# Patient Record
Sex: MALE | Race: WHITE | NOT HISPANIC OR LATINO | Employment: UNEMPLOYED | URBAN - METROPOLITAN AREA
[De-identification: names, ages, dates, MRNs, and addresses within clinical notes are randomized per-mention and may not be internally consistent; named-entity substitution may affect disease eponyms.]

---

## 2017-01-03 ENCOUNTER — HOSPITAL ENCOUNTER (OUTPATIENT)
Dept: CARDIOLOGY | Facility: HOSPITAL | Age: 57
Discharge: HOME | End: 2017-01-03
Admitting: INTERNAL MEDICINE

## 2017-01-03 PROCEDURE — 85610 PROTHROMBIN TIME: CPT | Performed by: INTERNAL MEDICINE

## 2017-01-03 RX ORDER — RAMIPRIL 1.25 MG/1
CAPSULE ORAL
Qty: 30 CAPSULE | Refills: 0 | Status: SHIPPED | OUTPATIENT
Start: 2017-01-03 | End: 2017-02-16 | Stop reason: SDUPTHER

## 2017-01-03 RX ORDER — WARFARIN SODIUM 2 MG/1
TABLET ORAL
Qty: 45 TABLET | Refills: 0 | Status: SHIPPED | OUTPATIENT
Start: 2017-01-03 | End: 2017-02-16 | Stop reason: SDUPTHER

## 2017-01-03 RX ORDER — AMIODARONE HYDROCHLORIDE 200 MG/1
TABLET ORAL
Qty: 30 TABLET | Refills: 0 | Status: SHIPPED | OUTPATIENT
Start: 2017-01-03 | End: 2017-02-16 | Stop reason: SDUPTHER

## 2017-01-09 DIAGNOSIS — Z79.01 ANTICOAGULATED BY ANTICOAGULATION TREATMENT: ICD-10-CM

## 2017-01-09 DIAGNOSIS — I48.0 PAROXYSMAL ATRIAL FIBRILLATION (HCC): Primary | ICD-10-CM

## 2017-01-09 DIAGNOSIS — Z95.3 S/P MITRAL VALVE REPLACEMENT WITH PORCINE VALVE: ICD-10-CM

## 2017-01-10 ENCOUNTER — HOSPITAL ENCOUNTER (OUTPATIENT)
Dept: CARDIOLOGY | Facility: HOSPITAL | Age: 57
Discharge: HOME | End: 2017-01-10
Admitting: INTERNAL MEDICINE

## 2017-01-10 PROCEDURE — 85610 PROTHROMBIN TIME: CPT | Performed by: INTERNAL MEDICINE

## 2017-01-13 ENCOUNTER — OFFICE VISIT (OUTPATIENT)
Dept: CARDIOLOGY | Facility: CLINIC | Age: 57
End: 2017-01-13

## 2017-01-13 ENCOUNTER — HOSPITAL ENCOUNTER (OUTPATIENT)
Dept: CARDIOLOGY | Facility: HOSPITAL | Age: 57
Discharge: HOME | End: 2017-01-13
Admitting: INTERNAL MEDICINE

## 2017-01-13 VITALS
HEART RATE: 75 BPM | WEIGHT: 225 LBS | DIASTOLIC BLOOD PRESSURE: 85 MMHG | SYSTOLIC BLOOD PRESSURE: 144 MMHG | BODY MASS INDEX: 31.38 KG/M2

## 2017-01-13 DIAGNOSIS — F10.20 ALCOHOLISM (HCC): ICD-10-CM

## 2017-01-13 DIAGNOSIS — Z95.2 S/P MVR (MITRAL VALVE REPLACEMENT): ICD-10-CM

## 2017-01-13 DIAGNOSIS — I10 BENIGN ESSENTIAL HYPERTENSION: Primary | ICD-10-CM

## 2017-01-13 DIAGNOSIS — Z98.890 S/P TVR (TRICUSPID VALVE REPAIR): ICD-10-CM

## 2017-01-13 DIAGNOSIS — J43.8 OTHER EMPHYSEMA (HCC): ICD-10-CM

## 2017-01-13 DIAGNOSIS — I10 ESSENTIAL HYPERTENSION: ICD-10-CM

## 2017-01-13 PROBLEM — R94.31 ABNORMAL ELECTROCARDIOGRAM: Status: ACTIVE | Noted: 2017-01-13

## 2017-01-13 PROCEDURE — 85610 PROTHROMBIN TIME: CPT | Performed by: INTERNAL MEDICINE

## 2017-01-13 PROCEDURE — 93000 ELECTROCARDIOGRAM COMPLETE: CPT | Performed by: INTERNAL MEDICINE

## 2017-01-13 PROCEDURE — 99214 OFFICE O/P EST MOD 30 MIN: CPT | Performed by: INTERNAL MEDICINE

## 2017-01-13 NOTE — MR AVS SNAPSHOT
Joaquín Garcia   1/13/2017 9:30 AM   Office Visit    Dept Phone:  [phone]   Encounter #:  [id number]    Provider:  Steve Ford MD   Department:  [facility]                Your Full Care Plan              Today's Medication Changes          These changes are accurate as of: 1/13/17 10:45 AM.  If you have any questions, ask your nurse or doctor.               Stop taking medication(s)listed here:     cyclobenzaprine 10 MG tablet   Commonly known as:  FLEXERIL   Stopped by:  Steve Ford MD                      Your Updated Medication List          This list is accurate as of: 1/13/17 10:45 AM.  Always use your most recent med list.                acetaminophen 325 MG tablet   Commonly known as:  TYLENOL   Take 2 tablets by mouth Every 6 (Six) Hours As Needed for mild pain (1-3).       amiodarone 200 MG tablet   Commonly known as:  PACERONE   TAKE 1 TABLET BY MOUTH DAILY       aspirin 81 MG EC tablet   Take 1 tablet by mouth Daily.       budesonide-formoterol 160-4.5 MCG/ACT inhaler   Commonly known as:  SYMBICORT   Inhale 2 puffs 2 (Two) Times a Day.       metoprolol tartrate 25 MG tablet   Commonly known as:  LOPRESSOR   TAKE 1 TABLET BY MOUTH EVERY 12 HOURS       ramipril 1.25 MG capsule   Commonly known as:  ALTACE   TAKE 1 CAPSULE BY MOUTH DAILY       SPIRIVA HANDIHALER 18 MCG per inhalation capsule   Generic drug:  tiotropium   INHALE 2 PUFFS BY MOUTH EVERY DAY       warfarin 2 MG tablet   Commonly known as:  COUMADIN   TAKE 1 TABLET BY MOUTH ON MONDAY, WEDNESDAY, FRIDAY. TAKE 1& 1/2 TABLETS ALL OTHER DAYS AS DIRECTED WEEKLY FOR INR 2-3               We Performed the Following     ECG 12 Lead     Protime-INR       You Were Diagnosed With        Codes Comments    Benign essential hypertension    -  Primary ICD-10-CM: I10  ICD-9-CM: 401.1     S/P TVR (tricuspid valve repair)     ICD-10-CM: Z98.890  ICD-9-CM: V45.89        Essential hypertension     ICD-10-CM: I10  ICD-9-CM: 401.9     Other emphysema     ICD-10-CM: J43.8  ICD-9-CM: 492.8     Alcoholism     ICD-10-CM: F10.20  ICD-9-CM: 303.90     S/P MVR (mitral valve replacement)     ICD-10-CM: Z95.2  ICD-9-CM: V43.3       Instructions     None    Patient Instructions History      Upcoming Appointments     Visit Type Date Time Department    OFFICE VISIT 2017  9:30 AM [facility]    LAB 2017  9:45 AM [facility]    OFFICE VISIT 2017  3:00 PM [facility]    OFFICE VISIT 2017  9:00 AM [facility]    OFFICE VISIT 2017 11:00 AM [facility] CARDIAC SURG [facility]      MyChart Signup     [facility] [employer] allows you to send messages to your doctor, view your test results, renew your prescriptions, schedule appointments, and more. To sign up, go to [employer] and click on the Sign Up Now link in the New User? box. Enter your [employer] Activation Code exactly as it appears below along with the last four digits of your Social Security Number and your Date of Birth () to complete the sign-up process. If you do not sign up before the expiration date, you must request a new code.    [employer] Activation Code: [email or web address]  Expires: 2017 10:45 AM    If you have questions, you can email [email or web address] or call [phone] to talk to our [employer] staff. Remember, [employer] is NOT to be used for urgent needs. For medical emergencies, dial 911.               Other Info from Your Visit           Your Appointments     2017  9:45 AM EST   Lab with [facility] LAB/PT INR   [facility] ([place])    [address] [address]   [phone]            2017  3:00 PM EST   Office Visit with James Epley, APRN   [facility] (--)    [address] [facility] [id number] [phone]           Arrive 15 minutes  prior to appointment.            Apr 14, 2017  9:00 AM EDT   Office Visit with Steve Ford MD   [facility] (--)    [address]  [facility] [address] [phone]           Arrive 15 minutes prior to appointment.            Jun 07, 2017 11:00 AM EDT   Office Visit with Robert George MD   [facility] CARDIAC SURGERY (--)    [address]  [facility] [address] [phone]           Arrive 15 minutes prior to appointment.              Allergies     No Known Allergies      Reason for Visit     Atrial Fibrillation           Vital Signs     Blood Pressure Pulse Weight Body Mass Index Smoking Status       144/85 75 225 lb (102 kg) 31.38 kg/m2 Former Smoker       Problems and Diagnoses Noted     Alcoholism    Benign essential hypertension    Chronic airway obstruction    High blood pressure    Status post mitral valve replacement    Status post tricuspid valve repair      Results     ECG 12 Lead

## 2017-01-13 NOTE — PROGRESS NOTES
Procedure   [facility]  Cardiology Progress Note    Patient Identification:  Name:Joaquín Garcia  Age:56 y.o.  Sex: male  :  1960  MRN: [id number]           2017    Subjective:    Chief Complaint   Patient presents with   • Atrial Fibrillation       HPI     He had severe MR with no CAD and subsequently underwent s/p Mitral valve replacement with a 33 mm Medtronic Mosaic ultra porcine valve with preservation of all subvalvar apparatus. Tricuspid valve repair with a 32 mm Medtronic 3-D ring in 2016.  He denies any chest pain. He was once cardioverted but went back into atrial fibrillation.  Now still in atrial fib. No angina. His BP has been stable    His heart rate is stable.  He denies any syncope.  INR is a bit fluctuating    Review of Systems   Constitution: Negative for chills, fever and malaise/fatigue.   HENT: Positive for headaches (occ).    Eyes: Negative for blurred vision and double vision.   Cardiovascular: Positive for chest pain (some) and leg swelling (right foot occ). Negative for irregular heartbeat and palpitations.   Respiratory: Positive for snoring. Negative for shortness of breath.    Skin: Negative for color change.   Musculoskeletal: Positive for arthritis and joint pain.   Gastrointestinal: Negative for abdominal pain, nausea and vomiting.   Neurological: Positive for light-headedness (occ). Negative for dizziness and numbness.   Psychiatric/Behavioral: Negative for depression.       Past Medical History   Diagnosis Date   • Atrial fibrillation    • Atrial fibrillation    • COPD (chronic obstructive pulmonary disease)    • Emphysema with chronic bronchitis    • Hypertension    • Neurofibromatosis        Past Surgical History   Procedure Laterality Date   • Cardiac catheterization  10/20/2016     Procedure: Case Abort;  Surgeon: Zhen Ford MD;  Location: [facility] INVASIVE LOCATION;  Service:    • Cardiac catheterization Left 10/21/2016      Procedure: Cardiac catheterization;  Surgeon: Zhen Ford MD;  Location: [facility] INVASIVE LOCATION;  Service:    • Hernia repair     • Teeth extraction N/A 10/25/2016     Procedure: TEETH EXTRACTION FULL MOUTH;  Surgeon: Gio Ibarra DMD;  Location: [facility] OR;  Service:    • Mitral valve repair/replacement N/A 10/27/2016     Procedure: INTRAOPERATIVE LILIA, MIDLINE STERNOTOMY WITH MITRAL VALVE REPLACEMENT TRICUSPID VALVE REPAIR;  Surgeon: Robert George MD;  Location: [facility] OR;  Service:        Social History     Social History   • Marital status: Single     Spouse name: N/A   • Number of children: N/A   • Years of education: N/A     Occupational History   • Not on file.     Social History Main Topics   • Smoking status: Former Smoker     Packs/day: 0.50   • Smokeless tobacco: Not on file      Comment: stopped 53 days ago   • Alcohol use Yes      Comment: social drinker   • Drug use: No   • Sexual activity: Not on file     Other Topics Concern   • Not on file     Social History Narrative       History reviewed. No pertinent family history.    Scheduled Meds:    Current Outpatient Prescriptions:   •  acetaminophen (TYLENOL) 325 MG tablet, Take 2 tablets by mouth Every 6 (Six) Hours As Needed for mild pain (1-3)., Disp: , Rfl: 0  •  amiodarone (PACERONE) 200 MG tablet, TAKE 1 TABLET BY MOUTH DAILY, Disp: 30 tablet, Rfl: 0  •  aspirin 81 MG EC tablet, Take 1 tablet by mouth Daily., Disp: , Rfl:   •  budesonide-formoterol (SYMBICORT) 160-4.5 MCG/ACT inhaler, Inhale 2 puffs 2 (Two) Times a Day., Disp: 1 inhaler, Rfl: 0  •  metoprolol tartrate (LOPRESSOR) 25 MG tablet, TAKE 1 TABLET BY MOUTH EVERY 12 HOURS, Disp: 60 tablet, Rfl: 0  •  ramipril (ALTACE) 1.25 MG capsule, TAKE 1 CAPSULE BY MOUTH DAILY, Disp: 30 capsule, Rfl: 0  •  SPIRIVA HANDIHALER 18 MCG per inhalation capsule, INHALE 2 PUFFS BY MOUTH EVERY DAY, Disp: 30 capsule, Rfl: 0  •  warfarin (COUMADIN) 2 MG tablet, TAKE 1 TABLET BY  MOUTH ON MONDAY, WEDNESDAY, FRIDAY. TAKE 1& 1/2 TABLETS ALL OTHER DAYS AS DIRECTED WEEKLY FOR INR 2-3, Disp: 45 tablet, Rfl: 0    Objective:  Visit Vitals   • /85   • Pulse 75   • Wt 225 lb (102 kg)   • BMI 31.38 kg/m2        Physical Exam  Physical Exam:    General: No acute distress.    Skin: Warm and dry, no diaphoresis noted   HEENT: No ptosis; external ear and nose normal; oral mucosa moist   Neck: Supple; no carotid bruits; no JVD, Trachea mid line   Heart: S1S2 ir regular rate and rhythm; no murmurs; no gallop or rub appreciated, apex not displaced   Chest: Respirations regular, unlabored at rest, bilateral breath sounds have good air entry; no  wheezes auscultated.     Abdomen: Soft, non-tender, non-distended, positive bowel sounds  No hepatosplenomegaly   Extremities: Bilateral lower extremities have no pre-tibial pitting edema; Radials are palpable   Neurological: Alert and oriented x 3; no new motor deficits,           ECG 12 Lead  Date/Time: 1/13/2017 10:38 AM  Performed by: FARHEEN FORD  Authorized by: FARHEEN FORD   Comparison: compared with previous ECG   Similar to previous ECG  Rhythm: atrial fibrillation  Rate: normal  QRS axis: left               Assessment:  Problem List Items Addressed This Visit        Cardiovascular and Mediastinum    S/P MVR (mitral valve replacement)    Hypertension    S/P TVR (tricuspid valve repair)    Benign essential hypertension - Primary       Respiratory    COPD (chronic obstructive pulmonary disease)       Other    Alcoholism          Plan:    Overall clinically he has been doing OK. INR is a bit fluctuating.  I will check his INR again today. CHF has been stable.  No new complaints.  His BP has been stable and he says he has not given up alcolhol yet.  He is on amiodarone for rate control and QT has been stable.       01/13/2017  Zhen Ford MD, FACC

## 2017-01-17 ENCOUNTER — HOSPITAL ENCOUNTER (OUTPATIENT)
Dept: CARDIOLOGY | Facility: HOSPITAL | Age: 57
Discharge: HOME | End: 2017-01-17
Admitting: INTERNAL MEDICINE

## 2017-01-17 PROCEDURE — 85610 PROTHROMBIN TIME: CPT | Performed by: INTERNAL MEDICINE

## 2017-01-24 ENCOUNTER — HOSPITAL ENCOUNTER (OUTPATIENT)
Dept: CARDIOLOGY | Facility: HOSPITAL | Age: 57
Discharge: HOME | End: 2017-01-24
Admitting: INTERNAL MEDICINE

## 2017-01-24 PROCEDURE — 85610 PROTHROMBIN TIME: CPT | Performed by: INTERNAL MEDICINE

## 2017-01-31 ENCOUNTER — HOSPITAL ENCOUNTER (OUTPATIENT)
Dept: CARDIOLOGY | Facility: HOSPITAL | Age: 57
Discharge: HOME | End: 2017-01-31
Admitting: INTERNAL MEDICINE

## 2017-01-31 PROCEDURE — 85610 PROTHROMBIN TIME: CPT | Performed by: INTERNAL MEDICINE

## 2017-02-07 ENCOUNTER — HOSPITAL ENCOUNTER (OUTPATIENT)
Dept: CARDIOLOGY | Facility: HOSPITAL | Age: 57
Discharge: HOME | End: 2017-02-07
Admitting: INTERNAL MEDICINE

## 2017-02-07 PROCEDURE — 85610 PROTHROMBIN TIME: CPT | Performed by: INTERNAL MEDICINE

## 2017-02-14 ENCOUNTER — APPOINTMENT (OUTPATIENT)
Dept: CARDIOLOGY | Facility: HOSPITAL | Age: 57
End: 2017-02-14

## 2017-02-17 ENCOUNTER — HOSPITAL ENCOUNTER (OUTPATIENT)
Dept: CARDIOLOGY | Facility: HOSPITAL | Age: 57
End: 2017-02-17

## 2017-02-17 RX ORDER — WARFARIN SODIUM 2 MG/1
TABLET ORAL
Qty: 45 TABLET | Refills: 0 | Status: SHIPPED | OUTPATIENT
Start: 2017-02-17 | End: 2017-03-12 | Stop reason: SDUPTHER

## 2017-02-17 RX ORDER — RAMIPRIL 1.25 MG/1
CAPSULE ORAL
Qty: 30 CAPSULE | Refills: 0 | Status: SHIPPED | OUTPATIENT
Start: 2017-02-17 | End: 2017-03-19 | Stop reason: SDUPTHER

## 2017-02-17 RX ORDER — AMIODARONE HYDROCHLORIDE 200 MG/1
TABLET ORAL
Qty: 30 TABLET | Refills: 0 | Status: SHIPPED | OUTPATIENT
Start: 2017-02-17 | End: 2017-03-19 | Stop reason: SDUPTHER

## 2017-02-23 ENCOUNTER — HOSPITAL ENCOUNTER (OUTPATIENT)
Dept: CARDIOLOGY | Facility: HOSPITAL | Age: 57
Discharge: HOME | End: 2017-02-23
Admitting: INTERNAL MEDICINE

## 2017-02-23 ENCOUNTER — OFFICE VISIT (OUTPATIENT)
Dept: FAMILY MEDICINE CLINIC | Facility: CLINIC | Age: 57
End: 2017-02-23

## 2017-02-23 VITALS
WEIGHT: 232 LBS | DIASTOLIC BLOOD PRESSURE: 98 MMHG | HEART RATE: 95 BPM | SYSTOLIC BLOOD PRESSURE: 142 MMHG | OXYGEN SATURATION: 96 % | BODY MASS INDEX: 32.48 KG/M2 | HEIGHT: 71 IN

## 2017-02-23 DIAGNOSIS — Z00.00 HEALTH MAINTENANCE EXAMINATION: ICD-10-CM

## 2017-02-23 DIAGNOSIS — I48.20 ATRIAL FIBRILLATION, CHRONIC (HCC): Primary | ICD-10-CM

## 2017-02-23 DIAGNOSIS — Z95.2 S/P MVR (MITRAL VALVE REPLACEMENT): ICD-10-CM

## 2017-02-23 DIAGNOSIS — R74.8 ALKALINE PHOSPHATASE ELEVATION: ICD-10-CM

## 2017-02-23 DIAGNOSIS — Z13.9 SCREENING: ICD-10-CM

## 2017-02-23 DIAGNOSIS — J43.9 PULMONARY EMPHYSEMA, UNSPECIFIED EMPHYSEMA TYPE (HCC): ICD-10-CM

## 2017-02-23 DIAGNOSIS — Q85.00 NF (NEUROFIBROMATOSIS) (HCC): ICD-10-CM

## 2017-02-23 DIAGNOSIS — Z11.59 NEED FOR HEPATITIS C SCREENING TEST: ICD-10-CM

## 2017-02-23 PROCEDURE — 85610 PROTHROMBIN TIME: CPT | Performed by: INTERNAL MEDICINE

## 2017-02-23 PROCEDURE — 99213 OFFICE O/P EST LOW 20 MIN: CPT | Performed by: NURSE PRACTITIONER

## 2017-02-23 NOTE — PROGRESS NOTES
Subjective   Joaquín Garcia is a 56 y.o. male.     HPI Comments: Follow-up atrial fibrillation plan with medication compliant with warfarin INR within range managed by cardiology    Blood pressures been controlled little higher today  Just had it checked and it was normal    No chest pain no shortness of breath feels good    Recent lab work showed a mildly elevated alkaline phosphatase  Patient without bony pain  We'll repeat this lab today including a GGT    Works, new position at his work more active  Wants to start walking with his mom  Mostly just works a come home watches TV  Doesn't like computers  Used to golf consider trying again  Doesn't have any hobbies    Decreased alcohol 5 days week one half ounce only       The following portions of the patient's history were reviewed and updated as appropriate: allergies, current medications, past medical history, past social history, past surgical history and problem list.    Review of Systems   Constitutional: Negative.  Negative for appetite change, chills, fatigue, fever and unexpected weight change.   HENT: Negative for congestion, ear pain and sore throat.    Eyes: Negative.    Respiratory: Negative for cough, chest tightness, shortness of breath and wheezing.    Cardiovascular: Negative for chest pain, palpitations and leg swelling.   Gastrointestinal: Negative for abdominal pain and constipation.   Genitourinary: Negative for difficulty urinating, dysuria and urgency.   Musculoskeletal: Negative for arthralgias and myalgias.   Skin: Negative for rash and wound.   Neurological: Negative for dizziness, speech difficulty, weakness, numbness and headaches.   Psychiatric/Behavioral: Negative for sleep disturbance. The patient is not nervous/anxious.        Objective   Physical Exam   Constitutional: He appears well-developed.   Appears well very pleasant   Cardiovascular: Normal rate and normal heart sounds.    Irregularly irregular rate controlled    Pulmonary/Chest: Effort normal.   Abdominal: Soft.   Skin:   Moderately severe chronic skin changes  Neurofibromatous throughout  Chronic thickening nasal bridge  Increased on the left with deformity       Psychiatric: He has a normal mood and affect. His behavior is normal. Judgment and thought content normal.   Vitals reviewed.      Assessment/Plan   Joaquín was seen today for follow-up.    Diagnoses and all orders for this visit:    Atrial fibrillation, chronic  -     Comprehensive Metabolic Panel  -     CBC & Differential  -     TSH Rfx On Abnormal To Free T4  -     Lipid Panel With LDL / HDL Ratio  -     TSH  -     Gamma GT  -     PSA  -     Hepatitis C Antibody  -     Vitamin D 25 Hydroxy    S/P MVR (mitral valve replacement)  -     Comprehensive Metabolic Panel  -     CBC & Differential  -     TSH Rfx On Abnormal To Free T4  -     Lipid Panel With LDL / HDL Ratio  -     TSH  -     Gamma GT  -     PSA  -     Hepatitis C Antibody  -     Vitamin D 25 Hydroxy    Pulmonary emphysema, unspecified emphysema type  -     Comprehensive Metabolic Panel  -     CBC & Differential  -     TSH Rfx On Abnormal To Free T4  -     Lipid Panel With LDL / HDL Ratio  -     TSH  -     Gamma GT  -     PSA  -     Hepatitis C Antibody  -     Vitamin D 25 Hydroxy    NF (neurofibromatosis)  -     Comprehensive Metabolic Panel  -     CBC & Differential  -     TSH Rfx On Abnormal To Free T4  -     Lipid Panel With LDL / HDL Ratio  -     TSH  -     Gamma GT  -     PSA  -     Hepatitis C Antibody  -     Vitamin D 25 Hydroxy    Alkaline phosphatase elevation  -     Comprehensive Metabolic Panel  -     CBC & Differential  -     TSH Rfx On Abnormal To Free T4  -     Lipid Panel With LDL / HDL Ratio  -     TSH  -     Gamma GT  -     PSA  -     Hepatitis C Antibody  -     Vitamin D 25 Hydroxy    Screening  -     Comprehensive Metabolic Panel  -     CBC & Differential  -     TSH Rfx On Abnormal To Free T4  -     Lipid Panel With LDL / HDL  Ratio  -     TSH  -     Gamma GT  -     PSA  -     Hepatitis C Antibody  -     Vitamin D 25 Hydroxy    Need for hepatitis C screening test  -     Comprehensive Metabolic Panel  -     CBC & Differential  -     TSH Rfx On Abnormal To Free T4  -     Lipid Panel With LDL / HDL Ratio  -     TSH  -     Gamma GT  -     PSA  -     Hepatitis C Antibody  -     Vitamin D 25 Hydroxy    Health maintenance examination  -     Comprehensive Metabolic Panel  -     CBC & Differential  -     TSH Rfx On Abnormal To Free T4  -     Lipid Panel With LDL / HDL Ratio  -     TSH  -     Gamma GT  -     PSA  -     Hepatitis C Antibody  -     Vitamin D 25 Hydroxy                  Avoid alcohol    Encouraged sometimes social activity or  Hobby    Recheck in 4-6 months  Follow-up cardiology  Recheck blood pressure  Reviewed meds continue same medication

## 2017-03-09 ENCOUNTER — HOSPITAL ENCOUNTER (OUTPATIENT)
Dept: CARDIOLOGY | Facility: HOSPITAL | Age: 57
Discharge: HOME | End: 2017-03-09
Admitting: INTERNAL MEDICINE

## 2017-03-09 PROCEDURE — 85610 PROTHROMBIN TIME: CPT | Performed by: INTERNAL MEDICINE

## 2017-03-13 RX ORDER — WARFARIN SODIUM 2 MG/1
TABLET ORAL
Qty: 45 TABLET | Refills: 0 | Status: SHIPPED | OUTPATIENT
Start: 2017-03-13 | End: 2017-04-14 | Stop reason: SDUPTHER

## 2017-03-20 RX ORDER — RAMIPRIL 1.25 MG/1
CAPSULE ORAL
Qty: 30 CAPSULE | Refills: 0 | Status: SHIPPED | OUTPATIENT
Start: 2017-03-20 | End: 2017-04-27 | Stop reason: SDUPTHER

## 2017-03-20 RX ORDER — AMIODARONE HYDROCHLORIDE 200 MG/1
TABLET ORAL
Qty: 30 TABLET | Refills: 0 | Status: SHIPPED | OUTPATIENT
Start: 2017-03-20 | End: 2017-04-27 | Stop reason: SDUPTHER

## 2017-03-23 ENCOUNTER — HOSPITAL ENCOUNTER (OUTPATIENT)
Dept: CARDIOLOGY | Facility: HOSPITAL | Age: 57
Discharge: HOME | End: 2017-03-23
Admitting: INTERNAL MEDICINE

## 2017-03-23 PROCEDURE — 85610 PROTHROMBIN TIME: CPT | Performed by: INTERNAL MEDICINE

## 2017-04-06 ENCOUNTER — HOSPITAL ENCOUNTER (OUTPATIENT)
Dept: CARDIOLOGY | Facility: HOSPITAL | Age: 57
Discharge: HOME | End: 2017-04-06
Admitting: INTERNAL MEDICINE

## 2017-04-06 PROCEDURE — 85610 PROTHROMBIN TIME: CPT | Performed by: INTERNAL MEDICINE

## 2017-04-15 RX ORDER — WARFARIN SODIUM 2 MG/1
TABLET ORAL
Qty: 45 TABLET | Refills: 0 | Status: SHIPPED | OUTPATIENT
Start: 2017-04-15 | End: 2017-05-18 | Stop reason: SDUPTHER

## 2017-04-20 ENCOUNTER — HOSPITAL ENCOUNTER (OUTPATIENT)
Dept: CARDIOLOGY | Facility: HOSPITAL | Age: 57
Discharge: HOME | End: 2017-04-20
Admitting: INTERNAL MEDICINE

## 2017-04-20 PROCEDURE — 85610 PROTHROMBIN TIME: CPT | Performed by: INTERNAL MEDICINE

## 2017-04-28 RX ORDER — RAMIPRIL 1.25 MG/1
CAPSULE ORAL
Qty: 30 CAPSULE | Refills: 0 | Status: SHIPPED | OUTPATIENT
Start: 2017-04-28 | End: 2017-07-03 | Stop reason: SDUPTHER

## 2017-04-28 RX ORDER — AMIODARONE HYDROCHLORIDE 200 MG/1
TABLET ORAL
Qty: 30 TABLET | Refills: 0 | Status: SHIPPED | OUTPATIENT
Start: 2017-04-28 | End: 2017-06-22 | Stop reason: SDUPTHER

## 2017-05-04 ENCOUNTER — HOSPITAL ENCOUNTER (OUTPATIENT)
Dept: CARDIOLOGY | Facility: HOSPITAL | Age: 57
Discharge: HOME | End: 2017-05-04
Admitting: INTERNAL MEDICINE

## 2017-05-04 PROCEDURE — 85610 PROTHROMBIN TIME: CPT | Performed by: INTERNAL MEDICINE

## 2017-05-18 ENCOUNTER — HOSPITAL ENCOUNTER (OUTPATIENT)
Dept: CARDIOLOGY | Facility: HOSPITAL | Age: 57
Discharge: HOME | End: 2017-05-18
Admitting: INTERNAL MEDICINE

## 2017-05-18 PROCEDURE — 85610 PROTHROMBIN TIME: CPT | Performed by: INTERNAL MEDICINE

## 2017-05-18 RX ORDER — WARFARIN SODIUM 2 MG/1
TABLET ORAL
Qty: 50 TABLET | Refills: 0 | Status: SHIPPED | OUTPATIENT
Start: 2017-05-18 | End: 2017-06-18 | Stop reason: SDUPTHER

## 2017-06-01 ENCOUNTER — HOSPITAL ENCOUNTER (OUTPATIENT)
Dept: CARDIOLOGY | Facility: HOSPITAL | Age: 57
Discharge: HOME | End: 2017-06-01
Admitting: INTERNAL MEDICINE

## 2017-06-01 PROCEDURE — 85610 PROTHROMBIN TIME: CPT | Performed by: INTERNAL MEDICINE

## 2017-06-18 RX ORDER — WARFARIN SODIUM 2 MG/1
TABLET ORAL
Qty: 50 TABLET | Refills: 0 | Status: SHIPPED | OUTPATIENT
Start: 2017-06-18 | End: 2017-08-04 | Stop reason: SDUPTHER

## 2017-06-22 ENCOUNTER — OFFICE VISIT (OUTPATIENT)
Dept: CARDIAC SURGERY | Facility: CLINIC | Age: 57
End: 2017-06-22

## 2017-06-22 VITALS
TEMPERATURE: 98 F | HEART RATE: 72 BPM | BODY MASS INDEX: 30.96 KG/M2 | DIASTOLIC BLOOD PRESSURE: 80 MMHG | SYSTOLIC BLOOD PRESSURE: 138 MMHG | OXYGEN SATURATION: 97 % | WEIGHT: 222 LBS | RESPIRATION RATE: 16 BRPM

## 2017-06-22 DIAGNOSIS — Z95.2 S/P MVR (MITRAL VALVE REPLACEMENT): Primary | ICD-10-CM

## 2017-06-22 DIAGNOSIS — Z98.890 S/P TVR (TRICUSPID VALVE REPAIR): ICD-10-CM

## 2017-06-22 PROCEDURE — 99213 OFFICE O/P EST LOW 20 MIN: CPT | Performed by: THORACIC SURGERY (CARDIOTHORACIC VASCULAR SURGERY)

## 2017-06-22 NOTE — PROGRESS NOTES
CARDIOVASCULAR SURGERY FOLLOW-UP PROGRESS NOTE  Chief Complaint: Here for follow-up 9 months after mitral valve replacement and tricuspid repair.        HPI:   Dear Dr. Lidya Gonzalez MD and colleagues:    It was nice to see Joaquín Garcia in follow up In 9 months  after surgery.  As you know, he is a 56 y.o. male with severe mitral incompetence and mitral stenosis chronic persistent atrial fibrillation and tricuspid incompetence who underwent mitral valve replacement with a porcine valve and tricuspid valve repair on 10/27/16. He did well postoperatively and continues to do well. He comes in today complaining of nothing.  His activity level has been good.       Physical Exam:         /80 (BP Location: Left arm, Patient Position: Sitting, Cuff Size: Adult)  Pulse 72  Temp 98 °F (36.7 °C) (Oral)   Resp 16  Wt 222 lb (101 kg)  SpO2 97%  BMI 30.96 kg/m2  Heart:  irregularly irregular rhythm heart sounds are normal with no murmurs and no incompetence.  Mitral valve sounds are normal.  Lungs:  clear to auscultation bilaterally  Extremities:  no edema  Incision(s):  sternum stable    Assessment/Plan:     S/P MVR and tricuspid valve repair. Overall, he is doing well.    No significant post-op complications    Follow-up as scheduled with cardiology  Follow-up as scheduled with PCP  Return to clinic in 1 year(s) with no new studies    No restrictions of activity.      Thank you for allowing me to participate in the care of your   patient.  Regards,  Robert George MD

## 2017-06-23 RX ORDER — AMIODARONE HYDROCHLORIDE 200 MG/1
TABLET ORAL
Qty: 30 TABLET | Refills: 0 | Status: SHIPPED | OUTPATIENT
Start: 2017-06-23 | End: 2017-07-14 | Stop reason: ALTCHOICE

## 2017-07-03 RX ORDER — RAMIPRIL 1.25 MG/1
1.25 CAPSULE ORAL DAILY
Qty: 30 CAPSULE | Refills: 2 | Status: SHIPPED | OUTPATIENT
Start: 2017-07-03 | End: 2017-09-05 | Stop reason: SDUPTHER

## 2017-07-14 ENCOUNTER — OFFICE VISIT (OUTPATIENT)
Dept: CARDIOLOGY | Facility: CLINIC | Age: 57
End: 2017-07-14

## 2017-07-14 ENCOUNTER — HOSPITAL ENCOUNTER (OUTPATIENT)
Dept: CARDIOLOGY | Facility: HOSPITAL | Age: 57
Discharge: HOME | End: 2017-07-14
Admitting: INTERNAL MEDICINE

## 2017-07-14 VITALS
HEART RATE: 80 BPM | WEIGHT: 232 LBS | SYSTOLIC BLOOD PRESSURE: 138 MMHG | DIASTOLIC BLOOD PRESSURE: 91 MMHG | BODY MASS INDEX: 32.36 KG/M2

## 2017-07-14 DIAGNOSIS — Z98.890 S/P TVR (TRICUSPID VALVE REPAIR): ICD-10-CM

## 2017-07-14 DIAGNOSIS — I10 BENIGN ESSENTIAL HYPERTENSION: ICD-10-CM

## 2017-07-14 DIAGNOSIS — I48.19 PERSISTENT ATRIAL FIBRILLATION (HCC): Primary | ICD-10-CM

## 2017-07-14 DIAGNOSIS — Z95.2 S/P MVR (MITRAL VALVE REPLACEMENT): ICD-10-CM

## 2017-07-14 PROCEDURE — 93000 ELECTROCARDIOGRAM COMPLETE: CPT | Performed by: INTERNAL MEDICINE

## 2017-07-14 PROCEDURE — 99214 OFFICE O/P EST MOD 30 MIN: CPT | Performed by: INTERNAL MEDICINE

## 2017-07-14 PROCEDURE — 85610 PROTHROMBIN TIME: CPT | Performed by: INTERNAL MEDICINE

## 2017-07-14 RX ORDER — METOPROLOL SUCCINATE 100 MG/1
100 TABLET, EXTENDED RELEASE ORAL DAILY
Qty: 30 TABLET | Refills: 3 | Status: SHIPPED | OUTPATIENT
Start: 2017-07-14 | End: 2018-01-08 | Stop reason: SDUPTHER

## 2017-07-14 NOTE — PROGRESS NOTES
Procedure   [facility]  Cardiology Progress Note    Patient Identification:  Name:Joaquín Garcia  Age:56 y.o.  Sex: male  :  1960  MRN: [id number]           2017    Subjective:    Chief Complaint   Patient presents with   • Atrial Fibrillation       HPI    He had severe MR with no CAD and subsequently underwent s/p Mitral valve replacement with a 33 mm Medtronic Mosaic ultra porcine valve with preservation of all subvalvar apparatus. Tricuspid valve repair with a 32 mm Medtronic 3-D ring in 2016.  He denies any chest pain. He was once cardioverted but went back into atrial fibrillation. Now still in atrial fib. No angina. His BP has been stable     His heart rate is stable. He denies any syncope. INR is a bit fluctuating. He is not very compliant patient. No symptoms of CHF.     Review of Systems   Constitution: Positive for malaise/fatigue. Negative for chills and fever.   HENT: Positive for headaches.    Eyes: Negative for blurred vision and double vision.   Cardiovascular: Positive for chest pain (occ) and irregular heartbeat. Negative for leg swelling and palpitations.   Respiratory: Positive for snoring. Negative for shortness of breath.    Skin: Negative for color change.   Musculoskeletal: Positive for arthritis and joint pain.   Neurological: Positive for numbness. Negative for dizziness and light-headedness.       The following portions of the patient's history were reviewed and updated as appropriate: allergies, current medications, past family history, past medical history, past social history,and problem list.    Past Medical History:   Diagnosis Date   • Atrial fibrillation    • Atrial fibrillation    • COPD (chronic obstructive pulmonary disease)    • Emphysema with chronic bronchitis    • Hypertension    • Neurofibromatosis        Past Surgical History:   Procedure Laterality Date   • CARDIAC CATHETERIZATION  10/20/2016    Procedure: Case Abort;  Surgeon:  Zhen Ford MD;  Location: [facility] CATH INVASIVE LOCATION;  Service:    • CARDIAC CATHETERIZATION Left 10/21/2016    Procedure: Cardiac catheterization;  Surgeon: Zhen Ford MD;  Location: [facility] CATH INVASIVE LOCATION;  Service:    • HERNIA REPAIR     • MITRAL VALVE REPAIR/REPLACEMENT N/A 10/27/2016    Procedure: INTRAOPERATIVE LILIA, MIDLINE STERNOTOMY WITH MITRAL VALVE REPLACEMENT TRICUSPID VALVE REPAIR;  Surgeon: Robert George MD;  Location: [facility] OR;  Service:    • TEETH EXTRACTION N/A 10/25/2016    Procedure: TEETH EXTRACTION FULL MOUTH;  Surgeon: Gio Ibarra DMD;  Location: [facility] OR;  Service:        Social History     Social History   • Marital status: Single     Spouse name: N/A   • Number of children: N/A   • Years of education: N/A     Occupational History   • Not on file.     Social History Main Topics   • Smoking status: Former Smoker     Packs/day: 0.50   • Smokeless tobacco: Not on file      Comment: stopped 53 days ago   • Alcohol use Yes      Comment: social drinker   • Drug use: No   • Sexual activity: Not on file     Other Topics Concern   • Not on file     Social History Narrative       History reviewed. No pertinent family history.    Scheduled Meds:    Current Outpatient Prescriptions:   •  acetaminophen (TYLENOL) 325 MG tablet, Take 2 tablets by mouth Every 6 (Six) Hours As Needed for mild pain (1-3)., Disp: , Rfl: 0  •  aspirin 81 MG EC tablet, Take 1 tablet by mouth Daily., Disp: , Rfl:   •  budesonide-formoterol (SYMBICORT) 160-4.5 MCG/ACT inhaler, Inhale 2 puffs 2 (Two) Times a Day., Disp: 1 inhaler, Rfl: 0  •  ramipril (ALTACE) 1.25 MG capsule, Take 1 capsule by mouth Daily., Disp: 30 capsule, Rfl: 2  •  SPIRIVA HANDIHALER 18 MCG per inhalation capsule, INHALE 2 PUFFS BY MOUTH EVERY DAY, Disp: 30 capsule, Rfl: 0  •  warfarin (COUMADIN) 2 MG tablet, TAKE 1 TABLET BY MOUTH TUESDAY, THURSDAY, AND SUNDAY, TAKE 2 TABS ON ALL OTHER DAYS, Disp: 50 tablet,  Rfl: 0  •  metoprolol succinate XL (TOPROL XL) 100 MG 24 hr tablet, Take 1 tablet by mouth Daily., Disp: 30 tablet, Rfl: 3    Objective:  /91  Pulse 80  Wt 232 lb (105 kg)  BMI 32.36 kg/m2     Physical Exam  Physical Exam:    General: No acute distress.    Skin: Warm and dry, no diaphoresis noted   HEENT: No ptosis; external ear and nose normal; oral mucosa moist   Neck: Supple; no carotid bruits; no JVD, Trachea mid line   Heart: S1S2 irregular rate and rhythm; soft systolic murmurs; no gallop or rub appreciated, apex not displaced   Chest: Respirations regular, unlabored at rest, bilateral breath sounds have good air entry; no  wheezes auscultated.     Abdomen: Soft, non-tender, non-distended, positive bowel sounds  No hepatosplenomegaly   Extremities: Bilateral lower extremities have no pre-tibial pitting edema; Radials are palpable   Neurological: Alert and oriented x 3; no new motor deficits,           ECG 12 Lead  Date/Time: 7/14/2017 4:21 PM  Performed by: FARHEEN DAVIES  Authorized by: FARHEEN DAVIES   Rhythm: atrial fibrillation  QRS axis: right  Clinical impression: abnormal ECG           Comparison to previous ECG:  Similar to previous ecg     Assessment:  Problem List Items Addressed This Visit        Cardiovascular and Mediastinum    S/P MVR (mitral valve replacement)    S/P TVR (tricuspid valve repair)    Benign essential hypertension    Relevant Medications    metoprolol succinate XL (TOPROL XL) 100 MG 24 hr tablet      Other Visit Diagnoses     Persistent atrial fibrillation    -  Primary    Relevant Medications    metoprolol succinate XL (TOPROL XL) 100 MG 24 hr tablet          Plan:    Overall clinically stable with no angina. His ECG shows atrial fibrillation persistat. I am going to stop his amiodarone and go up on the dose of the coumadin. Importance regular INR checks to prevent stroke is explained.  Overall clinically he has been stable.      I not only counseled the  patient today on the risk factor modification of significant factors noted in the assessment and plan, and I also recommended that the patient reduce salt and saturated animal fat intake in diet, about the advantages of plant based diet, as well as to perform scheduled exercise on a regular basis.    07/14/2017  Zhen Ford MD, FACC

## 2017-07-27 ENCOUNTER — HOSPITAL ENCOUNTER (OUTPATIENT)
Dept: CARDIOLOGY | Facility: HOSPITAL | Age: 57
Discharge: HOME | End: 2017-07-27
Admitting: INTERNAL MEDICINE

## 2017-07-27 PROCEDURE — 85610 PROTHROMBIN TIME: CPT | Performed by: INTERNAL MEDICINE

## 2017-07-28 ENCOUNTER — APPOINTMENT (OUTPATIENT)
Dept: CARDIOLOGY | Facility: HOSPITAL | Age: 57
End: 2017-07-28

## 2017-08-04 ENCOUNTER — APPOINTMENT (OUTPATIENT)
Dept: CARDIOLOGY | Facility: HOSPITAL | Age: 57
End: 2017-08-04

## 2017-08-04 RX ORDER — WARFARIN SODIUM 2 MG/1
TABLET ORAL
Qty: 50 TABLET | Refills: 0 | Status: SHIPPED | OUTPATIENT
Start: 2017-08-04 | End: 2017-09-05 | Stop reason: SDUPTHER

## 2017-08-14 ENCOUNTER — TELEPHONE (OUTPATIENT)
Dept: FAMILY MEDICINE CLINIC | Facility: CLINIC | Age: 57
End: 2017-08-14

## 2017-08-14 DIAGNOSIS — N39.0 URINARY TRACT INFECTION, SITE UNSPECIFIED: Primary | ICD-10-CM

## 2017-08-14 RX ORDER — SULFAMETHOXAZOLE AND TRIMETHOPRIM 400; 80 MG/1; MG/1
1 TABLET ORAL 2 TIMES DAILY
Qty: 30 TABLET | Refills: 0 | Status: SHIPPED | OUTPATIENT
Start: 2017-08-14 | End: 2017-09-05

## 2017-09-01 RX ORDER — WARFARIN SODIUM 2 MG/1
TABLET ORAL
Qty: 50 TABLET | Refills: 0 | OUTPATIENT
Start: 2017-09-01

## 2017-09-05 ENCOUNTER — OFFICE VISIT (OUTPATIENT)
Dept: FAMILY MEDICINE CLINIC | Facility: CLINIC | Age: 57
End: 2017-09-05

## 2017-09-05 VITALS
TEMPERATURE: 98 F | BODY MASS INDEX: 32.76 KG/M2 | DIASTOLIC BLOOD PRESSURE: 90 MMHG | HEART RATE: 89 BPM | SYSTOLIC BLOOD PRESSURE: 190 MMHG | OXYGEN SATURATION: 99 % | WEIGHT: 234 LBS | HEIGHT: 71 IN

## 2017-09-05 DIAGNOSIS — I10 ESSENTIAL HYPERTENSION: ICD-10-CM

## 2017-09-05 DIAGNOSIS — Z95.2 S/P MVR (MITRAL VALVE REPLACEMENT): ICD-10-CM

## 2017-09-05 DIAGNOSIS — I48.20 ATRIAL FIBRILLATION, CHRONIC (HCC): Primary | ICD-10-CM

## 2017-09-05 DIAGNOSIS — F41.9 ANXIETY: ICD-10-CM

## 2017-09-05 PROCEDURE — 99214 OFFICE O/P EST MOD 30 MIN: CPT | Performed by: NURSE PRACTITIONER

## 2017-09-05 RX ORDER — WARFARIN SODIUM 2 MG/1
TABLET ORAL
Qty: 50 TABLET | Refills: 0 | OUTPATIENT
Start: 2017-09-05

## 2017-09-05 RX ORDER — WARFARIN SODIUM 2 MG/1
TABLET ORAL
Qty: 60 TABLET | Refills: 5 | Status: SHIPPED | OUTPATIENT
Start: 2017-09-05 | End: 2020-06-30 | Stop reason: SDUPTHER

## 2017-09-05 RX ORDER — ESCITALOPRAM OXALATE 5 MG/1
5 TABLET ORAL DAILY
Qty: 30 TABLET | Refills: 1 | Status: SHIPPED | OUTPATIENT
Start: 2017-09-05 | End: 2017-10-31

## 2017-09-05 RX ORDER — RAMIPRIL 10 MG/1
10 CAPSULE ORAL DAILY
Qty: 30 CAPSULE | Refills: 5 | Status: SHIPPED | OUTPATIENT
Start: 2017-09-05 | End: 2020-06-30 | Stop reason: SDUPTHER

## 2017-09-05 NOTE — PATIENT INSTRUCTIONS
Discharge instructions      Lexapro generic 5 mg which is considered a low dose    One half tablet the first week, then 1 tablet thereafter    Blood pressure increase ramipril 10 mg daily for blood pressure    Check blood pressure call me 2 days    Recheck in the office 2 weeks every 2 weeks INR    Follow-up office visit 6 weeks for anxiety and blood pressure    Thank You,      James Epley,  NP

## 2017-09-05 NOTE — PROGRESS NOTES
Subjective   Joaquín Garcia is a 56 y.o. male.     HPI Comments:   Patient is here to discuss warfarin treatment INR's anticoagulation. Also some issues with anxiety job-related.      Warfarin 2 mg Tuesdays Thursdays Sundays  4 mg all other days  INR today 2.8  Atrial fibrillation  2.0-3.0    Recently his cardiologist left the practice. He will see another cardiologist soon, needs to make an appointment. Until then he needs me to write him a prescription for his blood thinner Coumadin.  Presently compliant. History of noncompliance. The patient states he's learned his lesson.  Artificial Mitral valve replacement, nonmechanical  Atrial fibrillation.  Present without chest pain shortness of breath or dizziness.    He checks his blood pressure occasionally home recently 140/90\it was rechecked today per myself 190/90  Compliant with his blood pressure medications.      Last several months increased anxiety and stress at work.  New position with more responsibilities and work  At his job. Feels frustrated.  Sometimes has anger. Presently managed. But his anxiety is chronic and affects his personal and professional relationships  No history of severe mental illness no contraindications to SSRIs.  Patient reports he decreased his alcohol use presently one of the half up to 2 ounces per day except weekends 4 ounces Friday and Saturday                   The following portions of the patient's history were reviewed and updated as appropriate: current medications, past family history, past medical history, past social history, past surgical history and problem list.    Review of Systems   Constitutional: Negative.  Negative for appetite change, chills, fatigue, fever and unexpected weight change.   HENT: Negative for congestion, ear pain and sore throat.    Eyes: Negative.    Respiratory: Negative for cough, chest tightness, shortness of breath and wheezing.    Cardiovascular: Negative for chest pain, palpitations and leg  swelling.   Gastrointestinal: Negative for abdominal pain and constipation.   Genitourinary: Negative for difficulty urinating, dysuria and urgency.   Musculoskeletal: Negative for arthralgias and myalgias.   Skin: Negative for rash and wound.   Neurological: Negative for dizziness, speech difficulty, weakness, numbness and headaches.   Psychiatric/Behavioral: Negative for sleep disturbance. The patient is nervous/anxious.        Objective   Physical Exam   Constitutional: He is oriented to person, place, and time. He appears well-developed and well-nourished.   HENT:   Head: Normocephalic.   Tm clear rush no mass canal patent without d/c   Eyes: Conjunctivae are normal. Pupils are equal, round, and reactive to light. No scleral icterus.   Neck: Neck supple. No JVD present. No thyromegaly present.   Cardiovascular: Normal rate.  Exam reveals no gallop and no friction rub.    No murmur heard.  Irregular   Pulmonary/Chest: Effort normal and breath sounds normal. No stridor. No respiratory distress. He has no wheezes. He has no rales.   Abdominal: Soft. Bowel sounds are normal. He exhibits no distension. There is no tenderness.   No hepatosplenomegaly, no ascites,   Musculoskeletal: He exhibits no edema or tenderness.   Lymphadenopathy:     He has no cervical adenopathy.   Neurological: He is alert and oriented to person, place, and time. He has normal reflexes.   Skin: Skin is warm and dry. No rash noted. No erythema.   Psychiatric: He has a normal mood and affect. His behavior is normal. Judgment and thought content normal.   Vitals reviewed.      Assessment/Plan   Joaquín was seen today for follow-up and med refill.    Diagnoses and all orders for this visit:    Atrial fibrillation, chronic    Essential hypertension    Anxiety    Other orders  -     escitalopram (LEXAPRO) 5 MG tablet; Take 1 tablet by mouth Daily.  -     ramipril (ALTACE) 10 MG capsule; Take 1 capsule by mouth Daily. For hypertension  -     warfarin  (COUMADIN) 2 MG tablet; 2 tablets daily or as directed, per  INR                  Discussed appropriate use antianxiety medication such as Lexapro  Alt.ernatives, risk-benefit discussed including potential increased risk of bleeding when combined with Coumadin    Discharge instructions      Lexapro generic 5 mg which is considered a low dose    One half tablet the first week, then 1 tablet thereafter    Blood pressure increase ramipril 10 mg daily for blood pressure    Check blood pressure call me 2 days    Recheck in the office 2 weeks every 2 weeks INR    Follow-up office visit 6 weeks for anxiety and blood pressure    Thank You,      James Epley,  CELIA

## 2017-09-19 ENCOUNTER — CLINICAL SUPPORT (OUTPATIENT)
Dept: FAMILY MEDICINE CLINIC | Facility: CLINIC | Age: 57
End: 2017-09-19

## 2017-09-19 DIAGNOSIS — I48.20 ATRIAL FIBRILLATION, CHRONIC (HCC): ICD-10-CM

## 2017-09-19 LAB — INR PPP: 1.5 (ref 0.9–1.1)

## 2017-09-19 PROCEDURE — 36416 COLLJ CAPILLARY BLOOD SPEC: CPT | Performed by: NURSE PRACTITIONER

## 2017-09-19 PROCEDURE — 85610 PROTHROMBIN TIME: CPT | Performed by: NURSE PRACTITIONER

## 2017-09-19 PROCEDURE — 99211 OFF/OP EST MAY X REQ PHY/QHP: CPT | Performed by: NURSE PRACTITIONER

## 2017-09-26 ENCOUNTER — CLINICAL SUPPORT (OUTPATIENT)
Dept: FAMILY MEDICINE CLINIC | Facility: CLINIC | Age: 57
End: 2017-09-26

## 2017-09-26 DIAGNOSIS — I48.20 ATRIAL FIBRILLATION, CHRONIC (HCC): ICD-10-CM

## 2017-09-26 LAB — INR PPP: 2.6 (ref 0.9–1.1)

## 2017-09-26 PROCEDURE — 36416 COLLJ CAPILLARY BLOOD SPEC: CPT | Performed by: NURSE PRACTITIONER

## 2017-09-26 PROCEDURE — 85610 PROTHROMBIN TIME: CPT | Performed by: NURSE PRACTITIONER

## 2017-10-03 ENCOUNTER — TELEPHONE (OUTPATIENT)
Dept: FAMILY MEDICINE CLINIC | Facility: CLINIC | Age: 57
End: 2017-10-03

## 2017-10-03 ENCOUNTER — CLINICAL SUPPORT (OUTPATIENT)
Dept: FAMILY MEDICINE CLINIC | Facility: CLINIC | Age: 57
End: 2017-10-03

## 2017-10-03 DIAGNOSIS — I48.20 ATRIAL FIBRILLATION, CHRONIC (HCC): ICD-10-CM

## 2017-10-03 LAB — INR PPP: 1.4 (ref 0.9–1.1)

## 2017-10-03 PROCEDURE — 85610 PROTHROMBIN TIME: CPT | Performed by: NURSE PRACTITIONER

## 2017-10-03 PROCEDURE — 36416 COLLJ CAPILLARY BLOOD SPEC: CPT | Performed by: NURSE PRACTITIONER

## 2017-10-03 PROCEDURE — 99211 OFF/OP EST MAY X REQ PHY/QHP: CPT | Performed by: NURSE PRACTITIONER

## 2017-10-03 NOTE — TELEPHONE ENCOUNTER
Patient received extra 4 mg 2 weeks ago, he thought he missed a dose while out of town  Last week 2.6 mg  Continue same repeat today 1.4  Taking Sunday Tuesday Thursday 2 mg all other days 4 mg    Lovenox until INR therapeutic 2.0 3.0  Risk-benefit  Proper administration    Extra 4 mg today total 6mg    Then 2 mg Tuesdays, 4 mg remaining days  Recheck INR  Thursday

## 2017-10-05 ENCOUNTER — CLINICAL SUPPORT (OUTPATIENT)
Dept: FAMILY MEDICINE CLINIC | Facility: CLINIC | Age: 57
End: 2017-10-05

## 2017-10-05 DIAGNOSIS — I48.20 ATRIAL FIBRILLATION, CHRONIC (HCC): ICD-10-CM

## 2017-10-05 LAB — INR PPP: 3.1 (ref 0.9–1.1)

## 2017-10-05 PROCEDURE — 36416 COLLJ CAPILLARY BLOOD SPEC: CPT | Performed by: NURSE PRACTITIONER

## 2017-10-05 PROCEDURE — 85610 PROTHROMBIN TIME: CPT | Performed by: NURSE PRACTITIONER

## 2017-10-09 ENCOUNTER — CLINICAL SUPPORT (OUTPATIENT)
Dept: FAMILY MEDICINE CLINIC | Facility: CLINIC | Age: 57
End: 2017-10-09

## 2017-10-09 DIAGNOSIS — I48.20 ATRIAL FIBRILLATION, CHRONIC (HCC): ICD-10-CM

## 2017-10-09 LAB — INR PPP: 2.7 (ref 0.9–1.1)

## 2017-10-09 PROCEDURE — 36416 COLLJ CAPILLARY BLOOD SPEC: CPT | Performed by: NURSE PRACTITIONER

## 2017-10-09 PROCEDURE — 85610 PROTHROMBIN TIME: CPT | Performed by: NURSE PRACTITIONER

## 2017-10-25 ENCOUNTER — CLINICAL SUPPORT (OUTPATIENT)
Dept: FAMILY MEDICINE CLINIC | Facility: CLINIC | Age: 57
End: 2017-10-25

## 2017-10-25 DIAGNOSIS — I48.20 ATRIAL FIBRILLATION, CHRONIC (HCC): ICD-10-CM

## 2017-10-25 LAB — INR PPP: 2.5 (ref 0.9–1.1)

## 2017-10-25 PROCEDURE — 36416 COLLJ CAPILLARY BLOOD SPEC: CPT | Performed by: NURSE PRACTITIONER

## 2017-10-25 PROCEDURE — 85610 PROTHROMBIN TIME: CPT | Performed by: NURSE PRACTITIONER

## 2017-10-31 ENCOUNTER — OFFICE VISIT (OUTPATIENT)
Dept: FAMILY MEDICINE CLINIC | Facility: CLINIC | Age: 57
End: 2017-10-31

## 2017-10-31 VITALS
HEIGHT: 71 IN | DIASTOLIC BLOOD PRESSURE: 95 MMHG | TEMPERATURE: 98.4 F | SYSTOLIC BLOOD PRESSURE: 162 MMHG | WEIGHT: 232 LBS | HEART RATE: 86 BPM | BODY MASS INDEX: 32.48 KG/M2 | OXYGEN SATURATION: 98 %

## 2017-10-31 DIAGNOSIS — Z12.5 PROSTATE CANCER SCREENING: ICD-10-CM

## 2017-10-31 DIAGNOSIS — Z12.11 COLON CANCER SCREENING: ICD-10-CM

## 2017-10-31 DIAGNOSIS — I48.20 ATRIAL FIBRILLATION, CHRONIC (HCC): ICD-10-CM

## 2017-10-31 DIAGNOSIS — Z95.2 S/P MVR (MITRAL VALVE REPLACEMENT): ICD-10-CM

## 2017-10-31 DIAGNOSIS — Z11.59 NEED FOR HEPATITIS C SCREENING TEST: ICD-10-CM

## 2017-10-31 DIAGNOSIS — I10 BENIGN ESSENTIAL HYPERTENSION: Primary | ICD-10-CM

## 2017-10-31 LAB — INR PPP: 3.1 (ref 0.9–1.1)

## 2017-10-31 PROCEDURE — 36416 COLLJ CAPILLARY BLOOD SPEC: CPT | Performed by: NURSE PRACTITIONER

## 2017-10-31 PROCEDURE — 85610 PROTHROMBIN TIME: CPT | Performed by: NURSE PRACTITIONER

## 2017-10-31 PROCEDURE — 99213 OFFICE O/P EST LOW 20 MIN: CPT | Performed by: NURSE PRACTITIONER

## 2017-10-31 RX ORDER — AMLODIPINE BESYLATE 5 MG/1
5 TABLET ORAL DAILY
Qty: 30 TABLET | Refills: 5 | Status: SHIPPED | OUTPATIENT
Start: 2017-10-31 | End: 2017-11-15 | Stop reason: SDUPTHER

## 2017-11-01 NOTE — PROGRESS NOTES
Subjective   Joaquín Garcia is a 57 y.o. male.     HPI Comments: Follow-up blood pressure hypertension essential Long-standing  Rechecked 162/92  Recently increased his medications  It is better from previous was still elevated  No chest pain shortness of breath  No rapid heart feels great        INR 3.1 today  Presently compliant with his medication Coumadin,  He said long history previously of noncompliance with this is changed since last hospital visit  History of atrial fibrillation                       The following portions of the patient's history were reviewed and updated as appropriate: allergies, past family history, past medical history, past social history, past surgical history and problem list.    Review of Systems   Constitutional: Negative.  Negative for appetite change, chills, fatigue, fever and unexpected weight change.   HENT: Negative for congestion, ear pain and sore throat.    Eyes: Negative.    Respiratory: Negative for cough, chest tightness, shortness of breath and wheezing.    Cardiovascular: Negative for chest pain, palpitations and leg swelling.   Gastrointestinal: Negative for abdominal pain and constipation.   Genitourinary: Negative for difficulty urinating, dysuria and urgency.   Musculoskeletal: Negative for arthralgias and myalgias.   Skin: Negative for rash and wound.   Neurological: Negative for dizziness, speech difficulty, weakness, numbness and headaches.   Psychiatric/Behavioral: Negative for sleep disturbance. The patient is not nervous/anxious.        Objective   Physical Exam   Constitutional: He is oriented to person, place, and time. He appears well-developed and well-nourished.   HENT:   Head: Normocephalic.   Tm clear rush no mass canal patent without d/c   Eyes: Conjunctivae are normal. Pupils are equal, round, and reactive to light. No scleral icterus.   Neck: Neck supple. No JVD present. No thyromegaly present.   Cardiovascular: Normal rate, regular rhythm and  normal heart sounds.  Exam reveals no gallop and no friction rub.    No murmur heard.  Pulmonary/Chest: Effort normal and breath sounds normal. No stridor. No respiratory distress. He has no wheezes. He has no rales.   Abdominal: Soft. Bowel sounds are normal. He exhibits no distension. There is no tenderness.   No hepatosplenomegaly, no ascites,   Musculoskeletal: He exhibits no edema or tenderness.   Lymphadenopathy:     He has no cervical adenopathy.   Neurological: He is alert and oriented to person, place, and time. He has normal reflexes.   Skin: Skin is warm and dry. No rash noted. No erythema.   Psychiatric: He has a normal mood and affect. His behavior is normal. Judgment and thought content normal.   Vitals reviewed.      Assessment/Plan   Diagnoses and all orders for this visit:    Benign essential hypertension  -     TSH Rfx On Abnormal To Free T4  -     Comprehensive Metabolic Panel  -     CBC & Differential  -     Lipid Panel With LDL / HDL Ratio  -     PSA  -     Occult Blood, Fecal By Immunoassay - Stool, Per Rectum  -     Urinalysis With / Microscopic If Indicated - Urine, Clean Catch  -     Hepatitis C Antibody  -     POC INR    Atrial fibrillation, chronic  -     TSH Rfx On Abnormal To Free T4  -     Comprehensive Metabolic Panel  -     CBC & Differential  -     Lipid Panel With LDL / HDL Ratio  -     PSA  -     Occult Blood, Fecal By Immunoassay - Stool, Per Rectum  -     Urinalysis With / Microscopic If Indicated - Urine, Clean Catch  -     Hepatitis C Antibody  -     POC INR    S/P MVR (mitral valve replacement)  -     TSH Rfx On Abnormal To Free T4  -     Comprehensive Metabolic Panel  -     CBC & Differential  -     Lipid Panel With LDL / HDL Ratio  -     PSA  -     Occult Blood, Fecal By Immunoassay - Stool, Per Rectum  -     Urinalysis With / Microscopic If Indicated - Urine, Clean Catch  -     Hepatitis C Antibody  -     POC INR    Prostate cancer screening  -     TSH Rfx On Abnormal To  Free T4  -     Comprehensive Metabolic Panel  -     CBC & Differential  -     Lipid Panel With LDL / HDL Ratio  -     PSA  -     Occult Blood, Fecal By Immunoassay - Stool, Per Rectum  -     Urinalysis With / Microscopic If Indicated - Urine, Clean Catch  -     Hepatitis C Antibody  -     POC INR    Colon cancer screening  -     TSH Rfx On Abnormal To Free T4  -     Comprehensive Metabolic Panel  -     CBC & Differential  -     Lipid Panel With LDL / HDL Ratio  -     PSA  -     Occult Blood, Fecal By Immunoassay - Stool, Per Rectum  -     Urinalysis With / Microscopic If Indicated - Urine, Clean Catch  -     Ambulatory Referral For Screening Colonoscopy  -     Hepatitis C Antibody  -     POC INR    Need for hepatitis C screening test  -     Hepatitis C Antibody  -     POC INR    Other orders  -     amLODIPine (NORVASC) 5 MG tablet; Take 1 tablet by mouth Daily. For hypertention                    Add amlodipine  Recheck one week repeat INR  Treatment go 2.0 to 3.0    Continue same [place] dosing, recheck one week see INR lab report for details  Complemented patient for compliance and effort for his health

## 2017-11-05 PROBLEM — Z11.59 NEED FOR HEPATITIS C SCREENING TEST: Status: ACTIVE | Noted: 2017-11-05

## 2017-11-05 PROBLEM — Z12.5 PROSTATE CANCER SCREENING: Status: ACTIVE | Noted: 2017-11-05

## 2017-11-05 PROBLEM — Z12.11 COLON CANCER SCREENING: Status: ACTIVE | Noted: 2017-11-05

## 2017-11-08 ENCOUNTER — CLINICAL SUPPORT (OUTPATIENT)
Dept: FAMILY MEDICINE CLINIC | Facility: CLINIC | Age: 57
End: 2017-11-08

## 2017-11-08 LAB — INR PPP: 3.4 (ref 0.9–1.1)

## 2017-11-08 PROCEDURE — 36416 COLLJ CAPILLARY BLOOD SPEC: CPT | Performed by: NURSE PRACTITIONER

## 2017-11-08 PROCEDURE — 85610 PROTHROMBIN TIME: CPT | Performed by: NURSE PRACTITIONER

## 2017-11-15 ENCOUNTER — OFFICE VISIT (OUTPATIENT)
Dept: FAMILY MEDICINE CLINIC | Facility: CLINIC | Age: 57
End: 2017-11-15

## 2017-11-15 VITALS
BODY MASS INDEX: 32.48 KG/M2 | HEART RATE: 87 BPM | SYSTOLIC BLOOD PRESSURE: 160 MMHG | HEIGHT: 71 IN | TEMPERATURE: 98 F | DIASTOLIC BLOOD PRESSURE: 90 MMHG | WEIGHT: 232 LBS | OXYGEN SATURATION: 96 %

## 2017-11-15 DIAGNOSIS — S86.912A KNEE STRAIN, LEFT, INITIAL ENCOUNTER: Primary | ICD-10-CM

## 2017-11-15 DIAGNOSIS — S89.92XA INJURY OF LEFT KNEE, INITIAL ENCOUNTER: ICD-10-CM

## 2017-11-15 PROCEDURE — 99214 OFFICE O/P EST MOD 30 MIN: CPT | Performed by: NURSE PRACTITIONER

## 2017-11-15 RX ORDER — AMLODIPINE BESYLATE 10 MG/1
10 TABLET ORAL DAILY
Qty: 30 TABLET | Refills: 5 | Status: SHIPPED | OUTPATIENT
Start: 2017-11-15 | End: 2020-07-09 | Stop reason: SDUPTHER

## 2017-11-15 NOTE — PROGRESS NOTES
Patient's here today complains of left knee pain injured his left knee yesterday at work.  Apparently he is not able to file any Workmen's Comp. Injury according to patient.    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    After walking way he continued to have moderately severe left knee pain  He bumped his foot onto some plastic that normally would not cause injury, and noticed his left knee was much more injured that he had thought as this exacerbated his pain.    He actually had one injury which was the initial stepping over the palate and twisting his knee causing moderately severe pain.               Physical exam  Full range of motion knee  Mild to moderate subpatellar crepitus  Chronic  No effusion apparent  Stable negative drawer sign negative Hoang's  No laxity  Able to bear weight with slight lamp  Unable to squat more due to decreased chronic flexibility than knee pain but was having quite a bit of knee pain as well  Flexion decreased    During exam distal knee tenderness anterior  Small lateral fat pad but no edema only physical exam    No bruising  No evidence of hematoma      Plan    X-rays  Call tomorrow for results  Left knee immobilizer  Okay to take off her shower and sleeping  Recheck in one week  If not improving will need to see orthopedic and/or MRI

## 2017-11-15 NOTE — PATIENT INSTRUCTIONS
Nonweightbearing  ×48 hours  Limit weightbearing as tolerated      You can weigh your  knee brace, if helps  Walk lately and gradually increase weight  He can return to work full duty Monday if better otherwise follow-up here  If he continued to have knee pain you need a follow-up next week or see orthopedic    If severe pain severe swelling worsening symptoms  Emergency room  Recheck next week for your INR

## 2017-11-16 NOTE — PROGRESS NOTES
Subjective   Joaquín Garcia is a 57 y.o. male.     HPI Comments: Patient's here today complains of left knee pain  Moderate pain presently able to bear weight without giving awayinjured his left knee yesterday at work.  Apparently he is not able to file any Workmen's Comp. 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    After walking way he continued to have moderately severe left knee pain  He bumped his foot onto some plastic that normally would not cause injury, and noticed his left knee was much more injured that he had thought as this exacerbated his pain.    He actually had one injury which was the initial stepping over the palate and twisting his knee causing moderately severe pain.                        The following portions of the patient's history were reviewed and updated as appropriate: allergies, current medications, past family history, past medical history, past surgical history and problem list.    Review of Systems    Objective   Physical Exam   Constitutional: He is oriented to person, place, and time. He appears well-developed and well-nourished.   Physical exam  Full range of motion knee  Mild to moderate subpatellar crepitus  Chronic  No effusion apparent  Stable negative drawer sign negative Hoang's  No laxity  Able to bear weight with slight lamp  Unable to squat more due to decreased chronic flexibility than knee pain but was having quite a bit of knee pain as well  Flexion decreased    During exam distal knee tenderness anterior  Small lateral fat pad but no edema only  physical exam    No bruising  No evidence of hematoma       HENT:   Head: Normocephalic.   Eyes: Conjunctivae are normal. Pupils are equal, round, and reactive to light.   Cardiovascular: Normal rate.    Irregular rhythm rate controlled   Pulmonary/Chest: Effort normal and breath sounds normal.   Musculoskeletal:   Physical exam  Full range of motion knee  Mild to moderate subpatellar crepitus  Chronic  No effusion apparent  Stable negative drawer sign negative Hoang's  No laxity  Able to bear weight with slight lamp  Unable to squat more due to decreased chronic flexibility than knee pain but was having quite a bit of knee pain as well  Flexion decreased    During exam distal knee tenderness anterior  Small lateral fat pad but no edema only physical exam    No bruising  No evidence of hematoma       Neurological: He is alert and oriented to person, place, and time.   Skin: Skin is warm and dry.   Psychiatric: He has a normal mood and affect. His behavior is normal. Judgment and thought content normal.   Vitals reviewed.      Assessment/Plan   Joaquín was seen today for knee injury.    Diagnoses and all orders for this visit:    Knee strain, left, initial encounter    Injury of left knee, initial encounter  -     XR Knee 3 View Left    Other orders  -     amLODIPine (NORVASC) 10 MG tablet; Take 1 tablet by mouth Daily. For hypertention                    Plan    Patient declines knee immobilizer  Would rather wear splint of his own  They was to return to work on Monday  I suggested he take a few more days off  No work the next two days  Like to see how we can make is INRs easier  I believe he's getting trouble at work  Hopefully he can be stable enough to back off a little bit  But his medication is changing as he's taken Tylenol so  Will repeat this next week  Hopefully we can watch him a good enough stability to be monthly    X-rays  Call tomorrow for results  Left knee immobilizer  Okay to take off her shower  and sleeping  Recheck in one week  If not improving will need to see orthopedic and/or MRI

## 2018-01-08 RX ORDER — METOPROLOL SUCCINATE 100 MG/1
100 TABLET, EXTENDED RELEASE ORAL DAILY
Qty: 30 TABLET | Refills: 3 | Status: SHIPPED | OUTPATIENT
Start: 2018-01-08 | End: 2020-06-30 | Stop reason: SDUPTHER

## 2018-07-25 ENCOUNTER — ANTICOAGULATION VISIT (OUTPATIENT)
Dept: FAMILY MEDICINE CLINIC | Facility: CLINIC | Age: 58
End: 2018-07-25

## 2020-06-30 ENCOUNTER — OFFICE VISIT (OUTPATIENT)
Dept: FAMILY MEDICINE CLINIC | Facility: CLINIC | Age: 60
End: 2020-06-30

## 2020-06-30 VITALS
TEMPERATURE: 98 F | SYSTOLIC BLOOD PRESSURE: 192 MMHG | HEIGHT: 71 IN | BODY MASS INDEX: 31.36 KG/M2 | HEART RATE: 86 BPM | DIASTOLIC BLOOD PRESSURE: 114 MMHG | OXYGEN SATURATION: 94 % | WEIGHT: 224 LBS

## 2020-06-30 DIAGNOSIS — J44.9 CHRONIC OBSTRUCTIVE PULMONARY DISEASE, UNSPECIFIED COPD TYPE (HCC): ICD-10-CM

## 2020-06-30 DIAGNOSIS — Z98.890 S/P TVR (TRICUSPID VALVE REPAIR): ICD-10-CM

## 2020-06-30 DIAGNOSIS — F10.20 ALCOHOLISM (HCC): ICD-10-CM

## 2020-06-30 DIAGNOSIS — Z91.199 NONCOMPLIANCE: ICD-10-CM

## 2020-06-30 DIAGNOSIS — Z95.2 S/P MVR (MITRAL VALVE REPLACEMENT): ICD-10-CM

## 2020-06-30 DIAGNOSIS — M54.42 MIDLINE LOW BACK PAIN WITH BILATERAL SCIATICA, UNSPECIFIED CHRONICITY: ICD-10-CM

## 2020-06-30 DIAGNOSIS — I48.20 ATRIAL FIBRILLATION, CHRONIC (HCC): Primary | ICD-10-CM

## 2020-06-30 DIAGNOSIS — M54.41 MIDLINE LOW BACK PAIN WITH BILATERAL SCIATICA, UNSPECIFIED CHRONICITY: ICD-10-CM

## 2020-06-30 DIAGNOSIS — Z79.01 ANTICOAGULATION MANAGEMENT ENCOUNTER: ICD-10-CM

## 2020-06-30 DIAGNOSIS — I10 ESSENTIAL HYPERTENSION: ICD-10-CM

## 2020-06-30 DIAGNOSIS — Z12.5 PROSTATE CANCER SCREENING: ICD-10-CM

## 2020-06-30 DIAGNOSIS — Z51.81 ANTICOAGULATION MANAGEMENT ENCOUNTER: ICD-10-CM

## 2020-06-30 LAB — INR PPP: 1 (ref 0.9–1.1)

## 2020-06-30 PROCEDURE — 85610 PROTHROMBIN TIME: CPT | Performed by: NURSE PRACTITIONER

## 2020-06-30 PROCEDURE — 99214 OFFICE O/P EST MOD 30 MIN: CPT | Performed by: NURSE PRACTITIONER

## 2020-06-30 PROCEDURE — 93000 ELECTROCARDIOGRAM COMPLETE: CPT | Performed by: NURSE PRACTITIONER

## 2020-06-30 PROCEDURE — 36416 COLLJ CAPILLARY BLOOD SPEC: CPT | Performed by: NURSE PRACTITIONER

## 2020-06-30 RX ORDER — WARFARIN SODIUM 2 MG/1
TABLET ORAL
Qty: 60 TABLET | Refills: 5 | Status: SHIPPED | OUTPATIENT
Start: 2020-06-30 | End: 2021-06-25 | Stop reason: SDUPTHER

## 2020-06-30 RX ORDER — RAMIPRIL 10 MG/1
10 CAPSULE ORAL DAILY
Qty: 90 CAPSULE | Refills: 1 | Status: SHIPPED | OUTPATIENT
Start: 2020-06-30 | End: 2020-12-17

## 2020-06-30 RX ORDER — METOPROLOL SUCCINATE 100 MG/1
100 TABLET, EXTENDED RELEASE ORAL DAILY
Qty: 90 TABLET | Refills: 1 | Status: SHIPPED | OUTPATIENT
Start: 2020-06-30 | End: 2020-12-22

## 2020-07-01 LAB
ALBUMIN SERPL-MCNC: 4.6 G/DL (ref 3.8–4.8)
ALBUMIN/GLOB SERPL: 1.6 {RATIO} (ref 1.2–2.2)
ALP SERPL-CCNC: 105 IU/L (ref 39–117)
ALT SERPL-CCNC: 27 IU/L (ref 0–44)
APPEARANCE UR: CLEAR
AST SERPL-CCNC: 16 IU/L (ref 0–40)
BACTERIA #/AREA URNS HPF: NORMAL /[HPF]
BASOPHILS # BLD AUTO: 0.1 X10E3/UL (ref 0–0.2)
BASOPHILS NFR BLD AUTO: 1 %
BILIRUB SERPL-MCNC: 0.7 MG/DL (ref 0–1.2)
BILIRUB UR QL STRIP: NEGATIVE
BUN SERPL-MCNC: 10 MG/DL (ref 8–27)
BUN/CREAT SERPL: 12 (ref 10–24)
CALCIUM SERPL-MCNC: 9.3 MG/DL (ref 8.6–10.2)
CHLORIDE SERPL-SCNC: 99 MMOL/L (ref 96–106)
CHOLEST SERPL-MCNC: 217 MG/DL (ref 100–199)
CO2 SERPL-SCNC: 23 MMOL/L (ref 20–29)
COLOR UR: YELLOW
CREAT SERPL-MCNC: 0.85 MG/DL (ref 0.76–1.27)
EOSINOPHIL # BLD AUTO: 0.2 X10E3/UL (ref 0–0.4)
EOSINOPHIL NFR BLD AUTO: 2 %
EPI CELLS #/AREA URNS HPF: NORMAL /HPF (ref 0–10)
ERYTHROCYTE [DISTWIDTH] IN BLOOD BY AUTOMATED COUNT: 13.7 % (ref 11.6–15.4)
GLOBULIN SER CALC-MCNC: 2.9 G/DL (ref 1.5–4.5)
GLUCOSE SERPL-MCNC: 91 MG/DL (ref 65–99)
GLUCOSE UR QL: NEGATIVE
HCT VFR BLD AUTO: 51.8 % (ref 37.5–51)
HDLC SERPL-MCNC: 49 MG/DL
HGB BLD-MCNC: 17.7 G/DL (ref 13–17.7)
HGB UR QL STRIP: NEGATIVE
IMM GRANULOCYTES # BLD AUTO: 0 X10E3/UL (ref 0–0.1)
IMM GRANULOCYTES NFR BLD AUTO: 0 %
KETONES UR QL STRIP: NEGATIVE
LDLC SERPL CALC-MCNC: 142 MG/DL (ref 0–99)
LDLC/HDLC SERPL: 2.9 RATIO (ref 0–3.6)
LEUKOCYTE ESTERASE UR QL STRIP: NEGATIVE
LYMPHOCYTES # BLD AUTO: 1.4 X10E3/UL (ref 0.7–3.1)
LYMPHOCYTES NFR BLD AUTO: 14 %
MCH RBC QN AUTO: 32.3 PG (ref 26.6–33)
MCHC RBC AUTO-ENTMCNC: 34.2 G/DL (ref 31.5–35.7)
MCV RBC AUTO: 95 FL (ref 79–97)
MICRO URNS: ABNORMAL
MONOCYTES # BLD AUTO: 0.7 X10E3/UL (ref 0.1–0.9)
MONOCYTES NFR BLD AUTO: 8 %
MUCOUS THREADS URNS QL MICRO: PRESENT
NEUTROPHILS # BLD AUTO: 7.1 X10E3/UL (ref 1.4–7)
NEUTROPHILS NFR BLD AUTO: 75 %
NITRITE UR QL STRIP: NEGATIVE
PH UR STRIP: 5 [PH] (ref 5–7.5)
PLATELET # BLD AUTO: 188 X10E3/UL (ref 150–450)
POTASSIUM SERPL-SCNC: 5.1 MMOL/L (ref 3.5–5.2)
PROT SERPL-MCNC: 7.5 G/DL (ref 6–8.5)
PROT UR QL STRIP: ABNORMAL
PSA SERPL-MCNC: 1.2 NG/ML (ref 0–4)
RBC # BLD AUTO: 5.48 X10E6/UL (ref 4.14–5.8)
RBC #/AREA URNS HPF: NORMAL /HPF (ref 0–2)
SODIUM SERPL-SCNC: 139 MMOL/L (ref 134–144)
SP GR UR: 1.02 (ref 1–1.03)
TRIGL SERPL-MCNC: 129 MG/DL (ref 0–149)
TSH SERPL DL<=0.005 MIU/L-ACNC: 2.35 UIU/ML (ref 0.45–4.5)
UROBILINOGEN UR STRIP-MCNC: 1 MG/DL (ref 0.2–1)
VLDLC SERPL CALC-MCNC: 26 MG/DL (ref 5–40)
WBC # BLD AUTO: 9.4 X10E3/UL (ref 3.4–10.8)
WBC #/AREA URNS HPF: NORMAL /HPF (ref 0–5)

## 2020-07-02 ENCOUNTER — OFFICE VISIT (OUTPATIENT)
Dept: FAMILY MEDICINE CLINIC | Facility: CLINIC | Age: 60
End: 2020-07-02

## 2020-07-02 VITALS
OXYGEN SATURATION: 96 % | BODY MASS INDEX: 31.36 KG/M2 | TEMPERATURE: 97.3 F | HEART RATE: 84 BPM | HEIGHT: 71 IN | SYSTOLIC BLOOD PRESSURE: 156 MMHG | WEIGHT: 224 LBS | DIASTOLIC BLOOD PRESSURE: 91 MMHG

## 2020-07-02 DIAGNOSIS — I10 ESSENTIAL HYPERTENSION: ICD-10-CM

## 2020-07-02 DIAGNOSIS — M54.42 ACUTE MIDLINE LOW BACK PAIN WITH BILATERAL SCIATICA: ICD-10-CM

## 2020-07-02 DIAGNOSIS — M54.41 ACUTE MIDLINE LOW BACK PAIN WITH BILATERAL SCIATICA: ICD-10-CM

## 2020-07-02 DIAGNOSIS — I48.20 ATRIAL FIBRILLATION, CHRONIC (HCC): Primary | ICD-10-CM

## 2020-07-02 PROCEDURE — 99213 OFFICE O/P EST LOW 20 MIN: CPT | Performed by: NURSE PRACTITIONER

## 2020-07-02 RX ORDER — METHYLPREDNISOLONE 4 MG/1
TABLET ORAL
Qty: 21 TABLET | Refills: 0 | Status: SHIPPED | OUTPATIENT
Start: 2020-07-02 | End: 2020-10-28

## 2020-07-02 RX ORDER — AMLODIPINE BESYLATE 5 MG/1
5 TABLET ORAL DAILY
Qty: 30 TABLET | Refills: 3 | Status: SHIPPED | OUTPATIENT
Start: 2020-07-02 | End: 2020-07-09

## 2020-07-02 NOTE — PATIENT INSTRUCTIONS
Discharge instructions    Continue  Metoprolol 100 mg daily  Continue ramipril 10 mg daily  Start amlodipine 5 mg daily  Continue warfarin 4 mg daily  Medrol Dosepak as directed  Follow-up next Tuesday for INR recheck blood pressure and to recheck your back    Would advise you to fill out an FMLA form human resources  Bring this with you to your next appointment so we can complete  Short-term disability forms as well  Need a 30-minute appointment at your next follow-up    Continue off work  2 more weeks reevaluate at that time    Emergency room if chest pain increased shortness of breath weakness severe headache confusion slurred speech

## 2020-07-02 NOTE — PROGRESS NOTES
"Subjective   Joaquín Garcia is a 59 y.o. male.     Pleasant gentleman here follow-up atrial fibrillation uncontrolled blood pressure  Low back pain sciatica  Little bit better with his back still has to use walker still has moderate to severe pain  Blood pressure improvement 156/91 today  No chest pain or shortness of breath no headaches or dizziness  He is taking warfarin 4 mg daily for atrial fibrillation  He is resumed meta propanol and ramipril  I have not resume amlodipine which previously he was taking 10 mg daily we will do this today    Hypertension   Pertinent negatives include no chest pain, palpitations or shortness of breath.        /91   Pulse 84   Temp 97.3 °F (36.3 °C) (Temporal)   Ht 180.3 cm (71\")   Wt 102 kg (224 lb)   SpO2 96%   BMI 31.24 kg/m²       The following portions of the patient's history were reviewed and updated as appropriate: allergies, current medications, past family history, past medical history, past social history, past surgical history and problem list.    Review of Systems   Constitutional: Negative for fatigue and fever.   HENT: Negative.  Negative for trouble swallowing.    Eyes: Negative.    Respiratory: Negative.  Negative for cough and shortness of breath.    Cardiovascular: Negative for chest pain, palpitations and leg swelling.   Gastrointestinal: Negative.  Negative for abdominal pain.   Genitourinary: Negative.    Musculoskeletal: Positive for back pain.   Skin: Negative.    Neurological: Negative.  Negative for dizziness and confusion.   Psychiatric/Behavioral: Negative.        Objective   Physical Exam   Constitutional: He appears well-developed and well-nourished.   HENT:   Head: Normocephalic and atraumatic.   Eyes: Pupils are equal, round, and reactive to light. Conjunctivae are normal.   Neck: Neck supple.   Cardiovascular: Normal rate and normal heart sounds.   Irregular rhythm without murmur  Much better rate approximately 80   Pulmonary/Chest: " Effort normal.   Skin: Skin is warm and dry. No rash noted. No erythema.   Psychiatric: He has a normal mood and affect. His behavior is normal. Judgment and thought content normal.   Vitals reviewed.        Assessment/Plan   Joaquín was seen today for hypertension.    Diagnoses and all orders for this visit:    Atrial fibrillation, chronic (CMS/HCC)    Essential hypertension    Acute midline low back pain with bilateral sciatica    Other orders  -     methylPREDNISolone (MEDROL, GRACE,) 4 MG tablet; Take as directed on package instructions.  -     amLODIPine (NORVASC) 5 MG tablet; Take 1 tablet by mouth Daily. For blood pressure and healthy heart      Patient is doing much better  Blood pressures coming down  Medrol Dosepak was held off until his blood pressure is controlled better we will add amlodipine today  Close follow-up he resumed his warfarin will need repeat INR next week            There are no Patient Instructions on file for this visit.

## 2020-07-06 ENCOUNTER — HOSPITAL ENCOUNTER (OUTPATIENT)
Dept: GENERAL RADIOLOGY | Facility: HOSPITAL | Age: 60
Discharge: HOME | End: 2020-07-06
Admitting: NURSE PRACTITIONER

## 2020-07-06 PROCEDURE — 72100 X-RAY EXAM L-S SPINE 2/3 VWS: CPT

## 2020-07-09 ENCOUNTER — OFFICE VISIT (OUTPATIENT)
Dept: FAMILY MEDICINE CLINIC | Facility: CLINIC | Age: 60
End: 2020-07-09

## 2020-07-09 VITALS
BODY MASS INDEX: 31.5 KG/M2 | TEMPERATURE: 98.2 F | OXYGEN SATURATION: 94 % | DIASTOLIC BLOOD PRESSURE: 96 MMHG | WEIGHT: 225 LBS | HEIGHT: 71 IN | HEART RATE: 71 BPM | SYSTOLIC BLOOD PRESSURE: 179 MMHG

## 2020-07-09 DIAGNOSIS — M54.42 MIDLINE LOW BACK PAIN WITH BILATERAL SCIATICA, UNSPECIFIED CHRONICITY: Primary | ICD-10-CM

## 2020-07-09 DIAGNOSIS — I10 BENIGN ESSENTIAL HYPERTENSION: ICD-10-CM

## 2020-07-09 DIAGNOSIS — Z79.01 CHRONIC ANTICOAGULATION: ICD-10-CM

## 2020-07-09 DIAGNOSIS — I48.20 ATRIAL FIBRILLATION, CHRONIC (HCC): ICD-10-CM

## 2020-07-09 DIAGNOSIS — M54.41 MIDLINE LOW BACK PAIN WITH BILATERAL SCIATICA, UNSPECIFIED CHRONICITY: Primary | ICD-10-CM

## 2020-07-09 LAB — INR PPP: 4.1 (ref 0.9–1.1)

## 2020-07-09 PROCEDURE — 99213 OFFICE O/P EST LOW 20 MIN: CPT | Performed by: NURSE PRACTITIONER

## 2020-07-09 PROCEDURE — 36416 COLLJ CAPILLARY BLOOD SPEC: CPT | Performed by: NURSE PRACTITIONER

## 2020-07-09 PROCEDURE — 85610 PROTHROMBIN TIME: CPT | Performed by: NURSE PRACTITIONER

## 2020-07-09 RX ORDER — METHYLPREDNISOLONE 4 MG/1
TABLET ORAL
Qty: 21 TABLET | Refills: 0 | Status: SHIPPED | OUTPATIENT
Start: 2020-07-09 | End: 2020-08-21

## 2020-07-09 RX ORDER — AMLODIPINE BESYLATE 10 MG/1
10 TABLET ORAL DAILY
Qty: 90 TABLET | Refills: 1 | Status: SHIPPED | OUTPATIENT
Start: 2020-07-09 | End: 2021-01-05

## 2020-07-09 NOTE — PATIENT INSTRUCTIONS
Discharge instructions    For blood pressure increase amlodipine to 10 mg daily    INR anticoagulation  4.0 today  Your goal is between 2.0 and 3.0    Hold warfarin 1 day  Then take warfarin 4 mg daily except  Mondays and Thursdays 2 mg    Recheck in 1 week INR in 1 week    Schedule physical therapy as soon as possible  Okay to go ahead and start Medrol Dosepak  As we discussed  Chest pain shortness of breath weakness  Severe headache emergency room

## 2020-07-09 NOTE — PROGRESS NOTES
"Subjective   Joaquín Garcia is a 59 y.o. male.     Pleasant gentleman here follow-up acute midline back pain radiates down his legs bilateral  As well as essential hypertension quite uncontrolled with noncompliance with his blood pressure medications atrial fibrillation he is status post 2 surgeries heart valves mitral when tricuspid, no artificial valve previously  INR is been off the warfarin 2 years approximately for atrial fibrillation  He is resumed everything that he needs to do he is getting back on track he has good support from his family his mother is here with him today  Need INR today       /96   Pulse 71   Temp 98.2 °F (36.8 °C) (Temporal)   Ht 180.3 cm (71\")   Wt 102 kg (225 lb)   SpO2 94%   BMI 31.38 kg/m²       The following portions of the patient's history were reviewed and updated as appropriate: allergies, current medications, past family history, past medical history, past social history, past surgical history and problem list.    Review of Systems   Constitutional: Negative for fatigue and fever.   HENT: Negative.  Negative for trouble swallowing.    Eyes: Negative.    Respiratory: Negative.  Negative for cough and shortness of breath.    Cardiovascular: Negative for chest pain, palpitations and leg swelling.   Gastrointestinal: Negative.  Negative for abdominal pain.   Genitourinary: Negative.    Musculoskeletal: Positive for back pain.   Skin: Negative.    Neurological: Negative.  Negative for dizziness and confusion.   Psychiatric/Behavioral: Negative.        Objective   Physical Exam   Constitutional: He is oriented to person, place, and time. He appears well-developed and well-nourished. No distress.   Pleasant nontoxic   HENT:   Head: Normocephalic and atraumatic.   Nose: Nose normal.   Mouth/Throat: Oropharynx is clear and moist.   Eyes: Pupils are equal, round, and reactive to light. Conjunctivae are normal.   Neck: Neck supple. No JVD present.   Cardiovascular: Normal " rate, regular rhythm and normal heart sounds.   No murmur heard.  Normal rate irregular with murmur no radiation   Pulmonary/Chest: Effort normal and breath sounds normal. No respiratory distress. He has no wheezes.   Abdominal: Soft. Bowel sounds are normal. He exhibits no distension and no mass. There is no tenderness. There is no guarding. No hernia.   Musculoskeletal: He exhibits no edema or tenderness.   Lymphadenopathy:     He has no cervical adenopathy.   Neurological: He is alert and oriented to person, place, and time.   Skin: Skin is warm and dry. He is not diaphoretic.   Psychiatric: He has a normal mood and affect. His behavior is normal. Judgment and thought content normal.   Vitals reviewed.        Assessment/Plan   Joaquín was seen today for hypertension and atrial fibrillation.    Diagnoses and all orders for this visit:    Midline low back pain with bilateral sciatica, unspecified chronicity    Atrial fibrillation, chronic (CMS/HCC)  -     POCT INR    Benign essential hypertension    Chronic anticoagulation    Other orders  -     methylPREDNISolone (MEDROL, GRACE,) 4 MG tablet; Take as directed on package instructions.  -     amLODIPine (NORVASC) 10 MG tablet; Take 1 tablet by mouth Daily. For hypertention                  Patient Instructions   Discharge instructions    For blood pressure increase amlodipine to 10 mg daily    INR anticoagulation  4.0 today  Your goal is between 2.0 and 3.0    Hold warfarin 1 day  Then take warfarin 4 mg daily except  Mondays and Thursdays 2 mg    Recheck in 1 week INR in 1 week    Schedule physical therapy as soon as possible  Okay to go ahead and start Medrol Dosepak  As we discussed  Chest pain shortness of breath weakness  Severe headache emergency room

## 2020-07-20 ENCOUNTER — CLINICAL SUPPORT (OUTPATIENT)
Dept: FAMILY MEDICINE CLINIC | Facility: CLINIC | Age: 60
End: 2020-07-20

## 2020-07-20 DIAGNOSIS — Z79.01 CHRONIC ANTICOAGULATION: Primary | ICD-10-CM

## 2020-07-20 LAB — INR PPP: 2.2 (ref 0.9–1.1)

## 2020-07-20 PROCEDURE — 85610 PROTHROMBIN TIME: CPT | Performed by: NURSE PRACTITIONER

## 2020-07-20 PROCEDURE — 36416 COLLJ CAPILLARY BLOOD SPEC: CPT | Performed by: NURSE PRACTITIONER

## 2020-07-22 ENCOUNTER — TELEPHONE (OUTPATIENT)
Dept: FAMILY MEDICINE CLINIC | Facility: CLINIC | Age: 60
End: 2020-07-22

## 2020-07-22 NOTE — TELEPHONE ENCOUNTER
PATIENT STATES THAT HE NEEDS FORM FILLED OUT BY DR EPLEY. IT WILL BE FAXED TO OFFICE. PLEASE FILL OUT AND CALL HIM WITH ANY QUESTIONS. HE WANTED TO MAKE SURE DR EPLEY GOT THE FORM.   [phone]

## 2020-07-22 NOTE — TELEPHONE ENCOUNTER
Attempted to call pt to see what kind of forms he needed to be filled out to make sure we could fill them out.

## 2020-07-31 NOTE — TELEPHONE ENCOUNTER
PT CALLED TO FOLLOW UP ON SHORT-TERM DISABILITY PAPERWORK.   HE WANTS TO SPEAK WITH JAMES EPLEY REGARDING WHAT HE NEEDS AND WHERE IT NEEDS TO GO.    PLEASE ADVISE.    CALLBACK NUMBER: [phone]

## 2020-08-03 ENCOUNTER — TELEPHONE (OUTPATIENT)
Dept: FAMILY MEDICINE CLINIC | Facility: CLINIC | Age: 60
End: 2020-08-03

## 2020-08-03 NOTE — TELEPHONE ENCOUNTER
PT'S MOTHER YU CALLED TO FOLLOW UP ON DISABILITY PAPERWORK MR. EPLEY IS SUPPOSED TO BE WORKING ON FOR PT. HE HAS BEEN TRYING TO GET THIS FOR SOME TIME, AND YU SAYS THE PT NEEDS THE MONEY. SHE WANTS THIS TAKEN CARE OF ASAP.    PLEASE CALL WITH AN UPDATE ON THE PAPERWORK. [phone]

## 2020-08-04 ENCOUNTER — OFFICE VISIT (OUTPATIENT)
Dept: FAMILY MEDICINE CLINIC | Facility: CLINIC | Age: 60
End: 2020-08-04

## 2020-08-04 VITALS
OXYGEN SATURATION: 95 % | BODY MASS INDEX: 31.78 KG/M2 | SYSTOLIC BLOOD PRESSURE: 159 MMHG | TEMPERATURE: 97.7 F | WEIGHT: 227 LBS | DIASTOLIC BLOOD PRESSURE: 90 MMHG | HEART RATE: 73 BPM | HEIGHT: 71 IN

## 2020-08-04 DIAGNOSIS — M54.16 LUMBAR RADICULOPATHY, ACUTE: Primary | ICD-10-CM

## 2020-08-04 DIAGNOSIS — M54.41 ACUTE MIDLINE LOW BACK PAIN WITH BILATERAL SCIATICA: ICD-10-CM

## 2020-08-04 DIAGNOSIS — R60.9 DEPENDENT EDEMA: ICD-10-CM

## 2020-08-04 DIAGNOSIS — M54.42 ACUTE MIDLINE LOW BACK PAIN WITH BILATERAL SCIATICA: ICD-10-CM

## 2020-08-04 DIAGNOSIS — I10 BENIGN ESSENTIAL HYPERTENSION: ICD-10-CM

## 2020-08-04 PROCEDURE — 99213 OFFICE O/P EST LOW 20 MIN: CPT | Performed by: NURSE PRACTITIONER

## 2020-08-04 NOTE — PROGRESS NOTES
Pleasant gentleman here follow-up acute low back pain with bilateral radiculopathy symptoms.  Patient continues to have increased low back pain  Radiates down his legs bilateral to his feet  Worse when laying down  As well as when he stands better when he sits  He has had steroid  As well as a home exercise program and referred in physical therapy but he was concerned regarding the pandemic and wanted to do these at home and has been exercising most days of the week for several weeks  Despite his exercises and 2 Medrol Dosepak's  Although he had some improvement after the first the back pain is returned and continues to have back pain despite above interventions    No fever chills no increased abdominal pain no weakness no saddlebag paresthesias

## 2020-08-04 NOTE — PATIENT INSTRUCTIONS
Discharge instructions outpatient physical therapy outpatient MRI schedule appointment here to discuss her MRI results and to plan the next strategy which will be evaluate for injections or to see a neurosurgeon  But continue to participate in physical therapy    Decrease breads and pastas  Compression socks 15-19 knee-high  Or 20-30 such as mojo  He has some dependent edema as well as likely from your amlodipine so just elevate feet when sitting decrease his sodium which is in the breath in the past rash    Avoid alcohol stop smoking      Weakness bowel or bladder incontinence or retention groin paresthesias fever and back pain emergency room

## 2020-08-11 ENCOUNTER — OFFICE VISIT (OUTPATIENT)
Dept: FAMILY MEDICINE CLINIC | Facility: CLINIC | Age: 60
End: 2020-08-11

## 2020-08-11 VITALS
OXYGEN SATURATION: 94 % | SYSTOLIC BLOOD PRESSURE: 158 MMHG | DIASTOLIC BLOOD PRESSURE: 94 MMHG | HEART RATE: 88 BPM | WEIGHT: 230 LBS | BODY MASS INDEX: 32.2 KG/M2 | TEMPERATURE: 96.9 F | HEIGHT: 71 IN

## 2020-08-11 DIAGNOSIS — Z79.01 CHRONIC ANTICOAGULATION: ICD-10-CM

## 2020-08-11 DIAGNOSIS — Z51.81 ANTICOAGULATION MANAGEMENT ENCOUNTER: ICD-10-CM

## 2020-08-11 DIAGNOSIS — M54.42 ACUTE MIDLINE LOW BACK PAIN WITH BILATERAL SCIATICA: ICD-10-CM

## 2020-08-11 DIAGNOSIS — Z79.01 ANTICOAGULATION MANAGEMENT ENCOUNTER: ICD-10-CM

## 2020-08-11 DIAGNOSIS — I10 BENIGN ESSENTIAL HYPERTENSION: Primary | ICD-10-CM

## 2020-08-11 DIAGNOSIS — M54.41 ACUTE MIDLINE LOW BACK PAIN WITH BILATERAL SCIATICA: ICD-10-CM

## 2020-08-11 LAB — INR PPP: 2.1 (ref 0.9–1.1)

## 2020-08-11 PROCEDURE — 99213 OFFICE O/P EST LOW 20 MIN: CPT | Performed by: NURSE PRACTITIONER

## 2020-08-11 PROCEDURE — 85610 PROTHROMBIN TIME: CPT | Performed by: NURSE PRACTITIONER

## 2020-08-11 PROCEDURE — 36416 COLLJ CAPILLARY BLOOD SPEC: CPT | Performed by: NURSE PRACTITIONER

## 2020-08-11 NOTE — PROGRESS NOTES
"Subjective   Joaquín Garcia is a 59 y.o. male.     Pleasant gentleman here follow-up severe back pain radiates down his legs down to his feet  Increased by laying down especially as well as standing better when sitting  Pain is been moderate severe to severe he has an MRI scheduled next week  No bowel or bladder change or weakness  Presently on short-term disability is having such severe pain unable to work at this time no night sweats unexplained weight loss  It is back to take his warfarin for his atrial fibrillation he is putting interest back into his health  Checking blood pressure at home  Daily has been average around 132/84  Elevated today 158/94 but he had to walk an extensive weight to give here    INR 2.1 today    Atrial Fibrillation   Symptoms are negative for chest pain, dizziness, palpitations and shortness of breath. Past medical history includes atrial fibrillation.        /94   Pulse 88   Temp 96.9 °F (36.1 °C) (Tympanic)   Ht 180.3 cm (70.98\")   Wt 104 kg (230 lb)   SpO2 94%   BMI 32.09 kg/m²       The following portions of the patient's history were reviewed and updated as appropriate: allergies, current medications, past family history, past medical history, past social history, past surgical history and problem list.    Review of Systems   Constitutional: Negative for fatigue and fever.   HENT: Negative.  Negative for trouble swallowing.    Eyes: Negative.    Respiratory: Negative.  Negative for cough and shortness of breath.    Cardiovascular: Negative for chest pain, palpitations and leg swelling.   Gastrointestinal: Negative.  Negative for abdominal pain.   Genitourinary: Negative.    Musculoskeletal: Positive for back pain.   Skin: Negative.    Neurological: Negative.  Negative for dizziness and confusion.   Psychiatric/Behavioral: Negative.        Objective   Physical Exam   Constitutional: He is oriented to person, place, and time. He appears well-developed and " well-nourished. No distress.   Pleasant no distress   HENT:   Head: Normocephalic and atraumatic.   Nose: Nose normal.   Mouth/Throat: Oropharynx is clear and moist.   Eyes: Pupils are equal, round, and reactive to light. Conjunctivae are normal.   Neck: Neck supple. No JVD present.   Cardiovascular: Normal rate and normal heart sounds.   No murmur heard.  Irregular irregular rate controlled 70   Pulmonary/Chest: Effort normal and breath sounds normal. No respiratory distress. He has no wheezes.   Abdominal: Soft. Bowel sounds are normal. He exhibits no distension and no mass. There is no tenderness. There is no guarding. No hernia.   Musculoskeletal: He exhibits no edema or tenderness.   Walking, slowly with a walker no focal weakness   Lymphadenopathy:     He has no cervical adenopathy.   Neurological: He is alert and oriented to person, place, and time.   Skin: Skin is warm and dry. He is not diaphoretic.   Psychiatric: He has a normal mood and affect. His behavior is normal. Judgment and thought content normal.   Vitals reviewed.        Assessment/Plan   Joaquín was seen today for atrial fibrillation.    Diagnoses and all orders for this visit:    Benign essential hypertension    Chronic anticoagulation  -     POC INR    Anticoagulation management encounter  -     POC INR    Acute midline low back pain with bilateral sciatica      Patient will continue present plan continue antihypertensive medications continue exercise as tolerated and follow-up next week  To discuss his MRI results he will come back next Friday  Likely repeat his INR at that time as well            There are no Patient Instructions on file for this visit.

## 2020-08-11 NOTE — PATIENT INSTRUCTIONS
Discharge instructions continue present care follow-up after MRI  Next Friday   INR and in the next 1 to 2 weeks

## 2020-08-11 NOTE — PROGRESS NOTES
"Subjective   Joaquín Garcia is a 59 y.o. male.     Pleasant gentleman here follow-up severe acute low back pain with bilateral radiculopathy symptoms.  Going on several weeks  Patient continues to have increased low back pain  Radiates down his legs bilateral to his feet  Worse when laying down  As well as when he stands better when he sits  He has had steroid pack x2 little relief  As well as a home exercise program and referred in physical therapy but he was concerned regarding the pandemic and wanted to do these at home and has been exercising most days of the week for several weeks  Despite his exercises and 2 Medrol Dosepak's  Although he had some improvement after the first the back pain is returned and continues to have back pain despite above interventions    No fever chills no increased abdominal pain no weakness no saddlebag paresthesias    Essential hypertension atrial fibrillation need for chronic anticoagulation and noncompliance he is back on his medications to control his blood pressure heart rate is doing better his INR was within range with warfarin  Blood pressures been improving  130s low 140s he is having some edema in his lower extremities without increased shortness of breath or evidence of heart failure likely from amlodipine      Back Pain   Pertinent negatives include no abdominal pain, chest pain or fever.        /90   Pulse 73   Temp 97.7 °F (36.5 °C) (Tympanic)   Ht 180.3 cm (71\")   Wt 103 kg (227 lb)   SpO2 95%   BMI 31.66 kg/m²       The following portions of the patient's history were reviewed and updated as appropriate: allergies, current medications, past family history, past medical history, past social history, past surgical history and problem list.    Review of Systems   Constitutional: Negative for fatigue and fever.   HENT: Negative.  Negative for trouble swallowing.    Eyes: Negative.    Respiratory: Negative.  Negative for cough and shortness of breath.  "   Cardiovascular: Negative for chest pain, palpitations and leg swelling.   Gastrointestinal: Negative.  Negative for abdominal pain.   Genitourinary: Negative.    Musculoskeletal: Positive for back pain.   Skin: Negative.    Neurological: Negative.  Negative for dizziness and confusion.   Psychiatric/Behavioral: Negative.        Objective   Physical Exam   Constitutional: He is oriented to person, place, and time. He appears well-developed and well-nourished. No distress.   Nontoxic pleasant   HENT:   Head: Normocephalic and atraumatic.   Nose: Nose normal.   Mouth/Throat: Oropharynx is clear and moist.   Eyes: Pupils are equal, round, and reactive to light. Conjunctivae are normal.   Neck: Neck supple. No JVD present.   Cardiovascular: Normal rate and normal heart sounds.   No murmur heard.  Rate controlled irregular rhythm   Pulmonary/Chest: Effort normal and breath sounds normal. No respiratory distress. He has no wheezes.   Abdominal: Soft. Bowel sounds are normal. He exhibits no distension and no mass. There is no tenderness. There is no guarding. No hernia.   Musculoskeletal: He exhibits edema. He exhibits no tenderness.   Trace to 1+ pitting edema tibia  Bilateral no calf tenderness no redness   Lymphadenopathy:     He has no cervical adenopathy.   Neurological: He is alert and oriented to person, place, and time.   Skin: Skin is warm and dry. He is not diaphoretic.   Psychiatric: He has a normal mood and affect. His behavior is normal. Judgment and thought content normal.   Vitals reviewed.        Assessment/Plan   Joaquín was seen today for complete short term disablity forms and back pain.    Diagnoses and all orders for this visit:    Lumbar radiculopathy, acute  -     MRI Lumbar Spine Without Contrast    Acute midline low back pain with bilateral sciatica  -     MRI Lumbar Spine Without Contrast    Benign essential hypertension    Dependent edema        Patient has no improvement of his back despite 2  anti-inflammatory packs and continued exercises  Pain is uncontrolled  Schedule outpatient MRI  Dependent edema he will follow instructions below on dietary changes decreasing sodium  Chest pain shortness of breath severe pain fever edema increased emergency room          Patient Instructions   Discharge instructions outpatient physical therapy outpatient MRI schedule appointment here to discuss her MRI results and to plan the next strategy which will be evaluate for injections or to see a neurosurgeon  But continue to participate in physical therapy    Decrease breads and pastas  Compression socks 15-19 knee-high  Or 20-30 such as mojo  He has some dependent edema as well as likely from your amlodipine so just elevate feet when sitting decrease his sodium which is in the breath in the past rash    Avoid alcohol stop smoking      Weakness bowel or bladder incontinence or retention groin paresthesias fever and back pain emergency room

## 2020-08-18 ENCOUNTER — HOSPITAL ENCOUNTER (OUTPATIENT)
Dept: MRI IMAGING | Facility: HOSPITAL | Age: 60
Discharge: HOME | End: 2020-08-18
Admitting: NURSE PRACTITIONER

## 2020-08-18 PROCEDURE — 72148 MRI LUMBAR SPINE W/O DYE: CPT

## 2020-08-19 DIAGNOSIS — G96.89 SPINAL CORD CYSTS: ICD-10-CM

## 2020-08-19 DIAGNOSIS — M54.16 LUMBAR RADICULOPATHY, ACUTE: ICD-10-CM

## 2020-08-19 DIAGNOSIS — M51.36 BULGING LUMBAR DISC: Primary | ICD-10-CM

## 2020-08-21 ENCOUNTER — OFFICE VISIT (OUTPATIENT)
Dept: FAMILY MEDICINE CLINIC | Facility: CLINIC | Age: 60
End: 2020-08-21

## 2020-08-21 VITALS
BODY MASS INDEX: 32.53 KG/M2 | HEART RATE: 89 BPM | OXYGEN SATURATION: 99 % | SYSTOLIC BLOOD PRESSURE: 144 MMHG | DIASTOLIC BLOOD PRESSURE: 88 MMHG | HEIGHT: 71 IN | WEIGHT: 232.4 LBS

## 2020-08-21 DIAGNOSIS — M54.41 MIDLINE LOW BACK PAIN WITH BILATERAL SCIATICA, UNSPECIFIED CHRONICITY: Primary | ICD-10-CM

## 2020-08-21 DIAGNOSIS — L03.114 CELLULITIS OF LEFT ARM: ICD-10-CM

## 2020-08-21 DIAGNOSIS — M51.36 BULGING LUMBAR DISC: ICD-10-CM

## 2020-08-21 DIAGNOSIS — M54.42 MIDLINE LOW BACK PAIN WITH BILATERAL SCIATICA, UNSPECIFIED CHRONICITY: Primary | ICD-10-CM

## 2020-08-21 PROCEDURE — 99213 OFFICE O/P EST LOW 20 MIN: CPT | Performed by: NURSE PRACTITIONER

## 2020-08-21 RX ORDER — AMOXICILLIN AND CLAVULANATE POTASSIUM 875; 125 MG/1; MG/1
1 TABLET, FILM COATED ORAL 2 TIMES DAILY
Qty: 14 TABLET | Refills: 0 | Status: SHIPPED | OUTPATIENT
Start: 2020-08-21 | End: 2020-10-28

## 2020-08-21 NOTE — PATIENT INSTRUCTIONS
Discharge instructions  Appointment Dr. Cullen Musa neurosurgery to discuss MRI  If uncontrolled pain weakness bowel or bladder retention or incontinence or groin paresthesias  Augmentin twice daily 7 days for left arm dog bite if increased pain redness swelling emergency room    INR outpatient 5 days

## 2020-08-25 NOTE — PROGRESS NOTES
"Subjective   Joaquín Garcia is a 59 y.o. male.     Follow-up low back pain radiates down his legs complains of severe pain difficult time sleeping increased pain at night and when he stands.  MRI shows osteophyte and disc bulging compressing the equadal  possibly cerebral flow disruption  Patient has no groin paresthesias no groin pain no weakness bowel or bladder change incontinence  No fever chills  No worsening pain  Patient history of alcoholism  Tylenol is not helping much he takes warfarin for atrial fibrillation cannot take NSAIDs  Type II steroid packs not much help  Essential hypertension much better controlled  Noncompliance much more noncompliance mother is here with the patient today for support  Tobacco abuse not ready to stop  Alcohol abuse says he is cut down not ready to stop    During exam it incidentally noted he had some redness and scabs left upper extremity  Patient says not bothering him too much his mom is worried about his dog  Had scratched and nibbled on his arm  No fever no chills some redness and scabs        Back Pain   Pertinent negatives include no abdominal pain, chest pain or fever.   Hypertension   Pertinent negatives include no chest pain, palpitations or shortness of breath.        /88   Pulse 89   Ht 180.3 cm (71\")   Wt 105 kg (232 lb 6.4 oz)   SpO2 99%   BMI 32.41 kg/m²       The following portions of the patient's history were reviewed and updated as appropriate: allergies, current medications, past family history, past medical history, past social history, past surgical history and problem list.    Review of Systems   Constitutional: Negative for fatigue and fever.   HENT: Negative.  Negative for trouble swallowing.    Eyes: Negative.    Respiratory: Negative.  Negative for cough and shortness of breath.    Cardiovascular: Negative for chest pain, palpitations and leg swelling.   Gastrointestinal: Negative.  Negative for abdominal pain.   Genitourinary: Negative. "    Musculoskeletal: Positive for back pain.   Skin: Negative.    Neurological: Negative.  Negative for dizziness and confusion.   Psychiatric/Behavioral: Negative.        Objective   Physical Exam   Constitutional: He is oriented to person, place, and time. He appears well-developed and well-nourished. No distress.   HENT:   Head: Normocephalic and atraumatic.   Nose: Nose normal.   Mouth/Throat: Oropharynx is clear and moist.   Eyes: Pupils are equal, round, and reactive to light. Conjunctivae are normal.   Neck: Neck supple. No JVD present.   Cardiovascular: Normal rate, regular rhythm and normal heart sounds.   No murmur heard.  Pulmonary/Chest: Effort normal and breath sounds normal. No respiratory distress. He has no wheezes.   Abdominal: Soft. Bowel sounds are normal. He exhibits no distension and no mass. There is no tenderness. There is no guarding. No hernia.   Musculoskeletal: He exhibits no edema or tenderness.   Negative straight leg raise plantar flexion dorsiflexion normal  No lumbar tenderness patient is walking guarded without foot drag or any evidence of weakness  He is using a walker as he describes severe pain but he has no focal weakness noted without walker    Left upper extremity 5 or 6 less than 1 cm scabs with couple millimeter surrounding redness  Appears to be an early infection possibly there is no deep-seated evidence of cellulitis or edema order the nontender to touch   Lymphadenopathy:     He has no cervical adenopathy.   Neurological: He is alert and oriented to person, place, and time.   Skin: Skin is warm and dry. He is not diaphoretic.   Psychiatric: He has a normal mood and affect. His behavior is normal. Judgment and thought content normal.   Vitals reviewed.        Assessment/Plan   Joaquín was seen today for back pain and hypertension.    Diagnoses and all orders for this visit:    Midline low back pain with bilateral sciatica, unspecified chronicity    Bulging lumbar  disc    Other orders  -     amoxicillin-clavulanate (Augmentin) 875-125 MG per tablet; Take 1 tablet by mouth 2 (Two) Times a Day. With food and water      May have early cellulitis of his dog scratch/bite nipple  Elevate arm heart level start antibiotic now watch closely especially next 48 hours  If increased pain redness swelling fever chills emergency room  Follow instructions below any increase neurological weakness symptoms saddlebag paresthesias fevers  Gait difficulties uncontrolled pain emergency room  He has a history of alcoholism at this time he is not a candidate for opiate management should his pain become unbearable or uncontrolled emergency room            Patient Instructions   Discharge instructions  Appointment Dr. Cullen Musa neurosurgery to discuss MRI  If uncontrolled pain weakness bowel or bladder retention or incontinence or groin paresthesias  Augmentin twice daily 7 days for left arm dog bite if increased pain redness swelling emergency room    INR outpatient 5 days

## 2020-08-31 ENCOUNTER — TELEPHONE (OUTPATIENT)
Dept: FAMILY MEDICINE CLINIC | Facility: CLINIC | Age: 60
End: 2020-08-31

## 2020-08-31 NOTE — TELEPHONE ENCOUNTER
MESSAGE FOR [facility]:    PT IS NEEDING AN EXTENSION ON HIS DISABILITY AND NEEDS FOR [facility] TO SEND AN UPDATED REQUEST TO HIS DISABILITY CLAIM THAT INCLUDES HIS MOST RECENT MRI.

## 2020-09-03 ENCOUNTER — TELEPHONE (OUTPATIENT)
Dept: FAMILY MEDICINE CLINIC | Facility: CLINIC | Age: 60
End: 2020-09-03

## 2020-09-03 NOTE — TELEPHONE ENCOUNTER
PATIENT IS WANTING  COPY OF HIS MRI SENT TO HIS , [facility], FAX NUMBER [phone]    PLEASE FOLLOW UP WITH PATIENT AFTER COMPLETED.    PATIENT CALL BACK     [phone]

## 2020-09-10 ENCOUNTER — OFFICE VISIT (OUTPATIENT)
Dept: FAMILY MEDICINE CLINIC | Facility: CLINIC | Age: 60
End: 2020-09-10

## 2020-09-10 VITALS
HEIGHT: 71 IN | TEMPERATURE: 97.7 F | WEIGHT: 236 LBS | DIASTOLIC BLOOD PRESSURE: 90 MMHG | HEART RATE: 105 BPM | OXYGEN SATURATION: 96 % | SYSTOLIC BLOOD PRESSURE: 160 MMHG | BODY MASS INDEX: 33.04 KG/M2

## 2020-09-10 DIAGNOSIS — I10 ESSENTIAL HYPERTENSION: ICD-10-CM

## 2020-09-10 DIAGNOSIS — M54.16 LUMBAR RADICULOPATHY, ACUTE: Primary | ICD-10-CM

## 2020-09-10 DIAGNOSIS — M51.36 BULGING OF LUMBAR INTERVERTEBRAL DISC: ICD-10-CM

## 2020-09-10 DIAGNOSIS — I48.20 ATRIAL FIBRILLATION, CHRONIC (HCC): ICD-10-CM

## 2020-09-10 LAB — INR PPP: 2.3 (ref 0.9–1.1)

## 2020-09-10 PROCEDURE — 99213 OFFICE O/P EST LOW 20 MIN: CPT | Performed by: NURSE PRACTITIONER

## 2020-09-10 PROCEDURE — 85610 PROTHROMBIN TIME: CPT | Performed by: NURSE PRACTITIONER

## 2020-09-10 RX ORDER — HYDROCHLOROTHIAZIDE 12.5 MG/1
12.5 TABLET ORAL DAILY
Qty: 30 TABLET | Refills: 5 | Status: SHIPPED | OUTPATIENT
Start: 2020-09-10 | End: 2021-01-05

## 2020-09-10 NOTE — PROGRESS NOTES
"Subjective   Joaquín Garcia is a 59 y.o. male.     Follow-up severe low back pain radiates down his legs without weakness groin paresthesias bowel bladder changes fevers chills or abdominal pain.  Symptoms over 1 month pain has been severe  Increased with walking cannot go up and down stairs or steps more pain the longer he walks.  Previously he could not go anywhere without his walker now he has had some mild improvement but still has substantial pain moderate severe to severe  Patient has substantial pain as well as substantial abnormality of his lumbar MRI obtained on 8/4/2020, revealing multilevel degenerative changes and more importantly and specifically at L4-L5 large cysts 9 x 6 x 12 mm as well as a large bulging disc apparently narrowing cauda equina and diminishing spinal fluid  Patient has severe pain severely restricted and going up and down stairs walking standing sitting.  Restricted due to severe pain.  Presently taking Tylenol  And using body position for relief  History of alcoholism and tobacco abuse he has been decreased decreasing his alcohol use  He is participating in physical therapy  Blood pressure averages systolic 138 over upper 80s  For for his blood pressure 130/80 has no contraindications to diuretics no renal insufficiency no kidney stones    2 rounds of steroids ineffective and  He cannot take anti-inflammatories he has been referred to neurosurgery but we cannot get him into see Dr. Cullen Musa until November             /90   Pulse 105   Temp 97.7 °F (36.5 °C) (Temporal)   Ht 180.3 cm (71\")   Wt 107 kg (236 lb)   SpO2 96%   BMI 32.92 kg/m²       The following portions of the patient's history were reviewed and updated as appropriate: allergies, current medications, past family history, past medical history, past social history, past surgical history and problem list.    Review of Systems   Constitutional: Negative for fatigue and fever.   HENT: Negative.  Negative for " trouble swallowing.    Eyes: Negative.    Respiratory: Negative.  Negative for cough and shortness of breath.    Cardiovascular: Negative for chest pain, palpitations and leg swelling.   Gastrointestinal: Negative.  Negative for abdominal pain.   Genitourinary: Negative.    Musculoskeletal: Positive for back pain and gait problem.   Skin: Negative.    Neurological: Negative for dizziness and confusion.   Psychiatric/Behavioral: Negative.        Objective   Physical Exam   Constitutional: He appears well-developed and well-nourished.   Pleasant nontoxic   HENT:   Head: Normocephalic and atraumatic.   Eyes: Pupils are equal, round, and reactive to light. Conjunctivae are normal.   Neck: Neck supple.   Pulmonary/Chest: Effort normal.   Musculoskeletal:   Gait is guarded but no foot dragging no evidence of plantar dorsiflexion weakness with gait but unable to stand on his toes however when each individual foot checked patient equal strength bilateral 5+ plantar flexion and dorsiflexion he is able to move each individual toe and neurovascular intact sensation intact entire extremity negative straight leg raise  No lumbar tenderness   Skin: Skin is warm and dry. No rash noted. No erythema.   Psychiatric: He has a normal mood and affect. His behavior is normal. Judgment and thought content normal.   Vitals reviewed.        Assessment/Plan   Joaquín was seen today for follow-up on back pain.    Diagnoses and all orders for this visit:    Atrial fibrillation, chronic (CMS/HCC)  -     POCT INR    Bulging of lumbar intervertebral disc  -     Ambulatory Referral to Orthopedic Surgery    Lumbar radiculopathy, acute  -     Ambulatory Referral to Orthopedic Surgery    Other orders  -     hydroCHLOROthiazide (HYDRODIURIL) 12.5 MG tablet; Take 1 tablet by mouth Daily. For blood pressure control                  There are no Patient Instructions on file for this visit.              Patient has substantial pain as well as substantial  abnormality of his lumbar MRI obtained on 8/4/2020, revealing multilevel degenerative changes and more importantly and specifically at L4-L5 large cysts 9 x 6 x 12 mm as well as a large bulging disc apparently narrowing cauda equina and diminishing spinal fluid  Patient has severe pain severely restricted and going up and down stairs walking standing sitting.  Restricted due to severe pain.    Presently his restrictions include no prolonged standing sitting squatting reaching pushing pulling lifting.    Current treatment plan  Continue home exercises  Neurosurgery referral [facility]  As he cannot get into  Dr. Cullen Musa until November I will refer him to Dr. Christine  He will continue physical therapy  He cannot take anti-inflammatories due to warfarin  He has had at least 2 rounds of steroids which were not effective    Refer to Dr. Christine  No work presently  Reevaluate in 2 weeks  Estimated return to work 4-8 weeks      If patient has bowel or bladder retention or incontinence groin paresthesias weakness fever abdominal pain worsening symptoms or uncontrolled pain emergency room

## 2020-09-10 NOTE — PATIENT INSTRUCTIONS
Patient has substantial pain as well as substantial abnormality of his lumbar MRI obtained on 8/4/2020, revealing multilevel degenerative changes and more importantly and specifically at L4-L5 large cysts 9 x 6 x 12 mm as well as a large bulging disc apparently narrowing cauda equina and diminishing spinal fluid  Patient has severe pain severely restricted and going up and down stairs walking standing sitting.  Restricted due to severe pain.    Presently his restrictions include no prolonged standing sitting squatting reaching pushing pulling lifting.    Current treatment plan  Continue home exercises  Neurosurgery referral ASAP  As he cannot get into  Dr. Cullen Musa until November I will refer him to Dr. Christine  He will continue physical therapy  He cannot take anti-inflammatories due to warfarin  He has had at least 2 rounds of steroids which were not effective    Refer to Dr. Christine  No work presently  Reevaluate in 2 weeks  Estimated return to work 4-8 weeks      If patient has bowel or bladder retention or incontinence groin paresthesias weakness fever abdominal pain worsening symptoms or uncontrolled pain emergency room    Check blood pressure weekly should average less than 130/80 and greater than or equal to 110/70  Start hydrochlorothiazide 12.5 mg daily  Recheck in 2 weeks sooner for problems

## 2020-10-13 ENCOUNTER — OFFICE VISIT (OUTPATIENT)
Dept: ORTHOPEDIC SURGERY | Facility: CLINIC | Age: 60
End: 2020-10-13

## 2020-10-13 VITALS — WEIGHT: 234 LBS | HEIGHT: 71 IN | BODY MASS INDEX: 32.76 KG/M2 | TEMPERATURE: 97.3 F

## 2020-10-13 DIAGNOSIS — M48.062 SPINAL STENOSIS OF LUMBAR REGION WITH NEUROGENIC CLAUDICATION: Primary | ICD-10-CM

## 2020-10-13 PROCEDURE — 99204 OFFICE O/P NEW MOD 45 MIN: CPT | Performed by: ORTHOPAEDIC SURGERY

## 2020-10-13 RX ORDER — CEFAZOLIN SODIUM 2 G/100ML
2 INJECTION, SOLUTION INTRAVENOUS ONCE
Status: CANCELLED | OUTPATIENT
Start: 2020-11-05 | End: 2020-10-13

## 2020-10-13 NOTE — PROGRESS NOTES
New patient or new problem visit    Chief Complaint   Patient presents with   • Lumbar Spine - Establish Care       HPI: He is complaining of history since July of worsening back pain radiating into the right lower extremity.  Severe constant with stabbing and aching worse with activity.  He has been unable to work and it is failing to improve.  There is no history of trauma.  He sometimes uses a walker to help him with the pain.    PFSH: See chart- reviewed.  He is a smoker with COPD heart disease and history of neurofibromatosis    Review of Systems   Constitutional: Negative for chills, fever and unexpected weight change.   HENT: Positive for dental problem, sinus pressure, sinus pain and sneezing. Negative for trouble swallowing and voice change.    Eyes: Positive for pain. Negative for visual disturbance.   Respiratory: Negative for cough and shortness of breath.    Cardiovascular: Negative for chest pain and leg swelling.   Gastrointestinal: Negative for abdominal pain, nausea and vomiting.   Endocrine: Negative for cold intolerance and heat intolerance.   Genitourinary: Negative for difficulty urinating, frequency and urgency.   Musculoskeletal: Positive for arthralgias, back pain, neck pain and neck stiffness.        Muscle aches   Skin: Negative for rash and wound.   Allergic/Immunologic: Negative for immunocompromised state.   Neurological: Positive for dizziness, light-headedness, numbness and headaches. Negative for weakness.   Hematological: Does not bruise/bleed easily.   Psychiatric/Behavioral: Negative for dysphoric mood. The patient is not nervous/anxious.        PE: Constitutional: Vital signs above-noted.  Awake, alert and oriented    Psychiatric: Affect and insight do not appear grossly disturbed.    Pulmonary: Breathing is unlabored, color is good.    Skin: Warm, dry and normal turgor.  Stigmata of multiple neurofibromata    Cardiac: Pedal pulses intact.  No edema.    Eyesight and hearing appear  adequate for examination purposes      Musculoskeletal:  There is minimal tenderness to percussion and palpation of the spine. Motion appears undisturbed.  Posture is unremarkable to coronal and sagittal inspection.    The skin about the area is intact.  There is no palpable or visible deformity.  There is no local spasm.       Neurologic:   Reflexes are absent in the patellae and achilles.   Motor function is undisturbed in quadriceps, EHL, and gastrocnemius   sensation appears symmetrically intact to light touch.  In the bilateral lower extremities there is no evidence of atrophy.   Clonus is absent..  Gait appears undisturbed.  SLR test negative      MEDICAL DECISION MAKING    XRAY: Plain film x-rays of the lumbar spine demonstrate multilevel degenerative changes loss of lordosis and a sacralized transitional lumbosacral segment.  I reviewed the radiologist report with which I agree.  MRI scan of the lumbar spine shows severe stenosis of what we will call L4-5 are the second last formed and mobile disc which is due in part to a large synovial cyst but there is also facet hypertrophy causing severe canal narrowing.  Neither plain films nor MRI show any spondylolisthesis    Other: n/a    Impression: Multilevel lumbar disc degeneration and severe spinal stenosis L4-5.  Attention to transitional lumbosacral junction.  I think he is a good candidate for L4-5 laminectomy which will relieve his leg pain perhaps a bit of the back pain but not all of it.  It severe enough stenosis that I think he needs the surgery which should not be delayed for conservative treatment if he is a medical candidate.    Plan: L4-5 laminectomy.  I discussed the risks and benefits of laminectomy.  Risks include adverse anesthetic events including death, stroke and myocardial infarction.  More specific surgical complications include infection, nerve root injury and/or paralysis, persistent pain, recurrent pain, spinal fluid leakage, painful  scarring, and increased back pain and/or instability, among others. There is a 70 to 90 percent chance of success.   Alternatives were discussed.  After careful consideration the patient wishes to proceed with surgery.

## 2020-10-19 ENCOUNTER — TELEPHONE (OUTPATIENT)
Dept: FAMILY MEDICINE CLINIC | Facility: CLINIC | Age: 60
End: 2020-10-19

## 2020-10-20 ENCOUNTER — PREP FOR SURGERY (OUTPATIENT)
Dept: OTHER | Facility: HOSPITAL | Age: 60
End: 2020-10-20

## 2020-10-21 ENCOUNTER — OFFICE VISIT (OUTPATIENT)
Dept: FAMILY MEDICINE CLINIC | Facility: CLINIC | Age: 60
End: 2020-10-21

## 2020-10-21 VITALS
HEART RATE: 67 BPM | SYSTOLIC BLOOD PRESSURE: 130 MMHG | DIASTOLIC BLOOD PRESSURE: 70 MMHG | BODY MASS INDEX: 31.92 KG/M2 | WEIGHT: 228 LBS | OXYGEN SATURATION: 98 % | TEMPERATURE: 97.1 F | HEIGHT: 71 IN

## 2020-10-21 DIAGNOSIS — Z23 NEED FOR IMMUNIZATION AGAINST INFLUENZA: ICD-10-CM

## 2020-10-21 DIAGNOSIS — Z01.818 PREOPERATIVE CLEARANCE: ICD-10-CM

## 2020-10-21 DIAGNOSIS — M48.062 SPINAL STENOSIS OF LUMBAR REGION WITH NEUROGENIC CLAUDICATION: Primary | ICD-10-CM

## 2020-10-21 DIAGNOSIS — I48.20 ATRIAL FIBRILLATION, CHRONIC (HCC): ICD-10-CM

## 2020-10-21 DIAGNOSIS — I10 ESSENTIAL HYPERTENSION: ICD-10-CM

## 2020-10-21 LAB — INR PPP: 3.1 (ref 0.9–1.1)

## 2020-10-21 PROCEDURE — 36416 COLLJ CAPILLARY BLOOD SPEC: CPT | Performed by: NURSE PRACTITIONER

## 2020-10-21 PROCEDURE — 90686 IIV4 VACC NO PRSV 0.5 ML IM: CPT | Performed by: NURSE PRACTITIONER

## 2020-10-21 PROCEDURE — 99213 OFFICE O/P EST LOW 20 MIN: CPT | Performed by: NURSE PRACTITIONER

## 2020-10-21 PROCEDURE — 90471 IMMUNIZATION ADMIN: CPT | Performed by: NURSE PRACTITIONER

## 2020-10-21 PROCEDURE — 85610 PROTHROMBIN TIME: CPT | Performed by: NURSE PRACTITIONER

## 2020-10-21 NOTE — PROGRESS NOTES
"Subjective   Joaquín Garcia is a 60 y.o. male.     Very pleasant gentleman here today complains of severe mid low back pain  Several months with an abnormal MRI showing significantly decreased disc space diminished spinal fluid cauda equina at L4-L5 causing displacement of the L5 nerve root  Patient reports he continues to have severe pain he has had approximately 10% improvement he has felt least 3 times no new weakness no new bowel or bladder incontinence or retention no saddlebag paresthesias  He has seen orthopedic surgeon Dr. Christine and he is scheduled to have surgery laminectomy November 5    He has atrial fibrillation has been noncompliant previously but since the summer he has been doing very well taking his medications  And taken warfarin for A. fib to decrease risk of stroke  He will need to be off this for at least 5 days prior to his surgery and he will need cardiac clearance    Essential hypertension presently controlled  He is taking all his medication  Cutting down his tobacco use  History of alcoholism he is decrease his alcohol intake down to 3 ounces per day    Patient reports he is only had a 10% decrease in pain he has difficulty sleeping walking is affecting every aspect of his life he does not feel like he can work          No diabetes  No history of anesthesia difficulties  He is without acute respiratory symptoms without acute urinary symptoms no abdominal pain or other complaints he feels like he is ready for surgery unfortunately he is out on disability and is unable to work due to his severe pain and new disability                   /70   Pulse 67   Temp 97.1 °F (36.2 °C) (Temporal)   Ht 180.3 cm (71\")   Wt 103 kg (228 lb)   SpO2 98%   BMI 31.80 kg/m²       The following portions of the patient's history were reviewed and updated as appropriate: allergies, current medications, past family history, past medical history, past social history, past surgical history and problem " list.    Review of Systems   Constitutional: Negative for fatigue and fever.   HENT: Negative.  Negative for trouble swallowing.    Eyes: Negative.    Respiratory: Negative.  Negative for cough and shortness of breath.    Cardiovascular: Negative for chest pain, palpitations and leg swelling.   Gastrointestinal: Negative.  Negative for abdominal pain.   Genitourinary: Negative.         No new bowel or bladder changes   Musculoskeletal: Positive for back pain.        No weakness foot drop no groin paresthesias   Skin: Negative.    Neurological: Negative.  Negative for dizziness and confusion.   Psychiatric/Behavioral: Negative.        Objective   Physical Exam  Vitals signs reviewed.   Constitutional:       General: He is not in acute distress.     Appearance: He is well-developed. He is not diaphoretic.      Comments: Pleasant no distress   HENT:      Head: Normocephalic and atraumatic.      Nose: Nose normal.   Eyes:      Conjunctiva/sclera: Conjunctivae normal.      Pupils: Pupils are equal, round, and reactive to light.   Neck:      Musculoskeletal: Neck supple.      Vascular: No JVD.   Cardiovascular:      Rate and Rhythm: Normal rate. Rhythm irregular.      Heart sounds: Normal heart sounds. No murmur.   Pulmonary:      Effort: Pulmonary effort is normal. No respiratory distress.      Breath sounds: Normal breath sounds. No wheezing.   Abdominal:      General: Bowel sounds are normal. There is no distension.      Palpations: Abdomen is soft. There is no mass.      Tenderness: There is no abdominal tenderness. There is no guarding.      Hernia: No hernia is present.   Musculoskeletal:         General: No tenderness.      Comments: Patient was able to get up in the table fairly well he had some grimacing when he was leaning back but much better range of motion much better maneuverability than his initial baseline he came in without a walker his gait is still guarded but much more brisk than his shuffling when he  was in more severe pain   Lymphadenopathy:      Cervical: No cervical adenopathy.   Skin:     General: Skin is warm and dry.   Neurological:      Mental Status: He is alert and oriented to person, place, and time.   Psychiatric:         Behavior: Behavior normal.         Thought Content: Thought content normal.         Judgment: Judgment normal.           Assessment/Plan   Diagnoses and all orders for this visit:    1. Atrial fibrillation, chronic (CMS/HCC) (Primary)  -     POCT INR  -     Ambulatory Referral to Cardiology    2. Spinal stenosis of lumbar region with neurogenic claudication    3. Essential hypertension    4. Preoperative clearance  -     Ambulatory Referral to Cardiology    5. Need for immunization against influenza  -     Fluarix/Fluzone/Afluria Quad>6 Months    Patient will follow up with Dr. Christine for surgery  His blood pressures controlled he is without chest pain or shortness of breath he has no chronic cough no shortness of breath  No urinary difficulties he has no fevers no chills no night sweats  No signs or symptoms of infection  He has had no prior problems with anesthesia  He does have known atrial fibrillation and previous mitral valve pig valve replacement no mechanical valve  He has not followed up with cardiology  Needs a new referral  I refer him to cardiology for an evaluation as well as he needs cardiology clearance and  Advice and instructions regarding warfarin cessation prior to surgery    If weakness bowel bladder incontinence retention fever groin paresthesias    Emergency room                There are no Patient Instructions on file for this visit.

## 2020-10-21 NOTE — PROGRESS NOTES
Very pleasant gentleman here today complains of severe mid low back pain  Several months with an abnormal MRI showing significantly decreased disc space diminished spinal fluid cauda equina at L4-L5 causing displacement of the L5 nerve root  Patient reports he continues to have severe pain he has had approximately 10% improvement he has felt least 3 times no new weakness no new bowel or bladder incontinence or retention no saddlebag paresthesias  He has seen orthopedic surgeon Dr. Christine and he is scheduled to have surgery laminectomy November 5    He has atrial fibrillation has been noncompliant previously but since the summer he has been doing very well taking his medications  And taken warfarin for A. fib to decrease risk of stroke  He will need to be off this for at least 5 days prior to his surgery and he will need cardiac clearance    Essential hypertension presently controlled  He is taking all his medication  Cutting down his tobacco use  History of alcoholism he is decrease his alcohol intake down to 3 ounces per day        No diabetes  No history of anesthesia difficulties  He is without acute respiratory symptoms without acute urinary symptoms no abdominal pain or other complaints he feels like he is ready for surgery unfortunately he is out on disability and is unable to work due to his severe pain and new disability

## 2020-10-26 ENCOUNTER — TRANSCRIBE ORDERS (OUTPATIENT)
Dept: PREADMISSION TESTING | Facility: HOSPITAL | Age: 60
End: 2020-10-26

## 2020-10-26 DIAGNOSIS — Z01.818 OTHER SPECIFIED PRE-OPERATIVE EXAMINATION: Primary | ICD-10-CM

## 2020-10-28 ENCOUNTER — APPOINTMENT (OUTPATIENT)
Dept: PREADMISSION TESTING | Facility: HOSPITAL | Age: 60
End: 2020-10-28

## 2020-10-28 VITALS
TEMPERATURE: 97.6 F | OXYGEN SATURATION: 98 % | DIASTOLIC BLOOD PRESSURE: 85 MMHG | HEART RATE: 107 BPM | HEIGHT: 71 IN | WEIGHT: 230.5 LBS | SYSTOLIC BLOOD PRESSURE: 146 MMHG | RESPIRATION RATE: 18 BRPM | BODY MASS INDEX: 32.27 KG/M2

## 2020-10-28 LAB
ANION GAP SERPL CALCULATED.3IONS-SCNC: 9.8 MMOL/L (ref 5–15)
BUN SERPL-MCNC: 27 MG/DL (ref 8–23)
BUN/CREAT SERPL: 27.3 (ref 7–25)
CALCIUM SPEC-SCNC: 9.1 MG/DL (ref 8.6–10.5)
CHLORIDE SERPL-SCNC: 101 MMOL/L (ref 98–107)
CO2 SERPL-SCNC: 25.2 MMOL/L (ref 22–29)
CREAT SERPL-MCNC: 0.99 MG/DL (ref 0.76–1.27)
DEPRECATED RDW RBC AUTO: 44.3 FL (ref 37–54)
ERYTHROCYTE [DISTWIDTH] IN BLOOD BY AUTOMATED COUNT: 12.7 % (ref 12.3–15.4)
GFR SERPL CREATININE-BSD FRML MDRD: 77 ML/MIN/1.73
GLUCOSE SERPL-MCNC: 166 MG/DL (ref 65–99)
HCT VFR BLD AUTO: 48.2 % (ref 37.5–51)
HGB BLD-MCNC: 17.1 G/DL (ref 13–17.7)
INR PPP: 2.36 (ref 0.9–1.1)
MCH RBC QN AUTO: 33.7 PG (ref 26.6–33)
MCHC RBC AUTO-ENTMCNC: 35.5 G/DL (ref 31.5–35.7)
MCV RBC AUTO: 94.9 FL (ref 79–97)
PLATELET # BLD AUTO: 228 10*3/MM3 (ref 140–450)
PMV BLD AUTO: 8.3 FL (ref 6–12)
POTASSIUM SERPL-SCNC: 4.2 MMOL/L (ref 3.5–5.2)
PROTHROMBIN TIME: 25.1 SECONDS (ref 11.7–14.2)
RBC # BLD AUTO: 5.08 10*6/MM3 (ref 4.14–5.8)
SODIUM SERPL-SCNC: 136 MMOL/L (ref 136–145)
WBC # BLD AUTO: 7.26 10*3/MM3 (ref 3.4–10.8)

## 2020-10-28 PROCEDURE — 80048 BASIC METABOLIC PNL TOTAL CA: CPT | Performed by: ORTHOPAEDIC SURGERY

## 2020-10-28 PROCEDURE — 36415 COLL VENOUS BLD VENIPUNCTURE: CPT

## 2020-10-28 PROCEDURE — 85610 PROTHROMBIN TIME: CPT | Performed by: ORTHOPAEDIC SURGERY

## 2020-10-28 PROCEDURE — 85027 COMPLETE CBC AUTOMATED: CPT | Performed by: ORTHOPAEDIC SURGERY

## 2020-10-28 RX ORDER — WARFARIN SODIUM 2 MG/1
4 TABLET ORAL TAKE AS DIRECTED
COMMUNITY
End: 2022-08-26 | Stop reason: ALTCHOICE

## 2020-10-29 ENCOUNTER — OFFICE VISIT (OUTPATIENT)
Dept: CARDIOLOGY | Facility: CLINIC | Age: 60
End: 2020-10-29

## 2020-10-29 ENCOUNTER — PREP FOR SURGERY (OUTPATIENT)
Dept: OTHER | Facility: HOSPITAL | Age: 60
End: 2020-10-29

## 2020-10-29 ENCOUNTER — TELEPHONE (OUTPATIENT)
Dept: ORTHOPEDIC SURGERY | Facility: CLINIC | Age: 60
End: 2020-10-29

## 2020-10-29 VITALS
DIASTOLIC BLOOD PRESSURE: 82 MMHG | BODY MASS INDEX: 31.84 KG/M2 | WEIGHT: 227.4 LBS | OXYGEN SATURATION: 98 % | SYSTOLIC BLOOD PRESSURE: 120 MMHG | HEART RATE: 87 BPM | HEIGHT: 71 IN

## 2020-10-29 DIAGNOSIS — I48.20 ATRIAL FIBRILLATION, CHRONIC (HCC): ICD-10-CM

## 2020-10-29 DIAGNOSIS — I34.0 NONRHEUMATIC MITRAL VALVE REGURGITATION: Primary | ICD-10-CM

## 2020-10-29 DIAGNOSIS — I10 ESSENTIAL HYPERTENSION: ICD-10-CM

## 2020-10-29 DIAGNOSIS — Z72.0 TOBACCO ABUSE: ICD-10-CM

## 2020-10-29 PROCEDURE — 99204 OFFICE O/P NEW MOD 45 MIN: CPT | Performed by: INTERNAL MEDICINE

## 2020-10-29 PROCEDURE — 93000 ELECTROCARDIOGRAM COMPLETE: CPT | Performed by: INTERNAL MEDICINE

## 2020-10-29 NOTE — TELEPHONE ENCOUNTER
----- Message from Marcos Christine MD sent at 10/29/2020  2:59 PM EDT -----    ----- Message -----  From: Maikol Remy MD  Sent: 10/29/2020   2:08 PM EDT  To: MD Dr. Emmett Murillo: I saw Mr. Garcia in the office today for a consultation and preoperative evaluation for his upcoming back surgery.  He is chronically anticoagulated with warfarin secondary to chronic atrial fibrillation history of mitral valve replacement.  I recommend that he come off his warfarin 4 days prior to her surgery.  His INR yesterday was only 2.3.  He will need to go back on anticoagulation as soon as feasible postop.  Unfortunately the patient had a major complication on Lovenox after his heart surgery.  My recommendation if he needs any bridge postop it would be intravenous heparin and not Lovenox.  We will be happy to see him in the perioperative period to help manage his anticoagulation if needed.  Otherwise from a cardiac standpoint I do not anticipate any issues.  He does not have coronary disease based on his catheterization in 2016.  If you have any question please give me a call.  Thank you.  Morgan Remy  Cell [phone]

## 2020-10-29 NOTE — PROGRESS NOTES
Date of Consultation: 10/29/2020    Patient Name: Joaquín Garcia  :1960    Encounter Provider:Maikol Remy MD  Primary Care Provider: Epley, James, APRN    Place of Service: [facility] CARDIOLOGY    Chief Complaint   Patient presents with   • Atrial Fibrillation       History of Present Illness    The patient is a 60-year-old white male with a history of atrial fibrillation and mitral regurgitation.  He underwent a mitral valve replacement with a tissue prosthesis in 2016.  He also had a tricuspid valve repair.  He has remained anticoagulated with warfarin since that time.    The patient used to be followed by another cardiologist who is no longer here in town.  Its been several years since he has had any evaluation.  He is now here for preoperative evaluation with upcoming back surgery.    From a symptom standpoint the patient denies any chest pain or shortness of breath.  He denies any lightheadedness nor dizziness.  Only rarely will he have some lower extremity edema.  He states he does drink 2 alcoholic beverages every day.  He also drinks a fair amount of coffee and smokes half a pack of cigarettes per day.    On his catheterization in  there was no known coronary disease.  His left ventricular function was normal.    Past Medical History:   Diagnosis Date   • Arthritis    • Atrial fibrillation (CMS/HCC)    • COPD (chronic obstructive pulmonary disease) (CMS/HCC)     PT STATES NO LONGER ON INHALERS BUT IS BREATHING WELL   • Degenerative lumbar disc    • Emphysema with chronic bronchitis (CMS/HCC)    • H/O tricuspid valve repair 2016    MEDTRONIC ANNULOPLASTY RING   • History of heart valve replacement with porcine valve 2016    MITRAL VALVE, DR COBURN   • Hypertension    • Low back pain     RADIATES DOWN BLE, WORSE ON RT, SOME NUMBNESS/TINGLING   • Neurofibromatosis (CMS/HCC)    • On anticoagulant therapy    • Sleep apnea     DOES NOT USE CPAP   • Spinal  stenosis          Past Surgical History:   Procedure Laterality Date   • CARDIAC CATHETERIZATION  10/20/2016    Procedure: Case Abort;  Surgeon: Zhen Ford MD;  Location: [facility] CATH INVASIVE LOCATION;  Service:    • CARDIAC CATHETERIZATION Left 10/21/2016    Procedure: Cardiac catheterization;  Surgeon: Zhen Ford MD;  Location: [facility] CATH INVASIVE LOCATION;  Service:    • HERNIA REPAIR     • MITRAL VALVE REPAIR/REPLACEMENT N/A 10/27/2016    Procedure: INTRAOPERATIVE LILIA, MIDLINE STERNOTOMY WITH MITRAL VALVE REPLACEMENT TRICUSPID VALVE REPAIR;  Surgeon: Robert George MD;  Location: [facility] MAIN OR;  Service:    • TEETH EXTRACTION N/A 10/25/2016    Procedure: TEETH EXTRACTION FULL MOUTH;  Surgeon: Gio Ibarra DMD;  Location: [facility] OR;  Service:            Current Outpatient Medications:   •  acetaminophen (TYLENOL) 325 MG tablet, Take 2 tablets by mouth Every 6 (Six) Hours As Needed for mild pain (1-3)., Disp: , Rfl: 0  •  amLODIPine (NORVASC) 10 MG tablet, Take 1 tablet by mouth Daily. For hypertention, Disp: 90 tablet, Rfl: 1  •  hydroCHLOROthiazide (HYDRODIURIL) 12.5 MG tablet, Take 1 tablet by mouth Daily. For blood pressure control, Disp: 30 tablet, Rfl: 5  •  metoprolol succinate XL (Toprol XL) 100 MG 24 hr tablet, Take 1 tablet by mouth Daily., Disp: 90 tablet, Rfl: 1  •  ramipril (ALTACE) 10 MG capsule, Take 1 capsule by mouth Daily. For hypertension, Disp: 90 capsule, Rfl: 1  •  warfarin (COUMADIN) 2 MG tablet, 2 tablets daily or as directed, per  INR (Patient taking differently: Take 2 mg by mouth Take As Directed. 2 MG DAILY ON TUESDAYS, THURSDAYS AND SATURDAYS PT TO HOLD 5 DAYS PRIOR TO SURGERY), Disp: 60 tablet, Rfl: 5  •  warfarin (COUMADIN) 2 MG tablet, Take 4 mg by mouth Take As Directed. 4 MG ON MONDAYS, WEDNESDAYS AND FRIDAYS PT TO HOLD 5 DAYS PRIOR TO SURGERY, Disp: , Rfl:       Social History     Socioeconomic History   • Marital status: Single     Spouse  "name: Not on file   • Number of children: Not on file   • Years of education: Not on file   • Highest education level: Not on file   Tobacco Use   • Smoking status: Current Every Day Smoker     Packs/day: 0.00     Years: 35.00     Pack years: 0.00     Types: Cigarettes   • Smokeless tobacco: Never Used   • Tobacco comment: PT STATES DOWN TO 1/2 TO 1/3 PPD FROM 1PPD   Substance and Sexual Activity   • Alcohol use: Yes     Comment: Social   • Drug use: No   • Sexual activity: Defer         Review of Systems   Constitution: Negative.   HENT: Negative.    Eyes: Negative.    Cardiovascular: Negative.    Respiratory: Negative.    Endocrine: Negative.    Skin: Negative.    Musculoskeletal: Negative.    Gastrointestinal: Negative.    Neurological: Negative.    Psychiatric/Behavioral: Negative.        Procedures      ECG 12 Lead    Date/Time: 10/29/2020 2:04 PM  Performed by: Maikol Remy MD  Authorized by: Maikol Remy MD   Comparison: compared with previous ECG from 6/3/2020  Rhythm: atrial fibrillation  Rate: normal  Conduction: conduction normal  QRS axis: normal  Other findings: non-specific ST-T wave changes                Objective:    /82 (BP Location: Left arm, Patient Position: Sitting, Cuff Size: Adult)   Pulse 87   Ht 180.3 cm (71\")   Wt 103 kg (227 lb 6.4 oz)   SpO2 98%   BMI 31.72 kg/m²         Vitals signs reviewed.   Constitutional:       Appearance: Well-developed.   Eyes:      Pupils: Pupils are equal, round, and reactive to light.   HENT:      Head: Normocephalic.   Neck:      Musculoskeletal: Normal range of motion.      Thyroid: No thyromegaly.      Vascular: No carotid bruit or JVD.   Pulmonary:      Effort: Pulmonary effort is normal.      Breath sounds: Normal breath sounds.   Cardiovascular:      Normal rate. Regular rhythm.      No gallop.   Pulses:     Intact distal pulses.   Edema:     Peripheral edema absent.   Abdominal:      General: Bowel sounds are normal.      " Palpations: Abdomen is soft.   Skin:     General: Skin is warm and dry.      Findings: No erythema.   Neurological:      Mental Status: Alert and oriented to person, place, and time.             Assessment:       Diagnosis Plan   1. Nonrheumatic mitral valve regurgitation  Adult Transthoracic Echo Complete W/ Cont if Necessary Per Protocol   2. Atrial fibrillation, chronic (CMS/HCC)     3. Essential hypertension     4. Tobacco abuse         1.  Nonrheumatic mitral regurgitation: Status post mitral valve replacement with tissue prosthesis  2.  Tricuspid regurgitation: Status post repair  3.  Hypertension: Controlled  4.  Tobacco abuse: Advised to discontinue all tobacco products  5.  Atrial fibrillation: Chronic.  Anticoagulated with warfarin.  The patient can stop the warfarin 4 days prior to his surgery.  He will need to be on anticoagulation postop until his warfarin levels are therapeutic.  Unfortunately the patient had a severe reaction to Lovenox after his valve surgery.  If he does need any form of bridge postop he would need to be on heparin.  Hopefully he can resume warfarin after his upcoming surgery.  6.  Dyslipidemia: Dietary measures discussed.    From a cardiac standpoint he appears to be well controlled from a rhythm standpoint.  He has no evidence of heart failure.  He is cleared for his back surgery.     Plan:       1.  Discussed with Dr. Gil anticoagulation recommendations  2.  Echocardiogram: Patient has never had a post op echo.  3.  We will anticipate follow-up in 6 months    I appreciate the opportunity to see this patient in consultation.

## 2020-10-30 ENCOUNTER — PREP FOR SURGERY (OUTPATIENT)
Dept: OTHER | Facility: HOSPITAL | Age: 60
End: 2020-10-30

## 2020-11-03 ENCOUNTER — LAB (OUTPATIENT)
Dept: LAB | Facility: HOSPITAL | Age: 60
End: 2020-11-03

## 2020-11-03 DIAGNOSIS — Z01.818 OTHER SPECIFIED PRE-OPERATIVE EXAMINATION: ICD-10-CM

## 2020-11-03 PROCEDURE — C9803 HOPD COVID-19 SPEC COLLECT: HCPCS

## 2020-11-03 PROCEDURE — U0004 COV-19 TEST NON-CDC HGH THRU: HCPCS | Performed by: INTERNAL MEDICINE

## 2020-11-04 LAB — SARS-COV-2 RNA RESP QL NAA+PROBE: NOT DETECTED

## 2020-11-05 ENCOUNTER — ANESTHESIA EVENT (OUTPATIENT)
Dept: PERIOP | Facility: HOSPITAL | Age: 60
End: 2020-11-05

## 2020-11-05 ENCOUNTER — ANESTHESIA (OUTPATIENT)
Dept: PERIOP | Facility: HOSPITAL | Age: 60
End: 2020-11-05

## 2020-11-05 ENCOUNTER — HOSPITAL ENCOUNTER (OUTPATIENT)
Facility: HOSPITAL | Age: 60
Setting detail: OBSERVATION
Discharge: HOME | End: 2020-11-06
Attending: ORTHOPAEDIC SURGERY | Admitting: ORTHOPAEDIC SURGERY

## 2020-11-05 ENCOUNTER — APPOINTMENT (OUTPATIENT)
Dept: GENERAL RADIOLOGY | Facility: HOSPITAL | Age: 60
End: 2020-11-05

## 2020-11-05 DIAGNOSIS — M48.062 SPINAL STENOSIS OF LUMBAR REGION WITH NEUROGENIC CLAUDICATION: ICD-10-CM

## 2020-11-05 LAB
INR PPP: 1.2 (ref 0.9–1.1)
PROTHROMBIN TIME: 15.1 SECONDS (ref 11.7–14.2)

## 2020-11-05 PROCEDURE — 63047 LAM FACETEC & FORAMOT LUMBAR: CPT | Performed by: ORTHOPAEDIC SURGERY

## 2020-11-05 PROCEDURE — 25010000002 NEOSTIGMINE PER 0.5 MG: Performed by: NURSE ANESTHETIST, CERTIFIED REGISTERED

## 2020-11-05 PROCEDURE — 25010000003 CEFAZOLIN IN DEXTROSE 2-4 GM/100ML-% SOLUTION: Performed by: ORTHOPAEDIC SURGERY

## 2020-11-05 PROCEDURE — 25010000002 DEXAMETHASONE PER 1 MG: Performed by: NURSE ANESTHETIST, CERTIFIED REGISTERED

## 2020-11-05 PROCEDURE — G0378 HOSPITAL OBSERVATION PER HR: HCPCS

## 2020-11-05 PROCEDURE — 25010000002 ONDANSETRON PER 1 MG: Performed by: ORTHOPAEDIC SURGERY

## 2020-11-05 PROCEDURE — 25010000003 HYDROMORPHONE HCL PF 50 MG/5ML SOLUTION: Performed by: ORTHOPAEDIC SURGERY

## 2020-11-05 PROCEDURE — 76000 FLUOROSCOPY <1 HR PHYS/QHP: CPT

## 2020-11-05 PROCEDURE — 25010000002 ONDANSETRON PER 1 MG: Performed by: NURSE ANESTHETIST, CERTIFIED REGISTERED

## 2020-11-05 PROCEDURE — 25010000002 MIDAZOLAM PER 1 MG: Performed by: ANESTHESIOLOGY

## 2020-11-05 PROCEDURE — 85610 PROTHROMBIN TIME: CPT | Performed by: ORTHOPAEDIC SURGERY

## 2020-11-05 PROCEDURE — 25010000002 HYDROMORPHONE PER 4 MG: Performed by: NURSE ANESTHETIST, CERTIFIED REGISTERED

## 2020-11-05 PROCEDURE — 25010000003 CEFAZOLIN PER 500 MG: Performed by: ORTHOPAEDIC SURGERY

## 2020-11-05 PROCEDURE — 72100 X-RAY EXAM L-S SPINE 2/3 VWS: CPT

## 2020-11-05 PROCEDURE — 25010000002 FENTANYL CITRATE (PF) 100 MCG/2ML SOLUTION: Performed by: NURSE ANESTHETIST, CERTIFIED REGISTERED

## 2020-11-05 PROCEDURE — 25010000002 PHENYLEPHRINE PER 1 ML: Performed by: NURSE ANESTHETIST, CERTIFIED REGISTERED

## 2020-11-05 PROCEDURE — 25010000002 FENTANYL CITRATE (PF) 100 MCG/2ML SOLUTION: Performed by: ANESTHESIOLOGY

## 2020-11-05 PROCEDURE — 25010000002 PROPOFOL 10 MG/ML EMULSION: Performed by: NURSE ANESTHETIST, CERTIFIED REGISTERED

## 2020-11-05 DEVICE — FLOSEAL HEMOSTATIC MATRIX, 5ML
Type: IMPLANTABLE DEVICE | Site: SPINE LUMBAR | Status: FUNCTIONAL
Brand: FLOSEAL HEMOSTATIC MATRIX

## 2020-11-05 RX ORDER — HYDROMORPHONE HCL 110MG/55ML
PATIENT CONTROLLED ANALGESIA SYRINGE INTRAVENOUS AS NEEDED
Status: DISCONTINUED | OUTPATIENT
Start: 2020-11-05 | End: 2020-11-05 | Stop reason: SURG

## 2020-11-05 RX ORDER — METOPROLOL SUCCINATE 100 MG/1
100 TABLET, EXTENDED RELEASE ORAL DAILY
Status: DISCONTINUED | OUTPATIENT
Start: 2020-11-06 | End: 2020-11-06 | Stop reason: HOSPADM

## 2020-11-05 RX ORDER — FENTANYL CITRATE 50 UG/ML
INJECTION, SOLUTION INTRAMUSCULAR; INTRAVENOUS AS NEEDED
Status: DISCONTINUED | OUTPATIENT
Start: 2020-11-05 | End: 2020-11-05 | Stop reason: SURG

## 2020-11-05 RX ORDER — NALOXONE HCL 0.4 MG/ML
0.1 VIAL (ML) INJECTION
Status: DISCONTINUED | OUTPATIENT
Start: 2020-11-05 | End: 2020-11-06 | Stop reason: HOSPADM

## 2020-11-05 RX ORDER — CEFAZOLIN SODIUM 2 G/100ML
2 INJECTION, SOLUTION INTRAVENOUS ONCE
Status: COMPLETED | OUTPATIENT
Start: 2020-11-05 | End: 2020-11-05

## 2020-11-05 RX ORDER — LABETALOL HYDROCHLORIDE 5 MG/ML
5 INJECTION, SOLUTION INTRAVENOUS
Status: DISCONTINUED | OUTPATIENT
Start: 2020-11-05 | End: 2020-11-05 | Stop reason: HOSPADM

## 2020-11-05 RX ORDER — RAMIPRIL 10 MG/1
10 CAPSULE ORAL DAILY
Status: DISCONTINUED | OUTPATIENT
Start: 2020-11-05 | End: 2020-11-06 | Stop reason: HOSPADM

## 2020-11-05 RX ORDER — AMLODIPINE BESYLATE 10 MG/1
10 TABLET ORAL DAILY
Status: DISCONTINUED | OUTPATIENT
Start: 2020-11-05 | End: 2020-11-06 | Stop reason: HOSPADM

## 2020-11-05 RX ORDER — CYCLOBENZAPRINE HCL 10 MG
10 TABLET ORAL 3 TIMES DAILY PRN
Status: DISCONTINUED | OUTPATIENT
Start: 2020-11-05 | End: 2020-11-06 | Stop reason: HOSPADM

## 2020-11-05 RX ORDER — HYDROCHLOROTHIAZIDE 12.5 MG/1
12.5 TABLET ORAL DAILY
Status: DISCONTINUED | OUTPATIENT
Start: 2020-11-05 | End: 2020-11-06 | Stop reason: HOSPADM

## 2020-11-05 RX ORDER — POLYETHYLENE GLYCOL 3350 17 G/17G
17 POWDER, FOR SOLUTION ORAL DAILY
Status: DISCONTINUED | OUTPATIENT
Start: 2020-11-05 | End: 2020-11-06 | Stop reason: HOSPADM

## 2020-11-05 RX ORDER — ONDANSETRON 2 MG/ML
INJECTION INTRAMUSCULAR; INTRAVENOUS AS NEEDED
Status: DISCONTINUED | OUTPATIENT
Start: 2020-11-05 | End: 2020-11-05 | Stop reason: SURG

## 2020-11-05 RX ORDER — HYDROMORPHONE HCL IN 0.9% NACL 10 MG/50ML
PATIENT CONTROLLED ANALGESIA SYRINGE INTRAVENOUS CONTINUOUS
Status: DISCONTINUED | OUTPATIENT
Start: 2020-11-05 | End: 2020-11-06 | Stop reason: HOSPADM

## 2020-11-05 RX ORDER — SODIUM CHLORIDE 0.9 % (FLUSH) 0.9 %
3-10 SYRINGE (ML) INJECTION AS NEEDED
Status: DISCONTINUED | OUTPATIENT
Start: 2020-11-05 | End: 2020-11-05 | Stop reason: HOSPADM

## 2020-11-05 RX ORDER — ONDANSETRON 2 MG/ML
4 INJECTION INTRAMUSCULAR; INTRAVENOUS ONCE AS NEEDED
Status: DISCONTINUED | OUTPATIENT
Start: 2020-11-05 | End: 2020-11-05 | Stop reason: HOSPADM

## 2020-11-05 RX ORDER — FENTANYL CITRATE 50 UG/ML
50 INJECTION, SOLUTION INTRAMUSCULAR; INTRAVENOUS
Status: DISCONTINUED | OUTPATIENT
Start: 2020-11-05 | End: 2020-11-05 | Stop reason: HOSPADM

## 2020-11-05 RX ORDER — LIDOCAINE HYDROCHLORIDE 20 MG/ML
INJECTION, SOLUTION INFILTRATION; PERINEURAL AS NEEDED
Status: DISCONTINUED | OUTPATIENT
Start: 2020-11-05 | End: 2020-11-05 | Stop reason: SURG

## 2020-11-05 RX ORDER — HYDROMORPHONE HYDROCHLORIDE 1 MG/ML
0.5 INJECTION, SOLUTION INTRAMUSCULAR; INTRAVENOUS; SUBCUTANEOUS
Status: DISCONTINUED | OUTPATIENT
Start: 2020-11-05 | End: 2020-11-05 | Stop reason: HOSPADM

## 2020-11-05 RX ORDER — DIPHENHYDRAMINE HYDROCHLORIDE 50 MG/ML
12.5 INJECTION INTRAMUSCULAR; INTRAVENOUS
Status: DISCONTINUED | OUTPATIENT
Start: 2020-11-05 | End: 2020-11-05 | Stop reason: HOSPADM

## 2020-11-05 RX ORDER — ONDANSETRON 4 MG/1
4 TABLET, FILM COATED ORAL EVERY 6 HOURS PRN
Status: DISCONTINUED | OUTPATIENT
Start: 2020-11-05 | End: 2020-11-06 | Stop reason: HOSPADM

## 2020-11-05 RX ORDER — DIPHENHYDRAMINE HCL 25 MG
25 CAPSULE ORAL
Status: DISCONTINUED | OUTPATIENT
Start: 2020-11-05 | End: 2020-11-05 | Stop reason: HOSPADM

## 2020-11-05 RX ORDER — OXYCODONE AND ACETAMINOPHEN 7.5; 325 MG/1; MG/1
1 TABLET ORAL ONCE AS NEEDED
Status: DISCONTINUED | OUTPATIENT
Start: 2020-11-05 | End: 2020-11-05 | Stop reason: HOSPADM

## 2020-11-05 RX ORDER — SODIUM CHLORIDE, SODIUM LACTATE, POTASSIUM CHLORIDE, CALCIUM CHLORIDE 600; 310; 30; 20 MG/100ML; MG/100ML; MG/100ML; MG/100ML
9 INJECTION, SOLUTION INTRAVENOUS CONTINUOUS
Status: DISCONTINUED | OUTPATIENT
Start: 2020-11-05 | End: 2020-11-06 | Stop reason: HOSPADM

## 2020-11-05 RX ORDER — SODIUM CHLORIDE 9 MG/ML
100 INJECTION, SOLUTION INTRAVENOUS CONTINUOUS
Status: DISCONTINUED | OUTPATIENT
Start: 2020-11-05 | End: 2020-11-06 | Stop reason: HOSPADM

## 2020-11-05 RX ORDER — FAMOTIDINE 10 MG/ML
20 INJECTION, SOLUTION INTRAVENOUS ONCE
Status: COMPLETED | OUTPATIENT
Start: 2020-11-05 | End: 2020-11-05

## 2020-11-05 RX ORDER — MIDAZOLAM HYDROCHLORIDE 1 MG/ML
1 INJECTION INTRAMUSCULAR; INTRAVENOUS
Status: COMPLETED | OUTPATIENT
Start: 2020-11-05 | End: 2020-11-05

## 2020-11-05 RX ORDER — EPHEDRINE SULFATE 50 MG/ML
INJECTION, SOLUTION INTRAVENOUS AS NEEDED
Status: DISCONTINUED | OUTPATIENT
Start: 2020-11-05 | End: 2020-11-05 | Stop reason: SURG

## 2020-11-05 RX ORDER — FLUMAZENIL 0.1 MG/ML
0.2 INJECTION INTRAVENOUS AS NEEDED
Status: DISCONTINUED | OUTPATIENT
Start: 2020-11-05 | End: 2020-11-05 | Stop reason: HOSPADM

## 2020-11-05 RX ORDER — ESMOLOL HYDROCHLORIDE 10 MG/ML
INJECTION INTRAVENOUS AS NEEDED
Status: DISCONTINUED | OUTPATIENT
Start: 2020-11-05 | End: 2020-11-05 | Stop reason: SURG

## 2020-11-05 RX ORDER — SODIUM CHLORIDE 0.9 % (FLUSH) 0.9 %
3 SYRINGE (ML) INJECTION EVERY 12 HOURS SCHEDULED
Status: DISCONTINUED | OUTPATIENT
Start: 2020-11-05 | End: 2020-11-06 | Stop reason: HOSPADM

## 2020-11-05 RX ORDER — ROCURONIUM BROMIDE 10 MG/ML
INJECTION, SOLUTION INTRAVENOUS AS NEEDED
Status: DISCONTINUED | OUTPATIENT
Start: 2020-11-05 | End: 2020-11-05 | Stop reason: SURG

## 2020-11-05 RX ORDER — ONDANSETRON 2 MG/ML
4 INJECTION INTRAMUSCULAR; INTRAVENOUS EVERY 6 HOURS PRN
Status: DISCONTINUED | OUTPATIENT
Start: 2020-11-05 | End: 2020-11-06 | Stop reason: HOSPADM

## 2020-11-05 RX ORDER — PROMETHAZINE HYDROCHLORIDE 25 MG/1
25 SUPPOSITORY RECTAL ONCE AS NEEDED
Status: DISCONTINUED | OUTPATIENT
Start: 2020-11-05 | End: 2020-11-05 | Stop reason: HOSPADM

## 2020-11-05 RX ORDER — SODIUM CHLORIDE 0.9 % (FLUSH) 0.9 %
10 SYRINGE (ML) INJECTION AS NEEDED
Status: DISCONTINUED | OUTPATIENT
Start: 2020-11-05 | End: 2020-11-06 | Stop reason: HOSPADM

## 2020-11-05 RX ORDER — PROPOFOL 10 MG/ML
VIAL (ML) INTRAVENOUS AS NEEDED
Status: DISCONTINUED | OUTPATIENT
Start: 2020-11-05 | End: 2020-11-05 | Stop reason: SURG

## 2020-11-05 RX ORDER — DEXAMETHASONE SODIUM PHOSPHATE 10 MG/ML
INJECTION INTRAMUSCULAR; INTRAVENOUS AS NEEDED
Status: DISCONTINUED | OUTPATIENT
Start: 2020-11-05 | End: 2020-11-05 | Stop reason: SURG

## 2020-11-05 RX ORDER — EPHEDRINE SULFATE 50 MG/ML
5 INJECTION, SOLUTION INTRAVENOUS ONCE AS NEEDED
Status: DISCONTINUED | OUTPATIENT
Start: 2020-11-05 | End: 2020-11-05 | Stop reason: HOSPADM

## 2020-11-05 RX ORDER — BISACODYL 10 MG
10 SUPPOSITORY, RECTAL RECTAL DAILY PRN
Status: DISCONTINUED | OUTPATIENT
Start: 2020-11-05 | End: 2020-11-06 | Stop reason: HOSPADM

## 2020-11-05 RX ORDER — ACETAMINOPHEN 325 MG/1
650 TABLET ORAL EVERY 4 HOURS PRN
Status: DISCONTINUED | OUTPATIENT
Start: 2020-11-05 | End: 2020-11-06 | Stop reason: HOSPADM

## 2020-11-05 RX ORDER — NALOXONE HCL 0.4 MG/ML
0.2 VIAL (ML) INJECTION AS NEEDED
Status: DISCONTINUED | OUTPATIENT
Start: 2020-11-05 | End: 2020-11-05 | Stop reason: HOSPADM

## 2020-11-05 RX ORDER — CEFAZOLIN SODIUM 2 G/100ML
2 INJECTION, SOLUTION INTRAVENOUS EVERY 8 HOURS
Status: COMPLETED | OUTPATIENT
Start: 2020-11-05 | End: 2020-11-06

## 2020-11-05 RX ORDER — SODIUM CHLORIDE 0.9 % (FLUSH) 0.9 %
3 SYRINGE (ML) INJECTION EVERY 12 HOURS SCHEDULED
Status: DISCONTINUED | OUTPATIENT
Start: 2020-11-05 | End: 2020-11-05 | Stop reason: HOSPADM

## 2020-11-05 RX ORDER — OXYCODONE HYDROCHLORIDE AND ACETAMINOPHEN 5; 325 MG/1; MG/1
2 TABLET ORAL EVERY 4 HOURS PRN
Status: DISCONTINUED | OUTPATIENT
Start: 2020-11-05 | End: 2020-11-06 | Stop reason: HOSPADM

## 2020-11-05 RX ORDER — PROMETHAZINE HYDROCHLORIDE 25 MG/1
25 TABLET ORAL ONCE AS NEEDED
Status: DISCONTINUED | OUTPATIENT
Start: 2020-11-05 | End: 2020-11-05 | Stop reason: HOSPADM

## 2020-11-05 RX ORDER — DOCUSATE SODIUM 100 MG/1
100 CAPSULE, LIQUID FILLED ORAL 2 TIMES DAILY
Status: DISCONTINUED | OUTPATIENT
Start: 2020-11-05 | End: 2020-11-06 | Stop reason: HOSPADM

## 2020-11-05 RX ORDER — GLYCOPYRROLATE 0.2 MG/ML
INJECTION INTRAMUSCULAR; INTRAVENOUS AS NEEDED
Status: DISCONTINUED | OUTPATIENT
Start: 2020-11-05 | End: 2020-11-05 | Stop reason: SURG

## 2020-11-05 RX ORDER — LIDOCAINE HYDROCHLORIDE 10 MG/ML
0.5 INJECTION, SOLUTION EPIDURAL; INFILTRATION; INTRACAUDAL; PERINEURAL ONCE AS NEEDED
Status: DISCONTINUED | OUTPATIENT
Start: 2020-11-05 | End: 2020-11-05 | Stop reason: HOSPADM

## 2020-11-05 RX ORDER — HYDROCODONE BITARTRATE AND ACETAMINOPHEN 7.5; 325 MG/1; MG/1
1 TABLET ORAL ONCE AS NEEDED
Status: DISCONTINUED | OUTPATIENT
Start: 2020-11-05 | End: 2020-11-05 | Stop reason: HOSPADM

## 2020-11-05 RX ADMIN — HYDROMORPHONE HYDROCHLORIDE 0.5 MG: 1 INJECTION, SOLUTION INTRAMUSCULAR; INTRAVENOUS; SUBCUTANEOUS at 16:13

## 2020-11-05 RX ADMIN — MIDAZOLAM 1 MG: 1 INJECTION INTRAMUSCULAR; INTRAVENOUS at 13:30

## 2020-11-05 RX ADMIN — FAMOTIDINE 20 MG: 10 INJECTION INTRAVENOUS at 10:39

## 2020-11-05 RX ADMIN — FENTANYL CITRATE 50 MCG: 50 INJECTION INTRAMUSCULAR; INTRAVENOUS at 14:15

## 2020-11-05 RX ADMIN — FENTANYL CITRATE 50 MCG: 50 INJECTION, SOLUTION INTRAMUSCULAR; INTRAVENOUS at 10:40

## 2020-11-05 RX ADMIN — PHENYLEPHRINE HYDROCHLORIDE 100 MCG: 10 INJECTION INTRAVENOUS at 15:11

## 2020-11-05 RX ADMIN — CEFAZOLIN SODIUM 2 G: 2 INJECTION, SOLUTION INTRAVENOUS at 21:17

## 2020-11-05 RX ADMIN — LIDOCAINE HYDROCHLORIDE 100 MG: 20 INJECTION, SOLUTION INFILTRATION; PERINEURAL at 14:08

## 2020-11-05 RX ADMIN — FENTANYL CITRATE 50 MCG: 50 INJECTION, SOLUTION INTRAMUSCULAR; INTRAVENOUS at 15:45

## 2020-11-05 RX ADMIN — DEXAMETHASONE SODIUM PHOSPHATE 8 MG: 10 INJECTION INTRAMUSCULAR; INTRAVENOUS at 14:16

## 2020-11-05 RX ADMIN — PHENYLEPHRINE HYDROCHLORIDE 150 MCG: 10 INJECTION INTRAVENOUS at 14:41

## 2020-11-05 RX ADMIN — RAMIPRIL 10 MG: 10 CAPSULE ORAL at 20:14

## 2020-11-05 RX ADMIN — ESMOLOL HYDROCHLORIDE 10 MG: 10 INJECTION, SOLUTION INTRAVENOUS at 14:29

## 2020-11-05 RX ADMIN — ONDANSETRON 4 MG: 2 INJECTION INTRAMUSCULAR; INTRAVENOUS at 18:02

## 2020-11-05 RX ADMIN — FENTANYL CITRATE 50 MCG: 50 INJECTION, SOLUTION INTRAMUSCULAR; INTRAVENOUS at 16:05

## 2020-11-05 RX ADMIN — NEOSTIGMINE METHYLSULFATE 2 MG: 1 INJECTION INTRAMUSCULAR; INTRAVENOUS; SUBCUTANEOUS at 15:17

## 2020-11-05 RX ADMIN — ESMOLOL HYDROCHLORIDE 10 MG: 10 INJECTION, SOLUTION INTRAVENOUS at 14:15

## 2020-11-05 RX ADMIN — FENTANYL CITRATE 50 MCG: 50 INJECTION, SOLUTION INTRAMUSCULAR; INTRAVENOUS at 13:30

## 2020-11-05 RX ADMIN — HYDROMORPHONE HYDROCHLORIDE 0.5 MG: 1 INJECTION, SOLUTION INTRAMUSCULAR; INTRAVENOUS; SUBCUTANEOUS at 15:50

## 2020-11-05 RX ADMIN — PROPOFOL 200 MG: 10 INJECTION, EMULSION INTRAVENOUS at 14:08

## 2020-11-05 RX ADMIN — MIDAZOLAM 1 MG: 1 INJECTION INTRAMUSCULAR; INTRAVENOUS at 10:39

## 2020-11-05 RX ADMIN — ROCURONIUM BROMIDE 20 MG: 10 INJECTION INTRAVENOUS at 14:44

## 2020-11-05 RX ADMIN — PROPOFOL 50 MG: 10 INJECTION, EMULSION INTRAVENOUS at 14:10

## 2020-11-05 RX ADMIN — SODIUM CHLORIDE, POTASSIUM CHLORIDE, SODIUM LACTATE AND CALCIUM CHLORIDE 9 ML/HR: 600; 310; 30; 20 INJECTION, SOLUTION INTRAVENOUS at 08:51

## 2020-11-05 RX ADMIN — PHENYLEPHRINE HYDROCHLORIDE 200 MCG: 10 INJECTION INTRAVENOUS at 15:06

## 2020-11-05 RX ADMIN — HYDROMORPHONE HYDROCHLORIDE 0.5 MG: 2 INJECTION, SOLUTION INTRAMUSCULAR; INTRAVENOUS; SUBCUTANEOUS at 15:24

## 2020-11-05 RX ADMIN — FENTANYL CITRATE 50 MCG: 50 INJECTION INTRAMUSCULAR; INTRAVENOUS at 14:08

## 2020-11-05 RX ADMIN — ONDANSETRON HYDROCHLORIDE 4 MG: 2 SOLUTION INTRAMUSCULAR; INTRAVENOUS at 15:12

## 2020-11-05 RX ADMIN — CEFAZOLIN SODIUM 2 G: 2 INJECTION, SOLUTION INTRAVENOUS at 14:18

## 2020-11-05 RX ADMIN — HYDROMORPHONE HYDROCHLORIDE: 10 INJECTION, SOLUTION INTRAMUSCULAR; INTRAVENOUS; SUBCUTANEOUS at 16:22

## 2020-11-05 RX ADMIN — ROCURONIUM BROMIDE 40 MG: 10 INJECTION INTRAVENOUS at 14:08

## 2020-11-05 RX ADMIN — GLYCOPYRROLATE 0.4 MG: 0.2 INJECTION INTRAMUSCULAR; INTRAVENOUS at 15:17

## 2020-11-05 RX ADMIN — PHENYLEPHRINE HYDROCHLORIDE 100 MCG: 10 INJECTION INTRAVENOUS at 14:38

## 2020-11-05 RX ADMIN — ESMOLOL HYDROCHLORIDE 10 MG: 10 INJECTION, SOLUTION INTRAVENOUS at 14:17

## 2020-11-05 RX ADMIN — SODIUM CHLORIDE, POTASSIUM CHLORIDE, SODIUM LACTATE AND CALCIUM CHLORIDE: 600; 310; 30; 20 INJECTION, SOLUTION INTRAVENOUS at 14:43

## 2020-11-05 RX ADMIN — EPHEDRINE SULFATE 10 MG: 50 INJECTION INTRAVENOUS at 14:23

## 2020-11-05 RX ADMIN — DOCUSATE SODIUM 100 MG: 100 CAPSULE, LIQUID FILLED ORAL at 20:14

## 2020-11-05 RX ADMIN — SODIUM CHLORIDE 100 ML/HR: 9 INJECTION, SOLUTION INTRAVENOUS at 18:02

## 2020-11-05 RX ADMIN — PHENYLEPHRINE HYDROCHLORIDE 150 MCG: 10 INJECTION INTRAVENOUS at 14:58

## 2020-11-05 RX ADMIN — HYDROCHLOROTHIAZIDE 12.5 MG: 12.5 CAPSULE ORAL at 20:13

## 2020-11-05 NOTE — SIGNIFICANT NOTE
S/w Yolie, pt's mother. Advised that pt has left for OR, and she can expect a call from the surgeon post-op with an update. Verbalized understanding.

## 2020-11-05 NOTE — PLAN OF CARE
Problem: Adult Inpatient Plan of Care  Goal: Plan of Care Review  Outcome: Ongoing, Progressing  Flowsheets  Taken 11/5/2020 1734 by Stacy Ibarra, RN  Progress: no change  Outcome Summary: pt to unit from PACU at at 1700. Ambulated from stretcher to bed with Assist x2 VSS. NVI. Dressing CDI. Dilauded PCA in place. pain controlled at this time. Pt plans to D/C home with HH when able. Educated on the importance of glucose monitoring with HO DM. Verbalized understanding. will cont to monitor.  Taken 11/5/2020 1636 by Yomaira Parson, RN  Plan of Care Reviewed With: patient   Goal Outcome Evaluation:  Plan of Care Reviewed With: patient  Progress: no change  Outcome Summary: pt to unit from PACU at at 1700. Ambulated from stretcher to bed with Assist x2 VSS. NVI. Dressing CDI. Dilauded PCA in place. pain controlled at this time. Pt plans to D/C home with HH when able. Educated on the importance of glucose monitoring with HO DM. Verbalized understanding. will cont to monitor.

## 2020-11-05 NOTE — ANESTHESIA PROCEDURE NOTES
Airway  Urgency: elective    Date/Time: 11/5/2020 2:13 PM  Airway not difficult    General Information and Staff    Patient location during procedure: OR  Anesthesiologist: Adán Acosta MD  CRNA: Carolina Patricia CRNA    Indications and Patient Condition  Indications for airway management: airway protection    Preoxygenated: yes  MILS maintained throughout  Mask difficulty assessment: 2 - vent by mask + OA or adjuvant +/- NMBA    Final Airway Details  Final airway type: endotracheal airway      Successful airway: ETT  Cuffed: yes   Successful intubation technique: direct laryngoscopy  Facilitating devices/methods: intubating stylet  Blade: Craft  Blade size: 2  ETT size (mm): 7.5  Cormack-Lehane Classification: grade I - full view of glottis  Placement verified by: chest auscultation and capnometry   Cuff volume (mL): 8  Measured from: teeth  ETT/EBT  to teeth (cm): 22  Number of attempts at approach: 2  Assessment: lips, teeth, and gum same as pre-op and atraumatic intubation    Additional Comments  DL X 1 with Mac 3. Grade 3 view. DL X1 with Craft 2. Grade 1 view. BBS and +Etc02.

## 2020-11-05 NOTE — ANESTHESIA PREPROCEDURE EVALUATION
Anesthesia Evaluation     Patient summary reviewed and Nursing notes reviewed                Airway   Mallampati: III  Dental      Pulmonary    (+) a smoker Current, COPD, sleep apnea,   Cardiovascular     ECG reviewed  PT is on anticoagulation therapy  Patient on routine beta blocker  Rhythm: irregular  Rate: normal    (+) hypertension, valvular problems/murmurs, dysrhythmias Atrial Fib,       Neuro/Psych  (+) psychiatric history,     GI/Hepatic/Renal/Endo    (+) morbid obesity,      Musculoskeletal     Abdominal    Substance History   (+) alcohol use,      OB/GYN negative ob/gyn ROS         Other   arthritis,                      Anesthesia Plan    ASA 3     general   (Afib    S/p cardiac valve replacements)  intravenous induction     Anesthetic plan, all risks, benefits, and alternatives have been provided, discussed and informed consent has been obtained with: patient.

## 2020-11-05 NOTE — ANESTHESIA POSTPROCEDURE EVALUATION
"Patient: Joaquín Garcia Jr.    Procedure Summary     Date: 11/05/20 Room / Location: [facility] OR [address] [place] [address] MAIN OR    Anesthesia Start: 1403 Anesthesia Stop: 1540    Procedure: Lumbar 4 - Lumbar 5 Laminectomy (Bilateral Spine Lumbar) Diagnosis:       Spinal stenosis of lumbar region with neurogenic claudication      (Spinal stenosis of lumbar region with neurogenic claudication [M48.062])    Surgeon: Marcos Christine MD Provider: Adán Acosta MD    Anesthesia Type: general ASA Status: 3          Anesthesia Type: general    Vitals  Vitals Value Taken Time   /82 11/05/20 1630   Temp 36.3 °C (97.4 °F) 11/05/20 1536   Pulse 79 11/05/20 1638   Resp 14 11/05/20 1630   SpO2 90 % 11/05/20 1638   Vitals shown include unvalidated device data.        Post Anesthesia Care and Evaluation    Patient location during evaluation: bedside  Patient participation: complete - patient participated  Level of consciousness: sleepy but conscious  Pain score: 0  Pain management: adequate  Airway patency: patent  Anesthetic complications: No anesthetic complications    Cardiovascular status: acceptable  Respiratory status: acceptable  Hydration status: acceptable    Comments: /82   Pulse 72   Temp 36.3 °C (97.4 °F) (Oral)   Resp 14   Ht 180.3 cm (71\")   Wt 103 kg (227 lb 3.2 oz)   SpO2 92%   BMI 31.69 kg/m²         "

## 2020-11-05 NOTE — OP NOTE
Operative note    Preoperative diagnosis: L4-5 spinal stenosis    Postoperative diagnosis:  same    Procedure: L4-5 laminectomy    Surgeon:  Marcos Christine Jr. MD    Asst.:  Nicolle Washington    Anesthetic: Gen.    EBL: Minimal    Condition: Satisfactory    Description of procedure: Patient was anesthetized and positioned prone back was prepped and draped in sterile fashion incision was made down to lumbodorsal fascia subperiosteal elevation.  At the medial aspect of the facet joints.  A marker was placed confirming the anatomic level.  I performed a laminectomy for decompression at L4-5.   The interspinous ligament was excised.  The upper lamina was removed out to within 8-10 mm of the pars intra-articularis.  A small portion of the cephalad aspect of the caudal lamina was excised with a Kerrison rongeur.  Medial facetectomies were performed bilaterally using Kerrison rongeur and osteotomes.  A small synovial cyst was noted and removed.  A high-speed toby was used as well.  Foraminotomies were accomplished with a foraminotomy rongeur.   The disc was determined to be not impinging and stable and was not excised.  Upon completion of the decompression I could pass a ball probe through the affected foramina, and easily retract  the nerve roots  toward the midline.  Bleeding was controlled with bipolar cautery,and Gelfoam with thrombin and/ or FloSeal hemostatic matrix.  No dural trauma was sustained, and no CSF leakage was noted.

## 2020-11-06 ENCOUNTER — READMISSION MANAGEMENT (OUTPATIENT)
Dept: CALL CENTER | Facility: HOSPITAL | Age: 60
End: 2020-11-06

## 2020-11-06 VITALS
DIASTOLIC BLOOD PRESSURE: 75 MMHG | HEART RATE: 74 BPM | HEIGHT: 71 IN | BODY MASS INDEX: 31.79 KG/M2 | TEMPERATURE: 97.7 F | SYSTOLIC BLOOD PRESSURE: 122 MMHG | OXYGEN SATURATION: 95 % | RESPIRATION RATE: 16 BRPM | WEIGHT: 227.07 LBS

## 2020-11-06 PROCEDURE — 25010000003 CEFAZOLIN IN DEXTROSE 2-4 GM/100ML-% SOLUTION: Performed by: ORTHOPAEDIC SURGERY

## 2020-11-06 PROCEDURE — 99024 POSTOP FOLLOW-UP VISIT: CPT | Performed by: ORTHOPAEDIC SURGERY

## 2020-11-06 PROCEDURE — G0378 HOSPITAL OBSERVATION PER HR: HCPCS

## 2020-11-06 RX ORDER — OXYCODONE HYDROCHLORIDE AND ACETAMINOPHEN 5; 325 MG/1; MG/1
TABLET ORAL
Qty: 40 TABLET | Refills: 0 | Status: SHIPPED | OUTPATIENT
Start: 2020-11-06 | End: 2022-08-26 | Stop reason: ALTCHOICE

## 2020-11-06 RX ORDER — PSEUDOEPHEDRINE HCL 30 MG
100 TABLET ORAL 2 TIMES DAILY
Qty: 30 CAPSULE | Refills: 0 | Status: SHIPPED | OUTPATIENT
Start: 2020-11-06 | End: 2022-08-26 | Stop reason: ALTCHOICE

## 2020-11-06 RX ORDER — CYCLOBENZAPRINE HCL 10 MG
10 TABLET ORAL 3 TIMES DAILY PRN
Qty: 35 TABLET | Refills: 0 | Status: SHIPPED | OUTPATIENT
Start: 2020-11-06 | End: 2022-08-26 | Stop reason: ALTCHOICE

## 2020-11-06 RX ADMIN — HYDROCHLOROTHIAZIDE 12.5 MG: 12.5 CAPSULE ORAL at 08:12

## 2020-11-06 RX ADMIN — DOCUSATE SODIUM 100 MG: 100 CAPSULE, LIQUID FILLED ORAL at 08:12

## 2020-11-06 RX ADMIN — METOPROLOL SUCCINATE 100 MG: 100 TABLET, EXTENDED RELEASE ORAL at 08:12

## 2020-11-06 RX ADMIN — OXYCODONE HYDROCHLORIDE AND ACETAMINOPHEN 2 TABLET: 5; 325 TABLET ORAL at 08:15

## 2020-11-06 RX ADMIN — SODIUM CHLORIDE 100 ML/HR: 9 INJECTION, SOLUTION INTRAVENOUS at 06:22

## 2020-11-06 RX ADMIN — RAMIPRIL 10 MG: 10 CAPSULE ORAL at 08:12

## 2020-11-06 RX ADMIN — POLYETHYLENE GLYCOL 3350 17 G: 17 POWDER, FOR SOLUTION ORAL at 08:12

## 2020-11-06 RX ADMIN — AMLODIPINE BESYLATE 10 MG: 10 TABLET ORAL at 08:12

## 2020-11-06 RX ADMIN — CEFAZOLIN SODIUM 2 G: 2 INJECTION, SOLUTION INTRAVENOUS at 06:22

## 2020-11-06 NOTE — PROGRESS NOTES
Postop day 1: AVSS awake alert oriented.  No new problems.  No leg pain and moves the legs well.  Plan DC home follow-up 2 weeks

## 2020-11-06 NOTE — PLAN OF CARE
Goal Outcome Evaluation:  Plan of Care Reviewed With: patient  Progress: improving  Outcome Summary: patient ambulating with assistance around nursing unit, pain controlled at this time, unable to void, I&O cath x1 this shift, educated on b/p monitoring

## 2020-11-06 NOTE — OUTREACH NOTE
Prep Survey      Responses   [facility] facility patient discharged from?  [facility]   Is LACE score < 7 ?  Yes   Eligibility  [facility]   Date of Admission  11/05/20   Date of Discharge  11/06/20   Discharge Disposition  Home or Self Care   Discharge diagnosis  [place] LAMINEC/FACETECT/FORAMIN,LUMBAR 1 SEG    Does the patient have one of the following disease processes/diagnoses(primary or secondary)?  General Surgery   Does the patient have Home health ordered?  No   Is there a DME ordered?  No   Prep survey completed?  Yes          Daniela Burnham RN

## 2020-11-06 NOTE — PROGRESS NOTES
Discharge Planning Assessment  [facility]     Patient Name: Joaquín Garcia Jr.  MRN: [id number]  Today's Date: 11/6/2020    Admit Date: 11/5/2020    Discharge Needs Assessment     Row Name 11/06/20 0940       Living Environment    Current Living Arrangements  home/apartment/condo    Primary Care Provided by  self    Provides Primary Care For  no one    Family Caregiver if Needed  parent(s)    Able to Return to Prior Arrangements  yes       Resource/Environmental Concerns    Transportation Concerns  car, none       Transition Planning    Patient/Family Anticipates Transition to  home with family;home    Patient/Family Anticipated Services at Transition      Transportation Anticipated  family or friend will provide       Discharge Needs Assessment    Readmission Within the Last 30 Days  no previous admission in last 30 days    Equipment Currently Used at Home  none        Discharge Plan     Row Name 11/06/20 0940       Plan    Plan  Home with family support    Patient/Family in Agreement with Plan  yes    Plan Comments  Spoke with the patient, verified current information, and CCP role explained. Soumya states he has family support at home. He's IADL, and has no history with DME/RH/HH. Patient plans to d/c home with family support, and states family will transport him home by car at d/c. Pt denies needs for DME/RH/HH. Discussed case with MILA Spencer/Ortho. No other needs identified. CCP following.    Final Discharge Disposition Code  01 - home or self-care    Final Note  Pt to d/c home with family support. Family to transport.        Continued Care and Services - Admitted Since 11/5/2020    Coordination has not been started for this encounter.       Expected Discharge Date and Time     Expected Discharge Date Expected Discharge Time    Nov 6, 2020         Demographic Summary     Row Name 11/06/20 0939       General Information    Admission Type  observation    Reason for Consult  discharge planning     Preferred Language  English     Used During This Interaction  no       Contact Information    Permission Granted to Share Info With  ;family/designee        Functional Status     Row Name 11/06/20 0940       Functional Status    Usual Activity Tolerance  good    Current Activity Tolerance  good       Functional Status, IADL    Medications  independent    Meal Preparation  independent    Housekeeping  independent    Laundry  independent    Shopping  independent       Mental Status Summary    Recent Changes in Mental Status/Cognitive Functioning  no changes        Psychosocial     Row Name 11/06/20 0940       Intellectual Performance WDL    Level of Consciousness  Alert       Coping/Stress    Patient Personal Strengths  able to adapt    Sources of Support  parent(s)    Reaction to Health Status  accepting    Understanding of Condition and Treatment  adequate understanding of medical condition       Developmental Stage (Eriksson's)    Developmental Stage  Stage 7 (35-65 years/Middle Adulthood) Generativity vs. Stagnation        Abuse/Neglect    No documentation.       Legal    No documentation.       Substance Abuse    No documentation.       Patient Forms    No documentation.           Thuy Vargas RN

## 2020-11-06 NOTE — DISCHARGE SUMMARY
Orthopedic Discharge Summary      Patient: Joaquín Garcia Jr.  YOB: 1960  Medical Record Number: [id number]    Attending Physician: Marcos Christine MD  Consulting Physician(s):   Consults     No orders found for last 30 day(s).          Date of Admission: 11/5/2020  8:32 AM  Date of Discharge:11/6/2020    Discharge Diagnosis: NE LAMINEC/FACETECT/FORAMIN,LUMBAR 1 SEG [37930] (Lumbar 4-5 laminectomy),       Presenting Problem/History of Present Illness: Spinal stenosis of lumbar region with neurogenic claudication [M48.062]  Spinal stenosis of lumbar region with neurogenic claudication [M48.062]      Allergies: No Known Allergies    Discharge Medications     Discharge Medications      New Medications      Instructions Start Date   cyclobenzaprine 10 MG tablet  Commonly known as: FLEXERIL   10 mg, Oral, 3 Times Daily PRN      docusate sodium 100 MG capsule   100 mg, Oral, 2 Times Daily      oxyCODONE-acetaminophen 5-325 MG per tablet  Commonly known as: PERCOCET   1 to 2 tablets p.o. every 4 to 6 hours as needed pain         Changes to Medications      Instructions Start Date   warfarin 2 MG tablet  Commonly known as: COUMADIN  What changed: Another medication with the same name was changed. Make sure you understand how and when to take each.   4 mg, Oral, Take As Directed, 4 MG ON MONDAYS, WEDNESDAYS AND FRIDAYS PT TO HOLD 5 DAYS PRIOR TO SURGERY       warfarin 2 MG tablet  Commonly known as: COUMADIN  What changed:   · how much to take  · how to take this  · when to take this  · additional instructions   2 tablets daily or as directed, per  INR         Continue These Medications      Instructions Start Date   acetaminophen 325 MG tablet  Commonly known as: TYLENOL   650 mg, Oral, Every 6 Hours PRN      amLODIPine 10 MG tablet  Commonly known as: NORVASC   10 mg, Oral, Daily, For hypertention      hydroCHLOROthiazide 12.5 MG tablet  Commonly known as: HYDRODIURIL   12.5 mg, Oral, Daily, For  blood pressure control      metoprolol succinate  MG 24 hr tablet  Commonly known as: Toprol XL   100 mg, Oral, Daily      ramipril 10 MG capsule  Commonly known as: ALTACE   10 mg, Oral, Daily, For hypertension               Past Medical History:   Diagnosis Date   • Arthritis    • Atrial fibrillation (CMS/HCC)    • COPD (chronic obstructive pulmonary disease) (CMS/HCC)     PT STATES NO LONGER ON INHALERS BUT IS BREATHING WELL   • Degenerative lumbar disc    • Emphysema with chronic bronchitis (CMS/HCC)    • H/O tricuspid valve repair 2016    MEDTRONIC ANNULOPLASTY RING   • History of heart valve replacement with porcine valve 2016    MITRAL VALVE, DR COBURN   • Hypertension    • Low back pain     RADIATES DOWN BLE, WORSE ON RT, SOME NUMBNESS/TINGLING   • Neurofibromatosis (CMS/HCC)    • On anticoagulant therapy    • Sleep apnea     DOES NOT USE CPAP   • Spinal stenosis         Past Surgical History:   Procedure Laterality Date   • CARDIAC CATHETERIZATION  10/20/2016    Procedure: Case Abort;  Surgeon: Zhen Ford MD;  Location: [facility] INVASIVE LOCATION;  Service:    • CARDIAC CATHETERIZATION Left 10/21/2016    Procedure: Cardiac catheterization;  Surgeon: Zhen Ford MD;  Location: [facility] INVASIVE LOCATION;  Service:    • HERNIA REPAIR     • MITRAL VALVE REPAIR/REPLACEMENT N/A 10/27/2016    Procedure: INTRAOPERATIVE LILIA, MIDLINE STERNOTOMY WITH MITRAL VALVE REPLACEMENT TRICUSPID VALVE REPAIR;  Surgeon: Robert Coburn MD;  Location: [facility] OR;  Service:    • TEETH EXTRACTION N/A 10/25/2016    Procedure: TEETH EXTRACTION FULL MOUTH;  Surgeon: Gio Ibarra DMD;  Location: [facility] OR;  Service:         Social History     Occupational History   • Not on file   Tobacco Use   • Smoking status: Current Every Day Smoker     Packs/day: 0.50     Years: 35.00     Pack years: 17.50     Types: Cigarettes   • Smokeless tobacco: Never Used   • Tobacco comment: PT STATES DOWN TO  1/2 TO 1/3 PPD FROM 1PPD   Substance and Sexual Activity   • Alcohol use: Yes     Alcohol/week: 2.0 standard drinks     Types: 2 Shots of liquor per week   • Drug use: No   • Sexual activity: Defer      Social History     Social History Narrative   • Not on file        Family History   Problem Relation Age of Onset   • Heart disease Father    • Hypertension Father    • Malig Hyperthermia Neg Hx          Physical Exam: 60 y.o. male  General Appearance:    Alert, cooperative, in no acute distress                      Vitals:    11/06/20 0200 11/06/20 0300 11/06/20 0400 11/06/20 0600   BP:  109/65     BP Location:  Right arm     Patient Position:  Lying     Pulse:  84     Resp: 16 16 16 16   Temp:  97.3 °F (36.3 °C)     TempSrc:  Skin     SpO2:  91%     Weight:       Height:            DIAGNOSTIC TESTS:   Admission on 11/05/2020   Component Date Value Ref Range Status   • Protime 11/05/2020 15.1* 11.7 - 14.2 Seconds Final   • INR 11/05/2020 1.20* 0.90 - 1.10 Final       No results found.    Hospital Course:  60 y.o. male admitted to [facility] to services of Marcos Christine MD with Spinal stenosis of lumbar region with neurogenic claudication [M48.062]  Spinal stenosis of lumbar region with neurogenic claudication [M48.062] on 11/5/2020 and underwent [place] LAMINEC/FACETECT/FORAMIN,LUMBAR 1 SEG [78399] (Lumbar 4-5 laminectomy)  Per Marcos Christine MD. Antibiotic and VTE prophylaxis were per SCIP protocols.  The patient was admitted to the floor where IV and/or oral pain medications were administered for postoperative pain.  At discharge the incisional pain was tolerable and preop neurologic function was intact.  The dressing was dry and the wound was clean.    Condition on Discharge:  Stable    Discharge Instructions:   1.  Patient may weight bear as tolerated unless otherwise specified.   2.  May use ice to back incision as needed.   3.  Patient also instructed on incentive spirometer during hospitalization and  encouraged to continue to use at home regularly.   4.  Dressing is waterproof and should remain on and intact until seen in the office at 1st PO visit.  Patient has been instructed to contact the office if dressing becomes loose or saturated.   5.  Patient may shower on POD #3 if and when all wound drainage has stopped.    6.  Patient is to avoid forward bending and twisting at the waist.  No lifting greater than 5 pounds.     A detailed list of instructions specific to the operation was given to the patient at the time of discharge.    Follow up Instructions:  Follow up in the office with Dr. Marcos Christine Jr. in 2-3 weeks - patient to call the office at [phone] to schedule. Prescriptions were given for pain medication.    Follow-up Appointments  Future Appointments   Date Time Provider Department Center   11/20/2020 10:40 AM Marcos Christine MD [facility]   11/25/2020 10:00 AM Simon Pelaez MD [facility]   12/3/2020  7:00 AM [facility]   1/22/2021  8:00 AM Epley, James, APRN [facility]     Additional Instructions for the Follow-ups that You Need to Schedule     Discharge Follow-up with Specialty: Orthopedics; 2 Weeks   As directed      Specialty: Orthopedics    Follow Up: 2 Weeks    Follow Up Details: Return to the office to see Dr. Marcos Christine               Discharge Disposition Plan:today to home    Date: 11/6/2020    Marcos Christine MD  11/06/20  07:53 EST

## 2020-11-09 ENCOUNTER — TRANSITIONAL CARE MANAGEMENT TELEPHONE ENCOUNTER (OUTPATIENT)
Dept: CALL CENTER | Facility: HOSPITAL | Age: 60
End: 2020-11-09

## 2020-11-09 NOTE — OUTREACH NOTE
Call Center TCM Note      Responses   [facility] patient discharged from?  [place]   Does the patient have one of the following disease processes/diagnoses(primary or secondary)?  General Surgery   TCM attempt successful?  Yes   Discharge diagnosis  [place] LAMINEC/FACETECT/FORAMIN,LUMBAR 1 SEG    Meds reviewed with patient/caregiver?  Yes   Is the patient having any side effects they believe may be caused by any medication additions or changes?  No   Does the patient have all medications related to this admission filled (includes all antibiotics, pain medications, etc.)  Yes   Is the patient taking all medications as directed (includes completed medication regime)?  Yes   Does the patient have a follow up appointment scheduled with their surgeon?  Yes   Has the patient kept scheduled appointments due by today?  Yes   Comments  Post op with ortho sgn 11/20, and neuro sgn 11/25/2020. Pt will keep 01/2021 sched fwp with PCP   Has home health visited the patient within 72 hours of discharge?  N/A   Psychosocial issues?  No   Did the patient receive a copy of their discharge instructions?  Yes   Nursing interventions  Reviewed instructions with patient   What is the patient's perception of their health status since discharge?  Improving   Is the patient /caregiver able to teach back basic post-op care?  Continue use of incentive spirometry at least 1 week post discharge, Drive as instructed by MD in discharge instructions, No tub bath, swimming, or hot tub until instructed by MD, Do not remove steri-strips, Lifting as instructed by MD in discharge instructions, Keep incision areas clean,dry and protected, Take showers only when approved by MD-sponge bathe until then, Practice 'cough and deep breath'   Is the patient/caregiver able to teach back signs and symptoms of incisional infection?  Increased redness, swelling or pain at the incisonal site, Pus or odor from incision, Fever, Increased drainage or bleeding,  Incisional warmth   Is the patient/caregiver able to teach back steps to recovery at home?  Set small, achievable goals for return to baseline health, Practice good oral hygiene, Eat a well-balance diet, Rest and rebuild strength, gradually increase activity, Weigh daily, Make a list of questions for surgeon's appointment   If the patient is a current smoker, are they able to teach back resources for cessation?  [id number]   Is the patient/caregiver able to teach back the hierarchy of who to call/visit for symptoms/problems? PCP, Specialist, Home health nurse, Urgent Care, ED, 911  Yes   TCM call completed?  Yes   Wrap up additional comments  Pt states he is doing great s/p lumbar laminectomy. Feels better than he expected. Moving about home well, walking. No questions or concerns. Post op with ortho sgn 11/20, and neuro sgn 11/25/2020. Pt will keep 01/2021 sched fwp with PCP          Daniela Leyva MA    11/9/2020, 12:52 EST

## 2020-11-20 ENCOUNTER — OFFICE VISIT (OUTPATIENT)
Dept: ORTHOPEDIC SURGERY | Facility: CLINIC | Age: 60
End: 2020-11-20

## 2020-11-20 ENCOUNTER — TELEPHONE (OUTPATIENT)
Dept: NEUROSURGERY | Facility: CLINIC | Age: 60
End: 2020-11-20

## 2020-11-20 VITALS — WEIGHT: 227 LBS | BODY MASS INDEX: 31.78 KG/M2 | TEMPERATURE: 97.5 F | HEIGHT: 71 IN

## 2020-11-20 DIAGNOSIS — M48.062 SPINAL STENOSIS OF LUMBAR REGION WITH NEUROGENIC CLAUDICATION: Primary | ICD-10-CM

## 2020-11-20 PROCEDURE — 99024 POSTOP FOLLOW-UP VISIT: CPT | Performed by: ORTHOPAEDIC SURGERY

## 2020-11-20 NOTE — PROGRESS NOTES
He is 2 weeks out from lumbar laminectomy and has no leg pain.  Minimal back pain wound is healing nicely is doing very well.  This point he can drive and slowly resume normal activity.  No heavy lifting for now.  Follow-up in a month.  No x-rays needed.

## 2020-11-20 NOTE — TELEPHONE ENCOUNTER
Pt had an new patient appointment with Dr. Pelaez scheduled 11/25/20 appointment was made in August. Pt had recent surgery with Dr. Christine. This appointment with Dr. Pelaez has been cancelled.

## 2020-12-03 ENCOUNTER — HOSPITAL ENCOUNTER (OUTPATIENT)
Dept: CARDIOLOGY | Facility: HOSPITAL | Age: 60
Discharge: HOME | End: 2020-12-03
Admitting: INTERNAL MEDICINE

## 2020-12-03 VITALS
HEART RATE: 93 BPM | BODY MASS INDEX: 32.48 KG/M2 | DIASTOLIC BLOOD PRESSURE: 70 MMHG | WEIGHT: 232 LBS | HEIGHT: 71 IN | SYSTOLIC BLOOD PRESSURE: 138 MMHG

## 2020-12-03 DIAGNOSIS — I34.0 NONRHEUMATIC MITRAL VALVE REGURGITATION: ICD-10-CM

## 2020-12-03 LAB
AORTIC ARCH: 2.7 CM
ASCENDING AORTA: 3 CM
BH CV ECHO MEAS - ACS: 2.5 CM
BH CV ECHO MEAS - AO ARCH DIAM (PROXIMAL TRANS.): 2.7 CM
BH CV ECHO MEAS - AO MAX PG (FULL): 3.7 MMHG
BH CV ECHO MEAS - AO MAX PG: 6.9 MMHG
BH CV ECHO MEAS - AO MEAN PG (FULL): 1 MMHG
BH CV ECHO MEAS - AO MEAN PG: 3.1 MMHG
BH CV ECHO MEAS - AO ROOT AREA (BSA CORRECTED): 1.7
BH CV ECHO MEAS - AO ROOT AREA: 10.9 CM^2
BH CV ECHO MEAS - AO ROOT DIAM: 3.7 CM
BH CV ECHO MEAS - AO V2 MAX: 131.7 CM/SEC
BH CV ECHO MEAS - AO V2 MEAN: 78.6 CM/SEC
BH CV ECHO MEAS - AO V2 VTI: 23.5 CM
BH CV ECHO MEAS - ASC AORTA: 3 CM
BH CV ECHO MEAS - AVA(I,A): 3.2 CM^2
BH CV ECHO MEAS - AVA(I,D): 3.2 CM^2
BH CV ECHO MEAS - AVA(V,A): 3.2 CM^2
BH CV ECHO MEAS - AVA(V,D): 3.2 CM^2
BH CV ECHO MEAS - BSA(HAYCOCK): 2.3 M^2
BH CV ECHO MEAS - BSA: 2.2 M^2
BH CV ECHO MEAS - BZI_BMI: 32.4 KILOGRAMS/M^2
BH CV ECHO MEAS - BZI_METRIC_HEIGHT: 180.3 CM
BH CV ECHO MEAS - BZI_METRIC_WEIGHT: 105.2 KG
BH CV ECHO MEAS - EDV(MOD-SP2): 80 ML
BH CV ECHO MEAS - EDV(MOD-SP4): 76 ML
BH CV ECHO MEAS - EDV(TEICH): 110.6 ML
BH CV ECHO MEAS - EF(CUBED): 71.3 %
BH CV ECHO MEAS - EF(MOD-BP): 55.1 %
BH CV ECHO MEAS - EF(MOD-SP2): 55 %
BH CV ECHO MEAS - EF(MOD-SP4): 53.9 %
BH CV ECHO MEAS - EF(TEICH): 62.8 %
BH CV ECHO MEAS - ESV(MOD-SP2): 36 ML
BH CV ECHO MEAS - ESV(MOD-SP4): 35 ML
BH CV ECHO MEAS - ESV(TEICH): 41.2 ML
BH CV ECHO MEAS - FS: 34 %
BH CV ECHO MEAS - IVS/LVPW: 0.96
BH CV ECHO MEAS - IVSD: 1.3 CM
BH CV ECHO MEAS - LV DIASTOLIC VOL/BSA (35-75): 33.8 ML/M^2
BH CV ECHO MEAS - LV MASS(C)D: 260.4 GRAMS
BH CV ECHO MEAS - LV MASS(C)DI: 115.9 GRAMS/M^2
BH CV ECHO MEAS - LV MAX PG: 3.3 MMHG
BH CV ECHO MEAS - LV MEAN PG: 2 MMHG
BH CV ECHO MEAS - LV SYSTOLIC VOL/BSA (12-30): 15.6 ML/M^2
BH CV ECHO MEAS - LV V1 MAX: 90.4 CM/SEC
BH CV ECHO MEAS - LV V1 MEAN: 67.1 CM/SEC
BH CV ECHO MEAS - LV V1 VTI: 16.3 CM
BH CV ECHO MEAS - LVIDD: 4.9 CM
BH CV ECHO MEAS - LVIDS: 3.2 CM
BH CV ECHO MEAS - LVLD AP2: 6.9 CM
BH CV ECHO MEAS - LVLD AP4: 6.6 CM
BH CV ECHO MEAS - LVLS AP2: 5.6 CM
BH CV ECHO MEAS - LVLS AP4: 5.5 CM
BH CV ECHO MEAS - LVOT AREA (M): 4.5 CM^2
BH CV ECHO MEAS - LVOT AREA: 4.6 CM^2
BH CV ECHO MEAS - LVOT DIAM: 2.4 CM
BH CV ECHO MEAS - LVPWD: 1.4 CM
BH CV ECHO MEAS - MV DEC SLOPE: 459.5 CM/SEC^2
BH CV ECHO MEAS - MV DEC TIME: 0.27 SEC
BH CV ECHO MEAS - MV E MAX VEL: 158 CM/SEC
BH CV ECHO MEAS - MV MAX PG: 12 MMHG
BH CV ECHO MEAS - MV MEAN PG: 7.1 MMHG
BH CV ECHO MEAS - MV P1/2T MAX VEL: 160.2 CM/SEC
BH CV ECHO MEAS - MV P1/2T: 102.1 MSEC
BH CV ECHO MEAS - MV V2 MAX: 172.8 CM/SEC
BH CV ECHO MEAS - MV V2 MEAN: 128.8 CM/SEC
BH CV ECHO MEAS - MV V2 VTI: 39.6 CM
BH CV ECHO MEAS - MVA P1/2T LCG: 1.4 CM^2
BH CV ECHO MEAS - MVA(P1/2T): 2.2 CM^2
BH CV ECHO MEAS - MVA(VTI): 1.9 CM^2
BH CV ECHO MEAS - PA MAX PG (FULL): 5.4 MMHG
BH CV ECHO MEAS - PA MAX PG: 7.2 MMHG
BH CV ECHO MEAS - PA V2 MAX: 134.4 CM/SEC
BH CV ECHO MEAS - PVA(V,A): 1.5 CM^2
BH CV ECHO MEAS - PVA(V,D): 1.5 CM^2
BH CV ECHO MEAS - QP/QS: 0.41
BH CV ECHO MEAS - RV MAX PG: 1.9 MMHG
BH CV ECHO MEAS - RV MEAN PG: 0.93 MMHG
BH CV ECHO MEAS - RV V1 MAX: 68.1 CM/SEC
BH CV ECHO MEAS - RV V1 MEAN: 43.8 CM/SEC
BH CV ECHO MEAS - RV V1 VTI: 10.1 CM
BH CV ECHO MEAS - RVOT AREA: 3 CM^2
BH CV ECHO MEAS - RVOT DIAM: 2 CM
BH CV ECHO MEAS - SI(AO): 114.3 ML/M^2
BH CV ECHO MEAS - SI(CUBED): 36.4 ML/M^2
BH CV ECHO MEAS - SI(LVOT): 33.6 ML/M^2
BH CV ECHO MEAS - SI(MOD-SP2): 19.6 ML/M^2
BH CV ECHO MEAS - SI(MOD-SP4): 18.3 ML/M^2
BH CV ECHO MEAS - SI(TEICH): 30.9 ML/M^2
BH CV ECHO MEAS - SV(AO): 256.8 ML
BH CV ECHO MEAS - SV(CUBED): 81.7 ML
BH CV ECHO MEAS - SV(LVOT): 75.5 ML
BH CV ECHO MEAS - SV(MOD-SP2): 44 ML
BH CV ECHO MEAS - SV(MOD-SP4): 41 ML
BH CV ECHO MEAS - SV(RVOT): 30.6 ML
BH CV ECHO MEAS - SV(TEICH): 69.4 ML
BH CV XLRA - RV BASE: 4.6 CM
BH CV XLRA - RV LENGTH: 6.7 CM
BH CV XLRA - RV MID: 3.5 CM
LEFT ATRIUM VOLUME INDEX: 76 ML/M2
LEFT ATRIUM VOLUME: 163 CM3
LV EF 2D ECHO EST: 55 %
MAXIMAL PREDICTED HEART RATE: 158 BPM
SINUS: 3.3 CM
STJ: 3.3 CM
STRESS TARGET HR: 134 BPM

## 2020-12-03 PROCEDURE — 93306 TTE W/DOPPLER COMPLETE: CPT | Performed by: INTERNAL MEDICINE

## 2020-12-03 PROCEDURE — 93306 TTE W/DOPPLER COMPLETE: CPT

## 2020-12-04 ENCOUNTER — TELEPHONE (OUTPATIENT)
Dept: CARDIOLOGY | Facility: CLINIC | Age: 60
End: 2020-12-04

## 2020-12-16 RX ORDER — WARFARIN SODIUM 2 MG/1
TABLET ORAL
Qty: 180 TABLET | Refills: 1 | Status: SHIPPED | OUTPATIENT
Start: 2020-12-16 | End: 2021-06-11

## 2020-12-17 RX ORDER — RAMIPRIL 10 MG/1
CAPSULE ORAL
Qty: 90 CAPSULE | Refills: 1 | Status: SHIPPED | OUTPATIENT
Start: 2020-12-17 | End: 2021-06-11

## 2020-12-22 RX ORDER — METOPROLOL SUCCINATE 100 MG/1
TABLET, EXTENDED RELEASE ORAL
Qty: 90 TABLET | Refills: 1 | Status: SHIPPED | OUTPATIENT
Start: 2020-12-22 | End: 2021-06-21

## 2021-01-05 RX ORDER — AMLODIPINE BESYLATE 10 MG/1
10 TABLET ORAL DAILY
Qty: 90 TABLET | Refills: 1 | Status: SHIPPED | OUTPATIENT
Start: 2021-01-05 | End: 2021-06-28

## 2021-01-05 RX ORDER — HYDROCHLOROTHIAZIDE 12.5 MG/1
12.5 TABLET ORAL DAILY
Qty: 90 TABLET | Refills: 1 | Status: SHIPPED | OUTPATIENT
Start: 2021-01-05 | End: 2021-06-28

## 2021-01-25 NOTE — PLAN OF CARE
Doing well overall  tdap is unsure if it's UTD  Flu shot has not done- discussed    Eczema- triamcinolone ordered today  Peanut allergy- stable, has epipen    RTC for CPE   Goal Outcome Evaluation:  Plan of Care Reviewed With: patient  Progress: improving

## 2021-06-11 DIAGNOSIS — Z51.81 ANTICOAGULATION MANAGEMENT ENCOUNTER: ICD-10-CM

## 2021-06-11 DIAGNOSIS — I48.20 ATRIAL FIBRILLATION, CHRONIC (HCC): Primary | ICD-10-CM

## 2021-06-11 DIAGNOSIS — Z79.01 ANTICOAGULATION MANAGEMENT ENCOUNTER: ICD-10-CM

## 2021-06-11 RX ORDER — WARFARIN SODIUM 2 MG/1
TABLET ORAL
Qty: 60 TABLET | Refills: 0 | Status: SHIPPED | OUTPATIENT
Start: 2021-06-11 | End: 2022-08-26 | Stop reason: ALTCHOICE

## 2021-06-11 RX ORDER — RAMIPRIL 10 MG/1
CAPSULE ORAL
Qty: 90 CAPSULE | Refills: 0 | Status: SHIPPED | OUTPATIENT
Start: 2021-06-11 | End: 2021-09-23

## 2021-06-11 NOTE — TELEPHONE ENCOUNTER
Please call patient, have him schedule appointment with me soon for follow-up, as well as I referred him to cardiology warfarin management, I will no longer be able to manage his warfarin I gave him 1 month but he needs to see cardiology this has to be followed up by to prevent a bleed in his brain or stroke.  Hope he is doing well likely home soon thank you

## 2021-06-21 RX ORDER — METOPROLOL SUCCINATE 100 MG/1
TABLET, EXTENDED RELEASE ORAL
Qty: 90 TABLET | Refills: 0 | Status: SHIPPED | OUTPATIENT
Start: 2021-06-21 | End: 2021-09-23

## 2021-06-25 RX ORDER — WARFARIN SODIUM 2 MG/1
TABLET ORAL
Qty: 60 TABLET | Refills: 0 | Status: SHIPPED | OUTPATIENT
Start: 2021-06-25 | End: 2022-08-26 | Stop reason: ALTCHOICE

## 2021-06-25 NOTE — TELEPHONE ENCOUNTER
Please schedule patient an appointment will need to be monitored monthly orders and I will prescribe that for him and he will have to go to cardiology thank you

## 2021-06-28 RX ORDER — HYDROCHLOROTHIAZIDE 12.5 MG/1
12.5 TABLET ORAL DAILY
Qty: 90 TABLET | Refills: 0 | Status: SHIPPED | OUTPATIENT
Start: 2021-06-28 | End: 2021-09-23

## 2021-06-28 RX ORDER — AMLODIPINE BESYLATE 10 MG/1
10 TABLET ORAL DAILY
Qty: 90 TABLET | Refills: 0 | Status: SHIPPED | OUTPATIENT
Start: 2021-06-28 | End: 2021-09-23

## 2021-09-23 RX ORDER — HYDROCHLOROTHIAZIDE 12.5 MG/1
12.5 TABLET ORAL DAILY
Qty: 30 TABLET | Refills: 0 | Status: SHIPPED | OUTPATIENT
Start: 2021-09-23 | End: 2021-11-15

## 2021-09-23 RX ORDER — AMLODIPINE BESYLATE 10 MG/1
10 TABLET ORAL DAILY
Qty: 30 TABLET | Refills: 0 | Status: SHIPPED | OUTPATIENT
Start: 2021-09-23 | End: 2021-10-18

## 2021-09-23 RX ORDER — RAMIPRIL 10 MG/1
CAPSULE ORAL
Qty: 30 CAPSULE | Refills: 0 | Status: SHIPPED | OUTPATIENT
Start: 2021-09-23 | End: 2021-11-04

## 2021-09-23 RX ORDER — METOPROLOL SUCCINATE 100 MG/1
TABLET, EXTENDED RELEASE ORAL
Qty: 30 TABLET | Refills: 0 | Status: SHIPPED | OUTPATIENT
Start: 2021-09-23 | End: 2021-10-18

## 2021-09-23 NOTE — TELEPHONE ENCOUNTER
Please schedule him fasting lab before next office appointment and schedule his office appointment within the next month thank you

## 2021-09-23 NOTE — TELEPHONE ENCOUNTER
Rx Refill Note  Requested Prescriptions     Pending Prescriptions Disp Refills   • metoprolol succinate XL (TOPROL-XL) 100 MG 24 hr tablet [Pharmacy Med Name: METOPROLOL SUCC  MG TAB] 90 tablet 0     Sig: TAKE 1 TABLET BY MOUTH EVERY DAY   • ramipril (ALTACE) 10 MG capsule [Pharmacy Med Name: RAMIPRIL 10 MG CAPSULE] 90 capsule 0     Sig: TAKE 1 CAPSULE BY MOUTH EVERY DAY      Last office visit with prescribing clinician: 10/21/2020      Next office visit with prescribing clinician: Visit date not found            Gilda Allen LPN  09/23/21, 10:22 EDT

## 2021-10-12 ENCOUNTER — OFFICE VISIT (OUTPATIENT)
Dept: FAMILY MEDICINE CLINIC | Facility: CLINIC | Age: 61
End: 2021-10-12

## 2021-10-12 VITALS
SYSTOLIC BLOOD PRESSURE: 124 MMHG | HEART RATE: 102 BPM | RESPIRATION RATE: 14 BRPM | DIASTOLIC BLOOD PRESSURE: 80 MMHG | WEIGHT: 208.7 LBS | OXYGEN SATURATION: 97 % | HEIGHT: 71 IN | TEMPERATURE: 97.7 F | BODY MASS INDEX: 29.22 KG/M2

## 2021-10-12 DIAGNOSIS — Z95.2 S/P MVR (MITRAL VALVE REPLACEMENT): ICD-10-CM

## 2021-10-12 DIAGNOSIS — M25.522 LEFT ELBOW PAIN: ICD-10-CM

## 2021-10-12 DIAGNOSIS — Z98.890 S/P TVR (TRICUSPID VALVE REPAIR): ICD-10-CM

## 2021-10-12 DIAGNOSIS — I10 BENIGN ESSENTIAL HYPERTENSION: Primary | ICD-10-CM

## 2021-10-12 DIAGNOSIS — I48.20 ATRIAL FIBRILLATION, CHRONIC (HCC): ICD-10-CM

## 2021-10-12 DIAGNOSIS — Z72.0 TOBACCO ABUSE: ICD-10-CM

## 2021-10-12 DIAGNOSIS — Z12.11 SCREEN FOR COLON CANCER: ICD-10-CM

## 2021-10-12 PROCEDURE — 99214 OFFICE O/P EST MOD 30 MIN: CPT | Performed by: NURSE PRACTITIONER

## 2021-10-12 RX ORDER — AMOXICILLIN AND CLAVULANATE POTASSIUM 875; 125 MG/1; MG/1
1 TABLET, FILM COATED ORAL 2 TIMES DAILY
Qty: 14 TABLET | Refills: 0 | Status: SHIPPED | OUTPATIENT
Start: 2021-10-12 | End: 2021-10-21 | Stop reason: SDUPTHER

## 2021-10-12 NOTE — PATIENT INSTRUCTIONS
Discharge instructions    You need your INR checked monthly  After you show stability we can do this likely every 2 months    If you are unable for whatever reason to come in more consistently unfortunately I will need to refer you on to cardiology for your safety      Strongly encourage you to quit smoking continue healthy diet regular exercise with more focus on decreasing processed foods breads and pasta continue portion control and as we discussed challenge you  To continue therapeutic lifestyle changes and good decisions to feel better breathing better overall health improvement my challenges 195 pounds  Total weight over the next 6 to 12 months  That would be down 13 pounds  If this happens we may need to decrease your blood pressure medicines with caution    You have some mild redness and tenderness on your left elbow without fever    As a precaution given you Augmentin twice daily x7 days but should you have increased pain increased redness swelling edema fever chills unable to move your elbow worsening  Immediately to the emergency room to rule out septic arthritis which is extremely severe    Go to the emergency room for further evaluation should you not improve  After 3 days of antibiotics    Recheck INR  Monday while taking the antibiotic  Send me a message  Friday morning on your arm condition

## 2021-10-18 ENCOUNTER — CLINICAL SUPPORT (OUTPATIENT)
Dept: FAMILY MEDICINE CLINIC | Facility: CLINIC | Age: 61
End: 2021-10-18

## 2021-10-18 DIAGNOSIS — I48.20 ATRIAL FIBRILLATION, CHRONIC (HCC): Primary | ICD-10-CM

## 2021-10-18 LAB — INR PPP: 2.9 (ref 1.9–2.9)

## 2021-10-18 PROCEDURE — 93793 ANTICOAG MGMT PT WARFARIN: CPT | Performed by: NURSE PRACTITIONER

## 2021-10-18 PROCEDURE — 85610 PROTHROMBIN TIME: CPT | Performed by: NURSE PRACTITIONER

## 2021-10-18 PROCEDURE — 36416 COLLJ CAPILLARY BLOOD SPEC: CPT | Performed by: NURSE PRACTITIONER

## 2021-10-18 RX ORDER — METOPROLOL SUCCINATE 100 MG/1
TABLET, EXTENDED RELEASE ORAL
Qty: 90 TABLET | Refills: 1 | Status: SHIPPED | OUTPATIENT
Start: 2021-10-18 | End: 2022-05-25 | Stop reason: SDUPTHER

## 2021-10-18 RX ORDER — AMLODIPINE BESYLATE 10 MG/1
10 TABLET ORAL DAILY
Qty: 90 TABLET | Refills: 1 | Status: SHIPPED | OUTPATIENT
Start: 2021-10-18 | End: 2022-05-25 | Stop reason: SDUPTHER

## 2021-10-18 NOTE — TELEPHONE ENCOUNTER
Rx Refill Note  Requested Prescriptions     Pending Prescriptions Disp Refills   • amLODIPine (NORVASC) 10 MG tablet [Pharmacy Med Name: AMLODIPINE BESYLATE 10 MG TAB] 30 tablet 0     Sig: TAKE 1 TABLET BY MOUTH DAILY. FOR HYPERTENTION   • metoprolol succinate XL (TOPROL-XL) 100 MG 24 hr tablet [Pharmacy Med Name: METOPROLOL SUCC  MG TAB] 30 tablet 0     Sig: TAKE 1 TABLET BY MOUTH EVERY DAY      Last office visit with prescribing clinician: 10/12/2021      Next office visit with prescribing clinician: Visit date not found            Jose Cueva Rep  10/18/21, 12:24 EDT

## 2021-10-21 ENCOUNTER — OFFICE VISIT (OUTPATIENT)
Dept: FAMILY MEDICINE CLINIC | Facility: CLINIC | Age: 61
End: 2021-10-21

## 2021-10-21 VITALS
HEART RATE: 91 BPM | TEMPERATURE: 96.8 F | OXYGEN SATURATION: 99 % | BODY MASS INDEX: 29.18 KG/M2 | DIASTOLIC BLOOD PRESSURE: 84 MMHG | WEIGHT: 208.4 LBS | SYSTOLIC BLOOD PRESSURE: 142 MMHG | HEIGHT: 71 IN | RESPIRATION RATE: 12 BRPM

## 2021-10-21 DIAGNOSIS — M70.22 OLECRANON BURSITIS OF LEFT ELBOW: ICD-10-CM

## 2021-10-21 DIAGNOSIS — M25.522 LEFT ELBOW PAIN: Primary | ICD-10-CM

## 2021-10-21 PROCEDURE — 99214 OFFICE O/P EST MOD 30 MIN: CPT | Performed by: NURSE PRACTITIONER

## 2021-10-21 RX ORDER — AMOXICILLIN AND CLAVULANATE POTASSIUM 875; 125 MG/1; MG/1
1 TABLET, FILM COATED ORAL 2 TIMES DAILY
Qty: 14 TABLET | Refills: 0 | Status: SHIPPED | OUTPATIENT
Start: 2021-10-21 | End: 2022-05-25

## 2021-10-28 ENCOUNTER — CLINICAL SUPPORT (OUTPATIENT)
Dept: FAMILY MEDICINE CLINIC | Facility: CLINIC | Age: 61
End: 2021-10-28

## 2021-10-28 DIAGNOSIS — I48.20 ATRIAL FIBRILLATION, CHRONIC: ICD-10-CM

## 2021-10-28 DIAGNOSIS — Z79.01 ANTICOAGULATION MANAGEMENT ENCOUNTER: Primary | ICD-10-CM

## 2021-10-28 DIAGNOSIS — Z51.81 ANTICOAGULATION MANAGEMENT ENCOUNTER: Primary | ICD-10-CM

## 2021-10-28 LAB — INR PPP: 2.2 (ref 0.9–1.1)

## 2021-10-28 PROCEDURE — 85610 PROTHROMBIN TIME: CPT | Performed by: NURSE PRACTITIONER

## 2021-10-28 PROCEDURE — 36416 COLLJ CAPILLARY BLOOD SPEC: CPT | Performed by: NURSE PRACTITIONER

## 2021-10-28 NOTE — PROGRESS NOTES
"Chief Complaint  Med Management (follow up/inr) and Elbow Injury (pt states was sleeping and must have hit elbow on something. elbow is swollen.)    Subjective          Joaquín BILL Radha Eduardo presents to [facility]  Pleasant patient here today follow-up essential hypertension, controlled chronic A. Fib,  Takes warfarin chronically and has not been compliant with his INRs and we have counseled him  That I can only manage this turns office he is status post mitral valve replacement and tricuspid valve replacement  History of alcoholism, continues to drink at this time but says he is cut down not ready to stop  COPD stable continues to smoke not ready to stop at this time  Has some elbow pain a couple days of mild erythema without fever chills or severe complaints is tender to touch no known injury no history of osteomyelitis no arthralgias body aches or other fever equivalents    Elbow Injury        Objective   Vital Signs:   /80   Pulse 102   Temp 97.7 °F (36.5 °C) (Infrared)   Resp 14   Ht 180.3 cm (71\")   Wt 94.7 kg (208 lb 11.2 oz)   SpO2 97%   BMI 29.11 kg/m²     Physical Exam  Vitals reviewed.   Constitutional:       Appearance: He is well-developed.   HENT:      Head: Normocephalic.      Nose: Nose normal.   Eyes:      General: No scleral icterus.     Conjunctiva/sclera: Conjunctivae normal.      Pupils: Pupils are equal, round, and reactive to light.   Neck:      Thyroid: No thyromegaly.      Vascular: No JVD.   Cardiovascular:      Rate and Rhythm: Normal rate and regular rhythm.      Heart sounds: Normal heart sounds. No murmur heard.  No friction rub. No gallop.    Pulmonary:      Effort: Pulmonary effort is normal. No respiratory distress.      Breath sounds: Normal breath sounds. No stridor. No wheezing or rales.   Abdominal:      General: Bowel sounds are normal. There is no distension.      Palpations: Abdomen is soft.      Tenderness: There is no abdominal " tenderness.      Comments: No hepatosplenomegaly, no ascites,   Musculoskeletal:         General: No tenderness.      Cervical back: Neck supple.      Comments: Tenderness elbow some edema no abscess noted mild erythema full range of motion moving freely   Lymphadenopathy:      Cervical: No cervical adenopathy.   Skin:     General: Skin is warm and dry.      Findings: No erythema or rash.   Neurological:      Mental Status: He is alert and oriented to person, place, and time.      Deep Tendon Reflexes: Reflexes are normal and symmetric.   Psychiatric:         Behavior: Behavior normal.         Thought Content: Thought content normal.         Judgment: Judgment normal.        Result Review :                 Assessment and Plan    Diagnoses and all orders for this visit:    1. Benign essential hypertension (Primary)    2. Screen for colon cancer  -     Ambulatory Referral For Screening Colonoscopy    3. Left elbow pain    4. S/P TVR (tricuspid valve repair)    5. S/P MVR (mitral valve replacement)    6. Tobacco abuse    7. Atrial fibrillation, chronic (HCC)    Other orders  -     Discontinue: amoxicillin-clavulanate (Augmentin) 875-125 MG per tablet; Take 1 tablet by mouth 2 (Two) Times a Day.  Dispense: 14 tablet; Refill: 0        Follow Up   Return in about 6 months (around 4/12/2022), or Fasting labs prior to next appointment, follow-up in 6 months.     Start antibiotic now, he may have some localized inflammation but could have a localized infection as well  Normal range of motion and no fever or severe pain or evidence of a septic joint  Any increased pain redness swelling he needs to go to the emergency room  Close follow-up as well would need to make sure this resolves promptly  Describe signs and symptoms of septic joint he knows to watch for these were emergency room  Change people places things encourage him to quit smoking  Check blood pressure weekly stop alcohol as well    Continue Covid vaccines and  booster when available should he get Covid reach out to me immediately for antibody therapy has high risk  Patient was given instructions and counseling regarding his condition or for health maintenance advice. Please see specific information pulled into the AVS if appropriate.

## 2021-11-04 RX ORDER — RAMIPRIL 10 MG/1
CAPSULE ORAL
Qty: 30 CAPSULE | Refills: 0 | Status: SHIPPED | OUTPATIENT
Start: 2021-11-04 | End: 2022-01-18

## 2021-11-07 NOTE — PROGRESS NOTES
"Chief Complaint  Follow-up (follow up visit for left elbow)    Subjective          Joaquín aGrcia Jr. presents to [facility]  Pleasant gentleman here today with left elbow pain, some mild redness no fever no chills x1 day no known injury said no history of septic joint no night sweats or other complaints.  Takes warfarin for atrial fibrillation blood pressure has been controlled at home.        Objective   Vital Signs:   /84   Pulse 91   Temp 96.8 °F (36 °C) (Infrared)   Resp 12   Ht 180.3 cm (71\")   Wt 94.5 kg (208 lb 6.4 oz)   SpO2 99%   BMI 29.07 kg/m²     Physical Exam  Vitals reviewed.   Constitutional:       Appearance: He is well-developed.   HENT:      Head: Normocephalic.      Nose: Nose normal.   Eyes:      General: No scleral icterus.     Conjunctiva/sclera: Conjunctivae normal.      Pupils: Pupils are equal, round, and reactive to light.   Neck:      Thyroid: No thyromegaly.      Vascular: No JVD.   Cardiovascular:      Rate and Rhythm: Normal rate and regular rhythm.      Heart sounds: Normal heart sounds. No murmur heard.  No friction rub. No gallop.    Pulmonary:      Effort: Pulmonary effort is normal. No respiratory distress.      Breath sounds: Normal breath sounds. No stridor. No wheezing or rales.   Abdominal:      General: Bowel sounds are normal. There is no distension.      Palpations: Abdomen is soft.      Tenderness: There is no abdominal tenderness.      Comments: No hepatosplenomegaly, no ascites,   Musculoskeletal:         General: Swelling and tenderness present. Normal range of motion.      Cervical back: Neck supple.      Comments: Some pinkness mild erythema without tenderness left elbow normal range of motion is open versus edematous no fluctuance or abscess   Lymphadenopathy:      Cervical: No cervical adenopathy.   Skin:     General: Skin is warm and dry.      Findings: No erythema or rash.   Neurological:      Mental Status: He is " alert and oriented to person, place, and time.      Deep Tendon Reflexes: Reflexes are normal and symmetric.   Psychiatric:         Behavior: Behavior normal.         Thought Content: Thought content normal.         Judgment: Judgment normal.        Result Review :                 Assessment and Plan    Diagnoses and all orders for this visit:    1. Left elbow pain (Primary)  -     Ambulatory Referral to Hand Surgery    2. Olecranon bursitis of left elbow  -     Ambulatory Referral to Hand Surgery    Other orders  -     amoxicillin-clavulanate (Augmentin) 875-125 MG per tablet; Take 1 tablet by mouth 2 (Two) Times a Day.  Dispense: 14 tablet; Refill: 0        Follow Up   Return INR outpatient 1 week, urgent referral hand, recheck 6 months fasting labs prior.  Patient was given instructions and counseling regarding his condition or for health maintenance advice. Please see specific information pulled into the AVS if appropriate.     Increased pain redness swelling fever chills emergency room immediately low threshold for any further evaluation and will begin dealing with a septic joint has mild erythema but we should take no chances here make sure to improvement promptly  He declines emergency room for I&D presently

## 2021-11-15 ENCOUNTER — CLINICAL SUPPORT (OUTPATIENT)
Dept: FAMILY MEDICINE CLINIC | Facility: CLINIC | Age: 61
End: 2021-11-15

## 2021-11-15 DIAGNOSIS — Z79.01 ANTICOAGULATION MANAGEMENT ENCOUNTER: Primary | ICD-10-CM

## 2021-11-15 DIAGNOSIS — Z51.81 ANTICOAGULATION MANAGEMENT ENCOUNTER: Primary | ICD-10-CM

## 2021-11-15 LAB — INR PPP: 1.7 (ref 0.9–1.1)

## 2021-11-15 PROCEDURE — 36416 COLLJ CAPILLARY BLOOD SPEC: CPT | Performed by: NURSE PRACTITIONER

## 2021-11-15 PROCEDURE — 85610 PROTHROMBIN TIME: CPT | Performed by: NURSE PRACTITIONER

## 2021-11-15 RX ORDER — HYDROCHLOROTHIAZIDE 12.5 MG/1
12.5 TABLET ORAL DAILY
Qty: 30 TABLET | Refills: 0 | Status: SHIPPED | OUTPATIENT
Start: 2021-11-15 | End: 2022-01-18

## 2022-01-18 RX ORDER — HYDROCHLOROTHIAZIDE 12.5 MG/1
12.5 TABLET ORAL DAILY
Qty: 90 TABLET | Refills: 0 | Status: SHIPPED | OUTPATIENT
Start: 2022-01-18 | End: 2022-05-25 | Stop reason: SDUPTHER

## 2022-01-18 RX ORDER — RAMIPRIL 10 MG/1
CAPSULE ORAL
Qty: 90 CAPSULE | Refills: 0 | Status: SHIPPED | OUTPATIENT
Start: 2022-01-18 | End: 2022-05-25 | Stop reason: SDUPTHER

## 2022-05-25 ENCOUNTER — OFFICE VISIT (OUTPATIENT)
Dept: FAMILY MEDICINE CLINIC | Facility: CLINIC | Age: 62
End: 2022-05-25

## 2022-05-25 VITALS
TEMPERATURE: 97.5 F | BODY MASS INDEX: 28.27 KG/M2 | RESPIRATION RATE: 18 BRPM | HEART RATE: 114 BPM | DIASTOLIC BLOOD PRESSURE: 90 MMHG | SYSTOLIC BLOOD PRESSURE: 148 MMHG | OXYGEN SATURATION: 99 % | HEIGHT: 71 IN | WEIGHT: 201.9 LBS

## 2022-05-25 DIAGNOSIS — Z79.899 MEDICATION MANAGEMENT: Primary | ICD-10-CM

## 2022-05-25 DIAGNOSIS — Z98.890 S/P TVR (TRICUSPID VALVE REPAIR): ICD-10-CM

## 2022-05-25 DIAGNOSIS — Z95.2 S/P MVR (MITRAL VALVE REPLACEMENT): ICD-10-CM

## 2022-05-25 DIAGNOSIS — Z13.220 SCREENING FOR LIPOID DISORDERS: ICD-10-CM

## 2022-05-25 DIAGNOSIS — I10 BENIGN ESSENTIAL HYPERTENSION: ICD-10-CM

## 2022-05-25 DIAGNOSIS — Z51.81 ANTICOAGULATION MANAGEMENT ENCOUNTER: ICD-10-CM

## 2022-05-25 DIAGNOSIS — Z79.01 ANTICOAGULATION MANAGEMENT ENCOUNTER: ICD-10-CM

## 2022-05-25 DIAGNOSIS — I48.20 ATRIAL FIBRILLATION, CHRONIC: ICD-10-CM

## 2022-05-25 PROCEDURE — 99214 OFFICE O/P EST MOD 30 MIN: CPT | Performed by: NURSE PRACTITIONER

## 2022-05-25 PROCEDURE — 90677 PCV20 VACCINE IM: CPT | Performed by: NURSE PRACTITIONER

## 2022-05-25 PROCEDURE — 90471 IMMUNIZATION ADMIN: CPT | Performed by: NURSE PRACTITIONER

## 2022-05-25 RX ORDER — HYDROCHLOROTHIAZIDE 12.5 MG/1
12.5 TABLET ORAL DAILY
Qty: 90 TABLET | Refills: 0 | Status: SHIPPED | OUTPATIENT
Start: 2022-05-25 | End: 2022-08-22

## 2022-05-25 RX ORDER — METOPROLOL SUCCINATE 100 MG/1
100 TABLET, EXTENDED RELEASE ORAL DAILY
Qty: 90 TABLET | Refills: 1 | Status: SHIPPED | OUTPATIENT
Start: 2022-05-25 | End: 2023-01-26

## 2022-05-25 RX ORDER — RAMIPRIL 10 MG/1
10 CAPSULE ORAL DAILY
Qty: 90 CAPSULE | Refills: 1 | Status: SHIPPED | OUTPATIENT
Start: 2022-05-25 | End: 2022-11-23

## 2022-05-25 RX ORDER — WARFARIN SODIUM 5 MG/1
5 TABLET ORAL NIGHTLY
Qty: 30 TABLET | Refills: 0 | Status: SHIPPED | OUTPATIENT
Start: 2022-05-25 | End: 2022-06-17

## 2022-05-25 RX ORDER — AMLODIPINE BESYLATE 10 MG/1
10 TABLET ORAL DAILY
Qty: 90 TABLET | Refills: 1 | Status: SHIPPED | OUTPATIENT
Start: 2022-05-25 | End: 2022-11-23

## 2022-05-25 NOTE — PROGRESS NOTES
"Chief Complaint  Hypertension (AA new refill on meds, been without for 2m)    Subjective          Joaquín Garcia  presents to [facility]  Joaquín presents for medication refills. He is currently off all medications as they have . He has not taken coumadin in the past few months. He has a history of atrial fibrillation as well as valve replacement. His last dose of coumadin was 4 mg, he reports his INR goal is 2-3.     He has a history of hypertension. He is currently prescribed amlodipine, hctz, metoprolol and altace. He has not taken these medications recently as he has been out. He is interested in refills today.    He does smoke and drink alcohol. He is aware to monitor for bleeding with the coumadin.     Hypertension  This is a chronic problem. The current episode started more than 1 year ago. The problem has been gradually worsening since onset. The problem is uncontrolled. Pertinent negatives include no anxiety, blurred vision, chest pain, headaches, malaise/fatigue, neck pain, orthopnea, palpitations, peripheral edema, PND, shortness of breath or sweats. There are no associated agents to hypertension. Risk factors for coronary artery disease include male gender. Past treatments include ACE inhibitors, beta blockers, diuretics and calcium channel blockers. Current antihypertension treatment includes nothing. Compliance problems include psychosocial issues.        Objective   Vital Signs:  /90 (BP Location: Left arm, Patient Position: Sitting, Cuff Size: Adult)   Pulse 114   Temp 97.5 °F (36.4 °C) (Temporal)   Resp 18   Ht 180.3 cm (70.98\")   Wt 91.6 kg (201 lb 14.4 oz)   SpO2 99%   BMI 28.17 kg/m²         Physical Exam  Vitals reviewed.   Constitutional:       Appearance: Normal appearance.   HENT:      Right Ear: Tympanic membrane and ear canal normal.      Left Ear: Tympanic membrane and ear canal normal.      Mouth/Throat:      Mouth: Mucous " membranes are moist.      Pharynx: Oropharynx is clear.   Eyes:      Conjunctiva/sclera: Conjunctivae normal.      Pupils: Pupils are equal, round, and reactive to light.   Cardiovascular:      Rate and Rhythm: Regular rhythm. Tachycardia present.      Heart sounds: No murmur heard.    No friction rub. No gallop.   Pulmonary:      Effort: Pulmonary effort is normal. No respiratory distress.      Breath sounds: Normal breath sounds. No wheezing, rhonchi or rales.   Abdominal:      General: Bowel sounds are normal. There is no distension.      Palpations: Abdomen is soft. There is no mass.      Tenderness: There is no abdominal tenderness.      Hernia: No hernia is present.   Skin:     General: Skin is warm and dry.   Neurological:      Mental Status: He is alert and oriented to person, place, and time.   Psychiatric:         Mood and Affect: Mood normal.        Result Review :                 Assessment and Plan    Diagnoses and all orders for this visit:    1. Medication management (Primary)  -     CBC & Differential  -     Comprehensive metabolic panel  -     Lipid Panel With LDL / HDL Ratio  -     Protime-INR    2. Anticoagulation management encounter  -     Protime-INR  -     Protime-INR; Future    3. Atrial fibrillation, chronic (HCC)    4. Benign essential hypertension  -     CBC & Differential  -     Comprehensive metabolic panel    5. S/P TVR (tricuspid valve repair)    6. S/P MVR (mitral valve replacement)    7. Screening for lipoid disorders  -     Lipid Panel With LDL / HDL Ratio    Other orders  -     Pneumococcal Conjugate Vaccine 20-Valent (PCV20)  -     ramipril (ALTACE) 10 MG capsule; Take 1 capsule by mouth Daily.  Dispense: 90 capsule; Refill: 1  -     metoprolol succinate XL (TOPROL-XL) 100 MG 24 hr tablet; Take 1 tablet by mouth Daily.  Dispense: 90 tablet; Refill: 1  -     hydroCHLOROthiazide (HYDRODIURIL) 12.5 MG tablet; Take 1 tablet by mouth Daily.  Dispense: 90 tablet; Refill: 0  -      amLODIPine (NORVASC) 10 MG tablet; Take 1 tablet by mouth Daily. For hypertention  Dispense: 90 tablet; Refill: 1  -     warfarin (Coumadin) 5 MG tablet; Take 1 tablet by mouth Every Night.  Dispense: 30 tablet; Refill: 0             Follow Up   No follow-ups on file.  Patient was given instructions and counseling regarding his condition or for health maintenance advice. Please see specific information pulled into the AVS if appropriate.     Joaquín presents for medication refills, states INR goal is 2-3, no recent INR, stopped coumadin end of 2021  Will resume coumadin 5 mg,repeat INR in two weeks, last dose 4 mg, recommend at home testing, order faxed for [facility]el[facility] home INR to be monitored twice monthly minimal, he will see cost and move forward    HTN/tachycardia: previously treated with metoprolol, amlodipine, ramipril, and hctz. These medications were stopped. BP is increased at 148/90, heart rate is elevated at 114  Resume home medications refills sent    Update prevnar 20  Follow up in 3 months  Refills given

## 2022-05-26 LAB
ALBUMIN SERPL-MCNC: 4.5 G/DL (ref 3.8–4.8)
ALBUMIN/GLOB SERPL: 1.6 {RATIO} (ref 1.2–2.2)
ALP SERPL-CCNC: 110 IU/L (ref 44–121)
ALT SERPL-CCNC: 12 IU/L (ref 0–44)
AST SERPL-CCNC: 13 IU/L (ref 0–40)
BASOPHILS # BLD AUTO: 0.1 X10E3/UL (ref 0–0.2)
BASOPHILS NFR BLD AUTO: 1 %
BILIRUB SERPL-MCNC: 0.5 MG/DL (ref 0–1.2)
BUN SERPL-MCNC: 13 MG/DL (ref 8–27)
BUN/CREAT SERPL: 14 (ref 10–24)
CALCIUM SERPL-MCNC: 9.3 MG/DL (ref 8.6–10.2)
CHLORIDE SERPL-SCNC: 103 MMOL/L (ref 96–106)
CHOLEST SERPL-MCNC: 197 MG/DL (ref 100–199)
CO2 SERPL-SCNC: 20 MMOL/L (ref 20–29)
CREAT SERPL-MCNC: 0.9 MG/DL (ref 0.76–1.27)
EGFRCR SERPLBLD CKD-EPI 2021: 96 ML/MIN/1.73
EOSINOPHIL # BLD AUTO: 0.1 X10E3/UL (ref 0–0.4)
EOSINOPHIL NFR BLD AUTO: 1 %
ERYTHROCYTE [DISTWIDTH] IN BLOOD BY AUTOMATED COUNT: 13.7 % (ref 11.6–15.4)
GLOBULIN SER CALC-MCNC: 2.8 G/DL (ref 1.5–4.5)
GLUCOSE SERPL-MCNC: 91 MG/DL (ref 65–99)
HCT VFR BLD AUTO: 46.4 % (ref 37.5–51)
HDLC SERPL-MCNC: 53 MG/DL
HGB BLD-MCNC: 15.9 G/DL (ref 13–17.7)
IMM GRANULOCYTES # BLD AUTO: 0 X10E3/UL (ref 0–0.1)
IMM GRANULOCYTES NFR BLD AUTO: 0 %
INR PPP: 1 (ref 0.9–1.2)
LDLC SERPL CALC-MCNC: 123 MG/DL (ref 0–99)
LDLC/HDLC SERPL: 2.3 RATIO (ref 0–3.6)
LYMPHOCYTES # BLD AUTO: 1.4 X10E3/UL (ref 0.7–3.1)
LYMPHOCYTES NFR BLD AUTO: 13 %
MCH RBC QN AUTO: 33.5 PG (ref 26.6–33)
MCHC RBC AUTO-ENTMCNC: 34.3 G/DL (ref 31.5–35.7)
MCV RBC AUTO: 98 FL (ref 79–97)
MONOCYTES # BLD AUTO: 0.8 X10E3/UL (ref 0.1–0.9)
MONOCYTES NFR BLD AUTO: 8 %
NEUTROPHILS # BLD AUTO: 8.2 X10E3/UL (ref 1.4–7)
NEUTROPHILS NFR BLD AUTO: 77 %
PLATELET # BLD AUTO: 206 X10E3/UL (ref 150–450)
POTASSIUM SERPL-SCNC: 4.3 MMOL/L (ref 3.5–5.2)
PROT SERPL-MCNC: 7.3 G/DL (ref 6–8.5)
PROTHROMBIN TIME: 10.9 SEC (ref 9.1–12)
RBC # BLD AUTO: 4.75 X10E6/UL (ref 4.14–5.8)
SODIUM SERPL-SCNC: 141 MMOL/L (ref 134–144)
TRIGL SERPL-MCNC: 119 MG/DL (ref 0–149)
VLDLC SERPL CALC-MCNC: 21 MG/DL (ref 5–40)
WBC # BLD AUTO: 10.5 X10E3/UL (ref 3.4–10.8)

## 2022-06-16 NOTE — TELEPHONE ENCOUNTER
Rx Refill Note  Requested Prescriptions     Pending Prescriptions Disp Refills   • warfarin (COUMADIN) 5 MG tablet [Pharmacy Med Name: WARFARIN SODIUM 5 MG TABLET] 30 tablet 0     Sig: TAKE 1 TABLET BY MOUTH AT NIGHT      Last office visit with prescribing clinician: 5/25/2022      Next office visit with prescribing clinician: Visit date not found       {TIP  Please add Last Relevant Lab Date if appropriate: 05/25/22    Diamond Terry MA  06/16/22, 11:06 EDT

## 2022-06-17 RX ORDER — WARFARIN SODIUM 5 MG/1
TABLET ORAL
Qty: 30 TABLET | Refills: 1 | Status: SHIPPED | OUTPATIENT
Start: 2022-06-17 | End: 2022-07-25

## 2022-06-17 NOTE — TELEPHONE ENCOUNTER
Please refer patient to to cardiology for warfarin management  Notify patient please I would like him to manage his warfarin.  In the future    But for now schedule him an INR next week outpatient please  Until he sees cardiology  Thank you

## 2022-07-22 NOTE — TELEPHONE ENCOUNTER
Rx Refill Note  Requested Prescriptions     Pending Prescriptions Disp Refills   • warfarin (COUMADIN) 5 MG tablet [Pharmacy Med Name: WARFARIN SODIUM 5 MG TABLET] 30 tablet 1     Sig: TAKE 1 TABLET BY MOUTH EVERY DAY AT NIGHT      Last office visit with prescribing clinician: 10/21/2021      Next office visit with prescribing clinician: Visit date not found            Jose Cueva Rep  07/22/22, 12:20 EDT

## 2022-07-25 RX ORDER — WARFARIN SODIUM 5 MG/1
TABLET ORAL
Qty: 30 TABLET | Refills: 1 | Status: SHIPPED | OUTPATIENT
Start: 2022-07-25 | End: 2022-08-24

## 2022-07-25 NOTE — TELEPHONE ENCOUNTER
Please call patient  Schedule him a follow-up appointment  Please gently but firmly remind him must get his INR is monthly otherwise I can no longer fill this and he will have to go to a separate clinic at his cardiologist office    Thank you get him on schedule for INR please

## 2022-08-02 ENCOUNTER — OFFICE VISIT (OUTPATIENT)
Dept: FAMILY MEDICINE CLINIC | Facility: CLINIC | Age: 62
End: 2022-08-02

## 2022-08-02 VITALS
DIASTOLIC BLOOD PRESSURE: 62 MMHG | SYSTOLIC BLOOD PRESSURE: 118 MMHG | BODY MASS INDEX: 27.9 KG/M2 | WEIGHT: 199.3 LBS | HEIGHT: 71 IN | HEART RATE: 69 BPM | OXYGEN SATURATION: 97 % | RESPIRATION RATE: 12 BRPM | TEMPERATURE: 97 F

## 2022-08-02 DIAGNOSIS — I10 HYPERTENSION, UNSPECIFIED TYPE: Primary | ICD-10-CM

## 2022-08-02 DIAGNOSIS — I48.20 ATRIAL FIBRILLATION, CHRONIC: ICD-10-CM

## 2022-08-02 DIAGNOSIS — Z72.0 TOBACCO ABUSE: ICD-10-CM

## 2022-08-02 DIAGNOSIS — Z12.2 SCREENING FOR LUNG CANCER: ICD-10-CM

## 2022-08-02 DIAGNOSIS — Z12.11 SCREEN FOR COLON CANCER: ICD-10-CM

## 2022-08-02 PROCEDURE — 99213 OFFICE O/P EST LOW 20 MIN: CPT | Performed by: NURSE PRACTITIONER

## 2022-08-02 NOTE — PATIENT INSTRUCTIONS
Discharge instructions INR today  Then monthly  If consistent for at least 6 months can go every 2 months if not checking monthly  We will have to refer you to cardiology, for management,  Annual visit with cardiology  Lung cancer screening healthy diet regular exercise,  Functioning well follow-up in 4 months labs prior to next appointment please this is almost 100  If additional weight loss will likely need to decrease your blood pressure medicine so stay in tune to this check blood pressure weekly should be less than 130/80 greater than 110/70 on average

## 2022-08-02 NOTE — PROGRESS NOTES
"Chief Complaint  Med Refill    Subjective        Joaquín Garcia Jr. presents to [facility]  Very pleasant gentleman here today follow-up essential hypertension nicely controlled  History of atrial fibrillation takes warfarin, here for his INR he has been delayed on his INR checking, but he is up-to-date with his follow-up he was here in May and saw a colleague of mine.  No new complaints.  He has not had a colonoscopy does not want to have a colonoscopy  He did agree to consider Cologuard he has no family history of colon cancer no personal history of polyps          Objective   Vital Signs:  /62   Pulse 69   Temp 97 °F (36.1 °C)   Resp 12   Ht 180.3 cm (70.98\")   Wt 90.4 kg (199 lb 4.8 oz)   SpO2 97%   BMI 27.81 kg/m²   Estimated body mass index is 27.81 kg/m² as calculated from the following:    Height as of this encounter: 180.3 cm (70.98\").    Weight as of this encounter: 90.4 kg (199 lb 4.8 oz).    BMI is >= 25 and <30. (Overweight) The following options were offered after discussion;: exercise counseling/recommendations and nutrition counseling/recommendations      Physical Exam  Vitals reviewed.   Constitutional:       Appearance: He is well-developed.   HENT:      Head: Normocephalic.      Nose: Nose normal.   Eyes:      General: No scleral icterus.     Conjunctiva/sclera: Conjunctivae normal.      Pupils: Pupils are equal, round, and reactive to light.   Neck:      Thyroid: No thyromegaly.      Vascular: No JVD.   Cardiovascular:      Rate and Rhythm: Normal rate and regular rhythm.      Heart sounds: Normal heart sounds. No murmur heard.    No friction rub. No gallop.   Pulmonary:      Effort: Pulmonary effort is normal. No respiratory distress.      Breath sounds: Normal breath sounds. No stridor. No wheezing or rales.   Abdominal:      General: Bowel sounds are normal. There is no distension.      Palpations: Abdomen is soft.      Tenderness: There is no " abdominal tenderness.      Comments: No hepatosplenomegaly, no ascites,   Musculoskeletal:         General: No tenderness.      Cervical back: Neck supple.   Lymphadenopathy:      Cervical: No cervical adenopathy.   Skin:     General: Skin is warm and dry.      Findings: No erythema or rash.   Neurological:      Mental Status: He is alert and oriented to person, place, and time.      Deep Tendon Reflexes: Reflexes are normal and symmetric.   Psychiatric:         Behavior: Behavior normal.         Thought Content: Thought content normal.         Judgment: Judgment normal.        Result Review :                Assessment and Plan   Diagnoses and all orders for this visit:    1. Hypertension, unspecified type (Primary)    2. Screen for colon cancer  -     Cologuard - Stool, Per Rectum; Future    3. Atrial fibrillation, chronic (HCC)  -     Ambulatory Referral to Cardiology    4. Screening for lung cancer  -     CT Chest Low Dose Wo    5. Tobacco abuse  -     CT Chest Low Dose Wo           I spent 20  minutes caring for Joaquín on this date of service. This time includes time spent by me in the following activities:preparing for the visit, reviewing tests, obtaining and/or reviewing a separately obtained history, performing a medically appropriate examination and/or evaluation , counseling and educating the patient/family/caregiver, ordering medications, tests, or procedures, documenting information in the medical record, independently interpreting results and communicating that information with the patient/family/caregiver and care coordination  Follow Up   Return in about 4 months (around 12/2/2022), or labs before next apt.  Patient was given instructions and counseling regarding his condition or for health maintenance advice. Please see specific information pulled into the AVS if appropriate.     Discharge instructions INR today  Then monthly  If consistent for at least 6 months can go every 2 months if not checking  monthly  We will have to refer you to cardiology, for management,  Annual visit with cardiology  Lung cancer screening healthy diet regular exercise,  Functioning well follow-up in 4 months labs prior to next appointment please this is almost 100  If additional weight loss will likely need to decrease your blood pressure medicine so stay in tune to this check blood pressure weekly should be less than 130/80 greater than 110/70 on average

## 2022-08-09 DIAGNOSIS — I10 BENIGN ESSENTIAL HYPERTENSION: Primary | ICD-10-CM

## 2022-08-09 DIAGNOSIS — I48.20 ATRIAL FIBRILLATION, CHRONIC: Primary | ICD-10-CM

## 2022-08-09 DIAGNOSIS — I48.20 ATRIAL FIBRILLATION, CHRONIC: ICD-10-CM

## 2022-08-09 DIAGNOSIS — I10 BENIGN ESSENTIAL HYPERTENSION: ICD-10-CM

## 2022-08-10 LAB
ALBUMIN SERPL-MCNC: 4.7 G/DL (ref 3.8–4.8)
ALBUMIN/GLOB SERPL: 1.5 {RATIO} (ref 1.2–2.2)
ALP SERPL-CCNC: 103 IU/L (ref 44–121)
ALT SERPL-CCNC: 21 IU/L (ref 0–44)
AST SERPL-CCNC: 14 IU/L (ref 0–40)
BASOPHILS # BLD AUTO: 0.1 X10E3/UL (ref 0–0.2)
BASOPHILS NFR BLD AUTO: 1 %
BILIRUB SERPL-MCNC: 0.7 MG/DL (ref 0–1.2)
BUN SERPL-MCNC: 19 MG/DL (ref 8–27)
BUN/CREAT SERPL: 21 (ref 10–24)
CALCIUM SERPL-MCNC: 9.8 MG/DL (ref 8.6–10.2)
CHLORIDE SERPL-SCNC: 101 MMOL/L (ref 96–106)
CHOLEST SERPL-MCNC: 214 MG/DL (ref 100–199)
CO2 SERPL-SCNC: 22 MMOL/L (ref 20–29)
CREAT SERPL-MCNC: 0.89 MG/DL (ref 0.76–1.27)
EGFRCR SERPLBLD CKD-EPI 2021: 96 ML/MIN/1.73
EOSINOPHIL # BLD AUTO: 0.1 X10E3/UL (ref 0–0.4)
EOSINOPHIL NFR BLD AUTO: 1 %
ERYTHROCYTE [DISTWIDTH] IN BLOOD BY AUTOMATED COUNT: 14.1 % (ref 11.6–15.4)
GLOBULIN SER CALC-MCNC: 3.2 G/DL (ref 1.5–4.5)
GLUCOSE SERPL-MCNC: 108 MG/DL (ref 65–99)
HCT VFR BLD AUTO: 50.3 % (ref 37.5–51)
HDLC SERPL-MCNC: 50 MG/DL
HGB BLD-MCNC: 17.3 G/DL (ref 13–17.7)
IMM GRANULOCYTES # BLD AUTO: 0 X10E3/UL (ref 0–0.1)
IMM GRANULOCYTES NFR BLD AUTO: 0 %
INR PPP: 2 (ref 0.9–1.2)
LDLC SERPL CALC-MCNC: 143 MG/DL (ref 0–99)
LDLC/HDLC SERPL: 2.9 RATIO (ref 0–3.6)
LYMPHOCYTES # BLD AUTO: 1.4 X10E3/UL (ref 0.7–3.1)
LYMPHOCYTES NFR BLD AUTO: 10 %
MCH RBC QN AUTO: 33.3 PG (ref 26.6–33)
MCHC RBC AUTO-ENTMCNC: 34.4 G/DL (ref 31.5–35.7)
MCV RBC AUTO: 97 FL (ref 79–97)
MONOCYTES # BLD AUTO: 0.9 X10E3/UL (ref 0.1–0.9)
MONOCYTES NFR BLD AUTO: 7 %
NEUTROPHILS # BLD AUTO: 11.3 X10E3/UL (ref 1.4–7)
NEUTROPHILS NFR BLD AUTO: 81 %
PLATELET # BLD AUTO: 216 X10E3/UL (ref 150–450)
POTASSIUM SERPL-SCNC: 4.6 MMOL/L (ref 3.5–5.2)
PROT SERPL-MCNC: 7.9 G/DL (ref 6–8.5)
PROTHROMBIN TIME: 21.2 SEC (ref 9.1–12)
RBC # BLD AUTO: 5.2 X10E6/UL (ref 4.14–5.8)
SODIUM SERPL-SCNC: 139 MMOL/L (ref 134–144)
TRIGL SERPL-MCNC: 118 MG/DL (ref 0–149)
VLDLC SERPL CALC-MCNC: 21 MG/DL (ref 5–40)
WBC # BLD AUTO: 13.9 X10E3/UL (ref 3.4–10.8)

## 2022-08-18 ENCOUNTER — TELEPHONE (OUTPATIENT)
Dept: FAMILY MEDICINE CLINIC | Facility: CLINIC | Age: 62
End: 2022-08-18

## 2022-08-18 DIAGNOSIS — E78.2 MIXED HYPERLIPIDEMIA: Primary | ICD-10-CM

## 2022-08-18 NOTE — TELEPHONE ENCOUNTER
Please give him atorvastatin 20 mg dispense 90  1 p.o. daily to decrease risk of stroke and heart attack  No refill    1 week after starting atorvastatin give him on the schedule for INR  Tell him very important to make sure  He does not have unexpected bleeding for potential drug interaction with the medicine we may need to adjust his warfarin based on his INR    Thank you!!

## 2022-08-18 NOTE — TELEPHONE ENCOUNTER
PATIENT IS RETURNING WILBERTO'S PHONE CALL.     PATIENT VERBALIZED HIS UNDERSTANDING AND PATIENT STATES HE IS OK WITH STARTING THE NEW MEDICATION.     PREFERRED PHARMACY: [facility]/pharmacy #[id number] - [place], [place] - [address] AT [facility] [phone] [facility] [phone] FX    CALL BACK: [phone]

## 2022-08-18 NOTE — TELEPHONE ENCOUNTER
LVM for pt to return call    Hub please inform patient if call back:    Rose!    Mono has received and reveiwed your labs. He says the following:    Please call patient his INR is 2.0 therapeutic range   His cholesterol is too high   He needs to take a cholesterol medicine to decrease risk of stroke and heart attack.  After starting this very important in 1 week after starting we need to repeat his INR   Then will do monthly thereafter   We may need to decrease his warfarin after starting atorvastatin   If we do not it may increase risk of bleeding   If he is willing to do this send the prescription in for him and set him up 1 week   Out for INR after starting atorvastatin 40 mg daily         If you have questions, feel free to reach out to us!  ARNULFO Valerio

## 2022-08-19 RX ORDER — ATORVASTATIN CALCIUM 20 MG/1
20 TABLET, FILM COATED ORAL DAILY
Qty: 90 TABLET | Refills: 0 | Status: SHIPPED | OUTPATIENT
Start: 2022-08-19 | End: 2022-11-28

## 2022-08-22 NOTE — TELEPHONE ENCOUNTER
Rx Refill Note  Requested Prescriptions     Pending Prescriptions Disp Refills   • hydroCHLOROthiazide (HYDRODIURIL) 12.5 MG tablet [Pharmacy Med Name: HYDROCHLOROTHIAZIDE 12.5 MG TB] 90 tablet 0     Sig: TAKE 1 TABLET BY MOUTH EVERY DAY      Last office visit with prescribing clinician: 5/25/2022      Next office visit with prescribing clinician: Visit date not found            Dana Valdivia MA  08/22/22, 11:46 EDT

## 2022-08-24 RX ORDER — WARFARIN SODIUM 5 MG/1
TABLET ORAL
Qty: 90 TABLET | Refills: 0 | Status: SHIPPED | OUTPATIENT
Start: 2022-08-24

## 2022-08-24 RX ORDER — HYDROCHLOROTHIAZIDE 12.5 MG/1
TABLET ORAL
Qty: 90 TABLET | Refills: 1 | Status: SHIPPED | OUTPATIENT
Start: 2022-08-24 | End: 2023-01-26

## 2022-08-25 NOTE — PROGRESS NOTES
Date of Office Visit: 2022  Encounter Provider: TIFFANIE Ramirez  Place of Service: [facility]  Patient Name: Joaquín Garcia Jr.  :1960    Chief Complaint   Patient presents with   • Nonrheumatic mitral valve regurgitation   • Persistent atrial fibrillation    • Follow-up     LOV  10/29/2020   :     HPI: Joaquín Garcia Jr. is a 61 y.o. male who is a patient of Dr. Remy.  He is new to me today and presents for an office follow-up.  Patient was last seen in the office on 10/29/2020 by Dr. Remy for preoperative evaluation for upcoming back surgery.  He has a history of atrial fibrillation and mitral regurgitation.  He underwent mitral valve replacement with tissue prosthesis in 2016.  At that time, he also had a tricuspid valve repair.  Remained anticoagulated with warfarin since that time.  Of note, patient had severe reaction to Lovenox after his valve surgery.    Patient used to be followed by another cardiologist who is no longer here in town.  It had been several years since he had an evaluation prior to his  office visit.  At that office visit he had no complaints except some lower extremity edema at times.  He admitted to smoking half pack cigarettes a day, drink a fair amount of coffee and 2 alcoholic beverages every day.    A cardiac cath in  showed no coronary disease.  His left ventricular function was normal.      Patient presents today with no complaints of chest pain or pressure.  He has palpitations intermittently, not often.  He reports losing approximately 30 pounds since January by watching what he eats.  He is also alot more active, doing a lot of yard that he was not doing in previous years.  He has decreased the amount of smoking from a pack and 1/2 to 10 cigarettes a day.  He has also decreased his alcohol intake, as well.  Patient has no lower extremity edema.  He has some shortness of breath on  exertion that has not changed over the years.  Patient notes that he does have sleep apnea however he has never been officially tested.  I offered referral to sleep medicine however patient states that he would likely not wear the CPAP device and would like to forego assessment.        Previous testing and notes have been reviewed by me.   Past Medical History:   Diagnosis Date   • Arthritis    • Atrial fibrillation (HCC)    • COPD (chronic obstructive pulmonary disease) ([facility])     PT STATES NO LONGER ON INHALERS BUT IS BREATHING WELL   • Degenerative lumbar disc    • Emphysema with chronic bronchitis ([facility])    • H/O tricuspid valve repair 2016    MEDTRONIC ANNULOPLASTY RING   • History of heart valve replacement with porcine valve 2016    MITRAL VALVE, DR GEORGE   • Hypertension    • Low back pain     RADIATES DOWN BLE, WORSE ON RT, SOME NUMBNESS/TINGLING   • Neurofibromatosis (HCC)    • On anticoagulant therapy    • Sleep apnea     DOES NOT USE CPAP   • Spinal stenosis        Past Surgical History:   Procedure Laterality Date   • CARDIAC CATHETERIZATION  10/20/2016    Procedure: Case Abort;  Surgeon: Zhen Ford MD;  Location: [facility] CATH INVASIVE LOCATION;  Service:    • CARDIAC CATHETERIZATION Left 10/21/2016    Procedure: Cardiac catheterization;  Surgeon: Zhen Ford MD;  Location: [facility] CATH INVASIVE LOCATION;  Service:    • HERNIA REPAIR     • LUMBAR LAMINECTOMY DISCECTOMY DECOMPRESSION Bilateral 11/5/2020    Procedure: Lumbar 4 - Lumbar 5 Laminectomy;  Surgeon: Marcos Christine MD;  Location: [facility] OR;  Service: Orthopedic Spine;  Laterality: Bilateral;   • MITRAL VALVE REPAIR/REPLACEMENT N/A 10/27/2016    Procedure: INTRAOPERATIVE LILIA, MIDLINE STERNOTOMY WITH MITRAL VALVE REPLACEMENT TRICUSPID VALVE REPAIR;  Surgeon: Robert George MD;  Location: [facility] OR;  Service:    • TEETH EXTRACTION N/A 10/25/2016    Procedure: TEETH EXTRACTION FULL MOUTH;  Surgeon: Gio Ibarra,  DMD;  Location: [facility] OR;  Service:        Social History     Socioeconomic History   • Marital status: Single   Tobacco Use   • Smoking status: Current Every Day Smoker     Packs/day: 0.50     Years: 35.00     Pack years: 17.50     Types: Cigarettes   • Smokeless tobacco: Never Used   • Tobacco comment: PT STATES DOWN TO 1/2 TO 1/3 PPD FROM 1PPD   Vaping Use   • Vaping Use: Never used   Substance and Sexual Activity   • Alcohol use: Yes     Alcohol/week: 2.0 standard drinks     Types: 2 Shots of liquor per week     Comment: Caffeine - Soft Drinks daily   • Drug use: No   • Sexual activity: Defer       Family History   Problem Relation Age of Onset   • Heart disease Father    • Hypertension Father    • Malig Hyperthermia Neg Hx        Review of Systems   Constitutional: Negative.   HENT: Negative.    Eyes: Negative.    Cardiovascular: Positive for irregular heartbeat.   Respiratory: Negative.    Endocrine: Negative.    Hematologic/Lymphatic:        Warfarin   Skin: Negative.    Musculoskeletal: Negative.    Gastrointestinal: Negative.    Genitourinary: Negative.    Neurological: Negative.    Psychiatric/Behavioral: Negative.    Allergic/Immunologic: Negative.        No Known Allergies      Current Outpatient Medications:   •  acetaminophen (TYLENOL) 325 MG tablet, Take 2 tablets by mouth Every 6 (Six) Hours As Needed for mild pain (1-3)., Disp: , Rfl: 0  •  amLODIPine (NORVASC) 10 MG tablet, Take 1 tablet by mouth Daily. For hypertention, Disp: 90 tablet, Rfl: 1  •  atorvastatin (LIPITOR) 20 MG tablet, Take 1 tablet by mouth Daily., Disp: 90 tablet, Rfl: 0  •  hydroCHLOROthiazide (HYDRODIURIL) 12.5 MG tablet, TAKE 1 TABLET BY MOUTH EVERY DAY, Disp: 90 tablet, Rfl: 1  •  metoprolol succinate XL (TOPROL-XL) 100 MG 24 hr tablet, Take 1 tablet by mouth Daily., Disp: 90 tablet, Rfl: 1  •  ramipril (ALTACE) 10 MG capsule, Take 1 capsule by mouth Daily., Disp: 90 capsule, Rfl: 1  •  warfarin (COUMADIN) 5 MG tablet,  "TAKE 1 TABLET BY MOUTH EVERY DAY AT NIGHT, Disp: 90 tablet, Rfl: 0      Objective:     Vitals:    08/26/22 0851   BP: 100/72   BP Location: Left arm   Patient Position: Sitting   Pulse: 65   SpO2: 98%   Weight: 89.6 kg (197 lb 9.6 oz)   Height: 180.3 cm (71\")     Body mass index is 27.56 kg/m².     2D Echocardiogram 12/3/2020:  LVEF 55%, normal LV size, LV wall thickness consistent with mild LVH  Normal RV size and systolic function  Severely increased LA, severely dilated RA  Mild thickening of AV, no AI, no AS  Mitral valve peak and mean gradients within defined limits, no significant prosthetic stenosis or regurgitation  No significant TR or TS    Right/ Left heart cath 10/21/2016:  No significant coronary disease  Normal LVEF with severe mitral regurgitation with markedly dilated left atrium    PHYSICAL EXAM:    Constitutional:       Appearance: Healthy appearance. Not in distress.   Neck:      Vascular: No JVR. JVD normal.   Pulmonary:      Effort: Pulmonary effort is normal.      Breath sounds: Normal breath sounds. No wheezing. No rhonchi. No rales.   Chest:      Chest wall: Not tender to palpatation.   Cardiovascular:      PMI at left midclavicular line. Normal rate. Irregularly irregular rhythm. Normal S1. Normal S2.      Murmurs: There is no murmur.      No gallop. No click. No rub.   Pulses:     Intact distal pulses.   Edema:     Peripheral edema absent.   Abdominal:      General: Bowel sounds are normal.      Palpations: Abdomen is soft.      Tenderness: There is no abdominal tenderness.   Musculoskeletal: Normal range of motion.         General: No tenderness. Skin:     General: Skin is warm and dry.   Neurological:      General: No focal deficit present.      Mental Status: Alert and oriented to person, place and time.           ECG 12 Lead    Date/Time: 8/26/2022 9:26 AM  Performed by: Noa Kirk APRN  Authorized by: Noa Kirk APRN   Comparison: compared with previous ECG from " 10/29/2020  Similar to previous ECG  Rhythm: atrial fibrillation  BPM: 65  QRS axis: right                Assessment:       Diagnosis Plan   1. Atrial fibrillation, chronic (HCC)     2. S/P MVR (mitral valve replacement)     3. Hypertension, unspecified type     4. S/P TVR (tricuspid valve repair)     5. Benign essential hypertension     6. On anticoagulant therapy       No orders of the defined types were placed in this encounter.         Plan:       1.  Atrial fibrillation: Chronic anticoagulated with warfarin.  INR followed by PCP office.  Controlled on beta-blocker  2.  Hypertension: Well-controlled  3.  Nonrheumatic mitral regurgitation: Status post mitral valve replacement with tissue prosthesis.  Euvolemic  4.  Tricuspid regurgitation: Status post repair  5.  Dyslipidemia: Lipid panel shows total cholesterol 214, triglycerides 118, HDL 50 and .  On statin therapy.  Recently started on atorvastatin 20 mg daily by PCP.  Liver enzymes in normal range.  6.  Tobacco abuse: Recommend smoking cessation.  Patient has decreased his amount of smoking  7.  Possible sleep apnea: Recommend referral to sleep medicine.  Patient wanted to hold on that at this time.  He will call if/when he is ready.    Mr. Garcia will follow up with Dr. Moses in 1 year.  He will call sooner for any questions or concerns.  At this time PCP is ordering antihypertensives.  If that changes, he will let us know.           Your medication list          Accurate as of August 26, 2022  9:20 AM. If you have any questions, ask your nurse or doctor.            CONTINUE taking these medications      Instructions Last Dose Given Next Dose Due   acetaminophen 325 MG tablet  Commonly known as: TYLENOL      Take 2 tablets by mouth Every 6 (Six) Hours As Needed for mild pain (1-3).       amLODIPine 10 MG tablet  Commonly known as: NORVASC      Take 1 tablet by mouth Daily. For hypertention       atorvastatin 20 MG tablet  Commonly known as:  LIPITOR      Take 1 tablet by mouth Daily.       hydroCHLOROthiazide 12.5 MG tablet  Commonly known as: HYDRODIURIL      TAKE 1 TABLET BY MOUTH EVERY DAY       metoprolol succinate  MG 24 hr tablet  Commonly known as: TOPROL-XL      Take 1 tablet by mouth Daily.       ramipril 10 MG capsule  Commonly known as: ALTACE      Take 1 capsule by mouth Daily.       warfarin 5 MG tablet  Commonly known as: COUMADIN      TAKE 1 TABLET BY MOUTH EVERY DAY AT NIGHT                As always, it has been a pleasure to participate in your patient's care.      Sincerely,       TIFFANIE Hoang

## 2022-08-26 ENCOUNTER — OFFICE VISIT (OUTPATIENT)
Dept: CARDIOLOGY | Facility: CLINIC | Age: 62
End: 2022-08-26

## 2022-08-26 VITALS
BODY MASS INDEX: 27.66 KG/M2 | DIASTOLIC BLOOD PRESSURE: 72 MMHG | HEIGHT: 71 IN | SYSTOLIC BLOOD PRESSURE: 100 MMHG | WEIGHT: 197.6 LBS | HEART RATE: 65 BPM | OXYGEN SATURATION: 98 %

## 2022-08-26 DIAGNOSIS — Z98.890 S/P TVR (TRICUSPID VALVE REPAIR): ICD-10-CM

## 2022-08-26 DIAGNOSIS — Z95.2 S/P MVR (MITRAL VALVE REPLACEMENT): ICD-10-CM

## 2022-08-26 DIAGNOSIS — I10 HYPERTENSION, UNSPECIFIED TYPE: ICD-10-CM

## 2022-08-26 DIAGNOSIS — Z79.01 ON ANTICOAGULANT THERAPY: ICD-10-CM

## 2022-08-26 DIAGNOSIS — I10 BENIGN ESSENTIAL HYPERTENSION: ICD-10-CM

## 2022-08-26 DIAGNOSIS — I48.20 ATRIAL FIBRILLATION, CHRONIC: Primary | ICD-10-CM

## 2022-08-26 PROCEDURE — 99214 OFFICE O/P EST MOD 30 MIN: CPT | Performed by: NURSE PRACTITIONER

## 2022-08-26 PROCEDURE — 93000 ELECTROCARDIOGRAM COMPLETE: CPT | Performed by: NURSE PRACTITIONER

## 2022-09-09 ENCOUNTER — APPOINTMENT (OUTPATIENT)
Dept: CT IMAGING | Facility: HOSPITAL | Age: 62
End: 2022-09-09

## 2022-09-13 ENCOUNTER — HOSPITAL ENCOUNTER (OUTPATIENT)
Dept: CT IMAGING | Facility: HOSPITAL | Age: 62
Discharge: HOME | End: 2022-09-13
Admitting: NURSE PRACTITIONER

## 2022-09-13 PROCEDURE — 71271 CT THORAX LUNG CANCER SCR C-: CPT

## 2022-11-23 RX ORDER — AMLODIPINE BESYLATE 10 MG/1
TABLET ORAL
Qty: 90 TABLET | Refills: 1 | Status: SHIPPED | OUTPATIENT
Start: 2022-11-23

## 2022-11-23 RX ORDER — RAMIPRIL 10 MG/1
CAPSULE ORAL
Qty: 90 CAPSULE | Refills: 1 | Status: SHIPPED | OUTPATIENT
Start: 2022-11-23

## 2022-11-24 DIAGNOSIS — E78.2 MIXED HYPERLIPIDEMIA: ICD-10-CM

## 2022-11-28 RX ORDER — ATORVASTATIN CALCIUM 20 MG/1
TABLET, FILM COATED ORAL
Qty: 90 TABLET | Refills: 0 | Status: SHIPPED | OUTPATIENT
Start: 2022-11-28

## 2023-01-26 RX ORDER — HYDROCHLOROTHIAZIDE 12.5 MG/1
TABLET ORAL
Qty: 90 TABLET | Refills: 0 | Status: SHIPPED | OUTPATIENT
Start: 2023-01-26

## 2023-01-26 RX ORDER — METOPROLOL SUCCINATE 100 MG/1
TABLET, EXTENDED RELEASE ORAL
Qty: 90 TABLET | Refills: 1 | Status: SHIPPED | OUTPATIENT
Start: 2023-01-26

## 2023-01-26 NOTE — TELEPHONE ENCOUNTER
Rx Refill Note  Requested Prescriptions     Pending Prescriptions Disp Refills   • hydroCHLOROthiazide (HYDRODIURIL) 12.5 MG tablet [Pharmacy Med Name: HYDROCHLOROTHIAZIDE 12.5 MG TB] 90 tablet 1     Sig: TAKE 1 TABLET BY MOUTH EVERY DAY      Last office visit with prescribing clinician: 8/2/2022   Last telemedicine visit with prescribing clinician: Visit date not found   Next office visit with prescribing clinician: Visit date not found                         Would you like a call back once the refill request has been completed: [] Yes [] No    If the office needs to give you a call back, can they leave a voicemail: [] Yes [] No    Jose Cueva Rep  01/26/23, 09:25 EST

## 2023-01-26 NOTE — TELEPHONE ENCOUNTER
Please call patient and schedule fasting lab  Remind him he can go off of business hours as well  Is a history of compliance problems and depression    I have not seen him for his INR I do not think he is seen in the cardiology clinic for this  However we need to refer him  To cardiology to monitor his INR as he is noncompliant here chronically  Not sure how to do this if you could assist please or let me know    Make sure he speak directly to patient  He is a pleasant gentleman thank you

## 2023-01-30 ENCOUNTER — TELEPHONE (OUTPATIENT)
Dept: FAMILY MEDICINE CLINIC | Facility: CLINIC | Age: 63
End: 2023-01-30
Payer: COMMERCIAL

## 2023-01-30 DIAGNOSIS — Z00.00 HEALTH CARE MAINTENANCE: ICD-10-CM

## 2023-01-30 DIAGNOSIS — I10 BENIGN ESSENTIAL HYPERTENSION: ICD-10-CM

## 2023-01-30 DIAGNOSIS — E78.2 MIXED HYPERLIPIDEMIA: Primary | ICD-10-CM

## 2023-01-30 DIAGNOSIS — Z12.5 PROSTATE CANCER SCREENING: ICD-10-CM

## 2023-05-05 DIAGNOSIS — Z79.01 ANTICOAGULATED ON WARFARIN: ICD-10-CM

## 2023-05-05 DIAGNOSIS — Z95.2 S/P MVR (MITRAL VALVE REPLACEMENT): ICD-10-CM

## 2023-05-05 DIAGNOSIS — I48.20 ATRIAL FIBRILLATION, CHRONIC: Primary | ICD-10-CM

## 2023-05-05 RX ORDER — AMLODIPINE BESYLATE 10 MG/1
TABLET ORAL
Qty: 90 TABLET | Refills: 0 | Status: SHIPPED | OUTPATIENT
Start: 2023-05-05

## 2023-05-05 RX ORDER — RAMIPRIL 10 MG/1
CAPSULE ORAL
Qty: 90 CAPSULE | Refills: 0 | Status: SHIPPED | OUTPATIENT
Start: 2023-05-05

## 2023-05-05 NOTE — TELEPHONE ENCOUNTER
Rx Refill Note  Requested Prescriptions     Pending Prescriptions Disp Refills   • amLODIPine (NORVASC) 10 MG tablet [Pharmacy Med Name: AMLODIPINE BESYLATE 10 MG TAB] 90 tablet 1     Sig: TAKE 1 TABLET BY MOUTH EVERY DAY   • ramipril (ALTACE) 10 MG capsule [Pharmacy Med Name: RAMIPRIL 10 MG CAPSULE] 90 capsule 1     Sig: TAKE 1 CAPSULE BY MOUTH EVERY DAY      Last office visit with prescribing clinician: 5/25/2022   Last telemedicine visit with prescribing clinician: Visit date not found   Next office visit with prescribing clinician: Visit date not found                         Would you like a call back once the refill request has been completed: [] Yes [] No    If the office needs to give you a call back, can they leave a voicemail: [] Yes [] No    Clemencia Montalvo LPN  05/05/23, 10:44 EDT

## 2023-09-05 ENCOUNTER — TELEPHONE (OUTPATIENT)
Dept: FAMILY MEDICINE CLINIC | Facility: CLINIC | Age: 63
End: 2023-09-05
Payer: MEDICARE

## 2023-09-05 DIAGNOSIS — Z79.01 CHRONIC ANTICOAGULATION: Primary | ICD-10-CM

## 2023-09-05 DIAGNOSIS — Z91.199 NONCOMPLIANCE: ICD-10-CM

## 2023-09-05 DIAGNOSIS — I48.11 LONGSTANDING PERSISTENT ATRIAL FIBRILLATION: ICD-10-CM

## 2023-09-05 RX ORDER — METOPROLOL SUCCINATE 100 MG/1
TABLET, EXTENDED RELEASE ORAL
Qty: 90 TABLET | Refills: 0 | Status: SHIPPED | OUTPATIENT
Start: 2023-09-05

## 2023-09-05 NOTE — TELEPHONE ENCOUNTER
Rx Refill Note  Requested Prescriptions     Pending Prescriptions Disp Refills    metoprolol succinate XL (TOPROL-XL) 100 MG 24 hr tablet [Pharmacy Med Name: METOPROLOL SUCC  MG TAB] 90 tablet 1     Sig: TAKE 1 TABLET BY MOUTH EVERY DAY      Last office visit with prescribing clinician: 5/25/2022   Last telemedicine visit with prescribing clinician: Visit date not found   Next office visit with prescribing clinician: Visit date not found       {TIP  Please add Last Relevant Lab Date if appropriate: 08/09/22                 Would you like a call back once the refill request has been completed: [] Yes [] No    If the office needs to give you a call back, can they leave a voicemail: [] Yes [] No    Diamond Terry MA  09/05/23, 11:15 EDT

## 2023-09-05 NOTE — TELEPHONE ENCOUNTER
Please call patient  Speak directly to patient no messages please  Looks like he is not taking warfarin or any anticoagulate    If this is the case I gave him a new prescription for Eliquis 5 mg twice daily to decrease risk of strokes related to A-fib  He is not to take warfarin with this medication, very important    Clarify that he is not taking warfarin?    Make sure he is taking his blood pressure medications I referred him to cardiology  We need to schedule him fasting lab and appointment with myself    He can start Eliquis twice a day hopefully his insurance will cover  We will see what his cardiologist wants to do regarding antidiarrhea coagulation to decrease risk of stroke regarding A-fib and his valvular replacements      Clarify if he is in fact taking warfarin?    Thank you

## 2023-09-07 NOTE — TELEPHONE ENCOUNTER
Attempted to call pt. No answer LM     PLEASE TRANSFER CALL IF PT CALLS BACK .    HUB OKAY TO READ AND TRANSFER

## 2023-09-08 NOTE — TELEPHONE ENCOUNTER
I have left a voice mail on patients phone and with the wife. Unable to reach the patient having to leave a voice mails.

## 2023-09-08 NOTE — TELEPHONE ENCOUNTER
Patient stopped taking his warfarin. Advised the patient its dangers of stop taking this medication. It can cause a stroke. Patient was advised he needs to go pickup the new medication and start today. Patient has an appointment with cardio next week.

## 2023-09-11 ENCOUNTER — TELEPHONE (OUTPATIENT)
Dept: FAMILY MEDICINE CLINIC | Facility: CLINIC | Age: 63
End: 2023-09-11
Payer: MEDICARE

## 2023-09-11 NOTE — TELEPHONE ENCOUNTER
Received letter today from [facility] letting us know that it is time for pt lung cancer screening.     Please advise, okay to place orders?

## 2023-09-12 ENCOUNTER — TELEPHONE (OUTPATIENT)
Dept: FAMILY MEDICINE CLINIC | Facility: CLINIC | Age: 63
End: 2023-09-12
Payer: MEDICARE

## 2023-09-12 DIAGNOSIS — Z72.0 TOBACCO ABUSE: Primary | ICD-10-CM

## 2023-09-12 NOTE — TELEPHONE ENCOUNTER
ORDERS PENDING FOR LUNG CANCER SCREENING SENT AS TELEPHONE ENCOUNTER. I ALSO PLACED NAMES OF OFFICE PT IS WANTING CT DONE

## 2023-09-19 ENCOUNTER — OFFICE VISIT (OUTPATIENT)
Dept: CARDIOLOGY | Facility: CLINIC | Age: 63
End: 2023-09-19
Payer: MEDICARE

## 2023-09-19 VITALS
HEART RATE: 109 BPM | DIASTOLIC BLOOD PRESSURE: 82 MMHG | HEIGHT: 71 IN | SYSTOLIC BLOOD PRESSURE: 120 MMHG | WEIGHT: 205.8 LBS | BODY MASS INDEX: 28.81 KG/M2

## 2023-09-19 DIAGNOSIS — Z98.890 S/P TVR (TRICUSPID VALVE REPAIR): ICD-10-CM

## 2023-09-19 DIAGNOSIS — Z95.2 S/P MVR (MITRAL VALVE REPLACEMENT): ICD-10-CM

## 2023-09-19 DIAGNOSIS — J44.9 CHRONIC OBSTRUCTIVE PULMONARY DISEASE, UNSPECIFIED COPD TYPE: ICD-10-CM

## 2023-09-19 DIAGNOSIS — I48.20 ATRIAL FIBRILLATION, CHRONIC: Primary | ICD-10-CM

## 2023-09-19 DIAGNOSIS — Z72.0 TOBACCO ABUSE: ICD-10-CM

## 2023-09-19 DIAGNOSIS — E78.2 MIXED HYPERLIPIDEMIA: ICD-10-CM

## 2023-09-19 PROCEDURE — 93000 ELECTROCARDIOGRAM COMPLETE: CPT | Performed by: NURSE PRACTITIONER

## 2023-09-19 PROCEDURE — 3079F DIAST BP 80-89 MM HG: CPT | Performed by: NURSE PRACTITIONER

## 2023-09-19 PROCEDURE — 3074F SYST BP LT 130 MM HG: CPT | Performed by: NURSE PRACTITIONER

## 2023-09-19 PROCEDURE — 99214 OFFICE O/P EST MOD 30 MIN: CPT | Performed by: NURSE PRACTITIONER

## 2023-09-19 RX ORDER — ATORVASTATIN CALCIUM 20 MG/1
20 TABLET, FILM COATED ORAL DAILY
Qty: 30 TABLET | Refills: 11 | Status: SHIPPED | OUTPATIENT
Start: 2023-09-19

## 2023-09-19 RX ORDER — RAMIPRIL 10 MG/1
10 CAPSULE ORAL DAILY
Qty: 30 CAPSULE | Refills: 11 | Status: SHIPPED | OUTPATIENT
Start: 2023-09-19

## 2023-09-19 RX ORDER — HYDROCHLOROTHIAZIDE 12.5 MG/1
12.5 TABLET ORAL DAILY
Qty: 30 TABLET | Refills: 11 | Status: SHIPPED | OUTPATIENT
Start: 2023-09-19

## 2023-09-19 RX ORDER — AMLODIPINE BESYLATE 10 MG/1
10 TABLET ORAL DAILY
Qty: 30 TABLET | Refills: 11 | Status: SHIPPED | OUTPATIENT
Start: 2023-09-19

## 2023-09-19 RX ORDER — METOPROLOL SUCCINATE 100 MG/1
100 TABLET, EXTENDED RELEASE ORAL DAILY
Qty: 30 TABLET | Refills: 11 | Status: SHIPPED | OUTPATIENT
Start: 2023-09-19

## 2023-09-19 NOTE — PROGRESS NOTES
Please call patient  Due to the burden of the INR's  for him, I switched him to Eliquis 5 mg twice daily  He should not take warfarin any longer  He should continue Eliquis 5 mg twice a day without disruption  For protection against stroke  There is probably a coupon for his co-pay on  [employer] etc.    Just want to confirm that he received this is taking and doing well and knows not to take with warfarin thank you

## 2023-09-19 NOTE — PROGRESS NOTES
Date of Office Visit: 2023  Encounter Provider: TIFFANIE Ramirez  Place of Service: [facility] CARDIOLOGY  Patient Name: Joaquín Garcia Jr.  :1960    No chief complaint on file.  : follow up    HPI: Joaquín Garcia Jr. is a 62 y.o. male who is a patient who previously followed with Dr. Remy.  He presents for 1 year office follow-up.  He has a history of atrial fibrillation, hypertension, hyperlipidemia and mitral regurgitation.  He underwent mitral valve replacement with tissue prosthesis in 2016.  At that time, he also had tricuspid valve repair. Of note, patient had severe reaction to Lovenox after his valve surgery.  Left heart cath prior to cardiac surgery showed no coronary disease and normal left ventricular function.    Today, patient has no new complaints.  Blood pressure well controlled on today;s visit.  He has been out of his ramipril and amlodipine for approximately 1 week and metoprolol since last .  EKG shows atrial fibrillation with a rate of 109.  This is a higher rate than previous EKG showing 62.  Not taking his metoprolol for the last couple of days, could have contributed to this.  Patient states that he drinks plenty of water.  He has no chest pain, lightheadedness, edema or shortness of air of the ordinary.  Patient states that he only sleeps well about one night a week.  I, again, offered to put a sleep referral in and he declined.    Previous testing and notes have been reviewed by me.   Past Medical History:   Diagnosis Date    Arthritis     Atrial fibrillation     COPD (chronic obstructive pulmonary disease)     PT STATES NO LONGER ON INHALERS BUT IS BREATHING WELL    Degenerative lumbar disc     Emphysema with chronic bronchitis     H/O tricuspid valve repair 2016    MEDTRONIC ANNULOPLASTY RING    History of heart valve replacement with porcine valve 2016    MITRAL VALVE, DR COBURN    Hypertension     Low back  pain     RADIATES DOWN BLE, WORSE ON RT, SOME NUMBNESS/TINGLING    Neurofibromatosis     On anticoagulant therapy     Sleep apnea     DOES NOT USE CPAP    Spinal stenosis        Past Surgical History:   Procedure Laterality Date    CARDIAC CATHETERIZATION  10/20/2016    Procedure: Case Abort;  Surgeon: Zhen Ford MD;  [facility];  Service:     CARDIAC CATHETERIZATION Left 10/21/2016    Procedure: Cardiac catheterization;  Surgeon: Zhen Ford MD;  [facility];  Service:     HERNIA REPAIR      LUMBAR LAMINECTOMY DISCECTOMY DECOMPRESSION Bilateral 11/5/2020    Procedure: Lumbar 4 - Lumbar 5 Laminectomy;  Surgeon: Marcos Christine MD;  Location: [facility] OR;  Service: Orthopedic Spine;  Laterality: Bilateral;    MITRAL VALVE REPAIR/REPLACEMENT N/A 10/27/2016    Procedure: INTRAOPERATIVE LILIA, MIDLINE STERNOTOMY WITH MITRAL VALVE REPLACEMENT TRICUSPID VALVE REPAIR;  Surgeon: Robert George MD;  Location: [facility] OR;  Service:     TEETH EXTRACTION N/A 10/25/2016    Procedure: TEETH EXTRACTION FULL MOUTH;  Surgeon: Gio Ibarra DMD;  Location: [facility] OR;  Service:        Social History     Socioeconomic History    Marital status: Single   Tobacco Use    Smoking status: Every Day     Packs/day: 0.50     Years: 35.00     Pack years: 17.50     Types: Cigarettes    Smokeless tobacco: Never    Tobacco comments:     PT STATES DOWN TO 1/2 TO 1/3 PPD FROM 1PPD   Vaping Use    Vaping Use: Never used   Substance and Sexual Activity    Alcohol use: Yes     Alcohol/week: 2.0 standard drinks     Types: 2 Shots of liquor per week     Comment: Caffeine - Soft Drinks daily    Drug use: No    Sexual activity: Defer       Family History   Problem Relation Age of Onset    Heart disease Father     Hypertension Father     Malig Hyperthermia Neg Hx        Review of Systems   Constitutional: Negative.   HENT: Negative.     Eyes: Negative.   "  Cardiovascular:  Positive for irregular heartbeat.   Respiratory: Negative.     Endocrine: Negative.    Hematologic/Lymphatic:        Eliquis     Skin: Negative.    Musculoskeletal: Negative.    Gastrointestinal: Negative.    Genitourinary: Negative.    Neurological: Negative.    Psychiatric/Behavioral: Negative.     Allergic/Immunologic: Negative.      No Known Allergies      Current Outpatient Medications:     acetaminophen (TYLENOL) 325 MG tablet, Take 2 tablets by mouth Every 6 (Six) Hours As Needed for mild pain (1-3)., Disp: , Rfl: 0    amLODIPine (NORVASC) 10 MG tablet, Take 1 tablet by mouth Daily., Disp: 30 tablet, Rfl: 11    apixaban (ELIQUIS) 5 MG tablet tablet, Take 1 tablet by mouth 2 (Two) Times a Day. To decrease risk of stroke(do not take with warfarin), Disp: 180 tablet, Rfl: 0    atorvastatin (LIPITOR) 20 MG tablet, Take 1 tablet by mouth Daily., Disp: 30 tablet, Rfl: 11    hydroCHLOROthiazide (HYDRODIURIL) 12.5 MG tablet, Take 1 tablet by mouth Daily., Disp: 30 tablet, Rfl: 11    metoprolol succinate XL (TOPROL-XL) 100 MG 24 hr tablet, Take 1 tablet by mouth Daily., Disp: 30 tablet, Rfl: 11    ramipril (ALTACE) 10 MG capsule, Take 1 capsule by mouth Daily., Disp: 30 capsule, Rfl: 11      Objective:     Vitals:    09/19/23 1151   BP: 120/82   Pulse: 109   Weight: 93.4 kg (205 lb 12.8 oz)   Height: 180.3 cm (71\")     Body mass index is 28.7 kg/m².    2D Echocardiogram 12/3/2020:  Calculated left ventricular EF = 55.1% Estimated left ventricular EF = 55% Estimated left ventricular EF was in agreement with the calculated left ventricular EF. Left ventricular systolic function is normal. Normal left ventricular cavity size noted. Left ventricular wall thickness is consistent with mild concentric hypertrophy. All left ventricular wall segments contract normally. Left ventricular diastolic function was indeterminate.  Left atrial volume is severely increased.The right atrial cavity is severely " dilated.  There is mild thickening of the aortic valve.  There is a 33 mm, porcine, Medtronic bioprosthetic mitral valve present. The mitral valve peak and mean gradients are within defined limits. There is no significant prosthetic valve stenosis or regurgitation.  No significant tricuspid valve regurgitation or significant stenosis present. There is evidence of previous tricuspid valve repair.     Left Heart Cath 10/21/2016:  A. Hemodynamics:  Ao: 90/60 mmHg;  LV:  94/16 mmHg       LM: Normal       LAD: Proximal LAD has mild plaque of 20% and mid to distal LAD is normal      Circumflex:  Large caliber vessel, non dominant and normal      RCA:  Large dominant vessel and mid RCA has 20% plaque      Left Ventriculogram:   Normal LV  EF= 55%     G.  Right Heart Cath:    RA mean:  mmHg,                                                                        RV: 57/12,mmHg                                          PA:  mmHg; mean PA:  54/24 mmHg                                          PCWP mean:  28 mm with prominent V waves                                                                                   CI:   2.03  L/min       Pa saturation: 45%, RA saturation 51% and FA saturation 92% on 4 ltrs             Final Impressions:     1. No significant Coronary disease,  2. Normal LVEF with severe Mitral regurgitation with markedly dilated left atrium    LILIA 10/20/2016:  All left ventricular wall segments contract normally.  Left ventricular function is normal.  Left atrial cavity size is severely dilated.  Severe mitral valve regurgitation is present  Moderate tricuspid valve regurgitation is present.        PHYSICAL EXAM:    Constitutional:       Appearance: Healthy appearance. Not in distress.   Neck:      Vascular: No JVR. JVD normal.   Pulmonary:      Effort: Pulmonary effort is normal.      Breath sounds: Normal breath sounds. No wheezing. No rhonchi. No rales.   Chest:      Chest wall: Not tender to palpatation.    Cardiovascular:      PMI at left midclavicular line. Normal rate. Irregularly irregular rhythm. Normal S1. Normal S2.       Murmurs: There is no murmur.      No gallop.  No click. No rub.   Pulses:     Intact distal pulses.   Edema:     Peripheral edema absent.   Abdominal:      General: Bowel sounds are normal.      Palpations: Abdomen is soft.      Tenderness: There is no abdominal tenderness.   Musculoskeletal: Normal range of motion.         General: No tenderness. Skin:     General: Skin is warm and dry.   Neurological:      General: No focal deficit present.      Mental Status: Alert and oriented to person, place and time.         ECG 12 Lead    Date/Time: 9/19/2023 1:01 PM  Performed by: Noa Kirk APRN  Authorized by: Noa Kirk APRN   Comparison: compared with previous ECG from 8/26/2022  Similar to previous ECG  Rhythm: atrial fibrillation  BPM: 109  QRS axis: right          Assessment:       Diagnosis Plan   1. Atrial fibrillation, chronic        2. Mixed hyperlipidemia  atorvastatin (LIPITOR) 20 MG tablet      3. S/P MVR (mitral valve replacement)        4. S/P TVR (tricuspid valve repair)        5. Chronic obstructive pulmonary disease, unspecified COPD type        6. Tobacco abuse          No orders of the defined types were placed in this encounter.         Plan:       1.  Persistent atrial fibrillation: Rate controlled with beta-blocker.  On anticoagulation with Eliquis.  No issues of bleeding.   2.  Status post mitral valve replacement with prosthetic valve in 2016: euvolemic.  NO murmur  3.  Status post tricuspid valve repair in 2016: as above  4.  Tobacco abuse: smokes approx 10 cigarettes a day.  Smoking cessation recommended.    5.  Hypertension: well controlled    Mr. Garcia will follow-up with Dr. Walsh in 1 year.  He will call sooner for any questions or concerns.           Your medication list            Accurate as of September 19, 2023 12:58 PM. If you have any  questions, ask your nurse or doctor.                CONTINUE taking these medications        Instructions Last Dose Given Next Dose Due   acetaminophen 325 MG tablet  Commonly known as: TYLENOL      Take 2 tablets by mouth Every 6 (Six) Hours As Needed for mild pain (1-3).       amLODIPine 10 MG tablet  Commonly known as: NORVASC      Take 1 tablet by mouth Daily.       apixaban 5 MG tablet tablet  Commonly known as: ELIQUIS      Take 1 tablet by mouth 2 (Two) Times a Day. To decrease risk of stroke(do not take with warfarin)       atorvastatin 20 MG tablet  Commonly known as: LIPITOR      Take 1 tablet by mouth Daily.       hydroCHLOROthiazide 12.5 MG tablet  Commonly known as: HYDRODIURIL      Take 1 tablet by mouth Daily.       metoprolol succinate  MG 24 hr tablet  Commonly known as: TOPROL-XL      Take 1 tablet by mouth Daily.       ramipril 10 MG capsule  Commonly known as: ALTACE      Take 1 capsule by mouth Daily.                 Where to Get Your Medications        These medications were sent to [facility]/pharmacy #[id number] - [facility], [place] - [address] AT [facility] [phone] [facility] [phone]   [address], [facility] [address]      Phone: [phone]   amLODIPine 10 MG tablet  atorvastatin 20 MG tablet  hydroCHLOROthiazide 12.5 MG tablet  metoprolol succinate  MG 24 hr tablet  ramipril 10 MG capsule           As always, it has been a pleasure to participate in your patient's care.      Sincerely,       TIFFANIE Hoang

## 2023-09-26 ENCOUNTER — OFFICE VISIT (OUTPATIENT)
Dept: FAMILY MEDICINE CLINIC | Facility: CLINIC | Age: 63
End: 2023-09-26
Payer: MEDICARE

## 2023-09-26 VITALS
WEIGHT: 201.4 LBS | OXYGEN SATURATION: 97 % | TEMPERATURE: 97.7 F | HEART RATE: 88 BPM | HEIGHT: 71 IN | SYSTOLIC BLOOD PRESSURE: 128 MMHG | RESPIRATION RATE: 14 BRPM | BODY MASS INDEX: 28.19 KG/M2 | DIASTOLIC BLOOD PRESSURE: 60 MMHG

## 2023-09-26 DIAGNOSIS — M79.674 TOE PAIN, RIGHT: ICD-10-CM

## 2023-09-26 DIAGNOSIS — Z23 FLU VACCINE NEED: ICD-10-CM

## 2023-09-26 DIAGNOSIS — I48.91 ATRIAL FIBRILLATION, UNSPECIFIED TYPE: ICD-10-CM

## 2023-09-26 DIAGNOSIS — M10.9 ACUTE GOUT INVOLVING TOE OF RIGHT FOOT, UNSPECIFIED CAUSE: Primary | ICD-10-CM

## 2023-09-26 DIAGNOSIS — Z12.5 PROSTATE CANCER SCREENING: ICD-10-CM

## 2023-09-26 DIAGNOSIS — I10 HYPERTENSION, UNSPECIFIED TYPE: ICD-10-CM

## 2023-09-26 DIAGNOSIS — M48.062 SPINAL STENOSIS OF LUMBAR REGION WITH NEUROGENIC CLAUDICATION: ICD-10-CM

## 2023-09-26 PROCEDURE — 99214 OFFICE O/P EST MOD 30 MIN: CPT | Performed by: NURSE PRACTITIONER

## 2023-09-26 PROCEDURE — 3078F DIAST BP <80 MM HG: CPT | Performed by: NURSE PRACTITIONER

## 2023-09-26 PROCEDURE — 3074F SYST BP LT 130 MM HG: CPT | Performed by: NURSE PRACTITIONER

## 2023-09-26 PROCEDURE — G0008 ADMIN INFLUENZA VIRUS VAC: HCPCS | Performed by: NURSE PRACTITIONER

## 2023-09-26 PROCEDURE — 90662 IIV NO PRSV INCREASED AG IM: CPT | Performed by: NURSE PRACTITIONER

## 2023-09-26 RX ORDER — FLUTICASONE PROPIONATE 50 MCG
2 SPRAY, SUSPENSION (ML) NASAL DAILY
Qty: 15.8 ML | Refills: 11 | Status: SHIPPED | OUTPATIENT
Start: 2023-09-26

## 2023-09-26 RX ORDER — METHYLPREDNISOLONE 4 MG/1
TABLET ORAL
Qty: 21 TABLET | Refills: 0 | Status: SHIPPED | OUTPATIENT
Start: 2023-09-26

## 2023-09-26 NOTE — PROGRESS NOTES
"Chief Complaint  Gout (Pt states gout in right foot )    Subjective        Joaquín Garcia Jr. presents to [facility] PRIMARY CARE  History of Present Illness  Pleasant patient here today complains of right first metatarsal joint tenderness and redness and swelling over the last week has sudden onset consistent with gout has had this in the past,  Signs and symptoms of gout, does not take allopurinol I do not know if he has had official diagnosis  And will need uric acid checked, he has no fever no chills no chest pain shortness of breath he had difficulty with compliance with warfarin, for this reason he was switched to Eliquis and this is much better for him, as he had some difficulty checking the frequent INRs, he saw cardiology everything checked out well\    Ear pressure bilateral no fever  Essential hypertension controlled 128/60 he is doing more thorough visit  Gout      Objective   Vital Signs:  /60   Pulse 88   Temp 97.7 °F (36.5 °C) (Temporal)   Resp 14   Ht 180.3 cm (71\")   Wt 91.4 kg (201 lb 6.4 oz)   SpO2 97%   BMI 28.09 kg/m²   Estimated body mass index is 28.09 kg/m² as calculated from the following:    Height as of this encounter: 180.3 cm (71\").    Weight as of this encounter: 91.4 kg (201 lb 6.4 oz).          Physical Exam  Vitals reviewed.   Constitutional:       General: He is not in acute distress.     Appearance: Normal appearance. He is well-developed. He is not ill-appearing, toxic-appearing or diaphoretic.   HENT:      Head: Normocephalic.      Ears:      Comments: Wax impaction right left TM retracted otherwise normal clear.     Nose: Nose normal.   Eyes:      General: No scleral icterus.     Conjunctiva/sclera: Conjunctivae normal.      Pupils: Pupils are equal, round, and reactive to light.   Neck:      Thyroid: No thyromegaly.      Vascular: No JVD.   Cardiovascular:      Rate and Rhythm: Normal rate and regular rhythm.      Heart sounds: Normal heart " sounds. No murmur heard.    No friction rub. No gallop.   Pulmonary:      Effort: Pulmonary effort is normal. No respiratory distress.      Breath sounds: Normal breath sounds. No stridor. No wheezing or rales.   Abdominal:      General: Bowel sounds are normal. There is no distension.      Palpations: Abdomen is soft.      Tenderness: There is no abdominal tenderness.      Comments: No hepatosplenomegaly, no ascites,   Musculoskeletal:         General: No tenderness.      Cervical back: Neck supple.      Comments: Redness and tenderness over his right first metatarsal joint, no significant metatarsal joint tenderness otherwise, walking with a limp.     Lymphadenopathy:      Cervical: No cervical adenopathy.   Skin:     General: Skin is warm and dry.      Findings: No erythema or rash.   Neurological:      General: No focal deficit present.      Mental Status: He is alert and oriented to person, place, and time. Mental status is at baseline.      Deep Tendon Reflexes: Reflexes are normal and symmetric.   Psychiatric:         Behavior: Behavior normal.         Thought Content: Thought content normal.         Judgment: Judgment normal.      Result Review :                Assessment and Plan   Diagnoses and all orders for this visit:    1. Acute gout involving toe of right foot, unspecified cause (Primary)  -     CBC & Differential  -     Comprehensive Metabolic Panel  -     Uric Acid  -     TSH Rfx On Abnormal To Free T4  -     Lipid Panel With LDL / HDL Ratio  -     PSA Screen    2. Flu vaccine need  -     Fluzone High-Dose 65+yrs (9031-9230)  -     CBC & Differential  -     Comprehensive Metabolic Panel  -     Uric Acid  -     TSH Rfx On Abnormal To Free T4  -     Lipid Panel With LDL / HDL Ratio  -     PSA Screen    3. Hypertension, unspecified type  -     CBC & Differential  -     Comprehensive Metabolic Panel  -     Uric Acid  -     TSH Rfx On Abnormal To Free T4  -     Lipid Panel With LDL / HDL Ratio  -     PSA  Screen    4. Toe pain, right  -     CBC & Differential  -     Comprehensive Metabolic Panel  -     Uric Acid  -     TSH Rfx On Abnormal To Free T4  -     Lipid Panel With LDL / HDL Ratio  -     PSA Screen    5. Spinal stenosis of lumbar region with neurogenic claudication  -     CBC & Differential  -     Comprehensive Metabolic Panel  -     Uric Acid  -     TSH Rfx On Abnormal To Free T4  -     Lipid Panel With LDL / HDL Ratio  -     PSA Screen    6. Atrial fibrillation, unspecified type  -     CBC & Differential  -     Comprehensive Metabolic Panel  -     Uric Acid  -     TSH Rfx On Abnormal To Free T4  -     Lipid Panel With LDL / HDL Ratio  -     PSA Screen    7. Prostate cancer screening  -     CBC & Differential  -     Comprehensive Metabolic Panel  -     Uric Acid  -     TSH Rfx On Abnormal To Free T4  -     Lipid Panel With LDL / HDL Ratio  -     PSA Screen    Other orders  -     methylPREDNISolone (MEDROL) 4 MG dose pack; Take as directed on package instructions.  Dispense: 21 tablet; Refill: 0             Follow Up   Return in about 2 weeks (around 10/10/2023).  Patient was given instructions and counseling regarding his condition or for health maintenance advice. Please see specific information pulled into the AVS if appropriate.     Patient Instructions   Discharge instructions Medrol Dosepak for right foot inflammation gout checking labs today specifically uric acid will need it less than 6    If severe pain fever chills worsening symptoms emergency room    Flonase 2 sprays each nostril chronically for eustachian tube dysfunction ear discomfort right ear has wax impaction 5 drops  Over-the-counter Debrox ear softening drops daily  Recheck in the office 1 or 2 weeks for irrigation as well as Medicare welcome to Medicare physical    Check blood pressure weekly stay on top of your health    Hydrate well

## 2023-09-26 NOTE — PATIENT INSTRUCTIONS
Discharge instructions Medrol Dosepak for right foot inflammation gout checking labs today specifically uric acid will need it less than 6    If severe pain fever chills worsening symptoms emergency room    Flonase 2 sprays each nostril chronically for eustachian tube dysfunction ear discomfort right ear has wax impaction 5 drops  Over-the-counter Debrox ear softening drops daily  Recheck in the office 1 or 2 weeks for irrigation as well as Medicare welcome to Medicare physical    Check blood pressure weekly stay on top of your health    Hydrate well

## 2023-09-27 LAB
ALBUMIN SERPL-MCNC: 4.6 G/DL (ref 3.5–5.2)
ALBUMIN/GLOB SERPL: 1.6 G/DL
ALP SERPL-CCNC: 105 U/L (ref 39–117)
ALT SERPL-CCNC: 18 U/L (ref 1–41)
AST SERPL-CCNC: 23 U/L (ref 1–40)
BASOPHILS # BLD AUTO: 0.09 10*3/MM3 (ref 0–0.2)
BASOPHILS NFR BLD AUTO: 1 % (ref 0–1.5)
BILIRUB SERPL-MCNC: 0.5 MG/DL (ref 0–1.2)
BUN SERPL-MCNC: 18 MG/DL (ref 8–23)
BUN/CREAT SERPL: 16.8 (ref 7–25)
CALCIUM SERPL-MCNC: 9.7 MG/DL (ref 8.6–10.5)
CHLORIDE SERPL-SCNC: 100 MMOL/L (ref 98–107)
CHOLEST SERPL-MCNC: 181 MG/DL (ref 0–200)
CO2 SERPL-SCNC: 26 MMOL/L (ref 22–29)
CREAT SERPL-MCNC: 1.07 MG/DL (ref 0.76–1.27)
EGFRCR SERPLBLD CKD-EPI 2021: 77 ML/MIN/1.73
EOSINOPHIL # BLD AUTO: 0.09 10*3/MM3 (ref 0–0.4)
EOSINOPHIL NFR BLD AUTO: 1 % (ref 0.3–6.2)
ERYTHROCYTE [DISTWIDTH] IN BLOOD BY AUTOMATED COUNT: 13.7 % (ref 12.3–15.4)
GLOBULIN SER CALC-MCNC: 2.8 GM/DL
GLUCOSE SERPL-MCNC: 95 MG/DL (ref 65–99)
HCT VFR BLD AUTO: 53 % (ref 37.5–51)
HDLC SERPL-MCNC: 51 MG/DL (ref 40–60)
HGB BLD-MCNC: 18.5 G/DL (ref 13–17.7)
IMM GRANULOCYTES # BLD AUTO: 0.06 10*3/MM3 (ref 0–0.05)
IMM GRANULOCYTES NFR BLD AUTO: 0.6 % (ref 0–0.5)
LDLC SERPL CALC-MCNC: 114 MG/DL (ref 0–100)
LDLC/HDLC SERPL: 2.2 {RATIO}
LYMPHOCYTES # BLD AUTO: 1.44 10*3/MM3 (ref 0.7–3.1)
LYMPHOCYTES NFR BLD AUTO: 15.3 % (ref 19.6–45.3)
MCH RBC QN AUTO: 35.3 PG (ref 26.6–33)
MCHC RBC AUTO-ENTMCNC: 34.9 G/DL (ref 31.5–35.7)
MCV RBC AUTO: 101.1 FL (ref 79–97)
MONOCYTES # BLD AUTO: 0.81 10*3/MM3 (ref 0.1–0.9)
MONOCYTES NFR BLD AUTO: 8.6 % (ref 5–12)
NEUTROPHILS # BLD AUTO: 6.9 10*3/MM3 (ref 1.7–7)
NEUTROPHILS NFR BLD AUTO: 73.5 % (ref 42.7–76)
NRBC BLD AUTO-RTO: 0 /100 WBC (ref 0–0.2)
PLATELET # BLD AUTO: 170 10*3/MM3 (ref 140–450)
POTASSIUM SERPL-SCNC: 5.3 MMOL/L (ref 3.5–5.2)
PROT SERPL-MCNC: 7.4 G/DL (ref 6–8.5)
PSA SERPL-MCNC: 1.03 NG/ML (ref 0–4)
RBC # BLD AUTO: 5.24 10*6/MM3 (ref 4.14–5.8)
SODIUM SERPL-SCNC: 139 MMOL/L (ref 136–145)
TRIGL SERPL-MCNC: 88 MG/DL (ref 0–150)
TSH SERPL DL<=0.005 MIU/L-ACNC: 1.2 UIU/ML (ref 0.27–4.2)
URATE SERPL-MCNC: 9.8 MG/DL (ref 3.4–7)
VLDLC SERPL CALC-MCNC: 16 MG/DL (ref 5–40)
WBC # BLD AUTO: 9.39 10*3/MM3 (ref 3.4–10.8)

## 2023-09-29 ENCOUNTER — HOSPITAL ENCOUNTER (OUTPATIENT)
Dept: PET IMAGING | Facility: HOSPITAL | Age: 63
Discharge: HOME | End: 2023-09-29
Admitting: NURSE PRACTITIONER
Payer: MEDICARE

## 2023-09-29 DIAGNOSIS — Z72.0 TOBACCO ABUSE: ICD-10-CM

## 2023-09-29 PROCEDURE — 71271 CT THORAX LUNG CANCER SCR C-: CPT

## 2023-10-02 ENCOUNTER — TELEPHONE (OUTPATIENT)
Dept: FAMILY MEDICINE CLINIC | Facility: CLINIC | Age: 63
End: 2023-10-02

## 2023-10-02 ENCOUNTER — TELEPHONE (OUTPATIENT)
Dept: FAMILY MEDICINE CLINIC | Facility: CLINIC | Age: 63
End: 2023-10-02
Payer: MEDICARE

## 2023-10-02 DIAGNOSIS — I51.89 ATRIAL MASS: Primary | ICD-10-CM

## 2023-10-02 NOTE — TELEPHONE ENCOUNTER
CALLED PT TO SCHED A TELEPHONE VISIT THIS AFTERNOON WITH  TO DISCUSS CT RESULT. PLEASE TRANSFER TO OFFICE. OK FOR HUB TO READ

## 2023-10-11 ENCOUNTER — OFFICE VISIT (OUTPATIENT)
Dept: FAMILY MEDICINE CLINIC | Facility: CLINIC | Age: 63
End: 2023-10-11
Payer: MEDICARE

## 2023-10-11 VITALS
HEART RATE: 75 BPM | WEIGHT: 203.1 LBS | SYSTOLIC BLOOD PRESSURE: 120 MMHG | HEIGHT: 71 IN | DIASTOLIC BLOOD PRESSURE: 60 MMHG | TEMPERATURE: 97.3 F | OXYGEN SATURATION: 97 % | BODY MASS INDEX: 28.43 KG/M2 | RESPIRATION RATE: 14 BRPM

## 2023-10-11 DIAGNOSIS — M10.9 GOUT, UNSPECIFIED CAUSE, UNSPECIFIED CHRONICITY, UNSPECIFIED SITE: ICD-10-CM

## 2023-10-11 DIAGNOSIS — R79.89 ABNORMAL CBC: ICD-10-CM

## 2023-10-11 DIAGNOSIS — I51.89 LEFT ATRIAL MASS: Primary | ICD-10-CM

## 2023-10-11 DIAGNOSIS — R40.0 DAYTIME SOMNOLENCE: ICD-10-CM

## 2023-10-11 DIAGNOSIS — Z12.11 SCREEN FOR COLON CANCER: ICD-10-CM

## 2023-10-11 RX ORDER — ATORVASTATIN CALCIUM 40 MG/1
40 TABLET, FILM COATED ORAL DAILY
Qty: 30 TABLET | Refills: 11 | Status: SHIPPED | OUTPATIENT
Start: 2023-10-11

## 2023-10-11 RX ORDER — ALLOPURINOL 300 MG/1
300 TABLET ORAL DAILY
Qty: 30 TABLET | Refills: 11 | Status: SHIPPED | OUTPATIENT
Start: 2023-10-11

## 2023-10-11 NOTE — PROGRESS NOTES
"Chief Complaint  Gout (2 week follow up)    Subjective        Joaquín Garcia Jr. presents to [facility] PRIMARY [facility]ARE  History of Present Illness  Pleasant patient here today follow-up recent gout episode is doing much better no pain  Recent follow-up pulmonary nodule no change here but possible 5 cm rounded mass left atrium  Recently saw Dr. Rodriguez in my absence,  CT angiogram pending  Cardiology follow-up next year history of atrial fibrillation not mechanical heart valve tricuspid bicuspid  No acute chest pain shortness of breath doing well.  History of neurofibromatosis      Gout        Objective   Vital Signs:  /60   Pulse 75   Temp 97.3 øF (36.3 øC) (Infrared)   Resp 14   Ht 180.3 cm (71\")   Wt 92.1 kg (203 lb 1.6 oz)   SpO2 97%   BMI 28.33 kg/mý   Estimated body mass index is 28.33 kg/mý as calculated from the following:    Height as of this encounter: 180.3 cm (71\").    Weight as of this encounter: 92.1 kg (203 lb 1.6 oz).          Physical Exam  Vitals reviewed.   Constitutional:       General: He is not in acute distress.     Appearance: Normal appearance. He is well-developed. He is not ill-appearing, toxic-appearing or diaphoretic.      Comments:   Plsnt okay appears well   HENT:      Head: Normocephalic.      Nose: Nose normal.   Eyes:      General: No scleral icterus.     Conjunctiva/sclera: Conjunctivae normal.      Pupils: Pupils are equal, round, and reactive to light.   Neck:      Thyroid: No thyromegaly.      Vascular: No JVD.   Cardiovascular:      Rate and Rhythm: Normal rate and regular rhythm.      Heart sounds: Normal heart sounds. No murmur heard.     No friction rub. No gallop.   Pulmonary:      Effort: Pulmonary effort is normal. No respiratory distress.      Breath sounds: Normal breath sounds. No stridor. No wheezing or rales.   Abdominal:      General: Bowel sounds are normal. There is no distension.      Palpations: Abdomen is soft.      Tenderness: " There is no abdominal tenderness.      Comments: No hepatosplenomegaly, no ascites,   Musculoskeletal:         General: No tenderness.      Cervical back: Neck supple.   Lymphadenopathy:      Cervical: No cervical adenopathy.   Skin:     General: Skin is warm and dry.      Findings: No erythema or rash.   Neurological:      General: No focal deficit present.      Mental Status: He is alert and oriented to person, place, and time.      Deep Tendon Reflexes: Reflexes are normal and symmetric.   Psychiatric:         Mood and Affect: Mood normal.         Behavior: Behavior normal.         Thought Content: Thought content normal.         Judgment: Judgment normal.        Result Review :                Assessment and Plan   Diagnoses and all orders for this visit:    1. Left atrial mass (Primary)  -     Ambulatory Referral to Cardiology             Follow Up   No follow-ups on file.  Patient was given instructions and counseling regarding his condition or for health maintenance advice. Please see specific information pulled into the AVS if appropriate.     There are no Patient Instructions on file for this visit.

## 2023-10-11 NOTE — PATIENT INSTRUCTIONS
Discharge instructions  Continue healthy diet exercise  Avoid alcohol  Cologuard outpatient  Follow-up cardiology regarding a possible left atrial mass as well as get your  CT angiogram likely will need echocardiogram and cardiology will order this if needed  Chest pain shortness of breath weakness emergency room  Always hydrate well check blood pressure weekly should be less than 130/80      Stop smoking  Increase atorvastatin to 40 mg daily and resume cholesterol medication  You likely has sleep apnea sleep study important  Regarding your heart and mental wellbeing    Reactive changes in your CBC I would like hematology to take a look at this but I suspect it may be a combination of  Enlarged red blood cells from alcohol or microcytosis and increased hemoglobin hematocrit from possible sleep apnea but she need further evaluation here.    As long as you are doing well follow-up here in 6 weeks.    Once we got    COVID-vaccine recommended strongly after 6 weeks Shingrix x2 shingles vaccine      After 6 weeks Prevnar 20  Somewhere along the line Tdap      Walking daily break a sweat something fun daily

## 2023-10-17 ENCOUNTER — HOSPITAL ENCOUNTER (OUTPATIENT)
Facility: HOSPITAL | Age: 63
Discharge: HOME | End: 2023-10-17
Payer: MEDICARE

## 2023-10-17 ENCOUNTER — HOSPITAL ENCOUNTER (INPATIENT)
Facility: HOSPITAL | Age: 63
LOS: 5 days | Discharge: HOME HEALTH | End: 2023-10-23
Attending: EMERGENCY MEDICINE | Admitting: STUDENT IN AN ORGANIZED HEALTH CARE EDUCATION/TRAINING PROGRAM
Payer: MEDICARE

## 2023-10-17 ENCOUNTER — APPOINTMENT (OUTPATIENT)
Dept: CT IMAGING | Facility: HOSPITAL | Age: 63
End: 2023-10-17
Payer: MEDICARE

## 2023-10-17 ENCOUNTER — APPOINTMENT (OUTPATIENT)
Dept: GENERAL RADIOLOGY | Facility: HOSPITAL | Age: 63
End: 2023-10-17
Payer: MEDICARE

## 2023-10-17 DIAGNOSIS — Z95.2 S/P MVR (MITRAL VALVE REPLACEMENT): ICD-10-CM

## 2023-10-17 DIAGNOSIS — I48.20 ATRIAL FIBRILLATION, CHRONIC: ICD-10-CM

## 2023-10-17 DIAGNOSIS — Z79.01 CHRONIC ANTICOAGULATION: ICD-10-CM

## 2023-10-17 DIAGNOSIS — I51.89 ATRIAL MASS: ICD-10-CM

## 2023-10-17 DIAGNOSIS — H51.8 DYSCONJUGATE GAZE: ICD-10-CM

## 2023-10-17 DIAGNOSIS — I63.9 ACUTE CVA (CEREBROVASCULAR ACCIDENT): Primary | ICD-10-CM

## 2023-10-17 DIAGNOSIS — H53.2 DIPLOPIA: ICD-10-CM

## 2023-10-17 DIAGNOSIS — Z79.01 ON ANTICOAGULANT THERAPY: ICD-10-CM

## 2023-10-17 DIAGNOSIS — J44.9 CHRONIC OBSTRUCTIVE PULMONARY DISEASE, UNSPECIFIED COPD TYPE: ICD-10-CM

## 2023-10-17 LAB
ABO GROUP BLD: NORMAL
ALBUMIN SERPL-MCNC: 4.6 G/DL (ref 3.5–5.2)
ALBUMIN/GLOB SERPL: 1.6 G/DL
ALP SERPL-CCNC: 88 U/L (ref 39–117)
ALT SERPL W P-5'-P-CCNC: 28 U/L (ref 1–41)
ANION GAP SERPL CALCULATED.3IONS-SCNC: 10 MMOL/L (ref 5–15)
APTT PPP: 31.2 SECONDS (ref 22.7–35.4)
AST SERPL-CCNC: 17 U/L (ref 1–40)
BASOPHILS # BLD AUTO: 0.06 10*3/MM3 (ref 0–0.2)
BASOPHILS NFR BLD AUTO: 0.8 % (ref 0–1.5)
BILIRUB SERPL-MCNC: 0.5 MG/DL (ref 0–1.2)
BLD GP AB SCN SERPL QL: NEGATIVE
BUN SERPL-MCNC: 14 MG/DL (ref 8–23)
BUN/CREAT SERPL: 14.7 (ref 7–25)
CALCIUM SPEC-SCNC: 9.8 MG/DL (ref 8.6–10.5)
CHLORIDE SERPL-SCNC: 97 MMOL/L (ref 98–107)
CO2 SERPL-SCNC: 26 MMOL/L (ref 22–29)
CREAT SERPL-MCNC: 0.95 MG/DL (ref 0.76–1.27)
DEPRECATED RDW RBC AUTO: 47.6 FL (ref 37–54)
EGFRCR SERPLBLD CKD-EPI 2021: 88.8 ML/MIN/1.73
EOSINOPHIL # BLD AUTO: 0.13 10*3/MM3 (ref 0–0.4)
EOSINOPHIL NFR BLD AUTO: 1.7 % (ref 0.3–6.2)
ERYTHROCYTE [DISTWIDTH] IN BLOOD BY AUTOMATED COUNT: 13.4 % (ref 12.3–15.4)
GLOBULIN UR ELPH-MCNC: 2.8 GM/DL
GLUCOSE BLDC GLUCOMTR-MCNC: 102 MG/DL (ref 70–130)
GLUCOSE SERPL-MCNC: 101 MG/DL (ref 65–99)
HCT VFR BLD AUTO: 49.4 % (ref 37.5–51)
HGB BLD-MCNC: 17.5 G/DL (ref 13–17.7)
HOLD SPECIMEN: NORMAL
HOLD SPECIMEN: NORMAL
IMM GRANULOCYTES # BLD AUTO: 0.03 10*3/MM3 (ref 0–0.05)
IMM GRANULOCYTES NFR BLD AUTO: 0.4 % (ref 0–0.5)
INR PPP: 1.11 (ref 0.9–1.1)
LYMPHOCYTES # BLD AUTO: 1.36 10*3/MM3 (ref 0.7–3.1)
LYMPHOCYTES NFR BLD AUTO: 17.5 % (ref 19.6–45.3)
MCH RBC QN AUTO: 34.9 PG (ref 26.6–33)
MCHC RBC AUTO-ENTMCNC: 35.4 G/DL (ref 31.5–35.7)
MCV RBC AUTO: 98.6 FL (ref 79–97)
MONOCYTES # BLD AUTO: 0.64 10*3/MM3 (ref 0.1–0.9)
MONOCYTES NFR BLD AUTO: 8.2 % (ref 5–12)
NEUTROPHILS NFR BLD AUTO: 5.54 10*3/MM3 (ref 1.7–7)
NEUTROPHILS NFR BLD AUTO: 71.4 % (ref 42.7–76)
NRBC BLD AUTO-RTO: 0 /100 WBC (ref 0–0.2)
PLATELET # BLD AUTO: 137 10*3/MM3 (ref 140–450)
PMV BLD AUTO: 8.1 FL (ref 6–12)
POTASSIUM SERPL-SCNC: 4.1 MMOL/L (ref 3.5–5.2)
PROT SERPL-MCNC: 7.4 G/DL (ref 6–8.5)
PROTHROMBIN TIME: 14.5 SECONDS (ref 11.7–14.2)
RBC # BLD AUTO: 5.01 10*6/MM3 (ref 4.14–5.8)
RH BLD: POSITIVE
SODIUM SERPL-SCNC: 133 MMOL/L (ref 136–145)
T&S EXPIRATION DATE: NORMAL
TROPONIN T SERPL HS-MCNC: 10 NG/L
WBC NRBC COR # BLD: 7.76 10*3/MM3 (ref 3.4–10.8)
WHOLE BLOOD HOLD COAG: NORMAL
WHOLE BLOOD HOLD SPECIMEN: NORMAL

## 2023-10-17 PROCEDURE — 85025 COMPLETE CBC W/AUTO DIFF WBC: CPT | Performed by: EMERGENCY MEDICINE

## 2023-10-17 PROCEDURE — G0378 HOSPITAL OBSERVATION PER HR: HCPCS

## 2023-10-17 PROCEDURE — 70496 CT ANGIOGRAPHY HEAD: CPT

## 2023-10-17 PROCEDURE — 85730 THROMBOPLASTIN TIME PARTIAL: CPT | Performed by: EMERGENCY MEDICINE

## 2023-10-17 PROCEDURE — 84484 ASSAY OF TROPONIN QUANT: CPT | Performed by: EMERGENCY MEDICINE

## 2023-10-17 PROCEDURE — 85610 PROTHROMBIN TIME: CPT | Performed by: EMERGENCY MEDICINE

## 2023-10-17 PROCEDURE — 25810000003 SODIUM CHLORIDE 0.9 % SOLUTION: Performed by: EMERGENCY MEDICINE

## 2023-10-17 PROCEDURE — 80053 COMPREHEN METABOLIC PANEL: CPT | Performed by: EMERGENCY MEDICINE

## 2023-10-17 PROCEDURE — 99285 EMERGENCY DEPT VISIT HI MDM: CPT

## 2023-10-17 PROCEDURE — 25510000001 IOPAMIDOL PER 1 ML: Performed by: FAMILY MEDICINE

## 2023-10-17 PROCEDURE — 25810000003 SODIUM CHLORIDE 0.9 % SOLUTION: Performed by: NURSE PRACTITIONER

## 2023-10-17 PROCEDURE — 82565 ASSAY OF CREATININE: CPT

## 2023-10-17 PROCEDURE — 86850 RBC ANTIBODY SCREEN: CPT | Performed by: EMERGENCY MEDICINE

## 2023-10-17 PROCEDURE — 71275 CT ANGIOGRAPHY CHEST: CPT

## 2023-10-17 PROCEDURE — 86901 BLOOD TYPING SEROLOGIC RH(D): CPT | Performed by: EMERGENCY MEDICINE

## 2023-10-17 PROCEDURE — 93005 ELECTROCARDIOGRAM TRACING: CPT | Performed by: EMERGENCY MEDICINE

## 2023-10-17 PROCEDURE — 70498 CT ANGIOGRAPHY NECK: CPT

## 2023-10-17 PROCEDURE — 93010 ELECTROCARDIOGRAM REPORT: CPT | Performed by: INTERNAL MEDICINE

## 2023-10-17 PROCEDURE — 86900 BLOOD TYPING SEROLOGIC ABO: CPT | Performed by: EMERGENCY MEDICINE

## 2023-10-17 PROCEDURE — 82948 REAGENT STRIP/BLOOD GLUCOSE: CPT

## 2023-10-17 PROCEDURE — 71045 X-RAY EXAM CHEST 1 VIEW: CPT

## 2023-10-17 PROCEDURE — 0042T HC CT CEREBRAL PERFUSION W/WO CONTRAST: CPT

## 2023-10-17 PROCEDURE — 25510000001 IOPAMIDOL PER 1 ML: Performed by: EMERGENCY MEDICINE

## 2023-10-17 RX ORDER — ATORVASTATIN CALCIUM 80 MG/1
80 TABLET, FILM COATED ORAL NIGHTLY
Status: DISCONTINUED | OUTPATIENT
Start: 2023-10-17 | End: 2023-10-23 | Stop reason: HOSPADM

## 2023-10-17 RX ORDER — ACETAMINOPHEN 650 MG/1
650 SUPPOSITORY RECTAL EVERY 4 HOURS PRN
Status: DISCONTINUED | OUTPATIENT
Start: 2023-10-17 | End: 2023-10-23 | Stop reason: HOSPADM

## 2023-10-17 RX ORDER — ASPIRIN 300 MG/1
300 SUPPOSITORY RECTAL DAILY
Status: DISCONTINUED | OUTPATIENT
Start: 2023-10-17 | End: 2023-10-23 | Stop reason: HOSPADM

## 2023-10-17 RX ORDER — SODIUM CHLORIDE 0.9 % (FLUSH) 0.9 %
10 SYRINGE (ML) INJECTION AS NEEDED
Status: DISCONTINUED | OUTPATIENT
Start: 2023-10-17 | End: 2023-10-23 | Stop reason: HOSPADM

## 2023-10-17 RX ORDER — SODIUM CHLORIDE 9 MG/ML
75 INJECTION, SOLUTION INTRAVENOUS CONTINUOUS
Status: DISCONTINUED | OUTPATIENT
Start: 2023-10-17 | End: 2023-10-18

## 2023-10-17 RX ORDER — ONDANSETRON 2 MG/ML
4 INJECTION INTRAMUSCULAR; INTRAVENOUS EVERY 6 HOURS PRN
Status: DISCONTINUED | OUTPATIENT
Start: 2023-10-17 | End: 2023-10-23 | Stop reason: HOSPADM

## 2023-10-17 RX ORDER — METOPROLOL SUCCINATE 100 MG/1
100 TABLET, EXTENDED RELEASE ORAL DAILY
Status: DISCONTINUED | OUTPATIENT
Start: 2023-10-18 | End: 2023-10-23 | Stop reason: HOSPADM

## 2023-10-17 RX ORDER — ASPIRIN 325 MG
325 TABLET ORAL DAILY
Status: DISCONTINUED | OUTPATIENT
Start: 2023-10-17 | End: 2023-10-23 | Stop reason: HOSPADM

## 2023-10-17 RX ORDER — LABETALOL HYDROCHLORIDE 5 MG/ML
10 INJECTION, SOLUTION INTRAVENOUS
Status: DISCONTINUED | OUTPATIENT
Start: 2023-10-17 | End: 2023-10-23 | Stop reason: HOSPADM

## 2023-10-17 RX ORDER — ALLOPURINOL 300 MG/1
300 TABLET ORAL DAILY
Status: DISCONTINUED | OUTPATIENT
Start: 2023-10-18 | End: 2023-10-23 | Stop reason: HOSPADM

## 2023-10-17 RX ORDER — ACETAMINOPHEN 325 MG/1
650 TABLET ORAL EVERY 4 HOURS PRN
Status: DISCONTINUED | OUTPATIENT
Start: 2023-10-17 | End: 2023-10-23 | Stop reason: HOSPADM

## 2023-10-17 RX ORDER — BISACODYL 10 MG
10 SUPPOSITORY, RECTAL RECTAL DAILY PRN
Status: DISCONTINUED | OUTPATIENT
Start: 2023-10-17 | End: 2023-10-23 | Stop reason: HOSPADM

## 2023-10-17 RX ADMIN — SODIUM CHLORIDE 75 ML/HR: 9 INJECTION, SOLUTION INTRAVENOUS at 22:46

## 2023-10-17 RX ADMIN — ATORVASTATIN CALCIUM 80 MG: 80 TABLET, FILM COATED ORAL at 22:46

## 2023-10-17 RX ADMIN — IOPAMIDOL 50 ML: 755 INJECTION, SOLUTION INTRAVENOUS at 19:30

## 2023-10-17 RX ADMIN — SODIUM CHLORIDE 1000 ML: 9 INJECTION, SOLUTION INTRAVENOUS at 19:46

## 2023-10-17 RX ADMIN — ASPIRIN 325 MG: 325 TABLET ORAL at 22:46

## 2023-10-17 RX ADMIN — IOPAMIDOL 100 ML: 755 INJECTION, SOLUTION INTRAVENOUS at 14:36

## 2023-10-17 RX ADMIN — IOPAMIDOL 95 ML: 755 INJECTION, SOLUTION INTRAVENOUS at 19:30

## 2023-10-17 NOTE — ED NOTES
Patient states he had a CT scan with contrast at 1430 today and at 1600 while he was driving has had blurred vision and states his eyes cannot focus.

## 2023-10-17 NOTE — PROGRESS NOTES
Clinical Pharmacy Services: Medication History    Joaquín Garcia Jr. is a 63 y.o. male presenting to [facility] for   Chief Complaint   Patient presents with    Eye Problem       He  has a past medical history of Arthritis, Atrial fibrillation, COPD (chronic obstructive pulmonary disease), Degenerative lumbar disc, Emphysema with chronic bronchitis, H/O tricuspid valve repair (2016), History of heart valve replacement with porcine valve (2016), Hypertension, Low back pain, Neurofibromatosis, On anticoagulant therapy, Sleep apnea, and Spinal stenosis.    Allergies as of 10/17/2023    (No Known Allergies)       Medication information was obtained from: Pharmacy/[facility]   Pharmacy and Phone Number: [facility] [phone]    Prior to Admission Medications       Prescriptions Last Dose Informant Patient Reported? Taking?    acetaminophen (TYLENOL) 325 MG tablet  Family Member No Yes    Take 2 tablets by mouth Every 6 (Six) Hours As Needed for mild pain (1-3).    allopurinol (ZYLOPRIM) 300 MG tablet  Pharmacy No Yes    Take 1 tablet by mouth Daily. For gout prevention    Patient taking differently:  Take 1 tablet by mouth Daily.    amLODIPine (NORVASC) 10 MG tablet  Pharmacy No Yes    Take 1 tablet by mouth Daily.    apixaban (ELIQUIS) 5 MG tablet tablet  Pharmacy No Yes    Take 1 tablet by mouth 2 (Two) Times a Day. To decrease risk of stroke(do not take with warfarin)    Patient taking differently:  Take 1 tablet by mouth 2 (Two) Times a Day.    fluticasone (FLONASE) 50 MCG/ACT nasal spray  Pharmacy No Yes    2 sprays into the nostril(s) as directed by provider Daily.    hydroCHLOROthiazide (HYDRODIURIL) 12.5 MG tablet  Pharmacy No Yes    Take 1 tablet by mouth Daily.    metoprolol succinate XL (TOPROL-XL) 100 MG 24 hr tablet  Pharmacy No Yes    Take 1 tablet by mouth Daily.    ramipril (ALTACE) 10 MG capsule  Pharmacy No Yes    Take 1 capsule by mouth Daily.    atorvastatin (LIPITOR) 40 MG tablet   No  No    Take 1 tablet by mouth Daily.              Medication notes:     This medication list is complete to the best of my knowledge as of 10/17/2023    Please call if questions.    Margarita Ruffin  Medication History Technician   [phone]    10/17/2023 19:27 EDT

## 2023-10-17 NOTE — ED PROVIDER NOTES
EMERGENCY DEPARTMENT ENCOUNTER    Room Number:  13/13  Date of encounter:  10/17/2023  PCP: Epley, James, APRN  Historian: Patient    Patient was placed in face mask during triage process. Patient was wearing facemask when I entered the room and throughout our encounter. I wore full protective equipment throughout this patient encounter including a face mask, eye protection, and gloves. Hand hygiene was performed before donning protective equipment and again following doffing of PPE after leaving the room.    HPI:  Chief Complaint: Vision deficit  A complete HPI/ROS/PMH/PSH/SH/FH are unobtainable due to: N/A   Context: Joaquín Garcia Jr. is a 63 y.o. male who presents to the ED c/o no vision deficit that started about 30 to 45 minutes prior to arrival in the ED.  The patient was actually on his way home following an outpatient CT angiography of the chest to evaluate for some mass in his chest.  The patient did get IV contrast for that.  He noted on his way home that he began to have vision difficulties and could no longer coordinate his hands to what he was touching.  No history of similar.  No associated headache.  Denies speech difficulty, headache, focal weakness, dizziness or nausea.  No prior history of similar.  Patient is chronically anticoagulated for history of atrial fibrillation.      MEDICAL HISTORY REVIEW  Additional sources:  - Discussed/ obtained information from independent historians: Family who are at bedside    - External (non-ED) record review:   Cardiology office note 9/19/2023 reviewed: Patient followed for chronic A-fib for which she is anticoagulated but was recently switched from warfarin to Eliquis, mixed hyperlipidemia, status post mitral valve replacement and status post tricuspid valve repair with a history of tobacco abuse and COPD.    - Chronic or social conditions impacting care: Tobacco abuse      PAST MEDICAL HISTORY  Active Ambulatory Problems     Diagnosis Date Noted     Supraventricular tachycardia 10/10/2016    NF (neurofibromatosis) 10/10/2016    COPD (chronic obstructive pulmonary disease) 10/10/2016    Acute hypoxemic respiratory failure 10/10/2016    MR (mitral regurgitation) 10/12/2016    COPD exacerbation 10/12/2016    Acute respiratory failure with hypoxemia 10/12/2016    Rectus sheath hematoma 10/21/2016    Alcoholism 11/23/2016    Atrial fibrillation, chronic 11/23/2016    Toe pain, right 11/23/2016    S/P MVR (mitral valve replacement) 11/23/2016    Hypertension 12/02/2016    S/P TVR (tricuspid valve repair) 12/07/2016    Abnormal electrocardiogram 01/13/2017    Benign essential hypertension 01/13/2017    Anxiety 09/05/2017    Prostate cancer screening 11/05/2017    Colon cancer screening 11/05/2017    Need for hepatitis C screening test 11/05/2017    Knee strain, left, initial encounter 11/15/2017    Acute midline low back pain with bilateral sciatica 07/02/2020    Spinal stenosis of lumbar region with neurogenic claudication 10/13/2020    On anticoagulant therapy     Tobacco abuse 09/19/2023    Gout 10/11/2023    Daytime somnolence 10/11/2023    Left atrial mass 10/11/2023     Resolved Ambulatory Problems     Diagnosis Date Noted    Pneumonia, bacterial 10/12/2016     Past Medical History:   Diagnosis Date    Arthritis     Atrial fibrillation     Degenerative lumbar disc     Emphysema with chronic bronchitis     H/O tricuspid valve repair 2016    History of heart valve replacement with porcine valve 2016    Low back pain     Neurofibromatosis     Sleep apnea     Spinal stenosis          PAST SURGICAL HISTORY  Past Surgical History:   Procedure Laterality Date    CARDIAC CATHETERIZATION  10/20/2016    Procedure: Case Abort;  Surgeon: Zhen Ford MD;  Location: [facility] CATH INVASIVE LOCATION;  Service:     CARDIAC CATHETERIZATION Left 10/21/2016    Procedure: Cardiac catheterization;  Surgeon: Zhen Ford MD;  Location: [facility] CATH INVASIVE LOCATION;   Service:     HERNIA REPAIR      LUMBAR LAMINECTOMY DISCECTOMY DECOMPRESSION Bilateral 11/5/2020    Procedure: Lumbar 4 - Lumbar 5 Laminectomy;  Surgeon: Marcos Christine MD;  Location: [facility];  Service: Orthopedic Spine;  Laterality: Bilateral;    MITRAL VALVE REPAIR/REPLACEMENT N/A 10/27/2016    Procedure: INTRAOPERATIVE LILIA, MIDLINE STERNOTOMY WITH MITRAL VALVE REPLACEMENT TRICUSPID VALVE REPAIR;  Surgeon: Robert George MD;  Location: [facility] OR;  Service:     TEETH EXTRACTION N/A 10/25/2016    Procedure: TEETH EXTRACTION FULL MOUTH;  Surgeon: Gio Ibarra DMD;  Location: [facility] OR;  Service:          FAMILY HISTORY  Family History   Problem Relation Age of Onset    Heart disease Father     Hypertension Father     Malig Hyperthermia Neg Hx          SOCIAL HISTORY  Social History     Socioeconomic History    Marital status: Single   Tobacco Use    Smoking status: Every Day     Packs/day: 0.50     Years: 35.00     Additional pack years: 0.00     Total pack years: 17.50     Types: Cigarettes    Smokeless tobacco: Never    Tobacco comments:     PT STATES DOWN TO 1/2 TO 1/3 PPD FROM 1PPD   Vaping Use    Vaping Use: Never used   Substance and Sexual Activity    Alcohol use: Yes     Alcohol/week: 2.0 standard drinks of alcohol     Types: 2 Shots of liquor per week     Comment: Caffeine - Soft Drinks daily    Drug use: No    Sexual activity: Defer         ALLERGIES  Patient has no known allergies.        REVIEW OF SYSTEMS  Review of Systems     All systems reviewed and negative except for those discussed in HPI.       PHYSICAL EXAM    I have reviewed the triage vital signs and nursing notes.    ED Triage Vitals [10/17/23 1851]   Temp Heart Rate Resp BP SpO2   97.7 °F (36.5 °C) 79 18 -- 99 %      Temp src Heart Rate Source Patient Position BP Location FiO2 (%)   -- -- -- -- --       Physical Exam    Physical Exam   Constitutional: No distress.   HENT:  Head: Normocephalic and atraumatic.  Multiple  lesions of the face consistent with history of neurofibromatosis.  Oropharynx: Mucous membranes are moist.   Eyes: No scleral icterus.  Disconjugate gaze with limited range of motion of either eye up or down.  Pupils reactive.  Neck: Neck supple.   Cardiovascular: Pink, warm and well-perfused throughout  Pulmonary/Chest: No respiratory distress.  No tachypnea or increased work of breathing  Abdominal: Soft.  No rebound or rigidity  Musculoskeletal: Moves all extremities equally.   Neurological: Alert.  Speech fluent and easily intelligible.  Patient has difficulty with finger-nose secondary to vision issues otherwise the remainder of his NIH stroke scale is negative.  Ambulation not attempted secondary to difficulty seeing and gauging distance.  Skin: Skin is pink, warm, and dry. No pallor.   Psychiatric: Mood and affect normal.   Nursing note and vitals reviewed.    LAB RESULTS  Recent Results (from the past 24 hour(s))   POC Glucose Once    Collection Time: 10/17/23  7:07 PM    Specimen: Blood   Result Value Ref Range    Glucose 102 70 - 130 mg/dL   Comprehensive Metabolic Panel    Collection Time: 10/17/23  7:12 PM    Specimen: Blood   Result Value Ref Range    Glucose 101 (H) 65 - 99 mg/dL    BUN 14 8 - 23 mg/dL    Creatinine 0.95 0.76 - 1.27 mg/dL    Sodium 133 (L) 136 - 145 mmol/L    Potassium 4.1 3.5 - 5.2 mmol/L    Chloride 97 (L) 98 - 107 mmol/L    CO2 26.0 22.0 - 29.0 mmol/L    Calcium 9.8 8.6 - 10.5 mg/dL    Total Protein 7.4 6.0 - 8.5 g/dL    Albumin 4.6 3.5 - 5.2 g/dL    ALT (SGPT) 28 1 - 41 U/L    AST (SGOT) 17 1 - 40 U/L    Alkaline Phosphatase 88 39 - 117 U/L    Total Bilirubin 0.5 0.0 - 1.2 mg/dL    Globulin 2.8 gm/dL    A/G Ratio 1.6 g/dL    BUN/Creatinine Ratio 14.7 7.0 - 25.0    Anion Gap 10.0 5.0 - 15.0 mmol/L    eGFR 88.8 >60.0 mL/min/1.73   Protime-INR    Collection Time: 10/17/23  7:12 PM    Specimen: Blood   Result Value Ref Range    Protime 14.5 (H) 11.7 - 14.2 Seconds    INR 1.11 (H) 0.90  - 1.10   aPTT    Collection Time: 10/17/23  7:12 PM    Specimen: Blood   Result Value Ref Range    PTT 31.2 22.7 - 35.4 seconds   CBC Auto Differential    Collection Time: 10/17/23  7:12 PM    Specimen: Blood   Result Value Ref Range    WBC 7.76 3.40 - 10.80 10*3/mm3    RBC 5.01 4.14 - 5.80 10*6/mm3    Hemoglobin 17.5 13.0 - 17.7 g/dL    Hematocrit 49.4 37.5 - 51.0 %    MCV 98.6 (H) 79.0 - 97.0 fL    MCH 34.9 (H) 26.6 - 33.0 pg    MCHC 35.4 31.5 - 35.7 g/dL    RDW 13.4 12.3 - 15.4 %    RDW-SD 47.6 37.0 - 54.0 fl    MPV 8.1 6.0 - 12.0 fL    Platelets 137 (L) 140 - 450 10*3/mm3    Neutrophil % 71.4 42.7 - 76.0 %    Lymphocyte % 17.5 (L) 19.6 - 45.3 %    Monocyte % 8.2 5.0 - 12.0 %    Eosinophil % 1.7 0.3 - 6.2 %    Basophil % 0.8 0.0 - 1.5 %    Immature Grans % 0.4 0.0 - 0.5 %    Neutrophils, Absolute 5.54 1.70 - 7.00 10*3/mm3    Lymphocytes, Absolute 1.36 0.70 - 3.10 10*3/mm3    Monocytes, Absolute 0.64 0.10 - 0.90 10*3/mm3    Eosinophils, Absolute 0.13 0.00 - 0.40 10*3/mm3    Basophils, Absolute 0.06 0.00 - 0.20 10*3/mm3    Immature Grans, Absolute 0.03 0.00 - 0.05 10*3/mm3    nRBC 0.0 0.0 - 0.2 /100 WBC   Type & Screen    Collection Time: 10/17/23  7:12 PM    Specimen: Blood   Result Value Ref Range    ABO Type O     RH type Positive     Antibody Screen Negative     T&S Expiration Date 10/20/2023 11:59:59 PM    Single High Sensitivity Troponin T    Collection Time: 10/17/23  7:12 PM    Specimen: Blood   Result Value Ref Range    HS Troponin T 10 <15 ng/L   ECG 12 Lead Altered Mental Status    Collection Time: 10/17/23  7:53 PM   Result Value Ref Range    QT Interval 424 ms    QTC Interval 468 ms       Ordered the above labs and independently reviewed the results.        RADIOLOGY  XR Chest 1 View    Result Date: 10/17/2023  PORTABLE CHEST X-RAY  HISTORY: Acute stroke symptoms. Type I neurofibromatosis. CT angiogram chest earlier today demonstrated multiple intraluminal filling defects in the left atrium.  Portable  chest x-ray is correlated with CT angiogram chest from earlier today and chest x-ray 10/31/2016.  FINDINGS: Sternal wires with cardiomegaly and extensive emphysematous change in the upper lung zones. There is some prominence of the interstitial markings commensurate with advanced emphysema. No focal infiltrate, effusion, or pneumothorax.      Cardiomegaly with marked emphysematous pulmonary parenchymal change. No acute abnormality evident on x-ray.  This report was finalized on 10/17/2023 8:08 PM by Dr. Juanito Perea M.D on Workstation: [id number]       I ordered the above noted radiological studies. Reviewed by me and discussed with radiologist.  See dictation for official radiology interpretation.      PROCEDURES    Procedures        MEDICATIONS GIVEN IN ER    Medications   sodium chloride 0.9 % flush 10 mL (has no administration in time range)   sodium chloride 0.9 % flush 10 mL (has no administration in time range)   sodium chloride 0.9 % bolus 1,000 mL (1,000 mL Intravenous New Bag 10/17/23 1946)   iopamidol (ISOVUE-370) 76 % injection 50 mL (50 mL Intravenous Given by Other 10/17/23 1930)   iopamidol (ISOVUE-370) 76 % injection 100 mL (95 mL Intravenous Given by Other 10/17/23 1930)         PROGRESS, DATA ANALYSIS, CONSULTS, AND MEDICAL DECISION MAKING    My differential diagnosis for altered mental status includes but is not limited to:  Hypoglycemia, hyperglycemia, DKA, overdose, ethanol intoxication, thiamine deficiency, niacin deficiency, hypothymia, hyperviscosity, Edwin’s disease, hyponatremia, hypernatremia, liver failure, kidney failure, hyper or hypothyroid, no insufficiency, hypoxia, hypercarbia, carbon monoxide poisoning, postanoxic encephalopathy, ischemic stroke, intracranial bleed, subarachnoid hemorrhage, brain tumor, closed head injury, epidural hematoma, epidural hematoma, seizure activity, postictal state, syncopal episode, disseminated encephalomyelitis, central pontine myelinolysis, post  cardiac arrest, bacterial meningitis, viral meningitis, fungal meningitis, encephalitis, brain abscess, subdural empyema, hysteria, catatonic state, malingering, hypertensive encephalopathy, vasculitis, TTP, DIC      Total critical care time: Approximately 35 minutes    Due to a high probability of clinically significant, life threatening deterioration, the patient required my highest level of preparedness to intervene emergently and I personally spent this critical care time directly and personally managing the patient. This critical care time included obtaining a history; examining the patient; vital sign monitoring; ordering and review of studies; arranging urgent treatment with development of a management plan; evaluation of patient's response to treatment; frequent reassessment; and, discussions with other providers.    This critical care time was performed to assess and manage the high probability of imminent, life-threatening deterioration that could result in multi-organ failure. It was exclusive of separately billable procedures and treating other patients and teaching time.    Please see MDM section and the rest of the note for further information on patient assessment and treatment.      All labs have been independently reviewed by me.  All radiology studies have been reviewed by me and discussed with radiologist dictating the report.   EKG's independently viewed and interpreted by me.  Discussion below represents my analysis of pertinent findings related to patient's condition, differential diagnosis, treatment plan and final disposition.      ED Course as of 10/17/23 2013   Tue Oct 17, 2023   1904 CONSULT        Provider: Dr. Jasso - Stroke Neuro    Discussion: Reviewed patient's history, ED presentation and evaluation.  Recommends team D.  Patient is chronically anticoagulated and thus not a TNK candidate.    Agreeable c treatment and planned disposition.         [RS]   1910 Discussed concerns with  patient's family and patient as well as recommendation for stroke work-up.  All agreeable. [RS]   1937 Dr. Menjivar has reviewed the images.  No large vessel occlusion appreciated.  Recommends admission to medicine service for further evaluation and treatment. [RS]   1958 EKG           EKG time: 1953  Rhythm/Rate: A-fib, 75  P waves and SD: N/A  QRS, axis: Narrow complex  ST and T waves: No STEMI; QTc within normal limits    Interpreted Contemporaneously by me, independently viewed  Comparison: 11/1/2016-chronic A-fib   [RS]   1959 Sodium(!): 133 [RS]   1959 Potassium: 4.1 [RS]   1959 Chloride(!): 97 [RS]   2012 CONSULT        Provider: RALPH HooperUMER    Discussion: Reviewed patient history, ED presentation and evaluation as well as consultation with stroke neurology and recommendations.  Agreeable to accept the patient for admission on behalf of Dr. Mary.    Agreeable c treatment and planned disposition.         [RS]   2012 Patient family updated with current findings and recommendation for admission.  All agreeable. [RS]      ED Course User Index  [RS] Jonn Hi MD       AS OF 20:13 EDT VITALS:    BP - 141/83  HR - 79  TEMP - 97.7 °F (36.5 °C)  O2 SATS - 99%        DIAGNOSIS  Final diagnoses:   Acute CVA (cerebrovascular accident)   Dysconjugate gaze   Chronic anticoagulation         DISPOSITION  ADMISSION    Discussed treatment plan and reason for admission with pt/family and admitting physician.  Pt/family voiced understanding of the plan for admission for further testing/treatment as needed.       Please note that portions of this were completed with a voice recognition program.       Note Disclaimer: At [facility], we believe that sharing information builds trust and better relationships. You are receiving this note because you are receiving care at [facility] or recently visited. It is possible you will see health information before a provider has talked with you about it. This kind of  information can be easy to misunderstand. To help you fully understand what it means for your health, we urge you to discuss this note with your provider.     Jonn Hi MD  10/17/23 2013

## 2023-10-18 ENCOUNTER — APPOINTMENT (OUTPATIENT)
Dept: MRI IMAGING | Facility: HOSPITAL | Age: 63
End: 2023-10-18
Payer: MEDICARE

## 2023-10-18 ENCOUNTER — APPOINTMENT (OUTPATIENT)
Dept: CARDIOLOGY | Facility: HOSPITAL | Age: 63
End: 2023-10-18
Payer: MEDICARE

## 2023-10-18 PROBLEM — R42 DIZZINESS: Status: ACTIVE | Noted: 2023-10-18

## 2023-10-18 PROBLEM — H53.2 DIPLOPIA: Status: ACTIVE | Noted: 2023-10-18

## 2023-10-18 LAB
ALBUMIN SERPL-MCNC: 3.9 G/DL (ref 3.5–5.2)
ALBUMIN/GLOB SERPL: 1.7 G/DL
ALP SERPL-CCNC: 76 U/L (ref 39–117)
ALT SERPL W P-5'-P-CCNC: 21 U/L (ref 1–41)
ANION GAP SERPL CALCULATED.3IONS-SCNC: 8.6 MMOL/L (ref 5–15)
APTT PPP: 32.3 SECONDS (ref 22.7–35.4)
APTT PPP: 42.2 SECONDS (ref 22.7–35.4)
AST SERPL-CCNC: 13 U/L (ref 1–40)
BH CV ECHO MEAS - LVOT AREA: 3.8 CM2
BH CV ECHO MEAS - LVOT DIAM: 2.21 CM
BH CV ECHO SHUNT ASSESSMENT PERFORMED (HIDDEN SCRIPTING): 1
BILIRUB SERPL-MCNC: 0.6 MG/DL (ref 0–1.2)
BUN SERPL-MCNC: 10 MG/DL (ref 8–23)
BUN/CREAT SERPL: 13.3 (ref 7–25)
CALCIUM SPEC-SCNC: 8.8 MG/DL (ref 8.6–10.5)
CHLORIDE SERPL-SCNC: 105 MMOL/L (ref 98–107)
CHOLEST SERPL-MCNC: 173 MG/DL (ref 0–200)
CO2 SERPL-SCNC: 24.4 MMOL/L (ref 22–29)
CREAT SERPL-MCNC: 0.75 MG/DL (ref 0.76–1.27)
DEPRECATED RDW RBC AUTO: 47.2 FL (ref 37–54)
EGFRCR SERPLBLD CKD-EPI 2021: 100.1 ML/MIN/1.73
ERYTHROCYTE [DISTWIDTH] IN BLOOD BY AUTOMATED COUNT: 13.3 % (ref 12.3–15.4)
GLOBULIN UR ELPH-MCNC: 2.3 GM/DL
GLUCOSE BLDC GLUCOMTR-MCNC: 90 MG/DL (ref 70–130)
GLUCOSE BLDC GLUCOMTR-MCNC: 97 MG/DL (ref 70–130)
GLUCOSE SERPL-MCNC: 90 MG/DL (ref 65–99)
HBA1C MFR BLD: 5.7 % (ref 4.8–5.6)
HCT VFR BLD AUTO: 46.5 % (ref 37.5–51)
HDLC SERPL-MCNC: 47 MG/DL (ref 40–60)
HGB BLD-MCNC: 16.1 G/DL (ref 13–17.7)
INR PPP: 1.16 (ref 0.9–1.1)
LDLC SERPL CALC-MCNC: 102 MG/DL (ref 0–100)
LDLC/HDLC SERPL: 2.1 {RATIO}
MCH RBC QN AUTO: 33.9 PG (ref 26.6–33)
MCHC RBC AUTO-ENTMCNC: 34.6 G/DL (ref 31.5–35.7)
MCV RBC AUTO: 97.9 FL (ref 79–97)
PLATELET # BLD AUTO: 131 10*3/MM3 (ref 140–450)
PMV BLD AUTO: 8.5 FL (ref 6–12)
POTASSIUM SERPL-SCNC: 3.9 MMOL/L (ref 3.5–5.2)
PROT SERPL-MCNC: 6.2 G/DL (ref 6–8.5)
PROTHROMBIN TIME: 15 SECONDS (ref 11.7–14.2)
QT INTERVAL: 424 MS
QTC INTERVAL: 468 MS
RBC # BLD AUTO: 4.75 10*6/MM3 (ref 4.14–5.8)
SINUS: 3.3 CM
SODIUM SERPL-SCNC: 138 MMOL/L (ref 136–145)
STJ: 2.6 CM
TRIGL SERPL-MCNC: 137 MG/DL (ref 0–150)
TSH SERPL DL<=0.05 MIU/L-ACNC: 0.82 UIU/ML (ref 0.27–4.2)
VLDLC SERPL-MCNC: 24 MG/DL (ref 5–40)
WBC NRBC COR # BLD: 5.87 10*3/MM3 (ref 3.4–10.8)

## 2023-10-18 PROCEDURE — 84443 ASSAY THYROID STIM HORMONE: CPT | Performed by: NURSE PRACTITIONER

## 2023-10-18 PROCEDURE — 93312 ECHO TRANSESOPHAGEAL: CPT | Performed by: INTERNAL MEDICINE

## 2023-10-18 PROCEDURE — 93325 DOPPLER ECHO COLOR FLOW MAPG: CPT | Performed by: INTERNAL MEDICINE

## 2023-10-18 PROCEDURE — 85613 RUSSELL VIPER VENOM DILUTED: CPT | Performed by: INTERNAL MEDICINE

## 2023-10-18 PROCEDURE — 85027 COMPLETE CBC AUTOMATED: CPT | Performed by: NURSE PRACTITIONER

## 2023-10-18 PROCEDURE — 99205 OFFICE O/P NEW HI 60 MIN: CPT | Performed by: INTERNAL MEDICINE

## 2023-10-18 PROCEDURE — 70553 MRI BRAIN STEM W/O & W/DYE: CPT

## 2023-10-18 PROCEDURE — 93325 DOPPLER ECHO COLOR FLOW MAPG: CPT

## 2023-10-18 PROCEDURE — 97535 SELF CARE MNGMENT TRAINING: CPT

## 2023-10-18 PROCEDURE — 25810000003 SODIUM CHLORIDE 0.9 % SOLUTION: Performed by: NURSE PRACTITIONER

## 2023-10-18 PROCEDURE — 94640 AIRWAY INHALATION TREATMENT: CPT

## 2023-10-18 PROCEDURE — 97530 THERAPEUTIC ACTIVITIES: CPT

## 2023-10-18 PROCEDURE — 86147 CARDIOLIPIN ANTIBODY EA IG: CPT | Performed by: INTERNAL MEDICINE

## 2023-10-18 PROCEDURE — 85732 THROMBOPLASTIN TIME PARTIAL: CPT | Performed by: INTERNAL MEDICINE

## 2023-10-18 PROCEDURE — 94761 N-INVAS EAR/PLS OXIMETRY MLT: CPT

## 2023-10-18 PROCEDURE — 85730 THROMBOPLASTIN TIME PARTIAL: CPT | Performed by: INTERNAL MEDICINE

## 2023-10-18 PROCEDURE — 85610 PROTHROMBIN TIME: CPT | Performed by: INTERNAL MEDICINE

## 2023-10-18 PROCEDURE — 93320 DOPPLER ECHO COMPLETE: CPT | Performed by: INTERNAL MEDICINE

## 2023-10-18 PROCEDURE — 99152 MOD SED SAME PHYS/QHP 5/>YRS: CPT

## 2023-10-18 PROCEDURE — 82948 REAGENT STRIP/BLOOD GLUCOSE: CPT

## 2023-10-18 PROCEDURE — 80061 LIPID PANEL: CPT | Performed by: NURSE PRACTITIONER

## 2023-10-18 PROCEDURE — 99223 1ST HOSP IP/OBS HIGH 75: CPT | Performed by: STUDENT IN AN ORGANIZED HEALTH CARE EDUCATION/TRAINING PROGRAM

## 2023-10-18 PROCEDURE — 97162 PT EVAL MOD COMPLEX 30 MIN: CPT

## 2023-10-18 PROCEDURE — 25010000002 HEPARIN (PORCINE) PER 1000 UNITS: Performed by: INTERNAL MEDICINE

## 2023-10-18 PROCEDURE — 36415 COLL VENOUS BLD VENIPUNCTURE: CPT | Performed by: NURSE PRACTITIONER

## 2023-10-18 PROCEDURE — 25010000002 FENTANYL CITRATE (PF) 50 MCG/ML SOLUTION: Performed by: INTERNAL MEDICINE

## 2023-10-18 PROCEDURE — 99153 MOD SED SAME PHYS/QHP EA: CPT

## 2023-10-18 PROCEDURE — 99222 1ST HOSP IP/OBS MODERATE 55: CPT | Performed by: INTERNAL MEDICINE

## 2023-10-18 PROCEDURE — 25010000002 MIDAZOLAM PER 1 MG: Performed by: INTERNAL MEDICINE

## 2023-10-18 PROCEDURE — 97166 OT EVAL MOD COMPLEX 45 MIN: CPT

## 2023-10-18 PROCEDURE — A9577 INJ MULTIHANCE: HCPCS | Performed by: INTERNAL MEDICINE

## 2023-10-18 PROCEDURE — 85705 THROMBOPLASTIN INHIBITION: CPT | Performed by: INTERNAL MEDICINE

## 2023-10-18 PROCEDURE — 0 GADOBENATE DIMEGLUMINE 529 MG/ML SOLUTION: Performed by: INTERNAL MEDICINE

## 2023-10-18 PROCEDURE — 93312 ECHO TRANSESOPHAGEAL: CPT

## 2023-10-18 PROCEDURE — 85670 THROMBIN TIME PLASMA: CPT | Performed by: INTERNAL MEDICINE

## 2023-10-18 PROCEDURE — B24BZZ4 ULTRASONOGRAPHY OF HEART WITH AORTA, TRANSESOPHAGEAL: ICD-10-PCS | Performed by: INTERNAL MEDICINE

## 2023-10-18 PROCEDURE — 94799 UNLISTED PULMONARY SVC/PX: CPT

## 2023-10-18 PROCEDURE — 99223 1ST HOSP IP/OBS HIGH 75: CPT | Performed by: NURSE PRACTITIONER

## 2023-10-18 PROCEDURE — 86146 BETA-2 GLYCOPROTEIN ANTIBODY: CPT | Performed by: INTERNAL MEDICINE

## 2023-10-18 PROCEDURE — 80053 COMPREHEN METABOLIC PANEL: CPT | Performed by: NURSE PRACTITIONER

## 2023-10-18 PROCEDURE — 93320 DOPPLER ECHO COMPLETE: CPT

## 2023-10-18 PROCEDURE — 83036 HEMOGLOBIN GLYCOSYLATED A1C: CPT | Performed by: NURSE PRACTITIONER

## 2023-10-18 RX ORDER — HEPARIN SODIUM 10000 [USP'U]/100ML
10.9 INJECTION, SOLUTION INTRAVENOUS
Status: DISCONTINUED | OUTPATIENT
Start: 2023-10-18 | End: 2023-10-18

## 2023-10-18 RX ORDER — FAMOTIDINE 10 MG/ML
20 INJECTION, SOLUTION INTRAVENOUS EVERY 12 HOURS SCHEDULED
Status: DISCONTINUED | OUTPATIENT
Start: 2023-10-18 | End: 2023-10-20

## 2023-10-18 RX ORDER — BUDESONIDE AND FORMOTEROL FUMARATE DIHYDRATE 160; 4.5 UG/1; UG/1
2 AEROSOL RESPIRATORY (INHALATION)
Status: DISCONTINUED | OUTPATIENT
Start: 2023-10-18 | End: 2023-10-23 | Stop reason: HOSPADM

## 2023-10-18 RX ORDER — FENTANYL CITRATE 50 UG/ML
INJECTION, SOLUTION INTRAMUSCULAR; INTRAVENOUS
Status: COMPLETED | OUTPATIENT
Start: 2023-10-18 | End: 2023-10-18

## 2023-10-18 RX ORDER — LIDOCAINE HYDROCHLORIDE 20 MG/ML
SOLUTION OROPHARYNGEAL
Status: COMPLETED | OUTPATIENT
Start: 2023-10-18 | End: 2023-10-18

## 2023-10-18 RX ORDER — SODIUM CHLORIDE 9 MG/ML
INJECTION, SOLUTION INTRAVENOUS
Status: COMPLETED | OUTPATIENT
Start: 2023-10-18 | End: 2023-10-18

## 2023-10-18 RX ORDER — IPRATROPIUM BROMIDE AND ALBUTEROL SULFATE 2.5; .5 MG/3ML; MG/3ML
3 SOLUTION RESPIRATORY (INHALATION) 4 TIMES DAILY PRN
Status: DISCONTINUED | OUTPATIENT
Start: 2023-10-18 | End: 2023-10-23 | Stop reason: HOSPADM

## 2023-10-18 RX ORDER — MIDAZOLAM HYDROCHLORIDE 1 MG/ML
INJECTION INTRAMUSCULAR; INTRAVENOUS
Status: COMPLETED | OUTPATIENT
Start: 2023-10-18 | End: 2023-10-18

## 2023-10-18 RX ADMIN — ALLOPURINOL 300 MG: 300 TABLET ORAL at 08:54

## 2023-10-18 RX ADMIN — ATORVASTATIN CALCIUM 80 MG: 80 TABLET, FILM COATED ORAL at 21:21

## 2023-10-18 RX ADMIN — MIDAZOLAM 2 MG: 1 INJECTION INTRAMUSCULAR; INTRAVENOUS at 12:54

## 2023-10-18 RX ADMIN — MIDAZOLAM 1 MG: 1 INJECTION INTRAMUSCULAR; INTRAVENOUS at 13:02

## 2023-10-18 RX ADMIN — ASPIRIN 325 MG: 325 TABLET ORAL at 08:54

## 2023-10-18 RX ADMIN — HEPARIN SODIUM 10.9 UNITS/KG/HR: 10000 INJECTION, SOLUTION INTRAVENOUS at 14:39

## 2023-10-18 RX ADMIN — METOPROLOL SUCCINATE 100 MG: 100 TABLET, EXTENDED RELEASE ORAL at 08:54

## 2023-10-18 RX ADMIN — FAMOTIDINE 20 MG: 10 INJECTION INTRAVENOUS at 21:21

## 2023-10-18 RX ADMIN — FAMOTIDINE 20 MG: 10 INJECTION INTRAVENOUS at 15:10

## 2023-10-18 RX ADMIN — LIDOCAINE HYDROCHLORIDE 10 ML: 20 SOLUTION OROPHARYNGEAL at 12:45

## 2023-10-18 RX ADMIN — GADOBENATE DIMEGLUMINE 19 ML: 529 INJECTION, SOLUTION INTRAVENOUS at 08:04

## 2023-10-18 RX ADMIN — SODIUM CHLORIDE 50 ML/HR: 9 INJECTION, SOLUTION INTRAVENOUS at 12:46

## 2023-10-18 RX ADMIN — FENTANYL CITRATE 25 MCG: 50 INJECTION, SOLUTION INTRAMUSCULAR; INTRAVENOUS at 12:55

## 2023-10-18 RX ADMIN — BUDESONIDE AND FORMOTEROL FUMARATE DIHYDRATE 2 PUFF: 160; 4.5 AEROSOL RESPIRATORY (INHALATION) at 21:48

## 2023-10-18 RX ADMIN — APIXABAN 5 MG: 5 TABLET, FILM COATED ORAL at 08:54

## 2023-10-18 NOTE — PLAN OF CARE
Goal Outcome Evaluation:  Plan of Care Reviewed With: patient           Outcome Evaluation: Patient passed RN swallow screen and is on a diet. HERSON for MRI. Will await MRI results before further assessment.

## 2023-10-18 NOTE — ED NOTES
"Nursing report ED to floor  Joaquín Garcia Jr.  63 y.o.  male    HPI :   Chief Complaint   Patient presents with    Eye Problem       Admitting doctor:   Chandan Mary MD    Admitting diagnosis:   The primary encounter diagnosis was Acute CVA (cerebrovascular accident). Diagnoses of Dysconjugate gaze and Chronic anticoagulation were also pertinent to this visit.    Code status:   Current Code Status       Date Active Code Status Order ID Comments User Context       10/17/2023 2015 CPR (Attempt to Resuscitate) [id number]  Maude Pineda, [facility]        Question Answer    Code Status (Patient has no pulse and is not breathing) CPR (Attempt to Resuscitate)    Medical Interventions (Patient has pulse or is breathing) Full Support                    Allergies:   Patient has no known allergies.    Isolation:   No active isolations    Intake and Output  No intake or output data in the 24 hours ending 10/17/23 2022    Weight:       10/17/23  1858   Weight: 92.1 kg (203 lb)       Most recent vitals:   Vitals:    10/17/23 1851 10/17/23 1858   BP:  141/83   Pulse: 79    Resp: 18    Temp: 97.7 °F (36.5 °C)    SpO2: 99%    Weight:  92.1 kg (203 lb)   Height:  180.3 cm (71\")       Active LDAs/IV Access:   Lines, Drains & Airways       Active LDAs       Name Placement date Placement time Site Days    Peripheral IV 11/05/20 0851 Posterior;Right Hand 11/05/20  0851  Hand  1076    Peripheral IV 10/17/23 1911 Right Antecubital 10/17/23  1911  Antecubital  less than 1                    Labs (abnormal labs have a star):   Labs Reviewed   COMPREHENSIVE METABOLIC PANEL - Abnormal; Notable for the following components:       Result Value    Glucose 101 (*)     Sodium 133 (*)     Chloride 97 (*)     All other components within normal limits    Narrative:     GFR Normal >60  Chronic Kidney Disease <60  Kidney Failure <15     PROTIME-INR - Abnormal; Notable for the following components:    Protime 14.5 (*)     INR 1.11 (*)     All " other components within normal limits   CBC WITH AUTO DIFFERENTIAL - Abnormal; Notable for the following components:    MCV 98.6 (*)     MCH 34.9 (*)     Platelets 137 (*)     Lymphocyte % 17.5 (*)     All other components within normal limits   APTT - Normal   SINGLE HSTROPONIN T - Normal    Narrative:     High Sensitive Troponin T Reference Range:  <10.0 ng/L- Negative Female for AMI  <15.0 ng/L- Negative Male for AMI  >=10 - Abnormal Female indicating possible myocardial injury.  >=15 - Abnormal Male indicating possible myocardial injury.   Clinicians would have to utilize clinical acumen, EKG, Troponin, and serial changes to determine if it is an Acute Myocardial Infarction or myocardial injury due to an underlying chronic condition.        RAINBOW DRAW    Narrative:     The following orders were created for panel order [facility] Draw.  Procedure                               Abnormality         Status                     ---------                               -----------         ------                     Green Top (Gel)[[id number]]                                  Final result               Lavender Top[[id number]]                                     Final result               Gold Top - SST[[id number]]                                   Final result               Light Blue Top[[id number]]                                   Final result                 Please view results for these tests on the individual orders.   POCT GLUCOSE FINGERSTICK   POCT GLUCOSE FINGERSTICK   POCT GLUCOSE FINGERSTICK   POCT GLUCOSE FINGERSTICK   POCT GLUCOSE FINGERSTICK   TYPE AND SCREEN   CBC AND DIFFERENTIAL    Narrative:     The following orders were created for panel order CBC & Differential.  Procedure                               Abnormality         Status                     ---------                               -----------         ------                     CBC Auto Differential[[id number]]        Abnormal            Final result                  Please view results for these tests on the individual orders.   GREEN TOP   LAVENDER TOP   GOLD TOP - SST   LIGHT BLUE TOP       EKG:   ECG 12 Lead Altered Mental Status   Preliminary Result   HEART RATE= 73  bpm   RR Interval= 822  ms   AZ Interval=   ms   P Horizontal Axis=   deg   P Front Axis=   deg   QRSD Interval= 128  ms   QT Interval= 424  ms   QTcB= 468  ms   QRS Axis= 109  deg   T Wave Axis= 21  deg   - ABNORMAL ECG -   Atrial fibrillation   Nonspecific intraventricular conduction delay   Baseline wander in lead(s) V4   Electronically Signed By:    Date and Time of Study: 2023-10-17 19:53:15          Meds given in ED:   Medications   sodium chloride 0.9 % flush 10 mL (has no administration in time range)   sodium chloride 0.9 % flush 10 mL (has no administration in time range)   sodium chloride 0.9 % infusion (has no administration in time range)   acetaminophen (TYLENOL) tablet 650 mg (has no administration in time range)     Or   acetaminophen (TYLENOL) suppository 650 mg (has no administration in time range)   labetalol (NORMODYNE,TRANDATE) injection 10 mg (has no administration in time range)   aspirin tablet 325 mg (has no administration in time range)     Or   aspirin suppository 300 mg (has no administration in time range)   atorvastatin (LIPITOR) tablet 80 mg (has no administration in time range)   ondansetron (ZOFRAN) injection 4 mg (has no administration in time range)   bisacodyl (DULCOLAX) suppository 10 mg (has no administration in time range)   iopamidol (ISOVUE-370) 76 % injection 50 mL (50 mL Intravenous Given by Other 10/17/23 1930)   iopamidol (ISOVUE-370) 76 % injection 100 mL (95 mL Intravenous Given by Other 10/17/23 1930)   sodium chloride 0.9 % bolus 1,000 mL (1,000 mL Intravenous New Bag 10/17/23 1946)       Imaging results:  XR Chest 1 View    Result Date: 10/17/2023  Cardiomegaly with marked emphysematous pulmonary parenchymal change. No acute abnormality evident on  x-ray.  This report was finalized on 10/17/2023 8:08 PM by Dr. Juanito Perea M.D on Workstation: [id number]       Ambulatory status:   - one person assist secondary to coordination issues      Social issues:   Social History     Socioeconomic History    Marital status: Single   Tobacco Use    Smoking status: Every Day     Packs/day: 0.50     Years: 35.00     Additional pack years: 0.00     Total pack years: 17.50     Types: Cigarettes    Smokeless tobacco: Never    Tobacco comments:     PT STATES DOWN TO 1/2 TO 1/3 PPD FROM 1PPD   Vaping Use    Vaping Use: Never used   Substance and Sexual Activity    Alcohol use: Yes     Alcohol/week: 2.0 standard drinks of alcohol     Types: 2 Shots of liquor per week     Comment: Caffeine - Soft Drinks daily    Drug use: No    Sexual activity: Defer       NIH Stroke Scale:       Jose Carlos Crawford RN  10/17/23 20:22 EDT

## 2023-10-18 NOTE — PROGRESS NOTES
[facility] Acute Inpt Rehab Note     Referral received via stroke order set.  Please note this is a screening only, rehab admissions will not actively be evaluating this patient.  If felt patient is appropriate for our services once therapies begin, please call our office at [phone], to initiate a full referral.    Thank you,   Zakia Craft RN   Rehab Admission Nurse

## 2023-10-18 NOTE — CONSULTS
"Neurology Consult Note    Consult Date: 10/18/2023    Referring MD: Chandan Mary MD    Reason for Consult I have been asked to see the patient in neurological consultation to render advice and opinion regarding vision problems    Joaquín Garcia Jr. is a 63 y.o. male with past medical history of atrial fibrillation, hypertension, gout.  Patient presented with double vision.  Patient had an outpatient CT chest with IV contrast and was driving home yesterday at around 2:30 PM when he started having blurry vision.  He was found to have 5 cm left atrial mass on the CT scan done outpatient.  Patient is currently on Eliquis from warfarin and and states that he did not miss any dose.    Denies any weakness numbness or speech problems  Denies ever having a stroke or mini stroke.  No family history of stroke    Past Medical History:   Diagnosis Date    Arthritis     Atrial fibrillation     COPD (chronic obstructive pulmonary disease)     PT STATES NO LONGER ON INHALERS BUT IS BREATHING WELL    Degenerative lumbar disc     Emphysema with chronic bronchitis     H/O tricuspid valve repair 2016    MEDTRONIC ANNULOPLASTY RING    History of heart valve replacement with porcine valve 2016    MITRAL VALVE, DR COBURN    Hypertension     Low back pain     RADIATES DOWN BLE, WORSE ON RT, SOME NUMBNESS/TINGLING    Neurofibromatosis     On anticoagulant therapy     Sleep apnea     DOES NOT USE CPAP    Spinal stenosis        Exam  /82 (BP Location: Right arm, Patient Position: Lying)   Pulse 68   Temp 98 °F (36.7 °C) (Oral)   Resp 18   Ht 180.3 cm (71\")   Wt 91.5 kg (201 lb 11.2 oz)   SpO2 92%   BMI 28.13 kg/m²     General appearance: Well developed, well nourished, well groomed, alert and cooperative.   HEENT: Normocephalic.   Cardiac: Regular rate and rhythm. No murmurs.   Chest Exam: Clear to auscultation bilaterally, no wheezes, no rhonchi.  Extremities: Normal, no edema.   Skin: No rashes or birthmarks.    "   Higher integrative function: Oriented to time, place, person, more comprehension repetition fluency no dysarthria  CN II: Normal visual acuity grossly normal, does have double vision in several quadrants  CN III IV VI: Right gaze movement with bilateral eyes intact, left gaze impaired  Vertical gaze impairment.  CN V: Sensation same on both sides of the face  CN VII: Facial movements are symmetric, no weakness.   Motor: Good strength 5/5 both UE and LE B/L  no pronator drift. No fasciculations, rigidity, spasticity or abnormal movements.   Sensation: Normal sensation to pin prick and light touch all four extremities   Coordination: Finger to nose test showed no dysmetria      DATA:    Lab Results   Component Value Date    GLUCOSE 90 10/18/2023    CALCIUM 8.8 10/18/2023     10/18/2023    K 3.9 10/18/2023    CO2 24.4 10/18/2023     10/18/2023    BUN 10 10/18/2023    CREATININE 0.75 (L) 10/18/2023    EGFRIFAFRI 77 09/30/2021    EGFRIFNONA 64 09/30/2021    BCR 13.3 10/18/2023    ANIONGAP 8.6 10/18/2023     Lab Results   Component Value Date    WBC 5.87 10/18/2023    HGB 16.1 10/18/2023    HCT 46.5 10/18/2023    MCV 97.9 (H) 10/18/2023     (L) 10/18/2023       Lab review:   Sodium 138  Creatinine 0.75  Normal LFTs    A1c 5.7  TSH 0.818      WBC 5.87  Hemoglobin 16 and platelets 131    Imaging review:   CT head-no acute abnormality, no acute hypodensity or hyperdensity, scalp nodules concerning for neurofibromatosis    CTA head and neck -no acute vessel stenosis cutoffs or atherosclerosis    CT perfusion-no core or penumbra noted    MRI brain-diffusion restriction in the midbrain and also left medial thalamus, no GRE changes    CT angio chest done on 10/17/2023- Multiple large filling defects within the chronically massively dilated left cardiac atrium corresponds to the more subtle  filling defect described in the left atrium on the low-dose chest CT  report from 09/29/2023.  There are 2 other  intra-atrial filling defects.    Diagnoses:  Midbrain stroke    Pre-stroke MRS: 0  NIHSS: NIHSS:    Baseline  0-->Alert: keenly responsive  0-->Answers both questions correctly  0-->Performs both tasks correctly  1-->Partial gaze palsy: gaze is abnormal in one or both eyes, but forced deviation or total gaze paresis is not present  0=No visual loss  0=Normal symmetric movement  0-->No drift: limb holds 90 (or 45) degrees for full 10 secs  0-->No drift: limb holds 90 (or 45) degrees for full 10 secs  0-->No drift: limb holds 90 (or 45) degrees for full 10 secs  0-->No drift: limb holds 90 (or 45) degrees for full 10 secs  0=Absent  0=Normal; no sensory loss  0=No aphasia, normal  0=Normal  0=No abnormality    Total score: 1      Midbrain and left thalamic stroke  Etiology of the stroke most likely cardioembolic from left atrial mass  Systolic blood pressure goal less than 150  LDL goal less than 75 and A1c goal less than 6.5  Continue anticoagulation with heparin drip no boluses  Cardiology or CT surgery consultation to see if the mass can be removed surgically    2.  Hypertension  Systolic blood pressure goal less than 150  Continue home medications  At home on hydrochlorothiazide 12.5, ramipril 10 mg and amlodipine 10 mg    3.  Hyperlipidemia  LDL goal less than 75 patient's LDL is 102  Start on Lipitor 80 mg daily    4.  History of mitral and tricuspid valve replacement  Follows up with cardiology outpatient    5.  Atrial fibrillation  -On Eliquis at home and metoprolol for rate control  -Started on heparin gtt    6.  Left atrial mass  -Recently had a CT chest for his tobacco abuse history and shortness of breath which showed a questionable 5 cm left atrial mass  -For which he underwent CT angio of his chest and was found to have multiple filling defects in his left atrium.  -Unsure if there is an atrial myxoma versus thrombus  -Cardiology consult might be necessary      MDM   Reviewed: Previous charts, nursing  notes and vitals   Reviewed: Previous labs and CT scan    Interpretation: Labs and CT scan   Total time providing care is :30-74 minutes. This excluded time spent performing separately reportable procedures and services  Consults :Neurology/Stroke    Guillermo Barrett MD  Neuro Hospitalist /Vascular Neurology.

## 2023-10-18 NOTE — PLAN OF CARE
Goal Outcome Evaluation:  Plan of Care Reviewed With: patient        Progress: improving  Pt was admitted from the ER, alert & oriented x4 with stable vital signs, pt passed bedside swallowing test, medicated as ordered & tolerated well. Neuro consult has been called. Nihss was five. No distress noticed or reported, I will continue to monitor.

## 2023-10-18 NOTE — PROGRESS NOTES
Discharge Planning Assessment  [facility]     Patient Name: Joaquín Garcia Jr.  MRN: [id number]  Today's Date: 10/18/2023    Admit Date: 10/17/2023        Discharge Needs Assessment    No documentation.                  Discharge Plan    No documentation.                 Continued Care and Services - Admitted Since 10/17/2023    Coordination has not been started for this encounter.          Demographic Summary    No documentation.                  Functional Status    No documentation.                  Psychosocial    No documentation.                  Abuse/Neglect    No documentation.                  Legal    No documentation.                  Substance Abuse    No documentation.                  Patient Forms    No documentation.                     Shanna Epstein RN

## 2023-10-18 NOTE — THERAPY EVALUATION
Patient Name: Joaquín Garcia Jr.  : 1960    MRN: [id number]                              Today's Date: 10/18/2023       Admit Date: 10/17/2023    Visit Dx:     ICD-10-CM ICD-9-CM   1. Acute CVA (cerebrovascular accident)  I63.9 434.91   2. Dysconjugate gaze  H51.8 378.87   3. Chronic anticoagulation  Z79.01 V58.61     Patient Active Problem List   Diagnosis    Supraventricular tachycardia    NF (neurofibromatosis)    COPD (chronic obstructive pulmonary disease)    Acute hypoxemic respiratory failure    MR (mitral regurgitation)    COPD exacerbation    Acute respiratory failure with hypoxemia    Rectus sheath hematoma    Alcoholism    Atrial fibrillation, chronic    Toe pain, right    S/P MVR (mitral valve replacement)    Hypertension    S/P TVR (tricuspid valve repair)    Abnormal electrocardiogram    Benign essential hypertension    Anxiety    Prostate cancer screening    Colon cancer screening    Need for hepatitis C screening test    Knee strain, left, initial encounter    Acute midline low back pain with bilateral sciatica    Spinal stenosis of lumbar region with neurogenic claudication    On anticoagulant therapy    Tobacco abuse    Gout    Daytime somnolence    Left atrial mass    Acute CVA (cerebrovascular accident)    Diplopia     Past Medical History:   Diagnosis Date    Arthritis     Atrial fibrillation     COPD (chronic obstructive pulmonary disease)     PT STATES NO LONGER ON INHALERS BUT IS BREATHING WELL    Degenerative lumbar disc     Emphysema with chronic bronchitis     H/O tricuspid valve repair 2016    MEDTRONIC ANNULOPLASTY RING    History of heart valve replacement with porcine valve 2016    MITRAL VALVE, DR COBURN    Hypertension     Low back pain     RADIATES DOWN BLE, WORSE ON RT, SOME NUMBNESS/TINGLING    Neurofibromatosis     On anticoagulant therapy     Sleep apnea     DOES NOT USE CPAP    Spinal stenosis      Past Surgical History:   Procedure Laterality Date    CARDIAC  CATHETERIZATION  10/20/2016    Procedure: Case Abort;  Surgeon: Zhen Ford MD;  Location:  [facility];  Service:     CARDIAC CATHETERIZATION Left 10/21/2016    Procedure: Cardiac catheterization;  Surgeon: Zhen Ford MD;  Location:  [facility];  Service:     HERNIA REPAIR      LUMBAR LAMINECTOMY DISCECTOMY DECOMPRESSION Bilateral 11/5/2020    Procedure: Lumbar 4 - Lumbar 5 Laminectomy;  Surgeon: Marcos Christine MD;  Location: [facility] MAIN OR;  Service: Orthopedic Spine;  Laterality: Bilateral;    MITRAL VALVE REPAIR/REPLACEMENT N/A 10/27/2016    Procedure: INTRAOPERATIVE LILIA, MIDLINE STERNOTOMY WITH MITRAL VALVE REPLACEMENT TRICUSPID VALVE REPAIR;  Surgeon: Robert George MD;  Location: [facility] MAIN OR;  Service:     TEETH EXTRACTION N/A 10/25/2016    Procedure: TEETH EXTRACTION FULL MOUTH;  Surgeon: Gio Ibarra DMD;  Location: [facility] MAIN OR;  Service:       General Information       Row Name 10/18/23 1458          Physical Therapy Time and Intention    Document Type evaluation  -EM (r) SK (t) EM (c)     Mode of Treatment individual therapy;physical therapy  -EM (r) SK (t) EM (c)       Row Name 10/18/23 1458          General Information    Patient Profile Reviewed yes  -EM (r) SK (t) EM (c)     Prior Level of Function independent:  -EM (r) SK (t) EM (c)     Existing Precautions/Restrictions no known precautions/restrictions  -EM (r) SK (t) EM (c)     Barriers to Rehab none identified  -EM (r) SK (t) EM (c)       Row Name 10/18/23 1450          Living Environment    People in Home parent(s)  -EM (r) SK (t) EM (c)       Row Name 10/18/23 1458          Home Main Entrance    Number of Stairs, Main Entrance two  -EM (r) SK (t) EM (c)       Row Name 10/18/23 1456          Cognition    Orientation Status (Cognition) oriented x 4  -EM (r) SK (t) EM (c)       Row Name 10/18/23 1451          Safety Issues, Functional Mobility    Safety Issues Affecting  Function (Mobility) impulsivity;judgment;safety precaution awareness;insight into deficits/self-awareness;awareness of need for assistance;problem-solving  could possibly be d/t medication  -EM (r) SK (t) EM (c)     Impairments Affecting Function (Mobility) visual/perceptual;balance  -EM (r) SK (t) EM (c)     Comment, Safety Issues/Impairments (Mobility) non skid socks and gait belt utilized  -EM (r) SK (t) EM (c)               User Key  (r) = Recorded By, (t) = Taken By, (c) = Cosigned By      Initials Name Provider Type    EM Erika Hutchins, PT Physical Therapist    Mu Pickens, PT Student PT Student                   Mobility       Row Name 10/18/23 1501          Bed Mobility    Bed Mobility supine-sit;sit-supine  -EM (r) SK (t) EM (c)     Supine-Sit [place] (Bed Mobility) contact guard;1 person assist;verbal cues  -EM (r) SK (t) EM (c)     Sit-Supine [place] (Bed Mobility) verbal cues;contact guard;1 person assist  -EM (r) SK (t) EM (c)     Assistive Device (Bed Mobility) bed rails;head of bed elevated  -EM (r) SK (t) EM (c)       Row Name 10/18/23 1501          Sit-Stand Transfer    Sit-Stand [place] (Transfers) contact guard;verbal cues  -EM (r) SK (t) EM (c)     Assistive Device (Sit-Stand Transfers) walker, front-wheeled  -EM (r) SK (t) EM (c)       Row Name 10/18/23 1501          Gait/Stairs (Locomotion)    [place] Level (Gait) contact guard;verbal cues;nonverbal cues (demo/gesture)  -EM (r) SK (t) EM (c)     Assistive Device (Gait) walker, front-wheeled  -EM (r) SK (t) EM (c)     Distance in Feet (Gait) 20  -EM (r) SK (t) EM (c)     Deviations/Abnormal Patterns (Gait) c[place] decreased;gait speed decreased;stride length decreased  -EM (r) SK (t) EM (c)     Comment, (Gait/Stairs) pt required min assistance to steer FWW at times  -EM (r) SK (t) EM (c)               User Key  (r) = Recorded By, (t) = Taken By, (c) = Cosigned By      Initials Name Provider Type    EM  Erika Hutchins, PT Physical Therapist    Mu Pickens, PT Student PT Student                   Obj/Interventions       Row Name 10/18/23 1503          Range of Motion Comprehensive    General Range of Motion no range of motion deficits identified  -EM (r) SK (t) EM (c)       Row Name 10/18/23 1503          Strength Comprehensive (MMT)    General Manual Muscle Testing (MMT) Assessment no strength deficits identified  -EM (r) SK (t) EM (c)       Row Name 10/18/23 1503          Motor Skills    Therapeutic Exercise --  B LEs 5 reps standing marches  -EM (r) SK (t) EM (c)       Row Name 10/18/23 1503          Balance    Balance Assessment standing static balance;standing dynamic balance  -EM (r) SK (t) EM (c)     Static Standing Balance contact guard  -EM (r) SK (t) EM (c)     Dynamic Standing Balance contact guard  -EM (r) SK (t) EM (c)     Position/Device Used, Standing Balance walker, front-wheeled  -EM (r) SK (t) EM (c)     Comment, Balance pt slightly unsteady with no LOB  -EM (r) SK (t) EM (c)               User Key  (r) = Recorded By, (t) = Taken By, (c) = Cosigned By      Initials Name Provider Type    EM Erika Hutchins, PT Physical Therapist    Mu Pickens, PT Student PT Student                   Goals/Plan       Row Name 10/18/23 1514          Bed Mobility Goal 1 (PT)    Activity/Assistive Device (Bed Mobility Goal 1, PT) bed mobility activities, all  -EM (r) SK (t) EM (c)     [place] Level/Cues Needed (Bed Mobility Goal 1, PT) supervision required  -EM (r) SK (t) EM (c)     Time Frame (Bed Mobility Goal 1, PT) 1 week  -EM (r) SK (t) EM (c)       Row Name 10/18/23 1514          Transfer Goal 1 (PT)    Activity/Assistive Device (Transfer Goal 1, PT) transfers, all  -EM (r) SK (t) EM (c)     [place] Level/Cues Needed (Transfer Goal 1, PT) supervision required  -EM (r) SK (t) EM (c)     Time Frame (Transfer Goal 1, PT) 1 week  -EM (r) SK (t) EM (c)       Row Name  "10/18/23 1518          Gait Training Goal 1 (PT)    Activity/Assistive Device (Gait Training Goal 1, PT) gait (walking locomotion)  -EM (r) SK (t) EM (c)     [place] Level (Gait Training Goal 1, PT) supervision required  -EM (r) SK (t) EM (c)     Distance (Gait Training Goal 1, PT) 100  -EM (r) SK (t) EM (c)     Time Frame (Gait Training Goal 1, PT) 1 week  -EM (r) SK (t) EM (c)       Row Name 10/18/23 1515          Therapy Assessment/Plan (PT)    Planned Therapy Interventions (PT) balance training;gait training;home exercise program;patient/family education;transfer training;strengthening;bed mobility training  -EM (r) SK (t) EM (c)               User Key  (r) = Recorded By, (t) = Taken By, (c) = Cosigned By      Initials Name Provider Type    EM Erika Hutchins, PT Physical Therapist    Mu Pickens, PT Student PT Student                   Clinical Impression       Row Name 10/18/23 3317          Pain    Pretreatment Pain Rating 0/10 - no pain  -EM (r) SK (t) EM (c)     Posttreatment Pain Rating 0/10 - no pain  -EM (r) SK (t) EM (c)       Row Name 10/18/23 1503          Plan of Care Review    Plan of Care Reviewed With patient;son  -EM (r) SK (t) EM (c)     Outcome Evaluation Pt is a 64 yo M who was admitted for visual difficulties and was dx with diplopia secondary to acute CVA. Son present upon PT arrival. Pt reports he is independent at baseline and does not use AD. Pt performed bed mobility, STS, and ambulation with CGA x2. Pt reports he is feeling loopy from medication received today, describing it as \"a good drunk\", therefore it is unclear if balance issues are d/t medication or CVA. Pt may benefit from skilled PT for balance deficits and safety during functional mobility. PT will follow and progress as pt tolerates. PT recommends SNF upon d/c. Continue to reasses d/c plan as pt progresses.  -EM (r) SK (t) EM (c)       Row Name 10/18/23 1507          Therapy Assessment/Plan (PT)    " Patient/Family Therapy Goals Statement (PT) return to PLOF  -EM (r) SK (t) EM (c)     Rehab Potential (PT) good, to achieve stated therapy goals  -EM (r) SK (t) EM (c)     Criteria for Skilled Interventions Met (PT) skilled treatment is necessary  -EM (r) SK (t) EM (c)     Therapy Frequency (PT) 6 times/wk  -EM (r) SK (t) EM (c)       Row Name 10/18/23 1506          Positioning and Restraints    Pre-Treatment Position in bed  -EM (r) SK (t) EM (c)     Post Treatment Position bed  -EM (r) SK (t) EM (c)     In Bed fowlers;call light within reach;encouraged to call for assist;exit alarm on;with family/caregiver  -EM (r) SK (t) EM (c)               User Key  (r) = Recorded By, (t) = Taken By, (c) = Cosigned By      Initials Name Provider Type    EM Erika Hutchins, PT Physical Therapist    Mu Pickens, PT Student PT Student                   Outcome Measures       Row Name 10/18/23 1515 10/18/23 0800       How much help from another person do you currently need...    Turning from your back to your side while in flat bed without using bedrails? 3  -EM (r) SK (t) EM (c) 3  -AM    Moving from lying on back to sitting on the side of a flat bed without bedrails? 3  -EM (r) SK (t) EM (c) 3  -AM    Moving to and from a bed to a chair (including a wheelchair)? 3  -EM (r) SK (t) EM (c) 3  -AM    Standing up from a chair using your arms (e.g., wheelchair, bedside chair)? 3  -EM (r) SK (t) EM (c) 3  -AM    Climbing 3-5 steps with a railing? 1  -EM (r) SK (t) EM (c) 3  -AM    To walk in hospital room? 3  -EM (r) SK (t) EM (c) 3  -AM    AM-PAC 6 Clicks Score (PT) 16  -EM (r) SK (t) 18  -AM    Highest level of mobility 5 --> Static standing  -EM (r) SK (t) 6 --> Walked 10 steps or more  -AM      Row Name 10/18/23 0853          Modified [place] Scale    Modified [place] Scale 4 - Moderately severe disability.  Unable to walk without assistance, and unable to attend to own bodily needs without assistance.  -KG       Row  Name 10/18/23 1515 10/18/23 1228       Functional Assessment    Outcome Measure Options AM-PAC 6 Clicks Basic Mobility (PT)  -EM (r) SK (t) EM (c) AM-PAC 6 Clicks Daily Activity (OT);Modified [place]  -KG              User Key  (r) = Recorded By, (t) = Taken By, (c) = Cosigned By      Initials Name Provider Type    EM Erika Hutchins, PT Physical Therapist    Annia Perez, RN Registered Nurse    Laith Farrar, OT Occupational Therapist    Mu Pickens, PT Student PT Student                                 Physical Therapy Education       Title: PT OT SLP Therapies (In Progress)       Topic: Physical Therapy (Done)       Point: Mobility training (Done)       Learning Progress Summary             Patient Acceptance, E,TB,D, VU,NR by SK at 10/18/2023 1516   Family Acceptance, E,TB,D, VU,NR by SK at 10/18/2023 1516                         Point: Home exercise program (Done)       Learning Progress Summary             Patient Acceptance, E,TB,D, VU,NR by SK at 10/18/2023 1516   Family Acceptance, E,TB,D, VU,NR by SK at 10/18/2023 1516                         Point: Body mechanics (Done)       Learning Progress Summary             Patient Acceptance, E,TB,D, VU,NR by SK at 10/18/2023 1516   Family Acceptance, E,TB,D, VU,NR by SK at 10/18/2023 1516                         Point: Precautions (Done)       Learning Progress Summary             Patient Acceptance, E,TB,D, VU,NR by SK at 10/18/2023 1516   Family Acceptance, E,TB,D, VU,NR by SK at 10/18/2023 1516                                         User Key       Initials Effective Dates Name Provider Type Discipline    SK 10/10/23 -  Mu Huffman, PT Student PT Student PT                  PT Recommendation and Plan  Planned Therapy Interventions (PT): balance training, gait training, home exercise program, patient/family education, transfer training, strengthening, bed mobility training  Plan of Care Reviewed With: patient, son  Outcome  "Evaluation: Pt is a 64 yo M who was admitted for visual difficulties and was dx with diplopia secondary to acute CVA. Son present upon PT arrival. Pt reports he is independent at baseline and does not use AD. Pt performed bed mobility, STS, and ambulation with CGA x2. Pt reports he is feeling loopy from medication received today, describing it as \"a good drunk\", therefore it is unclear if balance issues are d/t medication or CVA. Pt may benefit from skilled PT for balance deficits and safety during functional mobility. PT will follow and progress as pt tolerates. PT recommends SNF upon d/c. Continue to reasses d/c plan as pt progresses.     Time Calculation:         PT Charges       Row Name 10/18/23 1517             Time Calculation    Start Time 1442  -EM (r) SK (t) EM (c)      Stop Time 1454  -EM (r) SK (t) EM (c)      Time Calculation (min) 12 min  -EM (r) SK (t)      PT Received On 10/18/23  -EM (r) SK (t) EM (c)      PT - Next Appointment 10/19/23  -EM (r) SK (t) EM (c)      PT Goal Re-Cert Due Date 10/25/23  -EM (r) SK (t) EM (c)         Time Calculation- PT    Total Timed Code Minutes- PT 8 minute(s)  -EM (r) SK (t) EM (c)         Timed Charges    63475 - PT Therapeutic Activity Minutes 8  -EM (r) SK (t) EM (c)         Total Minutes    Timed Charges Total Minutes 8  -EM (r) SK (t)       Total Minutes 8  -EM (r) SK (t)                User Key  (r) = Recorded By, (t) = Taken By, (c) = Cosigned By      Initials Name Provider Type    EM Erika Hutchins, PT Physical Therapist    SK Mu Huffman, PT Student PT Student                  Therapy Charges for Today       Code Description Service Date Service Provider Modifiers Qty    42543147144 HC PT EVAL MOD COMPLEXITY 2 10/18/2023 Mu Huffman, PT Student GP 1    51888383823 HC PT THERAPEUTIC ACT EA 15 MIN 10/18/2023 Mu Huffman, PT Student GP 1            PT G-Codes  Outcome Measure Options: AM-PAC 6 Clicks Basic Mobility (PT)  AM-PAC " 6 Clicks Score (PT): 16  AM-PAC 6 Clicks Score (OT): 18  Modified [place] Scale: 4 - Moderately severe disability.  Unable to walk without assistance, and unable to attend to own bodily needs without assistance.  PT Discharge Summary  Anticipated Discharge Disposition (PT): skilled nursing facility    Mu Huffman, PT Student  10/18/2023

## 2023-10-18 NOTE — H&P
Patient Name:  Joaquín Garcia Jr.  YOB: 1960  MRN:  [id number]  Admit Date:  10/17/2023  Patient Care Team:  Epley, James, APRN as PCP - General (Nurse Practitioner)  Maikol Remy MD as Consulting Physician (Cardiology)      Subjective   History Present Illness     Chief Complaint   Patient presents with    Eye Problem       History of Present Illness  Mr. Garcia is a 63 y.o. with past medical history of chronic atrial fibrillation, hypertension, gout presenting with double vision.  Patient had a outpatient CT chest with IV contrast around 230 today and was driving home when he started having blurry vision and was brought to the hospital.  No numbness, tingling or weakness.  Was found to have a 5 cm left atrial mass on outpatient low-dose CT scan for lung cancer screening.  Patient transition to Eliquis from warfarin about a month ago, states he has not missed any doses of Eliquis.    Review of Systems   Constitutional:  Negative for chills and fever.   HENT:  Negative for rhinorrhea and sneezing.         Double vision   Respiratory:  Negative for cough and shortness of breath.    Cardiovascular:  Negative for chest pain and leg swelling.   Gastrointestinal:  Negative for abdominal pain, constipation, diarrhea and nausea.   Genitourinary:  Negative for dysuria and frequency.   Musculoskeletal:  Negative for back pain and myalgias.   Skin:  Negative for rash and wound.   Neurological:  Negative for dizziness, syncope, weakness, light-headedness, numbness and headaches.        Personal History     Past Medical History:   Diagnosis Date    Arthritis     Atrial fibrillation     COPD (chronic obstructive pulmonary disease)     PT STATES NO LONGER ON INHALERS BUT IS BREATHING WELL    Degenerative lumbar disc     Emphysema with chronic bronchitis     H/O tricuspid valve repair 2016    MEDTRONIC ANNULOPLASTY RING    History of heart valve replacement with porcine valve 2016    MITRAL  VALVE, DR GEORGE    Hypertension     Low back pain     RADIATES DOWN BLE, WORSE ON RT, SOME NUMBNESS/TINGLING    Neurofibromatosis     On anticoagulant therapy     Sleep apnea     DOES NOT USE CPAP    Spinal stenosis      Past Surgical History:   Procedure Laterality Date    CARDIAC CATHETERIZATION  10/20/2016    Procedure: Case Abort;  Surgeon: Zhen Ford MD;  Location[facility] C[facility]AS[facility];  Service:     CARDIAC CATHETERIZATION Left 10/21/2016    Procedure: Cardiac catheterization;  Surgeon: Zhen Ford MD;  [facility];  Service:     HERNIA REPAIR      LUMBAR LAMINECTOMY DISCECTOMY DECOMPRESSION Bilateral 11/5/2020    Procedure: Lumbar 4 - Lumbar 5 Laminectomy;  Surgeon: Marcos Christine MD;  Location: [facility] MAIN OR;  Service: Orthopedic Spine;  Laterality: Bilateral;    MITRAL VALVE REPAIR/REPLACEMENT N/A 10/27/2016    Procedure: INTRAOPERATIVE LILIA, MIDLINE STERNOTOMY WITH MITRAL VALVE REPLACEMENT TRICUSPID VALVE REPAIR;  Surgeon: Robert George MD;  Location: [facility] OR;  Service:     TEETH EXTRACTION N/A 10/25/2016    Procedure: TEETH EXTRACTION FULL MOUTH;  Surgeon: Gio Ibarra DMD;  Location: [facility] OR;  Service:      Family History   Problem Relation Age of Onset    Heart disease Father     Hypertension Father     Malig Hyperthermia Neg Hx      Social History     Tobacco Use    Smoking status: Every Day     Packs/day: 0.50     Years: 35.00     Additional pack years: 0.00     Total pack years: 17.50     Types: Cigarettes    Smokeless tobacco: Never    Tobacco comments:     PT STATES DOWN TO 1/2 TO 1/3 PPD FROM 1PPD   Vaping Use    Vaping Use: Never used   Substance Use Topics    Alcohol use: Yes     Alcohol/week: 2.0 standard drinks of alcohol     Types: 2 Shots of liquor per week     Comment: Caffeine - Soft Drinks daily    Drug use: No     Current Facility-Administered Medications on File Prior to Encounter   Medication Dose  Route Frequency Provider Last Rate Last Admin    [COMPLETED] iopamidol (ISOVUE-370) 76 % injection 100 mL  100 mL Intravenous Once in imaging Krishan Tian MD   100 mL at 10/17/23 8331     Current Outpatient Medications on File Prior to Encounter   Medication Sig Dispense Refill    acetaminophen (TYLENOL) 325 MG tablet Take 2 tablets by mouth Every 6 (Six) Hours As Needed for mild pain (1-3).  0    allopurinol (ZYLOPRIM) 300 MG tablet Take 1 tablet by mouth Daily. For gout prevention (Patient taking differently: Take 1 tablet by mouth Daily.) 30 tablet 11    amLODIPine (NORVASC) 10 MG tablet Take 1 tablet by mouth Daily. 30 tablet 11    apixaban (ELIQUIS) 5 MG tablet tablet Take 1 tablet by mouth 2 (Two) Times a Day. To decrease risk of stroke(do not take with warfarin) (Patient taking differently: Take 1 tablet by mouth 2 (Two) Times a Day.) 180 tablet 0    fluticasone (FLONASE) 50 MCG/ACT nasal spray 2 sprays into the nostril(s) as directed by provider Daily. 15.8 mL 11    hydroCHLOROthiazide (HYDRODIURIL) 12.5 MG tablet Take 1 tablet by mouth Daily. 30 tablet 11    metoprolol succinate XL (TOPROL-XL) 100 MG 24 hr tablet Take 1 tablet by mouth Daily. 30 tablet 11    ramipril (ALTACE) 10 MG capsule Take 1 capsule by mouth Daily. 30 capsule 11    atorvastatin (LIPITOR) 40 MG tablet Take 1 tablet by mouth Daily. 30 tablet 11     No Known Allergies    Objective    Objective     Vital Signs  Temp:  [97.7 °F (36.5 °C)-97.8 °F (36.6 °C)] 97.8 °F (36.6 °C)  Heart Rate:  [71-79] 71  Resp:  [18] 18  BP: (137-149)/(80-87) 139/82  SpO2:  [91 %-99 %] 91 %  on   ;      Body mass index is 28.13 kg/m².    Physical Exam  Vitals and nursing note reviewed.   Constitutional:       General: He is not in acute distress.  Eyes:      General: No scleral icterus.     Extraocular Movements: Extraocular movements intact.      Pupils: Pupils are equal, round, and reactive to light.   Cardiovascular:      Rate and Rhythm: Normal rate and  regular rhythm.   Pulmonary:      Effort: Pulmonary effort is normal. No respiratory distress.   Abdominal:      General: Abdomen is flat. There is no distension.      Tenderness: There is no abdominal tenderness.   Musculoskeletal:         General: No swelling or deformity.   Skin:     General: Skin is warm and dry.   Neurological:      Mental Status: He is alert and oriented to person, place, and time.      Sensory: No sensory deficit.      Motor: No weakness.      Coordination: Coordination normal.      Comments: Appears to have left hemineglect         Results Review:  I reviewed the patient's new clinical results.  I reviewed the patient's new imaging results and agree with the interpretation.  I reviewed the patient's other test results and agree with the interpretation  I personally viewed and interpreted the patient's EKG/Telemetry data  Discussed with ED provider.    Lab Results (last 24 hours)       Procedure Component Value Units Date/Time    POC Glucose Once [[id number]]  (Normal) Collected: 10/17/23 1907    Specimen: Blood Updated: 10/17/23 1907     Glucose 102 mg/dL     CBC & Differential [[id number]]  (Abnormal) Collected: 10/17/23 1912    Specimen: Blood Updated: 10/17/23 1926    Narrative:      The following orders were created for panel order CBC & Differential.  Procedure                               Abnormality         Status                     ---------                               -----------         ------                     CBC Auto Differential[[id number]]        Abnormal            Final result                 Please view results for these tests on the individual orders.    Comprehensive Metabolic Panel [[id number]]  (Abnormal) Collected: 10/17/23 1912    Specimen: Blood Updated: 10/17/23 1940     Glucose 101 mg/dL      BUN 14 mg/dL      Creatinine 0.95 mg/dL      Sodium 133 mmol/L      Potassium 4.1 mmol/L      Chloride 97 mmol/L      CO2 26.0 mmol/L      Calcium 9.8 mg/dL      Total  Protein 7.4 g/dL      Albumin 4.6 g/dL      ALT (SGPT) 28 U/L      AST (SGOT) 17 U/L      Alkaline Phosphatase 88 U/L      Total Bilirubin 0.5 mg/dL      Globulin 2.8 gm/dL      A/G Ratio 1.6 g/dL      BUN/Creatinine Ratio 14.7     Anion Gap 10.0 mmol/L      eGFR 88.8 mL/min/1.73     Narrative:      GFR Normal >60  Chronic Kidney Disease <60  Kidney Failure <15      Protime-INR [[id number]]  (Abnormal) Collected: 10/17/23 1912    Specimen: Blood Updated: 10/17/23 1934     Protime 14.5 Seconds      INR 1.11    aPTT [[id number]]  (Normal) Collected: 10/17/23 1912    Specimen: Blood Updated: 10/17/23 1934     PTT 31.2 seconds     CBC Auto Differential [[id number]]  (Abnormal) Collected: 10/17/23 1912    Specimen: Blood Updated: 10/17/23 1926     WBC 7.76 10*3/mm3      RBC 5.01 10*6/mm3      Hemoglobin 17.5 g/dL      Hematocrit 49.4 %      MCV 98.6 fL      MCH 34.9 pg      MCHC 35.4 g/dL      RDW 13.4 %      RDW-SD 47.6 fl      MPV 8.1 fL      Platelets 137 10*3/mm3      Neutrophil % 71.4 %      Lymphocyte % 17.5 %      Monocyte % 8.2 %      Eosinophil % 1.7 %      Basophil % 0.8 %      Immature Grans % 0.4 %      Neutrophils, Absolute 5.54 10*3/mm3      Lymphocytes, Absolute 1.36 10*3/mm3      Monocytes, Absolute 0.64 10*3/mm3      Eosinophils, Absolute 0.13 10*3/mm3      Basophils, Absolute 0.06 10*3/mm3      Immature Grans, Absolute 0.03 10*3/mm3      nRBC 0.0 /100 WBC     Single High Sensitivity Troponin T [[id number]]  (Normal) Collected: 10/17/23 1912    Specimen: Blood Updated: 10/17/23 1940     HS Troponin T 10 ng/L     Narrative:      High Sensitive Troponin T Reference Range:  <10.0 ng/L- Negative Female for AMI  <15.0 ng/L- Negative Male for AMI  >=10 - Abnormal Female indicating possible myocardial injury.  >=15 - Abnormal Male indicating possible myocardial injury.   Clinicians would have to utilize clinical acumen, EKG, Troponin, and serial changes to determine if it is an Acute Myocardial Infarction or  myocardial injury due to an underlying chronic condition.         POC Glucose Once [[id number]]  (Normal) Collected: 10/18/23 0033    Specimen: Blood Updated: 10/18/23 0035     Glucose 97 mg/dL             Imaging Results (Last 24 Hours)       Procedure Component Value Units Date/Time    CT Angiogram Head w AI Analysis of LVO [[id number]] Collected: 10/17/23 2117     Updated: 10/17/23 2124    Narrative:      CT ANGIOGRAM HEAD AND NECK WITH PRE AND POSTCONTRAST HEAD CT AND CT  PERFUSION EXAM     HISTORY: Type I neurofibromatosis. Known intracardiac mass lesion on CT  angiogram from earlier today. Acute onset of stroke symptoms.     TECHNIQUE: Noncontrast head CT scan was performed, reviewed, and  discussed with Dr. Menjivar of the stroke team at 7:26 p.m. Subsequently  CT angiogram head and neck with CT perfusion were performed using 145 mL  of Isovue-370 IV contrast. Numerous multiplanar and 3-dimensional  reformatted images were produced elsewhere. Postcontrast and perfusion  images were discussed with Dr. Jasso at 7:44 p.m.     AI analysis of LVO was utilized.     Radiation dose reduction techniques were utilized, including automated  exposure control and exposure modulation based on body size.     FINDINGS: Noncontrast head CT images show normal caliber ventricles with  normal brain parenchyma and extra-axial spaces. No abnormal intracranial  contrast enhancement on head CT.     Numerous enhancing nodules are observed throughout the scalp and upper  face compatible with the known history of neurofibromatosis.     CT perfusion images are negative.     CT angiogram images show widely patent aortic arch, brachiocephalic and  proximal subclavian arteries and common carotid arteries. Internal and  external carotid arteries are widely patent with minimal carotid bulb  atheromatous change and no significant stenosis. The vertebral arteries  are also widely patent throughout their cervical and intracranial  course. The  basilar artery, anterior, posterior, and middle cerebral  arteries and branches appear normal. Small posterior fossa branches  appear normal. No aneurysm or vascular cutoff is identified. Major  intracranial venous sinuses enhance as expected.     Advanced degenerative disc change at C3-4 with endplate osteophyte  posteriorly on the right creating at least moderately severe spinal  canal stenosis. Profound emphysematous change in the visualized lung  apices. Postoperative change from sternotomy.       Impression:      No acute abnormality identified on CT angiogram head and  neck, CT perfusion, or head CT with and without contrast. There are  numerous soft tissue nodules throughout the head and neck compatible  with known neurofibromatosis..     This report was finalized on 10/17/2023 9:20 PM by Dr. Juanito Perea M.D on Workstation: [id number]       CT Angiogram Neck [[id number]] Collected: 10/17/23 2117     Updated: 10/17/23 2124    Narrative:      CT ANGIOGRAM HEAD AND NECK WITH PRE AND POSTCONTRAST HEAD CT AND CT  PERFUSION EXAM     HISTORY: Type I neurofibromatosis. Known intracardiac mass lesion on CT  angiogram from earlier today. Acute onset of stroke symptoms.     TECHNIQUE: Noncontrast head CT scan was performed, reviewed, and  discussed with Dr. Menjivar of the stroke team at 7:26 p.m. Subsequently  CT angiogram head and neck with CT perfusion were performed using 145 mL  of Isovue-370 IV contrast. Numerous multiplanar and 3-dimensional  reformatted images were produced elsewhere. Postcontrast and perfusion  images were discussed with Dr. Jasso at 7:44 p.m.     AI analysis of LVO was utilized.     Radiation dose reduction techniques were utilized, including automated  exposure control and exposure modulation based on body size.     FINDINGS: Noncontrast head CT images show normal caliber ventricles with  normal brain parenchyma and extra-axial spaces. No abnormal intracranial  contrast enhancement on  head CT.     Numerous enhancing nodules are observed throughout the scalp and upper  face compatible with the known history of neurofibromatosis.     CT perfusion images are negative.     CT angiogram images show widely patent aortic arch, brachiocephalic and  proximal subclavian arteries and common carotid arteries. Internal and  external carotid arteries are widely patent with minimal carotid bulb  atheromatous change and no significant stenosis. The vertebral arteries  are also widely patent throughout their cervical and intracranial  course. The basilar artery, anterior, posterior, and middle cerebral  arteries and branches appear normal. Small posterior fossa branches  appear normal. No aneurysm or vascular cutoff is identified. Major  intracranial venous sinuses enhance as expected.     Advanced degenerative disc change at C3-4 with endplate osteophyte  posteriorly on the right creating at least moderately severe spinal  canal stenosis. Profound emphysematous change in the visualized lung  apices. Postoperative change from sternotomy.       Impression:      No acute abnormality identified on CT angiogram head and  neck, CT perfusion, or head CT with and without contrast. There are  numerous soft tissue nodules throughout the head and neck compatible  with known neurofibromatosis..     This report was finalized on 10/17/2023 9:20 PM by Dr. Juanito Perea M.D on Workstation: [id number]       CT CEREBRAL PERFUSION WITH & WITHOUT CONTRAST [[id number]] Collected: 10/17/23 2117     Updated: 10/17/23 2124    Narrative:      CT ANGIOGRAM HEAD AND NECK WITH PRE AND POSTCONTRAST HEAD CT AND CT  PERFUSION EXAM     HISTORY: Type I neurofibromatosis. Known intracardiac mass lesion on CT  angiogram from earlier today. Acute onset of stroke symptoms.     TECHNIQUE: Noncontrast head CT scan was performed, reviewed, and  discussed with Dr. Menjivar of the stroke team at 7:26 p.m. Subsequently  CT angiogram head and neck with CT  perfusion were performed using 145 mL  of Isovue-370 IV contrast. Numerous multiplanar and 3-dimensional  reformatted images were produced elsewhere. Postcontrast and perfusion  images were discussed with Dr. Jasso at 7:44 p.m.     AI analysis of LVO was utilized.     Radiation dose reduction techniques were utilized, including automated  exposure control and exposure modulation based on body size.     FINDINGS: Noncontrast head CT images show normal caliber ventricles with  normal brain parenchyma and extra-axial spaces. No abnormal intracranial  contrast enhancement on head CT.     Numerous enhancing nodules are observed throughout the scalp and upper  face compatible with the known history of neurofibromatosis.     CT perfusion images are negative.     CT angiogram images show widely patent aortic arch, brachiocephalic and  proximal subclavian arteries and common carotid arteries. Internal and  external carotid arteries are widely patent with minimal carotid bulb  atheromatous change and no significant stenosis. The vertebral arteries  are also widely patent throughout their cervical and intracranial  course. The basilar artery, anterior, posterior, and middle cerebral  arteries and branches appear normal. Small posterior fossa branches  appear normal. No aneurysm or vascular cutoff is identified. Major  intracranial venous sinuses enhance as expected.     Advanced degenerative disc change at C3-4 with endplate osteophyte  posteriorly on the right creating at least moderately severe spinal  canal stenosis. Profound emphysematous change in the visualized lung  apices. Postoperative change from sternotomy.       Impression:      No acute abnormality identified on CT angiogram head and  neck, CT perfusion, or head CT with and without contrast. There are  numerous soft tissue nodules throughout the head and neck compatible  with known neurofibromatosis..     This report was finalized on 10/17/2023 9:20 PM by   Juanito Perea M.D on Workstation: [id number]       XR Chest 1 View [[id number]] Collected: 10/17/23 2007     Updated: 10/17/23 2011    Narrative:      PORTABLE CHEST X-RAY     HISTORY: Acute stroke symptoms. Type I neurofibromatosis. CT angiogram  chest earlier today demonstrated multiple intraluminal filling defects  in the left atrium.     Portable chest x-ray is correlated with CT angiogram chest from earlier  today and chest x-ray 10/31/2016.      FINDINGS: Sternal wires with cardiomegaly and extensive emphysematous  change in the upper lung zones. There is some prominence of the  interstitial markings commensurate with advanced emphysema. No focal  infiltrate, effusion, or pneumothorax.       Impression:      Cardiomegaly with marked emphysematous pulmonary parenchymal  change. No acute abnormality evident on x-ray.     This report was finalized on 10/17/2023 8:08 PM by Dr. Juanito Perea M.D on Workstation: [id number]               Results for orders placed during the hospital encounter of 12/03/20    Adult Transthoracic Echo Complete W/ Cont if Necessary Per Protocol    Interpretation Summary  · Calculated left ventricular EF = 55.1% Estimated left ventricular EF = 55% Estimated left ventricular EF was in agreement with the calculated left ventricular EF. Left ventricular systolic function is normal. Normal left ventricular cavity size noted. Left ventricular wall thickness is consistent with mild concentric hypertrophy. All left ventricular wall segments contract normally. Left ventricular diastolic function was indeterminate.  · Left atrial volume is severely increased.The right atrial cavity is severely dilated.  · There is mild thickening of the aortic valve.  · There is a 33 mm, porcine, [employer]tr[employer] bioprosthetic mitral valve present. The mitral valve peak and mean gradients are within defined limits. There is no significant prosthetic valve stenosis or regurgitation.  · No significant tricuspid valve  regurgitation or significant stenosis present. There is evidence of previous tricuspid valve repair.      ECG 12 Lead Altered Mental Status   Preliminary Result   HEART RATE= 73  bpm   RR Interval= 822  ms   DE Interval=   ms   P Horizontal Axis=   deg   P Front Axis=   deg   QRSD Interval= 128  ms   QT Interval= 424  ms   QTcB= 468  ms   QRS Axis= 109  deg   T Wave Axis= 21  deg   - ABNORMAL ECG -   Atrial fibrillation   Nonspecific intraventricular conduction delay   Baseline wander in lead(s) V4   Electronically Signed By:    Date and Time of Study: 2023-10-17 19:53:15           Assessment/Plan     Active Hospital Problems    Diagnosis  POA    **Diplopia [H53.2]  Yes    Acute CVA (cerebrovascular accident) [I63.9]  Yes    Left atrial mass [I51.89]  Yes    Gout [M10.9]  Yes    On anticoagulant therapy [Z79.01]  Not Applicable    Hypertension [I10]  Yes    Atrial fibrillation, chronic [I48.20]  Yes      Resolved Hospital Problems   No resolved problems to display.     CVA  With diplopia  Hypertension  -Lipid panel, A1c, TSH pending  -Continue metoprolol, otherwise hold home  antihypertensives for permissive hypertension  -Aspirin, statin, Eliquis  -Stroke consulted, patient recommendations  -Passed a bedside swallow    Vague rounded 5 cm mass in left atrium  -Seen on recent CT scan for lung cancer screening  -Outpatient CT chest done on 10/17 with contrast   -Echo pending  -Consider cardiology consult     Chronic atrial fibrillation  Hyperlipidemia  -Continue metoprolol, statin, Eliquis    Gout-continue home allopurinol        I discussed the patient's findings and my recommendations with patient and ED provider.    VTE Prophylaxis -  Eliquis .  Code Status - Full code.     Patient seen and evaluated before midnight.    Chandan Mary MD  [facility]  10/18/23  00:59 EDT

## 2023-10-18 NOTE — CONSULTS
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    History of Present Illness   This is a 63-year-old man with atrial fibrillation hypertension hyperlipidemia, tissue mitral valve replacement 2016 neurofibromatosis on chronic anticoagulation with Eliquis 5 mg every 12 hours.  The patient had a low-dose CT of the chest lung cancer screening 9/29/2023 which showed stable pulmonary nodule 6 mm in the periphery of the right middle lobe and a vague rounded area of decreased attenuation in a severely dilated left atrium.  A referral was made to cardiology.  The patient presented to ER on 9/17/2023 with complaints of double vision.  MRI of the brain shows infarct in the medial aspect of the thalamus 8 x 4 mm and an acute infarct anterolateral to the sylvian aqueduct 7 x 4 mm.  He has been started on heparin drip.  CT angiogram of the head and neck showed no clinically significant stenosis.    The patient is a poor historian.  He states that he takes his Eliquis \"every morning\" but forgets his evening dose perhaps once or twice a week?  He denies prior history of thrombosis.    Past Medical History:   Diagnosis Date    Arthritis     Atrial fibrillation     COPD (chronic obstructive pulmonary disease)     PT STATES NO LONGER ON INHALERS BUT IS BREATHING WELL    Degenerative lumbar disc     Emphysema with chronic bronchitis     H/O tricuspid valve repair 2016    MEDTRONIC ANNULOPLASTY RING    History of heart valve replacement with porcine valve 2016    MITRAL VALVE, DR COBURN    Hypertension     Low back pain     RADIATES DOWN BLE, WORSE ON RT, SOME NUMBNESS/TINGLING    Neurofibromatosis     " On anticoagulant therapy     Sleep apnea     DOES NOT USE CPAP    Spinal stenosis         Past Surgical History:   Procedure Laterality Date    CARDIAC CATHETERIZATION  10/20/2016    Procedure: Case Abort;  Surgeon: Zhen Ford MD;  Location: [facility] CATH INVASIVE LOCATION;  Service:     CARDIAC CATHETERIZATION Left 10/21/2016    Procedure: Cardiac catheterization;  Surgeon: Zhen Ford MD;  Location:  [facility] CATH INVASIVE LOCATION;  Service:     HERNIA REPAIR      LUMBAR LAMINECTOMY DISCECTOMY DECOMPRESSION Bilateral 11/5/2020    Procedure: Lumbar 4 - Lumbar 5 Laminectomy;  Surgeon: Marcos Christine MD;  Location: [facility] MAIN OR;  Service: Orthopedic Spine;  Laterality: Bilateral;    MITRAL VALVE REPAIR/REPLACEMENT N/A 10/27/2016    Procedure: INTRAOPERATIVE LILIA, MIDLINE STERNOTOMY WITH MITRAL VALVE REPLACEMENT TRICUSPID VALVE REPAIR;  Surgeon: Robert George MD;  Location: [facility] MAIN OR;  Service:     TEETH EXTRACTION N/A 10/25/2016    Procedure: TEETH EXTRACTION FULL MOUTH;  Surgeon: Gio Ibarra DMD;  Location: [facility] OR;  Service:         Current Facility-Administered Medications on File Prior to Encounter   Medication Dose Route Frequency Provider Last Rate Last Admin    [COMPLETED] iopamidol (ISOVUE-370) 76 % injection 100 mL  100 mL Intravenous Once in imaging Krishan Tian MD   100 mL at 10/17/23 1436     Current Outpatient Medications on File Prior to Encounter   Medication Sig Dispense Refill    acetaminophen (TYLENOL) 325 MG tablet Take 2 tablets by mouth Every 6 (Six) Hours As Needed for mild pain (1-3).  0    allopurinol (ZYLOPRIM) 300 MG tablet Take 1 tablet by mouth Daily. For gout prevention (Patient taking differently: Take 1 tablet by mouth Daily.) 30 tablet 11    amLODIPine (NORVASC) 10 MG tablet Take 1 tablet by mouth Daily. 30 tablet 11    apixaban (ELIQUIS) 5 MG tablet tablet Take 1 tablet by mouth 2 (Two) Times a Day. To decrease risk of stroke(do not take  with warfarin) (Patient taking differently: Take 1 tablet by mouth 2 (Two) Times a Day.) 180 tablet 0    fluticasone (FLONASE) 50 MCG/ACT nasal spray 2 sprays into the nostril(s) as directed by provider Daily. 15.8 mL 11    hydroCHLOROthiazide (HYDRODIURIL) 12.5 MG tablet Take 1 tablet by mouth Daily. 30 tablet 11    metoprolol succinate XL (TOPROL-XL) 100 MG 24 hr tablet Take 1 tablet by mouth Daily. 30 tablet 11    ramipril (ALTACE) 10 MG capsule Take 1 capsule by mouth Daily. 30 capsule 11    atorvastatin (LIPITOR) 40 MG tablet Take 1 tablet by mouth Daily. 30 tablet 11        ALLERGIES:  No Known Allergies     Social History     Socioeconomic History    Marital status: Single   Tobacco Use    Smoking status: Every Day     Packs/day: 0.50     Years: 35.00     Additional pack years: 0.00     Total pack years: 17.50     Types: Cigarettes    Smokeless tobacco: Never    Tobacco comments:     PT STATES DOWN TO 1/2 TO 1/3 PPD FROM 1PPD   Vaping Use    Vaping Use: Never used   Substance and Sexual Activity    Alcohol use: Yes     Alcohol/week: 2.0 standard drinks of alcohol     Types: 2 Shots of liquor per week     Comment: Caffeine - Soft Drinks daily    Drug use: No    Sexual activity: Defer        Family History   Problem Relation Age of Onset    Heart disease Father     Hypertension Father     Malig Hyperthermia Neg Hx         Review of Systems   HENT: Negative.     Eyes:  Positive for visual disturbance.   Cardiovascular:  Negative for chest pain, palpitations and leg swelling.   Gastrointestinal:  Negative for abdominal pain, nausea and vomiting.   Skin:         Neuro fibromas   Hematological:  Does not bruise/bleed easily.          Objective     Vitals:    10/18/23 1300 10/18/23 1305 10/18/23 1310 10/18/23 1315   BP: 113/71 113/70  120/77   BP Location:       Patient Position:       Pulse: 72 72 74 67   Resp: 16 16 16 16   Temp:       TempSrc:       SpO2: 95% 94% 94% 96%   Weight:       Height:              No  data to display                Physical Exam    CONSTITUTIONAL: pleasant well-developed adult man  HEENT: no icterus, no thrush, moist membranes  LYMPH: no cervical or supraclavicular lad  RESP: cta bilat, no wheezing, no rales  GI: soft, non-tender, no splenomegaly, +bs  MUSC: no edema, normal gait  NEURO: alert and oriented x3, normal strength  PSYCH: normal mood poor historian  Skin: Neurofibromas  RECENT LABS:  Hematology WBC   Date Value Ref Range Status   10/18/2023 5.87 3.40 - 10.80 10*3/mm3 Final     RBC   Date Value Ref Range Status   10/18/2023 4.75 4.14 - 5.80 10*6/mm3 Final     Hemoglobin   Date Value Ref Range Status   10/18/2023 16.1 13.0 - 17.7 g/dL Final     Hematocrit   Date Value Ref Range Status   10/18/2023 46.5 37.5 - 51.0 % Final     Platelets   Date Value Ref Range Status   10/18/2023 131 (L) 140 - 450 10*3/mm3 Final        Lab Results   Component Value Date    GLUCOSE 90 10/18/2023    BUN 10 10/18/2023    CREATININE 0.75 (L) 10/18/2023    EGFRRESULT 77.0 09/26/2023    EGFR 100.1 10/18/2023    BCR 13.3 10/18/2023    K 3.9 10/18/2023    CO2 24.4 10/18/2023    CALCIUM 8.8 10/18/2023    PROTENTOTREF 7.4 09/26/2023    ALBUMIN 3.9 10/18/2023    BILITOT 0.6 10/18/2023    AST 13 10/18/2023    ALT 21 10/18/2023     MRI brain with and without contrast  IMPRESSION:  1.  There is an acute infarct involving the medial aspect of the  thalamus on the left anteriorly measuring 8 x 4 mm in size and an acute  infarct anterolateral to the sylvian aqueduct to the right measuring 7 x  4 mm in size.  2.  Interval nodules involving the scalp are noted as described above,  the largest of which overlies the anterior and medial aspect of the left  orbit measuring approximately 2.7 cm in size.  3.  Small vessel ischemic disease and lacunar infarct are noted. There  is no evidence of abnormal intra-axial enhancement.    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  Previous cardiology notes indicate adverse reaction to Lovenox but patient unclear.  Also unclear how adherent to his anticoagulation dosing is.  I will check antiphospholipid antibody labs.  Agree with LILIA and further work-up per cardiology and cardiothoracic surgery.  Discussed w dr [place]ana

## 2023-10-18 NOTE — CONSULTS
Patient Care Team:  Epley, James, APRN as PCP - General (Nurse Practitioner)  Maikol Remy MD as Consulting Physician (Cardiology)    Chief complaint stroke    Subjective     History of Present Illness  Patient is a 63 y.o. male with a past medical history including COPD, atrial fibrillation, hx of MVR/TVr with Dr. George in 2016, and hypertension who presented to the [facility] with complaints of double vision.  He states he has been in his usual health up until the visual changes.  He underwent an outpatient CT of the chest with contrast which revealed a 5cm left atrial mass.  He had recently been switched from warfarin to eliquis last month.  Neurology was consulted for evaluation for the vision changes.  A acute infarct midbrain and left thalamic stroke was noted.  Cardiology was consulted for cardioembolic evaluation.  He underwent a LILIA today which revealed a large mass in the left atrium.    We were consulted for cardiac surgery evaluation.        Review of Systems   Constitutional:  Positive for fatigue.   Eyes:  Positive for visual disturbance.   Neurological:  Positive for weakness and numbness.        Past Medical History:   Diagnosis Date    Arthritis     Atrial fibrillation     COPD (chronic obstructive pulmonary disease)     PT STATES NO LONGER ON INHALERS BUT IS BREATHING WELL    Degenerative lumbar disc     Emphysema with chronic bronchitis     H/O tricuspid valve repair 2016    MEDTRONIC ANNULOPLASTY RING    History of heart valve replacement with porcine valve 2016    MITRAL VALVE, DR GEORGE    Hypertension     Low back pain     RADIATES DOWN BLE, WORSE ON RT, SOME NUMBNESS/TINGLING    Neurofibromatosis     On anticoagulant therapy     Sleep apnea     DOES NOT USE CPAP    Spinal stenosis      Past Surgical History:   Procedure Laterality Date    CARDIAC CATHETERIZATION  10/20/2016    Procedure: Case Abort;  Surgeon: Zhen Ford MD;  Location: [facility] INVASIVE LOCATION;   Service:     CARDIAC CATHETERIZATION Left 10/21/2016    Procedure: Cardiac catheterization;  Surgeon: Zhen Ford MD;  Location: [facility] CATH INVASIVE LOCATION;  Service:     HERNIA REPAIR      LUMBAR LAMINECTOMY DISCECTOMY DECOMPRESSION Bilateral 11/5/2020    Procedure: Lumbar 4 - Lumbar 5 Laminectomy;  Surgeon: Marcos Christine MD;  Location: [facility] MAIN OR;  Service: Orthopedic Spine;  Laterality: Bilateral;    MITRAL VALVE REPAIR/REPLACEMENT N/A 10/27/2016    Procedure: INTRAOPERATIVE LILIA, MIDLINE STERNOTOMY WITH MITRAL VALVE REPLACEMENT TRICUSPID VALVE REPAIR;  Surgeon: Robert George MD;  Location: [facility] MAIN OR;  Service:     TEETH EXTRACTION N/A 10/25/2016    Procedure: TEETH EXTRACTION FULL MOUTH;  Surgeon: Gio Ibarra DMD;  Location: [facility] OR;  Service:      Family History   Problem Relation Age of Onset    Heart disease Father     Hypertension Father     Malig Hyperthermia Neg Hx      Social History     Tobacco Use    Smoking status: Every Day     Packs/day: 0.50     Years: 35.00     Additional pack years: 0.00     Total pack years: 17.50     Types: Cigarettes    Smokeless tobacco: Never    Tobacco comments:     PT STATES DOWN TO 1/2 TO 1/3 PPD FROM 1PPD   Vaping Use    Vaping Use: Never used   Substance Use Topics    Alcohol use: Yes     Alcohol/week: 2.0 standard drinks of alcohol     Types: 2 Shots of liquor per week     Comment: Caffeine - Soft Drinks daily    Drug use: No     Medications Prior to Admission   Medication Sig Dispense Refill Last Dose    acetaminophen (TYLENOL) 325 MG tablet Take 2 tablets by mouth Every 6 (Six) Hours As Needed for mild pain (1-3).  0     allopurinol (ZYLOPRIM) 300 MG tablet Take 1 tablet by mouth Daily. For gout prevention (Patient taking differently: Take 1 tablet by mouth Daily.) 30 tablet 11     amLODIPine (NORVASC) 10 MG tablet Take 1 tablet by mouth Daily. 30 tablet 11     apixaban (ELIQUIS) 5 MG tablet tablet Take 1 tablet by mouth 2  "(Two) Times a Day. To decrease risk of stroke(do not take with warfarin) (Patient taking differently: Take 1 tablet by mouth 2 (Two) Times a Day.) 180 tablet 0     fluticasone (FLONASE) 50 MCG/ACT nasal spray 2 sprays into the nostril(s) as directed by provider Daily. 15.8 mL 11     hydroCHLOROthiazide (HYDRODIURIL) 12.5 MG tablet Take 1 tablet by mouth Daily. 30 tablet 11     metoprolol succinate XL (TOPROL-XL) 100 MG 24 hr tablet Take 1 tablet by mouth Daily. 30 tablet 11     ramipril (ALTACE) 10 MG capsule Take 1 capsule by mouth Daily. 30 capsule 11     atorvastatin (LIPITOR) 40 MG tablet Take 1 tablet by mouth Daily. 30 tablet 11      allopurinol, 300 mg, Oral, Daily  aspirin, 325 mg, Oral, Daily   Or  aspirin, 300 mg, Rectal, Daily  atorvastatin, 80 mg, Oral, Nightly  budesonide-formoterol, 2 puff, Inhalation, BID - RT  famotidine, 20 mg, Intravenous, Q12H  metoprolol succinate XL, 100 mg, Oral, Daily  tiotropium bromide monohydrate, 2 puff, Inhalation, Daily - RT      Allergies:  Patient has no known allergies.    Objective      Vital Signs  Temp:  [97.7 °F (36.5 °C)-98 °F (36.7 °C)] 98 °F (36.7 °C)  Heart Rate:  [67-79] 68  Resp:  [16-18] 16  BP: (110-149)/(70-93) 110/71    Flowsheet Rows      Flowsheet Row First Filed Value   Admission Height 180.3 cm (71\") Documented at 10/17/2023 1858   Admission Weight 92.1 kg (203 lb) Documented at 10/17/2023 1858          180.3 cm (71\")    Physical Exam  Vitals and nursing note reviewed.   Constitutional:       Appearance: Normal appearance. He is obese.   Eyes:      General: No scleral icterus.  Cardiovascular:      Rate and Rhythm: Normal rate.   Pulmonary:      Effort: Pulmonary effort is normal.   Neurological:      Mental Status: He is alert.         Results Review:   Lab Results (last 24 hours)       Procedure Component Value Units Date/Time    Protime-INR [[id number]]  (Abnormal) Collected: 10/18/23 1025    Specimen: Blood Updated: 10/18/23 1057     Protime 15.0 " Seconds      INR 1.16    aPTT [[id number]]  (Normal) Collected: 10/18/23 1025    Specimen: Blood Updated: 10/18/23 1057     PTT 32.3 seconds     TSH [[id number]]  (Normal) Collected: 10/18/23 0534    Specimen: Blood Updated: 10/18/23 0631     TSH 0.818 uIU/mL     Comprehensive Metabolic Panel [[id number]]  (Abnormal) Collected: 10/18/23 0534    Specimen: Blood Updated: 10/18/23 0627     Glucose 90 mg/dL      BUN 10 mg/dL      Creatinine 0.75 mg/dL      Sodium 138 mmol/L      Potassium 3.9 mmol/L      Chloride 105 mmol/L      CO2 24.4 mmol/L      Calcium 8.8 mg/dL      Total Protein 6.2 g/dL      Albumin 3.9 g/dL      ALT (SGPT) 21 U/L      AST (SGOT) 13 U/L      Alkaline Phosphatase 76 U/L      Total Bilirubin 0.6 mg/dL      Globulin 2.3 gm/dL      A/G Ratio 1.7 g/dL      BUN/Creatinine Ratio 13.3     Anion Gap 8.6 mmol/L      eGFR 100.1 mL/min/1.73     Narrative:      GFR Normal >60  Chronic Kidney Disease <60  Kidney Failure <15      Lipid Panel [[id number]]  (Abnormal) Collected: 10/18/23 0534    Specimen: Blood Updated: 10/18/23 0625     Total Cholesterol 173 mg/dL      Triglycerides 137 mg/dL      HDL Cholesterol 47 mg/dL      LDL Cholesterol  102 mg/dL      VLDL Cholesterol 24 mg/dL      LDL/HDL Ratio 2.10    Narrative:      Cholesterol Reference Ranges  (U.S. Department of Health and Human Services ATP III Classifications)    Desirable          <200 mg/dL  Borderline High    200-239 mg/dL  High Risk          >240 mg/dL      Triglyceride Reference Ranges  (U.S. Department of Health and Human Services ATP III Classifications)    Normal           <150 mg/dL  Borderline High  150-199 mg/dL  High             200-499 mg/dL  Very High        >500 mg/dL    HDL Reference Ranges  (U.S. Department of Health and Human Services ATP III Classifications)    Low     <40 mg/dl (major risk factor for CHD)  High    >60 mg/dl ('negative' risk factor for CHD)        LDL Reference Ranges  (U.S. Department of Health and Human Services  ATP III Classifications)    Optimal          <100 mg/dL  Near Optimal     100-129 mg/dL  Borderline High  130-159 mg/dL  High             160-189 mg/dL  Very High        >189 mg/dL    Hemoglobin A1c [[id number]]  (Abnormal) Collected: 10/18/23 0534    Specimen: Blood Updated: 10/18/23 0624     Hemoglobin A1C 5.70 %     Narrative:      Hemoglobin A1C Ranges:    Increased Risk for Diabetes  5.7% to 6.4%  Diabetes                     >= 6.5%  Diabetic Goal                < 7.0%    CBC (No Diff) [[id number]]  (Abnormal) Collected: 10/18/23 0534    Specimen: Blood Updated: 10/18/23 0615     WBC 5.87 10*3/mm3      RBC 4.75 10*6/mm3      Hemoglobin 16.1 g/dL      Hematocrit 46.5 %      MCV 97.9 fL      MCH 33.9 pg      MCHC 34.6 g/dL      RDW 13.3 %      RDW-SD 47.2 fl      MPV 8.5 fL      Platelets 131 10*3/mm3     POC Glucose Once [[id number]]  (Normal) Collected: 10/18/23 0602    Specimen: Blood Updated: 10/18/23 0603     Glucose 90 mg/dL     POC Glucose Once [[id number]]  (Normal) Collected: 10/18/23 0033    Specimen: Blood Updated: 10/18/23 0035     Glucose 97 mg/dL     [place] Draw [[id number]] Collected: 10/17/23 1912    Specimen: Blood Updated: 10/17/23 2015    Narrative:      The following orders were created for panel order [place] Draw.  Procedure                               Abnormality         Status                     ---------                               -----------         ------                     Green Top (Gel)[[id number]]                                  Final result               Lavender Top[[id number]]                                     Final result               Gold Top - SST[[id number]]                                   Final result               Light Blue Top[[id number]]                                   Final result                 Please view results for these tests on the individual orders.    Lavender Top [[id number]] Collected: 10/17/23 1912    Specimen: Blood Updated: 10/17/23 2015     Extra  Tube hold for add-on     Comment: Auto resulted       Light Blue Top [[id number]] Collected: 10/17/23 1912    Specimen: Blood Updated: 10/17/23 2015     Extra Tube Hold for add-ons.     Comment: Auto resulted       Green Top (Gel) [[id number]] Collected: 10/17/23 1912    Specimen: Blood Updated: 10/17/23 2015     Extra Tube Hold for add-ons.     Comment: Auto resulted.       Gold Top - SST [[id number]] Collected: 10/17/23 1912    Specimen: Blood Updated: 10/17/23 2015     Extra Tube Hold for add-ons.     Comment: Auto resulted.       Comprehensive Metabolic Panel [[id number]]  (Abnormal) Collected: 10/17/23 1912    Specimen: Blood Updated: 10/17/23 1940     Glucose 101 mg/dL      BUN 14 mg/dL      Creatinine 0.95 mg/dL      Sodium 133 mmol/L      Potassium 4.1 mmol/L      Chloride 97 mmol/L      CO2 26.0 mmol/L      Calcium 9.8 mg/dL      Total Protein 7.4 g/dL      Albumin 4.6 g/dL      ALT (SGPT) 28 U/L      AST (SGOT) 17 U/L      Alkaline Phosphatase 88 U/L      Total Bilirubin 0.5 mg/dL      Globulin 2.8 gm/dL      A/G Ratio 1.6 g/dL      BUN/Creatinine Ratio 14.7     Anion Gap 10.0 mmol/L      eGFR 88.8 mL/min/1.73     Narrative:      GFR Normal >60  Chronic Kidney Disease <60  Kidney Failure <15      Single High Sensitivity Troponin T [[id number]]  (Normal) Collected: 10/17/23 1912    Specimen: Blood Updated: 10/17/23 1940     HS Troponin T 10 ng/L     Narrative:      High Sensitive Troponin T Reference Range:  <10.0 ng/L- Negative Female for AMI  <15.0 ng/L- Negative Male for AMI  >=10 - Abnormal Female indicating possible myocardial injury.  >=15 - Abnormal Male indicating possible myocardial injury.   Clinicians would have to utilize clinical acumen, EKG, Troponin, and serial changes to determine if it is an Acute Myocardial Infarction or myocardial injury due to an underlying chronic condition.         Protime-INR [[id number]]  (Abnormal) Collected: 10/17/23 1912    Specimen: Blood Updated: 10/17/23 1934      Protime 14.5 Seconds      INR 1.11    aPTT [[id number]]  (Normal) Collected: 10/17/23 1912    Specimen: Blood Updated: 10/17/23 1934     PTT 31.2 seconds     CBC & Differential [[id number]]  (Abnormal) Collected: 10/17/23 1912    Specimen: Blood Updated: 10/17/23 1926    Narrative:      The following orders were created for panel order CBC & Differential.  Procedure                               Abnormality         Status                     ---------                               -----------         ------                     CBC Auto Differential[[id number]]        Abnormal            Final result                 Please view results for these tests on the individual orders.    CBC Auto Differential [[id number]]  (Abnormal) Collected: 10/17/23 1912    Specimen: Blood Updated: 10/17/23 1926     WBC 7.76 10*3/mm3      RBC 5.01 10*6/mm3      Hemoglobin 17.5 g/dL      Hematocrit 49.4 %      MCV 98.6 fL      MCH 34.9 pg      MCHC 35.4 g/dL      RDW 13.4 %      RDW-SD 47.6 fl      MPV 8.1 fL      Platelets 137 10*3/mm3      Neutrophil % 71.4 %      Lymphocyte % 17.5 %      Monocyte % 8.2 %      Eosinophil % 1.7 %      Basophil % 0.8 %      Immature Grans % 0.4 %      Neutrophils, Absolute 5.54 10*3/mm3      Lymphocytes, Absolute 1.36 10*3/mm3      Monocytes, Absolute 0.64 10*3/mm3      Eosinophils, Absolute 0.13 10*3/mm3      Basophils, Absolute 0.06 10*3/mm3      Immature Grans, Absolute 0.03 10*3/mm3      nRBC 0.0 /100 WBC                 Assessment & Plan       Diplopia    Atrial fibrillation, chronic    Hypertension    On anticoagulant therapy    Gout    Left atrial mass    Acute CVA (cerebrovascular accident)      Assessment & Plan    -Left atrial mass   -Acute CVA  -hx of MVR, TV repair- 2016  -COPD/profoundly advanced emphysematous changes on CT scan  -Chronic atrial fibrillation- on chronic anticoagulation eliqius  -Hypertension  -Hyperlipidemia  -current tobacco abuse      His lungs are terrible.  Uncertain if he  would be able to tolerate an open heart operation  with the advanced lung disease. Will ask pulmonary to evaluate pulmonary risk for surgery.  Further recommendations to follow.     Thank you for allowing us to participate in the care of this patient.      TIFFANIE De Luna  10/18/23  13:24 EDT

## 2023-10-18 NOTE — PLAN OF CARE
Goal Outcome Evaluation:  Plan of Care Reviewed With: patient           Outcome Evaluation: Pt admitted to [facility] for c/o blurry double-vision. Imaging (+) for acute CVA. Lives w/ his mother in the basement of a 1 story home, travels to main level to use utilites. (I) w/ BADLs and mobility at baseline. Pt reports no numbness/tingling of BUEs. BUE strength/ROM WFL. UIC upon arrival. Performing STS transfers w/ CGA. Requiring CGA<>Min A for functional mobility 2/2 decreased balance during turns. Min A for UB grooming/hygiene @ sink 2/2 visual impairments and balance. Pt presents w/ decreased balance/activity tolerance and visual impairment which limit ability to perform ADLs near reported baseline. OT will f/u to address stated deficits. Anticipate SNF at d/c.      Anticipated Discharge Disposition (OT): skilled nursing facility

## 2023-10-18 NOTE — CONSULTS
Patient Identification:  Joaquín Garcia Jr.  63 y.o.  male  1960  [id number]          LOS 0    Requesting physician: Dr George    Reason for Consultation: COPD with emphysema and preoperative assessment    History of Present Illness:   63-year-old persistent smoker admitted by hospitalist service with acute stroke with diplopia and left atrial mass.  Has not seen a pulmonologist previously and is not on any inhaled medications at home.  CT scan shows significant upper lobe predominant emphysema.  Previously on Coumadin but this was switched to Eliquis about a month ago.  Has a history of heart valve replacement in 2016.  Discussed with Dr. George.    Past Medical History:  Past Medical History:   Diagnosis Date    Arthritis     Atrial fibrillation     COPD (chronic obstructive pulmonary disease)     PT STATES NO LONGER ON INHALERS BUT IS BREATHING WELL    Degenerative lumbar disc     Emphysema with chronic bronchitis     H/O tricuspid valve repair 2016    MEDTRONIC ANNULOPLASTY RING    History of heart valve replacement with porcine valve 2016    MITRAL VALVE, DR GEORGE    Hypertension     Low back pain     RADIATES DOWN BLE, WORSE ON RT, SOME NUMBNESS/TINGLING    Neurofibromatosis     On anticoagulant therapy     Sleep apnea     DOES NOT USE CPAP    Spinal stenosis        Past Surgical History:  Past Surgical History:   Procedure Laterality Date    CARDIAC CATHETERIZATION  10/20/2016    Procedure: Case Abort;  Surgeon: Zhen Ford MD;  Location: [facility] CATH INVASIVE LOCATION;  Service:     CARDIAC CATHETERIZATION Left 10/21/2016    Procedure: Cardiac catheterization;  Surgeon: Zhen Ford MD;  Location: [facility] CATH INVASIVE LOCATION;  Service:     HERNIA REPAIR      LUMBAR LAMINECTOMY DISCECTOMY DECOMPRESSION Bilateral 11/5/2020    Procedure: Lumbar 4 - Lumbar 5 Laminectomy;  Surgeon: Marcos Christine MD;  Location: [facility] OR;  Service: Orthopedic Spine;  Laterality:  OB FOLLOW UP    Jeanne Bauman is a 36 y.o.  25w2d patient being seen today for her obstetrical follow up visit. Patient reports fatigue and headache. Follow up US performed today and normal. These results explained and all questions answered.     Her prenatal care is complicated by (and status) :    Patient Active Problem List   Diagnosis   • Lipoma of anterior chest wall   • Insomnia   • Anxiety   • Heart palpitations   • FH: factor V Leiden mutation   • Abnormal mammogram of left breast   • Pregnancy   • Multigravida of advanced maternal age in first trimester   • Multigravida of advanced maternal age in first trimester   • AMA (advanced maternal age) multigravida 35+       ROS -   Patient Reports : No Problems  Patient Denies: Loss of Fluid, Vaginal Spotting, Vision Changes, Headaches and Cramping/Contractions   Fetal Movement : normal      /64   Wt 63.1 kg (139 lb 3.2 oz)   LMP 2020   BMI 22.47 kg/m²     Ultrasound Today: yes    EXAM:  Vitals: See prenatal flowsheet   Abdomen: Gravid, nontender to palpation   Urine glucose/protein: See prenatal flowsheet   Pelvic: See prenatal flowsheet     Assessment    1. Pregnancy at 25w2d  2. Fetal status reassuring     Problem List Items Addressed This Visit        Other    Pregnancy - Primary    Relevant Orders    POC Urinalysis Dipstick (Completed)    AMA (advanced maternal age) multigravida 35+          Plan    1. Fetal movement/ Labor Precautions  2. Fetal movement/PTL or Labor precautions  3. Reviewed routine prenatal care with the office and educational materials given  4. Patient is on Prenatal vitamins  5. Follow up: 3 week(s)      Petey Mccarty MD  2021   Bilateral;    MITRAL VALVE REPAIR/REPLACEMENT N/A 10/27/2016    Procedure: INTRAOPERATIVE LILIA, MIDLINE STERNOTOMY WITH MITRAL VALVE REPLACEMENT TRICUSPID VALVE REPAIR;  Surgeon: Robert George MD;  Location: [facility] OR;  Service:     TEETH EXTRACTION N/A 10/25/2016    Procedure: TEETH EXTRACTION FULL MOUTH;  Surgeon: Gio Ibarra DMD;  Location: [facility];  Service:         Home Meds:  Medications Prior to Admission   Medication Sig Dispense Refill Last Dose    acetaminophen (TYLENOL) 325 MG tablet Take 2 tablets by mouth Every 6 (Six) Hours As Needed for mild pain (1-3).  0     allopurinol (ZYLOPRIM) 300 MG tablet Take 1 tablet by mouth Daily. For gout prevention (Patient taking differently: Take 1 tablet by mouth Daily.) 30 tablet 11     amLODIPine (NORVASC) 10 MG tablet Take 1 tablet by mouth Daily. 30 tablet 11     apixaban (ELIQUIS) 5 MG tablet tablet Take 1 tablet by mouth 2 (Two) Times a Day. To decrease risk of stroke(do not take with warfarin) (Patient taking differently: Take 1 tablet by mouth 2 (Two) Times a Day.) 180 tablet 0     fluticasone (FLONASE) 50 MCG/ACT nasal spray 2 sprays into the nostril(s) as directed by provider Daily. 15.8 mL 11     hydroCHLOROthiazide (HYDRODIURIL) 12.5 MG tablet Take 1 tablet by mouth Daily. 30 tablet 11     metoprolol succinate XL (TOPROL-XL) 100 MG 24 hr tablet Take 1 tablet by mouth Daily. 30 tablet 11     ramipril (ALTACE) 10 MG capsule Take 1 capsule by mouth Daily. 30 capsule 11     atorvastatin (LIPITOR) 40 MG tablet Take 1 tablet by mouth Daily. 30 tablet 11          Allergies:  No Known Allergies    Social History:   Social History     Socioeconomic History    Marital status: Single   Tobacco Use    Smoking status: Every Day     Packs/day: 0.50     Years: 35.00     Additional pack years: 0.00     Total pack years: 17.50     Types: Cigarettes    Smokeless tobacco: Never    Tobacco comments:     PT STATES DOWN TO 1/2 TO 1/3 PPD FROM 1PPD   Vaping  "Use    Vaping Use: Never used   Substance and Sexual Activity    Alcohol use: Yes     Alcohol/week: 2.0 standard drinks of alcohol     Types: 2 Shots of liquor per week     Comment: Caffeine - Soft Drinks daily    Drug use: No    Sexual activity: Defer       Family History:  Family History   Problem Relation Age of Onset    Heart disease Father     Hypertension Father     Malig Hyperthermia Neg Hx        Review of Systems:  Denies fevers or chills  Denies nausea or vomiting  No new vision or hearing changes  No chest pain  shortness of breath  No diarrhea, hematemesis or hematochezia, no dysuria or frequency  No musculoskeletal complaints  No heat or cold intolerance  No skin rashes  No dizziness or confusion.  No seizure activity  No new anxiety or depression  12 system review of systems performed and all else negative    Objective:    PHYSICAL EXAM:    /79   Pulse 75   Temp 98 °F (36.7 °C) (Oral)   Resp 18   Ht 180 cm (70.87\")   Wt 91 kg (200 lb 9.9 oz)   SpO2 91%   BMI 28.09 kg/m²  Body mass index is 28.09 kg/m². 91% 91 kg (200 lb 9.9 oz)    GENERAL APPEARANCE:   Well developed  Well nourished  No acute distress   EYES:    PERRL                                                                           Conjunctivae normal  Sclerae nonicteric.  HENT:   Atraumatic, normocephalic  External ears and nose normal  Moist mucous membranes and no ulcers  NECK:  Thyroid not enlarged  Trachea midline   RESPIRATORY:    Nonlabored breathing   Normal breath sounds  No rales. No wheezing  No dullness  CARDIOVASCULAR:    RRR  Normal S1, S2  No murmur  Lower extremity edema: none    GI:   Bowel sounds normal  Abdomen soft , nondistended, nontender  No abdominal masses  MUSCULOSKELETAL:  Normal movement of extremities  No tenderness, no deformities  No clubbing or cyanosis   Skin:    No visible rashes  No palpable nodules  Cap refill normal.  No mottling.   PSYCHIATRIC:  Speech and behavior appropriate  Normal mood and " affect  Oriented to person, place and time  NEUROLOGIC:  Cranial nerves II through XII grossly intact.  Sensation intact.      Lab Review:      Results from last 7 days   Lab Units 10/18/23  0534 10/17/23  1912   WBC 10*3/mm3 5.87 7.76   HEMOGLOBIN g/dL 16.1 17.5   HEMATOCRIT % 46.5 49.4   PLATELETS 10*3/mm3 131* 137*     Results from last 7 days   Lab Units 10/18/23  0534 10/17/23  1912   SODIUM mmol/L 138 133*   POTASSIUM mmol/L 3.9 4.1   CHLORIDE mmol/L 105 97*   CO2 mmol/L 24.4 26.0   BUN mg/dL 10 14   CREATININE mg/dL 0.75* 0.95   CALCIUM mg/dL 8.8 9.8   BILIRUBIN mg/dL 0.6 0.5   ALK PHOS U/L 76 88   ALT (SGPT) U/L 21 28   AST (SGOT) U/L 13 17   GLUCOSE mg/dL 90 101*                    Imaging reviewed  chest X-ray shows cardiomegaly with significant emphysematous changes.    CT chest 10/17 reviewed shows large filling defects within the chronically dilated left atrium.  Significant emphysema is seen.             Assessment:  COPD with severe emphysema  Left atrial filling defects with severely dilated left atrium  History of valve replacement 2016  Acute stroke with diplopia  BRANDIE: Noncompliant with CPAP  Hypertension  Persistent smoker        Recommendations:  Significant upper lobe predominant emphysema.  Given the amount and severity of blebs he is at a high risk for a complication from a pulmonary standpoint.  We will get pulmonary function testing to further evaluate.  Scheduled and as needed bronchodilators for severe COPD and emphysema.  Try to optimize lung function as best as we can.      Thank you for allowing me to participate in the care of this patient.  I will continue to follow along with you.      Joseph Jackson MD  [facility], [facility]  Pulmonary and Critical Care Medicine    10/18/2023  14:32 EDT

## 2023-10-18 NOTE — THERAPY EVALUATION
Patient Name: Joaquín Garcia Jr.  : 1960    MRN: [id number]                              Today's Date: 10/18/2023       Admit Date: 10/17/2023    Visit Dx:     ICD-10-CM ICD-9-CM   1. Acute CVA (cerebrovascular accident)  I63.9 434.91   2. Dysconjugate gaze  H51.8 378.87   3. Chronic anticoagulation  Z79.01 V58.61     Patient Active Problem List   Diagnosis    Supraventricular tachycardia    NF (neurofibromatosis)    COPD (chronic obstructive pulmonary disease)    Acute hypoxemic respiratory failure    MR (mitral regurgitation)    COPD exacerbation    Acute respiratory failure with hypoxemia    Rectus sheath hematoma    Alcoholism    Atrial fibrillation, chronic    Toe pain, right    S/P MVR (mitral valve replacement)    Hypertension    S/P TVR (tricuspid valve repair)    Abnormal electrocardiogram    Benign essential hypertension    Anxiety    Prostate cancer screening    Colon cancer screening    Need for hepatitis C screening test    Knee strain, left, initial encounter    Acute midline low back pain with bilateral sciatica    Spinal stenosis of lumbar region with neurogenic claudication    On anticoagulant therapy    Tobacco abuse    Gout    Daytime somnolence    Left atrial mass    Acute CVA (cerebrovascular accident)    Diplopia     Past Medical History:   Diagnosis Date    Arthritis     Atrial fibrillation     COPD (chronic obstructive pulmonary disease)     PT STATES NO LONGER ON INHALERS BUT IS BREATHING WELL    Degenerative lumbar disc     Emphysema with chronic bronchitis     H/O tricuspid valve repair 2016    MEDTRONIC ANNULOPLASTY RING    History of heart valve replacement with porcine valve 2016    MITRAL VALVE, DR COBURN    Hypertension     Low back pain     RADIATES DOWN BLE, WORSE ON RT, SOME NUMBNESS/TINGLING    Neurofibromatosis     On anticoagulant therapy     Sleep apnea     DOES NOT USE CPAP    Spinal stenosis      Past Surgical History:   Procedure Laterality Date    CARDIAC  CATHETERIZATION  10/20/2016    Procedure: Case Abort;  Surgeon: Zhen Ford MD;  Location: [facility] CATH INVASIVE LOCATION;  Service:     CARDIAC CATHETERIZATION Left 10/21/2016    Procedure: Cardiac catheterization;  Surgeon: Zhen Ford MD;  [facility];  Service:     HERNIA REPAIR      LUMBAR LAMINECTOMY DISCECTOMY DECOMPRESSION Bilateral 11/5/2020    Procedure: Lumbar 4 - Lumbar 5 Laminectomy;  Surgeon: Marcos Christine MD;  Location: [facility] MAIN OR;  Service: Orthopedic Spine;  Laterality: Bilateral;    MITRAL VALVE REPAIR/REPLACEMENT N/A 10/27/2016    Procedure: INTRAOPERATIVE LILIA, MIDLINE STERNOTOMY WITH MITRAL VALVE REPLACEMENT TRICUSPID VALVE REPAIR;  Surgeon: Robert George MD;  Location: [facility] OR;  Service:     TEETH EXTRACTION N/A 10/25/2016    Procedure: TEETH EXTRACTION FULL MOUTH;  Surgeon: Gio Ibarra DMD;  Location: [facility] OR;  Service:       General Information       Row Name 10/18/23 1215          OT Time and Intention    Document Type evaluation  -KG     Mode of Treatment individual therapy;occupational therapy  -KG       Row Name 10/18/23 1215          General Information    Patient Profile Reviewed yes  -KG     Prior Level of Function --  (I) w/ BADLs and functional mobility  -KG     Existing Precautions/Restrictions no known precautions/restrictions  -KG     Barriers to Rehab none identified  -KG       Row Name 10/18/23 1215          Living Environment    People in Home --  Lives in the basement of a 1 story home w/ his mother. Travels to main [facility] for utilities.  -KG       Row Name 10/18/23 1215          Cognition    Orientation Status (Cognition) oriented x 3  -KG       Row Name 10/18/23 1215          Safety Issues, Functional Mobility    Safety Issues Affecting Function (Mobility) impulsivity;insight into deficits/self-awareness  -KG     Impairments Affecting Function (Mobility) balance;coordination;endurance/activity  tolerance;strength  -KG               User Key  (r) = Recorded By, (t) = Taken By, (c) = Cosigned By      Initials Name Provider Type    Laith Farrar OT Occupational Therapist                     Mobility/ADL's       Row Name 10/18/23 1216          Transfers    Transfers sit-stand transfer;stand-sit transfer  -KG       Row Name 10/18/23 1216          Sit-Stand Transfer    Sit-Stand [place] (Transfers) contact guard  -KG     Assistive Device (Sit-Stand Transfers) walker, front-wheeled  -KG       Row Name 10/18/23 1216          Stand-Sit Transfer    Stand-Sit [place] (Transfers) contact guard  -KG     Assistive Device (Stand-Sit Transfers) walker, front-wheeled  -KG       Row Name 10/18/23 1216          Functional Mobility    Functional Mobility- Ind. Level --  Performed functional mobility w/ CGA from EOB to bathroom, and from bathroom to EOB w/ CGA<>Min A using RW. 2 LOB during turns needing Min A and verbal cueing to correct.  -KG     Functional Mobility- Safety Issues balance decreased during turns  -KG       Row Name 10/18/23 1216          Activities of Daily Living    BADL Assessment/Intervention grooming;toileting  -KG       Row Name 10/18/23 1216          Grooming Assessment/Training    [place] Level (Grooming) minimum assist (75% patient effort)  -KG     Position (Grooming) sink side  -KG       Row Name 10/18/23 1216          Toileting Assessment/Training    [place] Level (Toileting) toileting skills;contact guard assist  -KG     Position (Toileting) supported standing  -KG               User Key  (r) = Recorded By, (t) = Taken By, (c) = Cosigned By      Initials Name Provider Type    Laith Farrar OT Occupational Therapist                   Obj/Interventions       Row Name 10/18/23 1219          Sensory Assessment (Somatosensory)    Sensory Assessment (Somatosensory) sensation intact  -KG       Row Name 10/18/23 1219          Vision Assessment/Intervention    Visual  Impairment/Limitations blurry vision;diplopia  -KG       Row Name 10/18/23 1219          Range of Motion Comprehensive    General Range of Motion no range of motion deficits identified  -KG       Row Name 10/18/23 1219          Strength Comprehensive (MMT)    General Manual Muscle Testing (MMT) Assessment no strength deficits identified  -KG       Row Name 10/18/23 1219          Balance    Balance Assessment standing static balance;standing dynamic balance  -KG     Static Standing Balance contact guard  -KG     Dynamic Standing Balance minimal assist  -KG     Position/Device Used, Standing Balance supported  -KG     Balance Interventions sitting;standing;static;dynamic;occupation based/functional task  -KG               User Key  (r) = Recorded By, (t) = Taken By, (c) = Cosigned By      Initials Name Provider Type    KG Laith Hrading, OT Occupational Therapist                   Goals/Plan       Row Name 10/18/23 1227          Bed Mobility Goal 1 (OT)    Activity/Assistive Device (Bed Mobility Goal 1, OT) bed mobility activities, all  -KG     [place] Level/Cues Needed (Bed Mobility Goal 1, OT) standby assist  -KG     Time Frame (Bed Mobility Goal 1, OT) short term goal (STG);2 weeks  -KG     Progress/Outcomes (Bed Mobility Goal 1, OT) new goal  -KG       Row Name 10/18/23 1227          Transfer Goal 1 (OT)    Activity/Assistive Device (Transfer Goal 1, OT) sit-to-stand/stand-to-sit;bed-to-chair/chair-to-bed;toilet  -KG     [place] Level/Cues Needed (Transfer Goal 1, OT) standby assist  -KG     Time Frame (Transfer Goal 1, OT) short term goal (STG);2 weeks  -KG     Progress/Outcome (Transfer Goal 1, OT) new goal  -KG       Row Name 10/18/23 1227          Dressing Goal 1 (OT)    Activity/Device (Dressing Goal 1, OT) upper body dressing;lower body dressing  -KG     [place]/Cues Needed (Dressing Goal 1, OT) standby assist  -KG     Time Frame (Dressing Goal 1, OT) short term goal (STG);2 weeks  -KG      Progress/Outcome (Dressing Goal 1, OT) new goal  -KG       Row Name 10/18/23 1227          Toileting Goal 1 (OT)    Activity/Device (Toileting Goal 1, OT) toileting skills, all  -KG     [place] Level/Cues Needed (Toileting Goal 1, OT) standby assist  -KG     Time Frame (Toileting Goal 1, OT) short term goal (STG);2 weeks  -KG     Progress/Outcome (Toileting Goal 1, OT) new goal  -KG       Row Name 10/18/23 1227          Grooming Goal 1 (OT)    Activity/Device (Grooming Goal 1, OT) hair care;oral care;wash face, hands  -KG     [place] (Grooming Goal 1, OT) standby assist  -KG     Time Frame (Grooming Goal 1, OT) short term goal (STG);2 weeks  -KG     Progress/Outcome (Grooming Goal 1, OT) new goal  -KG       Row Name 10/18/23 1227          Therapy Assessment/Plan (OT)    Planned Therapy Interventions (OT) activity tolerance training;patient/caregiver education/training;functional balance retraining;occupation/activity based interventions;strengthening exercise;ROM/therapeutic exercise  -KG               User Key  (r) = Recorded By, (t) = Taken By, (c) = Cosigned By      Initials Name Provider Type    KG Laith Harding, CARLOS Occupational Therapist                   Clinical Impression       Row Name 10/18/23 1220          Pain Assessment    Pretreatment Pain Rating 0/10 - no pain  -KG     Posttreatment Pain Rating 0/10 - no pain  -KG       Row Name 10/18/23 1220          Plan of Care Review    Plan of Care Reviewed With patient  -KG     Outcome Evaluation Pt admitted to [facility] for c/o blurry double-vision. Imaging (+) for acute CVA. Lives w/ his mother in the basement of a 1 story home, travels to main level to use utilites. (I) w/ BADLs and mobility at baseline. Pt reports no numbness/tingling of BUEs. BUE strength/ROM WFL. UIC upon arrival. Performing STS transfers w/ CGA. Requiring CGA<>Min A for functional mobility 2/2 decreased balance during turns. Min A for UB grooming/hygiene @ sink 2/2 visual impairments  and balance. Pt presents w/ decreased balance/activity tolerance and visual impairment which limit ability to perform ADLs near reported baseline. OT will f/u to address stated deficits. Anticipate SNF at d/c.  -KG       Row Name 10/18/23 1230 10/18/23 1220       Therapy Assessment/Plan (OT)    Rehab Potential (OT) -- good, to achieve stated therapy goals  -KG    Criteria for Skilled Therapeutic Interventions Met (OT) -- skilled treatment is necessary  -KG    Therapy Frequency (OT) 5 times/wk  -KG 5 times/wk  -KG      Row Name 10/18/23 1220          Therapy Plan Review/Discharge Plan (OT)    Anticipated Discharge Disposition (OT) skilled nursing facility  -KG       Row Name 10/18/23 1220          Vital Signs    Pre Patient Position Sitting  -KG     Intra Patient Position Standing  -KG     Post Patient Position Sitting  -KG       Row Name 10/18/23 1220          Positioning and Restraints    Pre-Treatment Position sitting in chair/recliner  -KG     Post Treatment Position chair  -KG     In Chair notified nsg;reclined;call light within reach;encouraged to call for assist;exit alarm on;with other staff  -KG               User Key  (r) = Recorded By, (t) = Taken By, (c) = Cosigned By      Initials Name Provider Type    KG Laith Harding, OT Occupational Therapist                   Outcome Measures       Row Name 10/18/23 1228          How much help from another is currently needed...    Putting on and taking off regular lower body clothing? 3  -KG     Bathing (including washing, rinsing, and drying) 3  -KG     Toileting (which includes using toilet bed pan or urinal) 3  -KG     Putting on and taking off regular upper body clothing 3  -KG     Taking care of personal grooming (such as brushing teeth) 3  -KG     Eating meals 3  -KG     AM-PAC 6 Clicks Score (OT) 18  -KG       Row Name 10/18/23 1228          Modified Decker Scale    Modified Decker Scale 4 - Moderately severe disability.  Unable to walk without assistance,  and unable to attend to own bodily needs without assistance.  -KG       Row Name 10/18/23 1228          Functional Assessment    Outcome Measure Options AM-PAC 6 Clicks Daily Activity (OT);Modified [place]  -KG               User Key  (r) = Recorded By, (t) = Taken By, (c) = Cosigned By      Initials Name Provider Type    JAYE Laith Harding OT Occupational Therapist                    Occupational Therapy Education       Title: PT OT SLP Therapies (In Progress)       Topic: Occupational Therapy (Not Started)       Point: ADL training (Not Started)       Description:   Instruct learner(s) on proper safety adaptation and remediation techniques during self care or transfers.   Instruct in proper use of assistive devices.                  Learner Progress:  Not documented in this visit.              Point: Home exercise program (Not Started)       Description:   Instruct learner(s) on appropriate technique for monitoring, assisting and/or progressing therapeutic exercises/activities.                  Learner Progress:  Not documented in this visit.              Point: Precautions (Not Started)       Description:   Instruct learner(s) on prescribed precautions during self-care and functional transfers.                  Learner Progress:  Not documented in this visit.              Point: Body mechanics (Not Started)       Description:   Instruct learner(s) on proper positioning and spine alignment during self-care, functional mobility activities and/or exercises.                  Learner Progress:  Not documented in this visit.                                  OT Recommendation and Plan  Planned Therapy Interventions (OT): activity tolerance training, patient/caregiver education/training, functional balance retraining, occupation/activity based interventions, strengthening exercise, ROM/therapeutic exercise  Therapy Frequency (OT): 5 times/wk  Plan of Care Review  Plan of Care Reviewed With: patient  Outcome Evaluation: Pt  admitted to [facility] for c/o blurry double-vision. Imaging (+) for acute CVA. Lives w/ his mother in the basement of a 1 story home, travels to main level to use utilites. (I) w/ BADLs and mobility at baseline. Pt reports no numbness/tingling of BUEs. BUE strength/ROM WFL. UIC upon arrival. Performing STS transfers w/ CGA. Requiring CGA<>Min A for functional mobility 2/2 decreased balance during turns. Min A for UB grooming/hygiene @ sink 2/2 visual impairments and balance. Pt presents w/ decreased balance/activity tolerance and visual impairment which limit ability to perform ADLs near reported baseline. OT will f/u to address stated deficits. Anticipate SNF at d/c.     Time Calculation:   Evaluation Complexity (OT)  Review Occupational Profile/Medical/Therapy History Complexity: expanded/moderate complexity  Assessment, Occupational Performance/Identification of Deficit Complexity: 3-5 performance deficits  Clinical Decision Making Complexity (OT): detailed assessment/moderate complexity  Overall Complexity of Evaluation (OT): moderate complexity     Time Calculation- OT       Row Name 10/18/23 1230 10/18/23 1229          Time Calculation- OT    OT Start Time 0943  -KG 0943  -KG     OT Stop Time 1003  -KG 1003  -KG     OT Time Calculation (min) 20 min  -KG 20 min  -KG     Total Timed Code Minutes- OT 15 minute(s)  -KG 15 minute(s)  -KG     OT Non-Billable Time (min) 5 min  -KG --     OT Received On 10/18/23  -KG 10/18/23  -KG     OT - Next Appointment 10/19/23  -KG 10/19/23  -KG     OT Goal Re-Cert Due Date 11/01/23  -KG 11/01/23  -KG        Timed Charges    88079 - OT Self Care/Mgmt Minutes 15  -KG --        Untimed Charges    OT Eval/Re-eval Minutes 5  -KG --        Total Minutes    Timed Charges Total Minutes 15  -KG --     Untimed Charges Total Minutes 5  -KG --      Total Minutes 20  -KG --               User Key  (r) = Recorded By, (t) = Taken By, (c) = Cosigned By      Initials Name Provider Type    KG Mehdi  CARLOS Ozuna Occupational Therapist                  Therapy Charges for Today       Code Description Service Date Service Provider Modifiers Qty    78282241034 HC OT SELF CARE/MGMT/TRAIN EA 15 MIN 10/18/2023 Laith Harding OT GO 1    39150618412 HC OT EVAL MOD COMPLEXITY 2 10/18/2023 Laith Harding OT GO 1                 Laith Harding OT  10/18/2023

## 2023-10-18 NOTE — PROGRESS NOTES
Name: Joaquín Garcia Jr. ADMIT: 10/17/2023   : 1960  PCP: Epley, James, APRN    MRN: [id number] LOS: 0 days   AGE/SEX: 63 y.o. male  ROOM: [facility]     Subjective   Subjective   Patient continues with diplopia and blurred vision.  No loss of the vision.  Positive unsteady gait.  Positive occasional slurred speech.  No unilateral numbness or weakness.  Positive.  No dysphagia.  No loss of consciousness.  No dizziness.  No headache.    Review of Systems  Cardio vascular/respiratory.  Positive exertional dyspnea/cough/wheeze (all old).  No chest pain.  No palpitation.  .  No dysuria or hematuria.  GI.  No abdominal pain or nausea or vomiting.  No bowel movements since admission     Objective   Objective   Vital Signs  Temp:  [97.7 °F (36.5 °C)-98 °F (36.7 °C)] 98 °F (36.7 °C)  Heart Rate:  [68-79] 68  Resp:  [18] 18  BP: (134-149)/(80-87) 134/82  SpO2:  [91 %-99 %] 92 %  on   ;   Device (Oxygen Therapy): room air    Intake/Output Summary (Last 24 hours) at 10/18/2023 1006  Last data filed at 10/17/2023 2300  Gross per 24 hour   Intake --   Output 650 ml   Net -650 ml     Body mass index is 28.13 kg/m².      10/17/23  1858 10/17/23  2119   Weight: 92.1 kg (203 lb) 91.5 kg (201 lb 11.2 oz)     Physical Exam  General.  Middle-aged gentleman.  Appears older than stated age.  He is alert and oriented x3.  No apparent pain/distress/diaphoresis.  Normal mood and affect.  Eyes.  Medial deviation of the left eye.  Positive horizontal neck static mass.  Pupils equal round and reactive.  Oral cavity.  Moist mucous membrane.  Neck.  Supple.  No JVD.  No lymphadenopathy or thyromegaly.  Cardiovascular.  Controlled rate atrial fibrillation and grade 2 systolic murmur.  Chest.  Poor bilateral air entry with scattered bilateral expiratory wheeze.  Abdomen.  Soft lax.  No tenderness.  No organomegaly.  No guarding or rebound.  Extremities.  No clubbing/cyanosis/edema.  CNS.  Unsteady gait.  Positive Romberg.  Medial  deviation of the left eye.  Poor cerebellar coordination.      Results Review:      Results from last 7 days   Lab Units 10/18/23  0534 10/17/23  1912   SODIUM mmol/L 138 133*   POTASSIUM mmol/L 3.9 4.1   CHLORIDE mmol/L 105 97*   CO2 mmol/L 24.4 26.0   BUN mg/dL 10 14   CREATININE mg/dL 0.75* 0.95   GLUCOSE mg/dL 90 101*   CALCIUM mg/dL 8.8 9.8   AST (SGOT) U/L 13 17   ALT (SGPT) U/L 21 28     Estimated Creatinine Clearance: 113.6 mL/min (A) (by C-G formula based on SCr of 0.75 mg/dL (L)).  Results from last 7 days   Lab Units 10/18/23  0534   HEMOGLOBIN A1C % 5.70*     Results from last 7 days   Lab Units 10/18/23  0602 10/18/23  0033 10/17/23  1907   GLUCOSE mg/dL 90 97 102     Results from last 7 days   Lab Units 10/17/23  1912   HSTROP T ng/L 10         Results from last 7 days   Lab Units 10/18/23  0534   TSH uIU/mL 0.818         Results from last 7 days   Lab Units 10/18/23  0534   CHOLESTEROL mg/dL 173   TRIGLYCERIDES mg/dL 137   HDL CHOL mg/dL 47     Results from last 7 days   Lab Units 10/18/23  0534 10/17/23  1912   WBC 10*3/mm3 5.87 7.76   HEMOGLOBIN g/dL 16.1 17.5   HEMATOCRIT % 46.5 49.4   PLATELETS 10*3/mm3 131* 137*   MCV fL 97.9* 98.6*   MCH pg 33.9* 34.9*   MCHC g/dL 34.6 35.4   RDW % 13.3 13.4   RDW-SD fl 47.2 47.6   MPV fL 8.5 8.1   NEUTROPHIL % %  --  71.4   LYMPHOCYTE % %  --  17.5*   MONOCYTES % %  --  8.2   EOSINOPHIL % %  --  1.7   BASOPHIL % %  --  0.8   IMM GRAN % %  --  0.4   NEUTROS ABS 10*3/mm3  --  5.54   LYMPHS ABS 10*3/mm3  --  1.36   MONOS ABS 10*3/mm3  --  0.64   EOS ABS 10*3/mm3  --  0.13   BASOS ABS 10*3/mm3  --  0.06   IMMATURE GRANS (ABS) 10*3/mm3  --  0.03   NRBC /100 WBC  --  0.0     Results from last 7 days   Lab Units 10/17/23  1912   INR  1.11*   APTT seconds 31.2                                       Imaging:  Imaging Results (Last 24 Hours)       Procedure Component Value Units Date/Time    MRI Brain With & Without Contrast [[id number]] Collected: 10/18/23 0988      Updated: 10/18/23 0940    Narrative:      MRI EXAMINATION OF THE BRAIN WITH AND WITHOUT CONTRAST     HISTORY: Blurry vision.     COMPARISON: CT head 10/17/2023. No prior MRI of the brain is available  for comparison.     TECHNIQUE: A MRI examination of the brain was performed utilizing  sagittal T1, axial diffusion, T1, T2, T2 FLAIR, gradient echo T2 as well  as sagittal, axial and coronal T1 postcontrast weighted sequences.     FINDINGS:     The diffusion sequence demonstrates an area of increased signal  intensity involving the anterior and medial aspect of the thalamus on  the left measuring approximately 8 x 4 mm in size consistent with a  small acute infarct. There is signal loss on the ADC map at the same  location. There is also an area of increased signal intensity  anterolateral to the sylvian aqueduct on the right measuring  approximately 7 x 4 mm in size which is decreased in signal intensity on  the ADC map consistent with a second smaller acute infarct.     There is expected flow void in the basilar artery and in the distal  aspect of the internal carotid arteries bilaterally on axial T2  sequence.     Innumerable nodules are appreciated involving the scalp consistent with  the patient's history of neurofibromatosis. Nearly all are subcentimeter  in size. The largest overlies the medial aspect of the left orbit  measuring approximately 2.7 x 1.6 x 2.3 cm in size. Mild small vessel  ischemic disease is noted. A small lacunar infarct involving the corona  radiata on the right anteriorly is appreciated. There is no evidence of  abnormal intra-axial enhancement.       Impression:      1.  There is an acute infarct involving the medial aspect of the  thalamus on the left anteriorly measuring 8 x 4 mm in size and an acute  infarct anterolateral to the sylvian aqueduct to the right measuring 7 x  4 mm in size.  2.  Interval nodules involving the scalp are noted as described above,  the largest of which  overlies the anterior and medial aspect of the left  orbit measuring approximately 2.7 cm in size.  3.  Small vessel ischemic disease and lacunar infarct are noted. There  is no evidence of abnormal intra-axial enhancement.       CT Angiogram Head w AI Analysis of LVO [[id number]] Collected: 10/17/23 2117     Updated: 10/17/23 2124    Narrative:      CT ANGIOGRAM HEAD AND NECK WITH PRE AND POSTCONTRAST HEAD CT AND CT  PERFUSION EXAM     HISTORY: Type I neurofibromatosis. Known intracardiac mass lesion on CT  angiogram from earlier today. Acute onset of stroke symptoms.     TECHNIQUE: Noncontrast head CT scan was performed, reviewed, and  discussed with Dr. Menjivar of the stroke team at 7:26 p.m. Subsequently  CT angiogram head and neck with CT perfusion were performed using 145 mL  of Isovue-370 IV contrast. Numerous multiplanar and 3-dimensional  reformatted images were produced elsewhere. Postcontrast and perfusion  images were discussed with Dr. Jasso at 7:44 p.m.     AI analysis of LVO was utilized.     Radiation dose reduction techniques were utilized, including automated  exposure control and exposure modulation based on body size.     FINDINGS: Noncontrast head CT images show normal caliber ventricles with  normal brain parenchyma and extra-axial spaces. No abnormal intracranial  contrast enhancement on head CT.     Numerous enhancing nodules are observed throughout the scalp and upper  face compatible with the known history of neurofibromatosis.     CT perfusion images are negative.     CT angiogram images show widely patent aortic arch, brachiocephalic and  proximal subclavian arteries and common carotid arteries. Internal and  external carotid arteries are widely patent with minimal carotid bulb  atheromatous change and no significant stenosis. The vertebral arteries  are also widely patent throughout their cervical and intracranial  course. The basilar artery, anterior, posterior, and middle  cerebral  arteries and branches appear normal. Small posterior fossa branches  appear normal. No aneurysm or vascular cutoff is identified. Major  intracranial venous sinuses enhance as expected.     Advanced degenerative disc change at C3-4 with endplate osteophyte  posteriorly on the right creating at least moderately severe spinal  canal stenosis. Profound emphysematous change in the visualized lung  apices. Postoperative change from sternotomy.       Impression:      No acute abnormality identified on CT angiogram head and  neck, CT perfusion, or head CT with and without contrast. There are  numerous soft tissue nodules throughout the head and neck compatible  with known neurofibromatosis..     This report was finalized on 10/17/2023 9:20 PM by Dr. Juanito Perea M.D on Workstation: [id number]       CT Angiogram Neck [[id number]] Collected: 10/17/23 2117     Updated: 10/17/23 2124    Narrative:      CT ANGIOGRAM HEAD AND NECK WITH PRE AND POSTCONTRAST HEAD CT AND CT  PERFUSION EXAM     HISTORY: Type I neurofibromatosis. Known intracardiac mass lesion on CT  angiogram from earlier today. Acute onset of stroke symptoms.     TECHNIQUE: Noncontrast head CT scan was performed, reviewed, and  discussed with Dr. Menjivar of the stroke team at 7:26 p.m. Subsequently  CT angiogram head and neck with CT perfusion were performed using 145 mL  of Isovue-370 IV contrast. Numerous multiplanar and 3-dimensional  reformatted images were produced elsewhere. Postcontrast and perfusion  images were discussed with Dr. Jasso at 7:44 p.m.     AI analysis of LVO was utilized.     Radiation dose reduction techniques were utilized, including automated  exposure control and exposure modulation based on body size.     FINDINGS: Noncontrast head CT images show normal caliber ventricles with  normal brain parenchyma and extra-axial spaces. No abnormal intracranial  contrast enhancement on head CT.     Numerous enhancing nodules are  observed throughout the scalp and upper  face compatible with the known history of neurofibromatosis.     CT perfusion images are negative.     CT angiogram images show widely patent aortic arch, brachiocephalic and  proximal subclavian arteries and common carotid arteries. Internal and  external carotid arteries are widely patent with minimal carotid bulb  atheromatous change and no significant stenosis. The vertebral arteries  are also widely patent throughout their cervical and intracranial  course. The basilar artery, anterior, posterior, and middle cerebral  arteries and branches appear normal. Small posterior fossa branches  appear normal. No aneurysm or vascular cutoff is identified. Major  intracranial venous sinuses enhance as expected.     Advanced degenerative disc change at C3-4 with endplate osteophyte  posteriorly on the right creating at least moderately severe spinal  canal stenosis. Profound emphysematous change in the visualized lung  apices. Postoperative change from sternotomy.       Impression:      No acute abnormality identified on CT angiogram head and  neck, CT perfusion, or head CT with and without contrast. There are  numerous soft tissue nodules throughout the head and neck compatible  with known neurofibromatosis..     This report was finalized on 10/17/2023 9:20 PM by Dr. Juanito Perea M.D on Workstation: [id number]       CT CEREBRAL PERFUSION WITH & WITHOUT CONTRAST [[id number]] Collected: 10/17/23 2117     Updated: 10/17/23 2124    Narrative:      CT ANGIOGRAM HEAD AND NECK WITH PRE AND POSTCONTRAST HEAD CT AND CT  PERFUSION EXAM     HISTORY: Type I neurofibromatosis. Known intracardiac mass lesion on CT  angiogram from earlier today. Acute onset of stroke symptoms.     TECHNIQUE: Noncontrast head CT scan was performed, reviewed, and  discussed with Dr. Menjivar of the stroke team at 7:26 p.m. Subsequently  CT angiogram head and neck with CT perfusion were performed using 145 mL  of  Isovue-370 IV contrast. Numerous multiplanar and 3-dimensional  reformatted images were produced elsewhere. Postcontrast and perfusion  images were discussed with Dr. Jasso at 7:44 p.m.     AI analysis of LVO was utilized.     Radiation dose reduction techniques were utilized, including automated  exposure control and exposure modulation based on body size.     FINDINGS: Noncontrast head CT images show normal caliber ventricles with  normal brain parenchyma and extra-axial spaces. No abnormal intracranial  contrast enhancement on head CT.     Numerous enhancing nodules are observed throughout the scalp and upper  face compatible with the known history of neurofibromatosis.     CT perfusion images are negative.     CT angiogram images show widely patent aortic arch, brachiocephalic and  proximal subclavian arteries and common carotid arteries. Internal and  external carotid arteries are widely patent with minimal carotid bulb  atheromatous change and no significant stenosis. The vertebral arteries  are also widely patent throughout their cervical and intracranial  course. The basilar artery, anterior, posterior, and middle cerebral  arteries and branches appear normal. Small posterior fossa branches  appear normal. No aneurysm or vascular cutoff is identified. Major  intracranial venous sinuses enhance as expected.     Advanced degenerative disc change at C3-4 with endplate osteophyte  posteriorly on the right creating at least moderately severe spinal  canal stenosis. Profound emphysematous change in the visualized lung  apices. Postoperative change from sternotomy.       Impression:      No acute abnormality identified on CT angiogram head and  neck, CT perfusion, or head CT with and without contrast. There are  numerous soft tissue nodules throughout the head and neck compatible  with known neurofibromatosis..     This report was finalized on 10/17/2023 9:20 PM by Dr. Juanito Perea M.D on Workstation:  [id number]       XR Chest 1 View [[id number]] Collected: 10/17/23 2007     Updated: 10/17/23 2011    Narrative:      PORTABLE CHEST X-RAY     HISTORY: Acute stroke symptoms. Type I neurofibromatosis. CT angiogram  chest earlier today demonstrated multiple intraluminal filling defects  in the left atrium.     Portable chest x-ray is correlated with CT angiogram chest from earlier  today and chest x-ray 10/31/2016.      FINDINGS: Sternal wires with cardiomegaly and extensive emphysematous  change in the upper lung zones. There is some prominence of the  interstitial markings commensurate with advanced emphysema. No focal  infiltrate, effusion, or pneumothorax.       Impression:      Cardiomegaly with marked emphysematous pulmonary parenchymal  change. No acute abnormality evident on x-ray.     This report was finalized on 10/17/2023 8:08 PM by Dr. Juanito Perea M.D on Workstation: [id number]                  I reviewed the patient's new clinical results / labs / tests / procedures      Assessment/Plan     Active Hospital Problems    Diagnosis  POA    **Diplopia [H53.2]  Yes    Acute CVA (cerebrovascular accident) [I63.9]  Yes    Left atrial mass [I51.89]  Yes    Gout [M10.9]  Yes    On anticoagulant therapy [Z79.01]  Not Applicable    Hypertension [I10]  Yes    Atrial fibrillation, chronic [I48.20]  Yes      Resolved Hospital Problems   No resolved problems to display.           Embolic acute CVA leading to diplopia/unsteady gait..  A1c is 5.7.  LDL is 102.  TSH is normal.  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  This has occurred while the patient is on Eliquis (he assures compliance) which indicate Eliquis failure.  Plan h/pet/ort/ neurology stroke consult.  Stop Eliquis.  Initiate IV heparin/aspirin/high-dose Lipitor.  Consult cardiology for LILIA and hematology oncology as the patient is considered to be a failure of Eliquis.  Not a candidate for thrombolytic as he is anticoagulated.  History  of atrial fibrillation/hypertension/bioprosthetic mitral valvular replacement/tricuspid repair/recent diagnosis of left atrial mass versus thrombus.  There is no evidence of angina or congestive heart failure.  Patient blood pressure is controlled and the rate is controlled.  We will stop Eliquis and start heparin as mentioned above.  Continue holding Norvasc/hydrochlorothiazide/Altace to avoid hypotension in view of stroke.  Will continue metoprolol.  We will consult cardiology for LILIA.  Echo is pending.  COPD.  Chest x-ray negative for infiltrate.  Will initiate as needed albuterol and Spiriva and Symbicort.   Thrombocytopenia.  Mild.  Will monitor.  Glucose intolerance.  A1c mildly elevated at 5.7.  Diabetic diet at discharge.  Dyslipidemia.  LDL is suboptimal.  Lipitor increased.  History of obstructive sleep apnea.  Oxygen as needed.  History of neurofibromatosis  VTE prophylaxis.  Patient anticoagulated.  Peptic ulcer disease prophylaxis.  IV Pepcid.      Discussed my findings and plan of treatment with the patient.  Disposition.  To be determined based on clinical course.      Ishan Franz MD  [facility]  10/18/23  10:06 EDT

## 2023-10-18 NOTE — PLAN OF CARE
"Goal Outcome Evaluation:  Plan of Care Reviewed With: (P) patient, son           Outcome Evaluation: (P) Pt is a 64 yo M who was admitted for visual difficulties and was dx with diplopia secondary to acute CVA. Son present upon PT arrival. Pt reports he is independent at baseline and does not use AD. Pt performed bed mobility, STS, and ambulation with CGA x2. Pt reports he is feeling loopy from medication received today, describing it as \"a good drunk\", therefore it is unclear if balance issues are d/t medication or CVA. Pt may benefit from skilled PT for balance deficits and safety during functional mobility. PT will follow and progress as pt tolerates. PT recommends SNF upon d/c. Continue to reasses d/c plan as pt progresses.      Anticipated Discharge Disposition (PT): (P) skilled nursing facility  "

## 2023-10-18 NOTE — CONSULTS
Date of Consultation: 10/18/23    Referral Provider: Chandan Mary MD     Reason for Consultation: Left atrial mass.    Encounter Provider: Harshad Wilson MD    Group of Service: [facility]     Patient Name: Joaquín Garcia Jr.    :1960    Chief complaint: Diplopia.    History of Present Illness:      Joaquín Garcia is a 63 year-old patient who used to follow with Dr. Remy. He has a history of atrial fibrillation, hypertension, hyperlipidemia, and mitral regurgitation. In 2016, he underwent a left heart cath that showed no coronary disease and normal left ventricular function.  He was found to have severe mitral regurgitation with markedly dilated atrium. He underwent tissue mitral valve replacement and tricuspid valve repair by Dr. George at that time.    The patient presented to the emergency department on 10/17/2023 with visual changes and diplopia which started approximately 30 to 45 minutes prior.  He had undergone an outpatient CT of the chest on 2023 as part of lung cancer screening.  However, there was a vague 5 cm area of attenuation within the left atrium concerning for potential mass.  He actually completed a CT angiogram of the chest on 10/17/2023, and developed his symptoms shortly afterwards.  This also showed filling defects as well.  An MRI of his brain confirmed an acute CVA in the medial aspect of the thalamus on the left, as well as an acute CVA anterolateral to the sylvian aqueduct.  He has remained in rate controlled atrial fibrillation since admission.  Unfortunately, he evidently had been off of Coumadin for several months and was switched to Eliquis.    After I saw him, I performed a LILIA which showed a very large mass in the left atrium.  After talking with Dr. George this is most likely a very large thrombus.  This is further discussed below.        ECHO 12/3/20  Calculated left ventricular EF = 55.1% Estimated left ventricular EF  = 55% Estimated left ventricular EF was in agreement with the calculated left ventricular EF. Left ventricular systolic function is normal. Normal left ventricular cavity size noted. Left ventricular wall thickness is consistent with mild concentric hypertrophy. All left ventricular wall segments contract normally. Left ventricular diastolic function was indeterminate.  Left atrial volume is severely increased.The right atrial cavity is severely dilated.  There is mild thickening of the aortic valve.  There is a 33 mm, porcine, Medtronic bioprosthetic mitral valve present. The mitral valve peak and mean gradients are within defined limits. There is no significant prosthetic valve stenosis or regurgitation.  No significant tricuspid valve regurgitation or significant stenosis present. There is evidence of previous tricuspid valve repair.     Cath 10/21/16  Final Impressions:       1. No significant Coronary disease,  2. Normal LVEF with severe Mitral regurgitation with markedly dilated left atrium     Recommendations:      1. MVR      Past Medical History:   Diagnosis Date    Arthritis     Atrial fibrillation     COPD (chronic obstructive pulmonary disease)     PT STATES NO LONGER ON INHALERS BUT IS BREATHING WELL    Degenerative lumbar disc     Emphysema with chronic bronchitis     H/O tricuspid valve repair 2016    MEDTRONIC ANNULOPLASTY RING    History of heart valve replacement with porcine valve 2016    MITRAL VALVE, DR COBURN    Hypertension     Low back pain     RADIATES DOWN BLE, WORSE ON RT, SOME NUMBNESS/TINGLING    Neurofibromatosis     On anticoagulant therapy     Sleep apnea     DOES NOT USE CPAP    Spinal stenosis          Past Surgical History:   Procedure Laterality Date    CARDIAC CATHETERIZATION  10/20/2016    Procedure: Case Abort;  Surgeon: Zhen Ford MD;  Location: [facility] INVASIVE LOCATION;  Service:     CARDIAC CATHETERIZATION Left 10/21/2016    Procedure: Cardiac  catheterization;  Surgeon: Zhen Ford MD;  Location: [facility] CATH INVASIVE LOCATION;  Service:     HERNIA REPAIR      LUMBAR LAMINECTOMY DISCECTOMY DECOMPRESSION Bilateral 11/5/2020    Procedure: Lumbar 4 - Lumbar 5 Laminectomy;  Surgeon: Marcos Christine MD;  Location: [facility] MAIN OR;  Service: Orthopedic Spine;  Laterality: Bilateral;    MITRAL VALVE REPAIR/REPLACEMENT N/A 10/27/2016    Procedure: INTRAOPERATIVE LILIA, MIDLINE STERNOTOMY WITH MITRAL VALVE REPLACEMENT TRICUSPID VALVE REPAIR;  Surgeon: Robert George MD;  Location: [facility] MAIN OR;  Service:     TEETH EXTRACTION N/A 10/25/2016    Procedure: TEETH EXTRACTION FULL MOUTH;  Surgeon: Gio Ibarra DMD;  Location: [facility] OR;  Service:          No Known Allergies      No current facility-administered medications on file prior to encounter.     Current Outpatient Medications on File Prior to Encounter   Medication Sig Dispense Refill    acetaminophen (TYLENOL) 325 MG tablet Take 2 tablets by mouth Every 6 (Six) Hours As Needed for mild pain (1-3).  0    allopurinol (ZYLOPRIM) 300 MG tablet Take 1 tablet by mouth Daily. For gout prevention (Patient taking differently: Take 1 tablet by mouth Daily.) 30 tablet 11    amLODIPine (NORVASC) 10 MG tablet Take 1 tablet by mouth Daily. 30 tablet 11    apixaban (ELIQUIS) 5 MG tablet tablet Take 1 tablet by mouth 2 (Two) Times a Day. To decrease risk of stroke(do not take with warfarin) (Patient taking differently: Take 1 tablet by mouth 2 (Two) Times a Day.) 180 tablet 0    fluticasone (FLONASE) 50 MCG/ACT nasal spray 2 sprays into the nostril(s) as directed by provider Daily. 15.8 mL 11    hydroCHLOROthiazide (HYDRODIURIL) 12.5 MG tablet Take 1 tablet by mouth Daily. 30 tablet 11    metoprolol succinate XL (TOPROL-XL) 100 MG 24 hr tablet Take 1 tablet by mouth Daily. 30 tablet 11    ramipril (ALTACE) 10 MG capsule Take 1 capsule by mouth Daily. 30 capsule 11    atorvastatin (LIPITOR) 40 MG tablet  "Take 1 tablet by mouth Daily. 30 tablet 11         Social History     Socioeconomic History    Marital status: Single   Tobacco Use    Smoking status: Every Day     Packs/day: 0.50     Years: 35.00     Additional pack years: 0.00     Total pack years: 17.50     Types: Cigarettes    Smokeless tobacco: Never    Tobacco comments:     PT STATES DOWN TO 1/2 TO 1/3 PPD FROM 1PPD   Vaping Use    Vaping Use: Never used   Substance and Sexual Activity    Alcohol use: Yes     Alcohol/week: 2.0 standard drinks of alcohol     Types: 2 Shots of liquor per week     Comment: Caffeine - Soft Drinks daily    Drug use: No    Sexual activity: Defer         Family History   Problem Relation Age of Onset    Heart disease Father     Hypertension Father     Malig Hyperthermia Neg Hx        REVIEW OF SYSTEMS:   Pertinent positives are noted in the HPI above.  Otherwise, all other systems were reviewed, and are negative.     Objective:     Vitals:    10/18/23 1330 10/18/23 1356 10/18/23 1439 10/18/23 1805   BP: 106/72 140/79 129/82 132/65   BP Location:   Right arm Left arm   Patient Position:   Lying Lying   Pulse: 69 75 77 68   Resp: 18 18 18   Temp:   97.7 °F (36.5 °C) 97.7 °F (36.5 °C)   TempSrc:   Oral Oral   SpO2: 91%  96% 99%   Weight:  91 kg (200 lb 9.9 oz)     Height:  180 cm (70.87\")       Body mass index is 28.09 kg/m².  Flowsheet Rows      Flowsheet Row First Filed Value   Admission Height 180.3 cm (71\") Documented at 10/17/2023 1858   Admission Weight 92.1 kg (203 lb) Documented at 10/17/2023 1858             General:    No acute distress, alert and oriented x4, pleasant                   Head:    Normocephalic, atraumatic.   Eyes:          Conjunctivae and sclerae normal, no icterus.   Throat:   No oral lesions, no thrush, oral mucosa moist.    Neck:   Supple, trachea midline.   Lungs:     Decreased breath sounds with scant wheezes bilaterally    Heart:    Irregularly irregular rhythm and normal rate. II/VI SM throughout. "   Abdomen:     Soft, non-tender, non-distended, positive bowel sounds.    Extremities:   No clubbing, cyanosis, or edema.     Pulses:   Pulses palpable and equal bilaterally.    Skin:   No bleeding or rash.   Neuro:   Non-focal.  Moves all extremities well.    Psychiatric:   Normal mood and affect.         Lab Review:                Results from last 7 days   Lab Units 10/18/23  0534   SODIUM mmol/L 138   POTASSIUM mmol/L 3.9   CHLORIDE mmol/L 105   CO2 mmol/L 24.4   BUN mg/dL 10   CREATININE mg/dL 0.75*   GLUCOSE mg/dL 90   CALCIUM mg/dL 8.8     Results from last 7 days   Lab Units 10/17/23  1912   HSTROP T ng/L 10     Results from last 7 days   Lab Units 10/18/23  0534   WBC 10*3/mm3 5.87   HEMOGLOBIN g/dL 16.1   HEMATOCRIT % 46.5   PLATELETS 10*3/mm3 131*     Results from last 7 days   Lab Units 10/18/23  1025 10/17/23  1912   INR  1.16* 1.11*   APTT seconds 32.3 31.2     Results from last 7 days   Lab Units 10/18/23  0534   CHOLESTEROL mg/dL 173         Results from last 7 days   Lab Units 10/18/23  0534   CHOLESTEROL mg/dL 173   TRIGLYCERIDES mg/dL 137   HDL CHOL mg/dL 47   LDL CHOL mg/dL 102*       EKG (reviewed by me personally):    10/17/23    8/26/22          Assessment:   1.  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  Massively dilated left atrium  3.  Acute embolic CVA with diplopia, secondary to #1  4.  Persistent atrial fibrillation  5.  Status post tissue mitral valve replacement and tricuspid valve repair in 2016 by Dr. George  6.  COPD with severe emphysema without acute exacerbation  7.  Ongoing tobacco use  8.  Neurofibromatosis  9.  Mild thrombocytopenia    Plan:       Discussed with Dr. George earlier today.  This is a very difficult situation.  His LILIA today showed massive left atrial enlargement with a very large left atrial mass.  Initially, I thought this was likely an intracardiac tumor.  However, knowing that the patient came off of anticoagulation when  switching from Coumadin to Eliquis recently, this most likely represents a very large thrombus.  He also had significant smoke in his left atrium on the LILIA.    Unfortunately, he has profound emphysema, and it is unclear whether he would make it through another surgery.  Also unfortunately, I doubt this is going to clear with medical therapy because it is almost 5 cm in size.  For now, he is going to be anticoagulated with heparin.  Pulmonary function tests have been ordered, and ultimate decisions will be made afterwards.  Dr. George is following.    Thank you very much for this consult.    Bhanu Wilson MD

## 2023-10-19 ENCOUNTER — APPOINTMENT (OUTPATIENT)
Dept: RESPIRATORY THERAPY | Facility: HOSPITAL | Age: 63
End: 2023-10-19
Payer: MEDICARE

## 2023-10-19 LAB
ANION GAP SERPL CALCULATED.3IONS-SCNC: 9 MMOL/L (ref 5–15)
APTT PPP: 110.5 SECONDS (ref 22.7–35.4)
APTT PPP: 39.6 SECONDS (ref 22.7–35.4)
APTT PPP: 63.8 SECONDS (ref 22.7–35.4)
BASOPHILS # BLD AUTO: 0.06 10*3/MM3 (ref 0–0.2)
BASOPHILS NFR BLD AUTO: 0.8 % (ref 0–1.5)
BUN SERPL-MCNC: 14 MG/DL (ref 8–23)
BUN/CREAT SERPL: 14.6 (ref 7–25)
CALCIUM SPEC-SCNC: 9.4 MG/DL (ref 8.6–10.5)
CARDIOLIPIN IGA SER IA-ACNC: <9 APL U/ML (ref 0–11)
CARDIOLIPIN IGG SER IA-ACNC: <9 GPL U/ML (ref 0–14)
CARDIOLIPIN IGM SER IA-ACNC: <9 MPL U/ML (ref 0–12)
CHLORIDE SERPL-SCNC: 102 MMOL/L (ref 98–107)
CO2 SERPL-SCNC: 28 MMOL/L (ref 22–29)
CREAT SERPL-MCNC: 0.96 MG/DL (ref 0.76–1.27)
DEPRECATED RDW RBC AUTO: 48.8 FL (ref 37–54)
EGFRCR SERPLBLD CKD-EPI 2021: 87.7 ML/MIN/1.73
EOSINOPHIL # BLD AUTO: 0.11 10*3/MM3 (ref 0–0.4)
EOSINOPHIL NFR BLD AUTO: 1.5 % (ref 0.3–6.2)
ERYTHROCYTE [DISTWIDTH] IN BLOOD BY AUTOMATED COUNT: 13.4 % (ref 12.3–15.4)
GLUCOSE SERPL-MCNC: 88 MG/DL (ref 65–99)
HCT VFR BLD AUTO: 46.2 % (ref 37.5–51)
HGB BLD-MCNC: 16.1 G/DL (ref 13–17.7)
IMM GRANULOCYTES # BLD AUTO: 0.04 10*3/MM3 (ref 0–0.05)
IMM GRANULOCYTES NFR BLD AUTO: 0.5 % (ref 0–0.5)
INR PPP: 1.12 (ref 0.9–1.1)
LYMPHOCYTES # BLD AUTO: 1.25 10*3/MM3 (ref 0.7–3.1)
LYMPHOCYTES NFR BLD AUTO: 16.7 % (ref 19.6–45.3)
MCH RBC QN AUTO: 34.7 PG (ref 26.6–33)
MCHC RBC AUTO-ENTMCNC: 34.8 G/DL (ref 31.5–35.7)
MCV RBC AUTO: 99.6 FL (ref 79–97)
MONOCYTES # BLD AUTO: 0.68 10*3/MM3 (ref 0.1–0.9)
MONOCYTES NFR BLD AUTO: 9.1 % (ref 5–12)
NEUTROPHILS NFR BLD AUTO: 5.35 10*3/MM3 (ref 1.7–7)
NEUTROPHILS NFR BLD AUTO: 71.4 % (ref 42.7–76)
NRBC BLD AUTO-RTO: 0 /100 WBC (ref 0–0.2)
PLATELET # BLD AUTO: 140 10*3/MM3 (ref 140–450)
PMV BLD AUTO: 8.3 FL (ref 6–12)
POTASSIUM SERPL-SCNC: 4.8 MMOL/L (ref 3.5–5.2)
PROTHROMBIN TIME: 14.5 SECONDS (ref 11.7–14.2)
RBC # BLD AUTO: 4.64 10*6/MM3 (ref 4.14–5.8)
SODIUM SERPL-SCNC: 139 MMOL/L (ref 136–145)
WBC NRBC COR # BLD: 7.49 10*3/MM3 (ref 3.4–10.8)

## 2023-10-19 PROCEDURE — 94761 N-INVAS EAR/PLS OXIMETRY MLT: CPT

## 2023-10-19 PROCEDURE — 99232 SBSQ HOSP IP/OBS MODERATE 35: CPT | Performed by: INTERNAL MEDICINE

## 2023-10-19 PROCEDURE — 85730 THROMBOPLASTIN TIME PARTIAL: CPT | Performed by: INTERNAL MEDICINE

## 2023-10-19 PROCEDURE — 85730 THROMBOPLASTIN TIME PARTIAL: CPT | Performed by: PSYCHIATRY & NEUROLOGY

## 2023-10-19 PROCEDURE — 94760 N-INVAS EAR/PLS OXIMETRY 1: CPT

## 2023-10-19 PROCEDURE — 25010000002 HEPARIN (PORCINE) PER 1000 UNITS: Performed by: NURSE PRACTITIONER

## 2023-10-19 PROCEDURE — 94799 UNLISTED PULMONARY SVC/PX: CPT

## 2023-10-19 PROCEDURE — 94726 PLETHYSMOGRAPHY LUNG VOLUMES: CPT

## 2023-10-19 PROCEDURE — 94010 BREATHING CAPACITY TEST: CPT

## 2023-10-19 PROCEDURE — 94729 DIFFUSING CAPACITY: CPT

## 2023-10-19 PROCEDURE — 85025 COMPLETE CBC W/AUTO DIFF WBC: CPT | Performed by: INTERNAL MEDICINE

## 2023-10-19 PROCEDURE — 80048 BASIC METABOLIC PNL TOTAL CA: CPT | Performed by: INTERNAL MEDICINE

## 2023-10-19 PROCEDURE — 85610 PROTHROMBIN TIME: CPT | Performed by: INTERNAL MEDICINE

## 2023-10-19 PROCEDURE — 99233 SBSQ HOSP IP/OBS HIGH 50: CPT | Performed by: NURSE PRACTITIONER

## 2023-10-19 PROCEDURE — 94664 DEMO&/EVAL PT USE INHALER: CPT

## 2023-10-19 RX ORDER — WARFARIN SODIUM 6 MG/1
6 TABLET ORAL
Status: DISCONTINUED | OUTPATIENT
Start: 2023-10-20 | End: 2023-10-19

## 2023-10-19 RX ORDER — HEPARIN SODIUM 5000 [USP'U]/ML
30-44 INJECTION, SOLUTION INTRAVENOUS; SUBCUTANEOUS EVERY 6 HOURS PRN
Status: DISCONTINUED | OUTPATIENT
Start: 2023-10-19 | End: 2023-10-22

## 2023-10-19 RX ORDER — WARFARIN SODIUM 6 MG/1
6 TABLET ORAL
Status: DISCONTINUED | OUTPATIENT
Start: 2023-10-20 | End: 2023-10-22 | Stop reason: DRUGHIGH

## 2023-10-19 RX ORDER — WARFARIN SODIUM 7.5 MG/1
7.5 TABLET ORAL
Status: COMPLETED | OUTPATIENT
Start: 2023-10-19 | End: 2023-10-19

## 2023-10-19 RX ORDER — HEPARIN SODIUM 10000 [USP'U]/100ML
13.9 INJECTION, SOLUTION INTRAVENOUS
Status: DISCONTINUED | OUTPATIENT
Start: 2023-10-19 | End: 2023-10-22

## 2023-10-19 RX ADMIN — FAMOTIDINE 20 MG: 10 INJECTION INTRAVENOUS at 08:43

## 2023-10-19 RX ADMIN — BUDESONIDE AND FORMOTEROL FUMARATE DIHYDRATE 2 PUFF: 160; 4.5 AEROSOL RESPIRATORY (INHALATION) at 07:36

## 2023-10-19 RX ADMIN — HEPARIN SODIUM 4000 UNITS: 5000 INJECTION, SOLUTION INTRAVENOUS; SUBCUTANEOUS at 15:27

## 2023-10-19 RX ADMIN — ALLOPURINOL 300 MG: 300 TABLET ORAL at 08:43

## 2023-10-19 RX ADMIN — BUDESONIDE AND FORMOTEROL FUMARATE DIHYDRATE 2 PUFF: 160; 4.5 AEROSOL RESPIRATORY (INHALATION) at 19:17

## 2023-10-19 RX ADMIN — TIOTROPIUM BROMIDE INHALATION SPRAY 2 PUFF: 3.12 SPRAY, METERED RESPIRATORY (INHALATION) at 07:37

## 2023-10-19 RX ADMIN — HEPARIN SODIUM 4000 UNITS: 5000 INJECTION, SOLUTION INTRAVENOUS; SUBCUTANEOUS at 00:17

## 2023-10-19 RX ADMIN — FAMOTIDINE 20 MG: 10 INJECTION INTRAVENOUS at 21:00

## 2023-10-19 RX ADMIN — ATORVASTATIN CALCIUM 80 MG: 80 TABLET, FILM COATED ORAL at 21:00

## 2023-10-19 RX ADMIN — METOPROLOL SUCCINATE 100 MG: 100 TABLET, EXTENDED RELEASE ORAL at 08:43

## 2023-10-19 RX ADMIN — HEPARIN SODIUM 13.9 UNITS/KG/HR: 10000 INJECTION, SOLUTION INTRAVENOUS at 15:28

## 2023-10-19 RX ADMIN — HEPARIN SODIUM 10.9 UNITS/KG/HR: 10000 INJECTION, SOLUTION INTRAVENOUS at 08:43

## 2023-10-19 RX ADMIN — WARFARIN 7.5 MG: 7.5 TABLET ORAL at 17:39

## 2023-10-19 RX ADMIN — HEPARIN SODIUM 13.9 UNITS/KG/HR: 10000 INJECTION, SOLUTION INTRAVENOUS at 00:18

## 2023-10-19 RX ADMIN — ASPIRIN 325 MG: 325 TABLET ORAL at 08:43

## 2023-10-19 NOTE — SIGNIFICANT NOTE
10/19/23 1113   OTHER   Discipline occupational therapist   Rehab Time/Intention   Session Not Performed   (held OT after discussion with RN about critical values, on Heparin drip.)   Recommendation   OT - Next Appointment 10/20/23

## 2023-10-19 NOTE — PROGRESS NOTES
Dr. Walsh note noted.  I agree.  Very high risk for any kind of operative intervention secondary to his severe lung disease.

## 2023-10-19 NOTE — PROGRESS NOTES
"DOS: 10/19/2023  NAME: Joaquín Garcia Jr.   : 1960  PCP: Epley, James, APRN  Chief Complaint   Patient presents with    Eye Problem     Stroke      Subjective: No acute events overnight.  Patient denies any new complaints or concerns on my exam    Objective:  Vital signs: /84 (BP Location: Left arm, Patient Position: Lying)   Pulse 74   Temp 97.5 °F (36.4 °C) (Oral)   Resp 16   Ht 180 cm (70.87\")   Wt 91 kg (200 lb 9.9 oz)   SpO2 97%   BMI 28.09 kg/m²       HEENT: Normocephalic, atraumatic   COR: RRR  Resp: Even and unlabored  Extremities: Equal pulses, nondistal embolization    Neurological:   MS: AO. Language normal. No neglect. Higher integrative function normal  CN: II-XII normal except right gaze movement with left gaze impairment-vertical gaze impairment resulting in diplopia  Motor: 5/5, normal tone  Reflexes: Toes downgoing  Sensory: Intact-to light touch  Coordination: Normal-finger-to-nose although on the right mild overshoot noted with finger-to-nose    Laboratory results:  Lab Results   Component Value Date    GLUCOSE 88 10/19/2023    CALCIUM 9.4 10/19/2023     10/19/2023    K 4.8 10/19/2023    CO2 28.0 10/19/2023     10/19/2023    BUN 14 10/19/2023    CREATININE 0.96 10/19/2023    EGFRIFAFRI 77 2021    EGFRIFNONA 64 2021    BCR 14.6 10/19/2023    ANIONGAP 9.0 10/19/2023     Lab Results   Component Value Date    WBC 7.49 10/19/2023    HGB 16.1 10/19/2023    HCT 46.2 10/19/2023    MCV 99.6 (H) 10/19/2023     10/19/2023     Lab Results   Component Value Date    CHOL 173 10/18/2023     Lab Results   Component Value Date    HDL 47 10/18/2023    HDL 51 2023    HDL 50 2022     Lab Results   Component Value Date     (H) 10/18/2023     (H) 2023     (H) 2022     Lab Results   Component Value Date    TRIG 137 10/18/2023    TRIG 88 2023    TRIG 118 2022         Lab 10/18/23  0534   HEMOGLOBIN A1C 5.70* "      Review and interpretation of imaging:  Imaging discussed below reviewed independently.      Impression: This is a 63-year-old male with known past medical history of atrial fibrillation, hypertension, gout who presented to [facility] 10/17 due to report of diplopia for which our service was consulted.  Patient had outpatient CT of the chest with IV contrast and was noted to have blurring of vision driving home.  Prior to arrival patient on Eliquis previously no reported missed doses although compliance of medication of concern.  Labs:  A1c 5.70.  Initial CT of the head negative for acute acute hypo or hyperdensity.  Evidence of this concerning for neurofibromatosis.  CTA head and neck no evidence of high-grade stenosis/aneurysm/dissection.  CT perfusion shows no evidence of perfusion mismatch specifically no core/penumbra.  MRI of the brain showed diffusion restriction of the mid ranges.  CT a of the chest completed 10/17 showed (questionable left atrial mass) multiple large filling defect to cardiac atrium corresponding to more subtle filling defect described left atrial low-dose CT chest report from 9/29.  Also, 2 other intra-atrial filling defects noted.    Neurologically, stable with persistent diplopia and right eye abnormalities/vertical gaze impairment.  Recommend anticoagulation compliance-other recommendations below. No further neurology workup indicated. We will sign off and see again upon request.      Diagnosis:  Acute midbrain and left thalamic stroke  etiology likley secondary to cardioemboic source from left atrial mass. -Recommend to continued compliance with anticoagulation-heparin drip currently-cardiac dosing without boluses while inpatient-cardiology/CT surgery consultation due to presence of LV mass-will defer to anticoagulation to etiology/cardiothoracic team  2.  Hypertension  3.  Hyperlipidemia  4.  Atrial fibrillation-fully anticoagulated prior to arrival-on  Eliquis  5.  History of mitral and tricuspid valve replaced  6.  Left atrial mass-seen on recent CT of the chest; history of tobacco abuse and reported shortness of breath-no surgery advised as risk outweigh benefits due to poor pulmonary function  7.  COPD/profoundly advanced emphysematous changes on CT tobacco abuse; smoking cessation advised        Plan:  Heparin drip; cardiac dosing without boluses; defer long-term anticoagulation to cardiology/cardiothoracic team-on Eliquis prior to arrival concern for noncompliant  Aspirin 325 mg daily  Lipitor 80 mg daily-on Lipitor 40 mg daily prior to arrival   Neurochecks  BP control  Stroke Education  ELLIE/SCDs  PT/OT/ST  Follow-up with neurology in 3 months      Case reviewed with attending neurologist  and he agrees with treatment plan above.    TIFFANIE Tim

## 2023-10-19 NOTE — PROGRESS NOTES
"REASON FOR FOLLOW-UP: atrial mass/thrombus/cva    INTERVAL HISTORY:      HISTORY OF PRESENT ILLNESS:   This is a 63-year-old man with atrial fibrillation hypertension hyperlipidemia, tissue mitral valve replacement 2016 neurofibromatosis on chronic anticoagulation with Eliquis 5 mg every 12 hours.  The patient had a low-dose CT of the chest lung cancer screening 9/29/2023 which showed stable pulmonary nodule 6 mm in the periphery of the right middle lobe and a vague rounded area of decreased attenuation in a severely dilated left atrium.  A referral was made to cardiology.  The patient presented to ER on 9/17/2023 with complaints of double vision.  MRI of the brain shows infarct in the medial aspect of the thalamus 8 x 4 mm and an acute infarct anterolateral to the sylvian aqueduct 7 x 4 mm.  He has been started on heparin drip.  CT angiogram of the head and neck showed no clinically significant stenosis.     The patient is a poor historian.  He states that he takes his Eliquis \"every morning\" but forgets his evening dose perhaps once or twice a week?  He denies prior history of thrombosis.      Past Medical History, Past Surgical History, Social History, Family History have been reviewed and are without significant changes except as mentioned.    Review of Systems     Medications:  The current medication list was reviewed in the EMR    ALLERGIES:  No Known Allergies    Objective      Vitals:    10/19/23 1306   BP: 120/69   Pulse: 71   Resp: 18   Temp: 97.5 °F (36.4 °C)   SpO2: 97%          Physical Exam    CONSTITUTIONAL: pleasant well-developed adult man  HEENT: no icterus, no thrush, moist membranes  LYMPH: no cervical or supraclavicular lad  RESP: cta bilat, no wheezing, no rales  GI: soft, non-tender, no splenomegaly, +bs  MUSC: no edema, normal gait  NEURO: alert and oriented x3, normal strength  PSYCH: normal mood poor historian  Skin: Neurofibromas    RECENT LABS:  Hematology Results from last 7 days   Lab " "Units 10/19/23  0520 10/18/23  0534 10/17/23  1912   WBC 10*3/mm3 7.49 5.87 7.76   HEMOGLOBIN g/dL 16.1 16.1 17.5   HEMATOCRIT % 46.2 46.5 49.4   PLATELETS 10*3/mm3 140 131* 137*     2  Lab Results   Component Value Date    GLUCOSE 88 10/19/2023    BUN 14 10/19/2023    CREATININE 0.96 10/19/2023    EGFRIFNONA 64 09/30/2021    EGFRIFAFRI 77 09/30/2021    BCR 14.6 10/19/2023    CO2 28.0 10/19/2023    CALCIUM 9.4 10/19/2023    PROTENTOTREF 7.4 09/26/2023    ALBUMIN 3.9 10/18/2023    LABIL2 1.6 09/26/2023    AST 13 10/18/2023    ALT 21 10/18/2023       No results found for: \"IRON\", \"TIBC\", \"FERRITIN\"    No results found for: \"[id number]\"    No results found for: \"FOLATE\"       Assessment & Plan   *Atrial fibrillation on anticoagulation with Eliquis 5 mg every 12 hours-unclear if fully compliant/adherent  *Left atrial mass versus thrombus (probable thrombus)  *Acute infarcts thalamus and adjacent to the sylvian aqueduct likely embolic, small vessel ischemic disease  *Mild thrombocytopenia likely consumptive-improved today 140  *Advanced emphysematous changes  *Tobacco abuse     Hematology plan/recommendations:  Agree with anticoagulation plan as outlined by cardiology unfractionated heparin drip and bridge with goal of transition back to Coumadin with higher INR goal 2.5-3.5  Full dose aspirin not required from hematology perspective but would ask neurology aspirin dose when combined with anticoagulation given acute stroke  Await results of antiphospholipid labs  Looks like no other procedures planned on the patient so we will go ahead and begin Coumadin 7.5 mg today (he states his previous dose was usually 5 mg/day).  There is a note in the chart about him having some type of reaction to Lovenox in the past although he is unclear what that was?                  10/19/2023      CC:           "

## 2023-10-19 NOTE — PLAN OF CARE
Problem: Adult Inpatient Plan of Care  Goal: Plan of Care Review  Outcome: Ongoing, Progressing  Goal: Patient-Specific Goal (Individualized)  Outcome: Ongoing, Progressing  Goal: Absence of Hospital-Acquired Illness or Injury  Outcome: Ongoing, Progressing  Intervention: Identify and Manage Fall Risk  Recent Flowsheet Documentation  Taken 10/19/2023 1400 by Dexter Hawk RN  Safety Promotion/Fall Prevention:   safety round/check completed   room organization consistent  Taken 10/19/2023 1200 by Dexter Hawk RN  Safety Promotion/Fall Prevention:   safety round/check completed   room organization consistent  Taken 10/19/2023 1000 by Dexter Hawk RN  Safety Promotion/Fall Prevention:   safety round/check completed   room organization consistent  Taken 10/19/2023 0800 by Dexter Hawk RN  Safety Promotion/Fall Prevention:   room organization consistent   safety round/check completed  Intervention: Prevent Skin Injury  Recent Flowsheet Documentation  Taken 10/19/2023 1400 by Dexter Hawk RN  Body Position: position changed independently  Taken 10/19/2023 0800 by Dexter Hawk RN  Body Position: position changed independently  Skin Protection:   adhesive use limited   tubing/devices free from skin contact  Intervention: Prevent and Manage VTE (Venous Thromboembolism) Risk  Recent Flowsheet Documentation  Taken 10/19/2023 1400 by Dexter Hawk RN  VTE Prevention/Management: patient refused intervention  Taken 10/19/2023 0800 by Dexter Hawk RN  VTE Prevention/Management:   sequential compression devices on   bilateral  Intervention: Prevent Infection  Recent Flowsheet Documentation  Taken 10/19/2023 1400 by Dexter Hawk RN  Infection Prevention:   single patient room provided   rest/sleep promoted  Taken 10/19/2023 1200 by Dexter Hawk RN  Infection Prevention:   single patient room provided   rest/sleep promoted  Taken 10/19/2023 1000 by Dexter Hawk RN  Infection Prevention:   single  patient room provided   rest/sleep promoted  Taken 10/19/2023 0800 by Dexter Hawk RN  Infection Prevention: single patient room provided  Goal: Optimal Comfort and Wellbeing  Outcome: Ongoing, Progressing  Intervention: Provide Person-Centered Care  Recent Flowsheet Documentation  Taken 10/19/2023 0800 by Dexter Hawk RN  Trust Relationship/Rapport:   care explained   questions encouraged  Goal: Readiness for Transition of Care  Outcome: Ongoing, Progressing     Problem: Hypertension Comorbidity  Goal: Blood Pressure in Desired Range  Outcome: Ongoing, Progressing  Intervention: Maintain Blood Pressure Management  Recent Flowsheet Documentation  Taken 10/19/2023 1400 by Dexter Hawk RN  Medication Review/Management: medications reviewed  Taken 10/19/2023 1200 by Dexter Hawk RN  Medication Review/Management: medications reviewed  Taken 10/19/2023 1000 by Dexter Hawk RN  Medication Review/Management: medications reviewed  Taken 10/19/2023 0800 by Dexter Hawk RN  Medication Review/Management: medications reviewed     Problem: Fall Injury Risk  Goal: Absence of Fall and Fall-Related Injury  Outcome: Ongoing, Progressing  Intervention: Identify and Manage Contributors  Recent Flowsheet Documentation  Taken 10/19/2023 1400 by Dexter Hawk RN  Medication Review/Management: medications reviewed  Taken 10/19/2023 1200 by Dexter Hawk RN  Medication Review/Management: medications reviewed  Taken 10/19/2023 1000 by Dexter Hawk RN  Medication Review/Management: medications reviewed  Taken 10/19/2023 0800 by Dexter Hawk RN  Medication Review/Management: medications reviewed  Intervention: Promote Injury-Free Environment  Recent Flowsheet Documentation  Taken 10/19/2023 1400 by Dexter Hawk RN  Safety Promotion/Fall Prevention:   safety round/check completed   room organization consistent  Taken 10/19/2023 1200 by Dexter Hawk RN  Safety Promotion/Fall Prevention:   safety round/check  completed   room organization consistent  Taken 10/19/2023 1000 by Dexter Hawk RN  Safety Promotion/Fall Prevention:   safety round/check completed   room organization consistent  Taken 10/19/2023 0800 by Dexter Hawk RN  Safety Promotion/Fall Prevention:   room organization consistent   safety round/check completed     Problem: Adjustment to Illness (Stroke, Ischemic/Transient Ischemic Attack)  Goal: Optimal Coping  Outcome: Ongoing, Progressing  Intervention: Support Psychosocial Response to Stroke  Recent Flowsheet Documentation  Taken 10/19/2023 1400 by Dexter Hawk RN  Family/Support System Care: self-care encouraged     Problem: Bowel Elimination Impaired (Stroke, Ischemic/Transient Ischemic Attack)  Goal: Effective Bowel Elimination  Outcome: Ongoing, Progressing     Problem: Cerebral Tissue Perfusion (Stroke, Ischemic/Transient Ischemic Attack)  Goal: Optimal Cerebral Tissue Perfusion  Outcome: Ongoing, Progressing     Problem: Cognitive Impairment (Stroke, Ischemic/Transient Ischemic Attack)  Goal: Optimal Cognitive Function  Outcome: Ongoing, Progressing     Problem: Communication Impairment (Stroke, Ischemic/Transient Ischemic Attack)  Goal: Improved Communication Skills  Outcome: Ongoing, Progressing     Problem: Functional Ability Impaired (Stroke, Ischemic/Transient Ischemic Attack)  Goal: Optimal Functional Ability  Outcome: Ongoing, Progressing     Problem: Respiratory Compromise (Stroke, Ischemic/Transient Ischemic Attack)  Goal: Effective Oxygenation and Ventilation  Outcome: Ongoing, Progressing  Intervention: Optimize Oxygenation and Ventilation  Recent Flowsheet Documentation  Taken 10/19/2023 1400 by Dexter Hawk RN  Head of Bed (HOB) Positioning: HOB at 30-45 degrees  Taken 10/19/2023 0800 by Dexter Hawk RN  Head of Bed (HOB) Positioning: HOB at 20-30 degrees     Problem: Sensorimotor Impairment (Stroke, Ischemic/Transient Ischemic Attack)  Goal: Improved Sensorimotor  Function  Outcome: Ongoing, Progressing  Intervention: Optimize Range of Motion, Motor Control and Function  Recent Flowsheet Documentation  Taken 10/19/2023 1400 by Dexter Hawk RN  Positioning/Transfer Devices:   pillows   in use  Taken 10/19/2023 0800 by Dexter Hawk RN  Positioning/Transfer Devices:   pillows   in use  Intervention: Optimize Sensory and Perceptual Ability  Recent Flowsheet Documentation  Taken 10/19/2023 0800 by Dexter Hawk RN  Pressure Reduction Techniques:   frequent weight shift encouraged   weight shift assistance provided  Pressure Reduction Devices:   pressure-redistributing mattress utilized   positioning supports utilized     Problem: Swallowing Impairment (Stroke, Ischemic/Transient Ischemic Attack)  Goal: Optimal Eating and Swallowing without Aspiration  Outcome: Ongoing, Progressing     Problem: Urinary Elimination Impaired (Stroke, Ischemic/Transient Ischemic Attack)  Goal: Effective Urinary Elimination  Outcome: Ongoing, Progressing   Goal Outcome Evaluation:

## 2023-10-19 NOTE — PROGRESS NOTES
[facility] Progress Note       Encounter Date:10/19/23  Patient:Joaquín Garcia Jr.  :1960  MRN:[id number]      Chief Complaint: Follow-up left atrial thrombus      Subjective:        Patient doing okay still having changes with his vision    Review of Systems:  Review of Systems   Constitutional: Negative for malaise/fatigue.   Eyes:  Positive for double vision.   Cardiovascular:  Negative for chest pain and palpitations.   Respiratory:  Negative for shortness of breath.        Medications:  Scheduled Meds:  allopurinol, 300 mg, Oral, Daily  aspirin, 325 mg, Oral, Daily   Or  aspirin, 300 mg, Rectal, Daily  atorvastatin, 80 mg, Oral, Nightly  budesonide-formoterol, 2 puff, Inhalation, BID - RT  famotidine, 20 mg, Intravenous, Q12H  metoprolol succinate XL, 100 mg, Oral, Daily  tiotropium bromide monohydrate, 2 puff, Inhalation, Daily - RT    Continuous Infusions:  heparin, 13.9 Units/kg/hr, Last Rate: 10.9 Units/kg/hr (10/19/23 0843)    PRN Meds:    acetaminophen **OR** acetaminophen    bisacodyl    heparin (porcine)    ipratropium-albuterol    labetalol    ondansetron    [COMPLETED] Insert Peripheral IV **AND** sodium chloride    sodium chloride         Objective:       Vitals:    10/19/23 0000 10/19/23 0345 10/19/23 0726 10/19/23 0736   BP: 116/82 127/78 144/84    BP Location: Right arm Right arm Left arm    Patient Position: Lying Lying Lying    Pulse: 69 68 74    Resp: 16 16 18 16   Temp: 97.9 °F (36.6 °C) 98 °F (36.7 °C) 97.5 °F (36.4 °C)    TempSrc: Oral Oral Oral    SpO2: 94% 96% 97%    Weight:       Height:               Physical Exam:  Constitutional: Well appearing, well developed, no acute distress   HENT: Oropharynx clear and membrane moist  Eyes: Normal conjunctiva, no sclera icterus.  Neck: Supple, no carotid bruit bilaterally.  Cardiovascular: Irregularly irregular rate and rhythm, Early peaking systolic murmur over the right upper sternal border, No bilateral lower  extremity edema.  Pulmonary: Normal respiratory effort, distant lung sounds, no wheezing.  Skin: Warm, dry, no ecchymosis, no rash.  Psych: Appropriate mood and affect. Normal judgment and insight.           Lab Review:   Results from last 7 days   Lab Units 10/19/23  0520 10/18/23  0534 10/17/23  1912   SODIUM mmol/L 139 138 133*   POTASSIUM mmol/L 4.8 3.9 4.1   CHLORIDE mmol/L 102 105 97*   CO2 mmol/L 28.0 24.4 26.0   BUN mg/dL 14 10 14   CREATININE mg/dL 0.96 0.75* 0.95   GLUCOSE mg/dL 88 90 101*   CALCIUM mg/dL 9.4 8.8 9.8   AST (SGOT) U/L  --  13 17   ALT (SGPT) U/L  --  21 28     Results from last 7 days   Lab Units 10/17/23  1912   HSTROP T ng/L 10     Results from last 7 days   Lab Units 10/19/23  0520 10/18/23  0534 10/17/23  1912   WBC 10*3/mm3 7.49 5.87 7.76   HEMOGLOBIN g/dL 16.1 16.1 17.5   HEMATOCRIT % 46.2 46.5 49.4   PLATELETS 10*3/mm3 140 131* 137*     Results from last 7 days   Lab Units 10/19/23  0520 10/18/23  2045 10/18/23  1025 10/17/23  1912   INR   --   --  1.16* 1.11*   APTT seconds 110.5* 42.2* 32.3 31.2         Results from last 7 days   Lab Units 10/18/23  0534   CHOLESTEROL mg/dL 173   TRIGLYCERIDES mg/dL 137   HDL CHOL mg/dL 47         Results from last 7 days   Lab Units 10/18/23  0534   TSH uIU/mL 0.818         Transesophageal echocardiogram 10/18/2023 images reviewed by myself:  The left atrium is massively enlarged (giant left atrium). There is heavy swirling smoke within the left atrium the left atrial appendage.  There is a very large left atrial mass attached to the posterolateral aspect of the left atrium. 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 A less probable differential would include a massive left atrial thrombus  Left ventricular systolic function is normal. Left ventricular ejection fraction appears to be 61 - 65%.  Left ventricular wall thickness is consistent with mild to  moderate concentric hypertrophy.  The right ventricular cavity is moderate to severely dilated. Normal right ventricular systolic function noted.  There is a bioprosthetic mitral valve present. The mitral valve peak and mean gradients are within defined limits. The prosthetic mitral valve is normal.  There is an annuloplasty ring in the tricuspid valve position. There is trace tricuspid regurgitation through the annuloplasty ring.  There is no evidence of pericardial effusion    Echocardiogram 12/3/2020:  Calculated left ventricular EF = 55.1% Estimated left ventricular EF = 55% Estimated left ventricular EF was in agreement with the calculated left ventricular EF. Left ventricular systolic function is normal. Normal left ventricular cavity size noted. Left ventricular wall thickness is consistent with mild concentric hypertrophy. All left ventricular wall segments contract normally. Left ventricular diastolic function was indeterminate.  Left atrial volume is severely increased.The right atrial cavity is severely dilated.  There is mild thickening of the aortic valve.  There is a 33 mm, porcine, Medtronic bioprosthetic mitral valve present. The mitral valve peak and mean gradients are within defined limits. There is no significant prosthetic valve stenosis or regurgitation.  No significant tricuspid valve regurgitation or significant stenosis present. There is evidence of previous tricuspid valve repair.    Cardiac catheterization 10/21/2016:  Left main: Normal  LAD: 20% proximal LAD segment stenoses otherwise luminal irregularities throughout  Circumflex: Angiographically normal throughout  RCA: 20% mid segment stenoses supplying a PDA    Transesophageal echocardiogram 10/23/2016 images reviewed by myself:  All left ventricular wall segments contract normally.  Left ventricular function is normal.  Left atrial cavity size is severely dilated.  Severe mitral valve regurgitation is present due to prolapse  Moderate  tricuspid valve regurgitation is present.    Mitral valve replacement/tricuspid valve repair 10/27/2016 Dr. George:  Mitral valve replacement with a 33 mm Medtronic Mosaic ultra porcine valve with preservation of all subvalvar apparatus.    Tricuspid valve repair with a 32 mm Medtronic 3-D ring.        Assessment:          Diagnosis Plan   1. Acute CVA (cerebrovascular accident)        2. Dysconjugate gaze        3. Chronic anticoagulation               Plan:       Mr. Garcia is a 63 y.o. gentleman with past medical history notable for atrial fibrillation, nonrheumatic mitral valve regurgitation due to prolapse status post mitral valve replacement 2016, tricuspid valve regurgitation status post tricuspid valve repair, COPD with emphysema, and obstructive sleep apnea who presents to the emergency room with visual changes with diplopia on 10/17/2023.  His presentation seems consistent with his past noncompliance with Coumadin over the last 3 months or so.  He was placed on Eliquis and reports compliance with this may be missing only a few dosages however when I look at the nature of his clots and location this fits more with a pattern that you typically see with rheumatic heart disease which only responds to Coumadin.  He has a fair amount of smoke in his left atrium and his left atrium is extremely dilated.  I think that these clots likely formed over the last couple of months while off of anticoagulation and the recent addition of Eliquis likely did not provide any coverage.  My recommendation would be for him to go back on anticoagulation.  Given his mitral valve replacement and severely dilated left atrium I would treat him like a patient with valvular heart disease despite not meeting strict criteria for this.  I would recommend an INR level of 2.5-3.5.  Aspirin would be fine but I do not think that he needs a 325 mg of aspirin but would also defer and appreciate hematology's input.  I would not recommend any  percutaneous approach to aspirate these clots and likely resulted in significant embolization and further strokes surgery did evaluate the patient and he is not a good surgical candidate given his underlying lung disease.  Usually with treatment with appropriate anticoagulation this should be sufficient would recommend heparin bridge until therapeutic INR.  Again would appreciate hematology's input and recommendations as well if any further testing would be needed from there and prior to starting Coumadin will defer to them from a cardiac standpoint would be fine to start Coumadin at this point.  I did talk with him it sounds like he would like to try and continue to follow with his primary care physician for his INR because it is closer to home that being said I know there is some issues with compliance in the past I will reach out to his primary care physician to see if he is interested in this or perhaps he can have his labs drawn at his primary care physician's office and managed by our anticoagulation clinic.    Permanent atrial fibrillation:  By strict criteria not truly valvular in etiology however anatomically would appear to be behaving like a rheumatic valvular A-fib given the size of his atrium and the location of his multiple thrombi within the walls of his left atrium  I would recommend a goal INR of 2.5-3.5  Would advise against NOAC  Aspirin 81 mg would be sufficient from my perspective  Appreciate hematology's input  Would bridge with heparin until therapeutic INR    Nonrheumatic mitral valve regurgitation status post mitral valve replacement:  Normal valve function on current study  We will continue to follow  Etiology of valvular heart disease appears to be prolapse not rheumatic heart disease but atrium is severely dilated would treat him as a patient who had rheumatic mitral stenosis             Gordon Walsh MD  [facility]  10/19/23  08:50 EDT

## 2023-10-19 NOTE — SIGNIFICANT NOTE
10/19/23 1605   OTHER   Discipline physical therapist   Rehab Time/Intention   Session Not Performed patient unavailable for treatment  (pt receiving PFT this PM, PT will check back tomorrow)   Recommendation   PT - Next Appointment 10/20/23

## 2023-10-19 NOTE — PROGRESS NOTES
Name: Joaquín Garcia Jr. ADMIT: 10/17/2023   : 1960  PCP: Epley, James, APRN    MRN: [id number] LOS: 1 days   AGE/SEX: 63 y.o. male  ROOM: [facility]     Subjective   Subjective   Patient reports continued diplopia and blurred vision.  No loss of the vision.  Cannot comment on the gait today as he did not get up.  Positive occasional slurred speech this morning that has now resolved..  No unilateral numbness or weakness.  Positive.  No dysphagia.  No loss of consciousness.  No dizziness.  No headache.    Review of Systems  Cardio vascular/respiratory.  Positive exertional dyspnea/cough/wheeze (all old).  No chest pain.  No palpitation.  .  No dysuria or hematuria.  GI.  No abdominal pain or nausea or vomiting.  No bowel x2 days.     Objective   Objective   Vital Signs  Temp:  [97.5 °F (36.4 °C)-98 °F (36.7 °C)] 97.5 °F (36.4 °C)  Heart Rate:  [56-77] 74  Resp:  [16-18] 16  BP: (106-144)/(65-84) 144/84  SpO2:  [91 %-99 %] 97 %  on  Flow (L/min):  [2] 2;   Device (Oxygen Therapy): nasal cannula    Intake/Output Summary (Last 24 hours) at 10/19/2023 1252  Last data filed at 10/19/2023 1100  Gross per 24 hour   Intake 360 ml   Output 700 ml   Net -340 ml     Body mass index is 28.09 kg/m².      10/17/23  1858 10/17/23  2119 10/18/23  1356   Weight: 92.1 kg (203 lb) 91.5 kg (201 lb 11.2 oz) 91 kg (200 lb 9.9 oz)     Physical Exam  General.  Middle-aged gentleman.  Appears older than stated age.  He is alert and oriented x3.  No apparent pain/distress/diaphoresis.  Normal mood and affect.  Eyes.  Medial deviation of the left eye.  Positive horizontal neck static mass.  Pupils equal round and reactive.  Oral cavity.  Moist mucous membrane.  Neck.  Supple.  No JVD.  No lymphadenopathy or thyromegaly.  Cardiovascular.  Controlled rate atrial fibrillation and grade 2 systolic murmur.  Chest.  Poor bilateral air entry with no added sounds.  Abdomen.  Soft lax.  No tenderness.  No organomegaly.  No guarding or  rebound.  Extremities.  No clubbing/cyanosis/edema.  CNS.  .  Medial deviation of the left eye.  Otherwise negative CNS examination.  Gait and Romberg not examined today.    Results Review:      Results from last 7 days   Lab Units 10/19/23  0520 10/18/23  0534 10/17/23  1912   SODIUM mmol/L 139 138 133*   POTASSIUM mmol/L 4.8 3.9 4.1   CHLORIDE mmol/L 102 105 97*   CO2 mmol/L 28.0 24.4 26.0   BUN mg/dL 14 10 14   CREATININE mg/dL 0.96 0.75* 0.95   GLUCOSE mg/dL 88 90 101*   CALCIUM mg/dL 9.4 8.8 9.8   AST (SGOT) U/L  --  13 17   ALT (SGPT) U/L  --  21 28     Estimated Creatinine Clearance: 88.3 mL/min (by C-G formula based on SCr of 0.96 mg/dL).  Results from last 7 days   Lab Units 10/18/23  0534   HEMOGLOBIN A1C % 5.70*     Results from last 7 days   Lab Units 10/18/23  0602 10/18/23  0033 10/17/23  1907   GLUCOSE mg/dL 90 97 102     Results from last 7 days   Lab Units 10/17/23  1912   HSTROP T ng/L 10         Results from last 7 days   Lab Units 10/18/23  0534   TSH uIU/mL 0.818         Results from last 7 days   Lab Units 10/18/23  0534   CHOLESTEROL mg/dL 173   TRIGLYCERIDES mg/dL 137   HDL CHOL mg/dL 47     Results from last 7 days   Lab Units 10/19/23  0520 10/18/23  0534 10/17/23  1912   WBC 10*3/mm3 7.49 5.87 7.76   HEMOGLOBIN g/dL 16.1 16.1 17.5   HEMATOCRIT % 46.2 46.5 49.4   PLATELETS 10*3/mm3 140 131* 137*   MCV fL 99.6* 97.9* 98.6*   MCH pg 34.7* 33.9* 34.9*   MCHC g/dL 34.8 34.6 35.4   RDW % 13.4 13.3 13.4   RDW-SD fl 48.8 47.2 47.6   MPV fL 8.3 8.5 8.1   NEUTROPHIL % % 71.4  --  71.4   LYMPHOCYTE % % 16.7*  --  17.5*   MONOCYTES % % 9.1  --  8.2   EOSINOPHIL % % 1.5  --  1.7   BASOPHIL % % 0.8  --  0.8   IMM GRAN % % 0.5  --  0.4   NEUTROS ABS 10*3/mm3 5.35  --  5.54   LYMPHS ABS 10*3/mm3 1.25  --  1.36   MONOS ABS 10*3/mm3 0.68  --  0.64   EOS ABS 10*3/mm3 0.11  --  0.13   BASOS ABS 10*3/mm3 0.06  --  0.06   IMMATURE GRANS (ABS) 10*3/mm3 0.04  --  0.03   NRBC /100 WBC 0.0  --  0.0     Results from  last 7 days   Lab Units 10/19/23  0520 10/18/23  2045 10/18/23  1025 10/17/23  1912   INR   --   --  1.16* 1.11*   APTT seconds 110.5* 42.2* 32.3 31.2                                       Imaging:  Imaging Results (Last 24 Hours)       Procedure Component Value Units Date/Time    MRI Brain With & Without Contrast [[id number]] Collected: 10/18/23 0940     Updated: 10/18/23 1706    Narrative:      MRI EXAMINATION OF THE BRAIN WITH AND WITHOUT CONTRAST     HISTORY: Blurry vision.     COMPARISON: CT head 10/17/2023. No prior MRI of the brain is available  for comparison.     TECHNIQUE: A MRI examination of the brain was performed utilizing  sagittal T1, axial diffusion, T1, T2, T2 FLAIR, gradient echo T2 as well  as sagittal, axial and coronal T1 postcontrast weighted sequences.     FINDINGS:     The diffusion sequence demonstrates an area of increased signal  intensity involving the anterior and medial aspect of the thalamus on  the left measuring approximately 8 x 4 mm in size consistent with a  small acute infarct. There is signal loss on the ADC map at the same  location. There is also an area of increased signal intensity  anterolateral to the sylvian aqueduct on the right measuring  approximately 7 x 4 mm in size which is decreased in signal intensity on  the ADC map consistent with a second smaller acute infarct.     There is expected flow void in the basilar artery and in the distal  aspect of the internal carotid arteries bilaterally on axial T2  sequence.     Innumerable nodules are appreciated involving the scalp consistent with  the patient's history of neurofibromatosis. Nearly all are subcentimeter  in size. The largest overlies the medial aspect of the left orbit  measuring approximately 2.7 x 1.6 x 2.3 cm in size. Mild small vessel  ischemic disease is noted. A small lacunar infarct involving the corona  radiata on the right anteriorly is appreciated. There is no evidence of  abnormal intra-axial  enhancement.       Impression:      1.  There is an acute infarct involving the medial aspect of the  thalamus on the left anteriorly measuring 8 x 4 mm in size and an acute  infarct anterolateral to the sylvian aqueduct to the right measuring 7 x  4 mm in size.  2.  Interval nodules involving the scalp are noted as described above,  the largest of which overlies the anterior and medial aspect of the left  orbit measuring approximately 2.7 cm in size.  3.  Small vessel ischemic disease and lacunar infarct are noted. There  is no evidence of abnormal intra-axial enhancement.     This report was finalized on 10/18/2023 5:03 PM by Dr. David Koehler M.D on Workstation: [id number]                  I reviewed the patient's new clinical results / labs / tests / procedures      Assessment/Plan     Active Hospital Problems    Diagnosis  POA    **Diplopia [H53.2]  Yes    Dizziness [R42]  Yes    Acute CVA (cerebrovascular accident) [I63.9]  Yes    Left atrial mass [I51.89]  Yes    Gout [M10.9]  Yes    On anticoagulant therapy [Z79.01]  Not Applicable    Hypertension [I10]  Yes    Atrial fibrillation, chronic [I48.20]  Yes      Resolved Hospital Problems   No resolved problems to display.           Embolic acute CVA leading to diplopia/unsteady gait..  A1c is 5.7.  LDL is 102.  TSH is normal.  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  This has occurred while the patient is on Eliquis (he assures compliance) which indicate Eliquis failure.  Currently on IV heparin/aspirin/high-dose Lipitor.  Cardiology consult is noted and appreciated.  Cardiology performed LILIA and the finding was consistent of a large left atrial thrombus, normal ejection fraction, left ventricular hypertrophy, dilated right ventricle, bioprosthetic mitral valve that appears to be normal, annuloplasty in the tricuspid valve with the position.  Patient is a poor candidate for surgery because of her/severe COPD.  Patient is a poor candidate  for catheter removal of the blood clot as it is significantly large.  Cardiology and cardiothoracic surgery recommends conservative treatment with anticoagulation.  Suggestion of changing Eliquis to Coumadin with an INR of 2.5-3.5 as a target.  Awaiting hematology oncology feedback.  Can stay on IV heparin or transition to subcu Lovenox until INR is therapeutic.  Hematology oncology checking antiphospholipid antibodies.  Physical therapy/Occupational Therapy evaluated the patient and recommends a skilled facility  History of atrial fibrillation/hypertension/bioprosthetic mitral valvular replacement/tricuspid repair/recent diagnosis of left atrial large thrombus.  There is no evidence of angina or congestive heart failure.  Patient blood pressure is controlled and the rate is controlled.  Currently on IV heparin.  Blood pressure is acceptable even after stopping Norvasc/hydrochlorothiazide/Altace to avoid hypotension in view of stroke.  Currently on metoprolol.  LILIA result noted.    COPD.  Chest x-ray negative for infiltrate.  Not in exacerbation.  Continue Symbicort/Spiriva/as needed albuterol.   Thrombocytopenia.  Mild.  Resolved.  Will monitor.  Glucose intolerance.  A1c mildly elevated at 5.7.  Diabetic diet at discharge.  Dyslipidemia.  LDL is suboptimal.  Lipitor increased.  History of obstructive sleep apnea.  Oxygen as needed.  History of neurofibromatosis  VTE prophylaxis.  Patient anticoagulated.  Peptic ulcer disease prophylaxis.  IV Pepcid.      Discussed my findings and plan of treatment with the patient/family.  Disposition.  To be determined based on clinical course.  Physical therapy recommends skilled      Ishan Franz MD  [facility]  10/19/23  12:52 EDT

## 2023-10-19 NOTE — PROGRESS NOTES
[facility] Services: Warfarin Dosing/Monitoring Consult    Joaquín Garcia Jr. is a 63 y.o. male, estimated creatinine clearance is 88.3 mL/min (by C-G formula based on SCr of 0.96 mg/dL). weighing 91 kg (200 lb 9.9 oz).    Results from last 7 days   Lab Units 10/19/23  0520 10/18/23  2045 10/18/23  1025 10/18/23  0534 10/17/23  1912   INR   --   --  1.16*  --  1.11*   APTT seconds 110.5* 42.2* 32.3  --  31.2   HEMOGLOBIN g/dL 16.1  --   --  16.1 17.5   HEMATOCRIT % 46.2  --   --  46.5 49.4   PLATELETS 10*3/mm3 140  --   --  131* 137*     Prior to admission anticoagulation: apixaban, prior to that was on warfarin 5mg daily    Hospital Anticoagulation:  Consulting provider: Dr Bolden  Start date: 10/19  Indication: DVT/PE (active thrombosis)  Target INR: 2.5 - 3.5  Expected duration: TBD   Bridge Therapy: Yes  with unfractionated heparin    Potential food or drug interactions: allopurinol,     Education complete?/Date: No; plan for follow up TBD    Assessment/Plan:  Dose: will give a one time dose of 7.5mg today and then start 6mg daily (higher than previous dose since INR goal higher)  Monitor for any signs or symptoms of bleeding  Follow up daily INRs and dose adjustments    Pharmacy will continue to follow until discharge or discontinuation of warfarin.     Scott Mary PharmD  Clinical Pharmacist

## 2023-10-19 NOTE — PROGRESS NOTES
Discharge Planning Assessment  [facility]     Patient Name: Joaquín Garcia Jr.  MRN: [id number]  Today's Date: 10/19/2023    Admit Date: 10/17/2023    Plan: Home with mother; denies any discharge needs   Discharge Needs Assessment    No documentation.                  Discharge Plan       Row Name 10/19/23 0937       Plan    Plan Home with mother; denies any discharge needs    Plan Comments Spoke with patient at bedside face sheet verified. Patient lives with his mother Susy [phone]. He stated he is independent with ADL's and denies using any medical equipment. Patient denies any discharge needs. He is agreeable with Meds to Bed. Provided patient with [facility]. Apolonia ZARAGOZA                  Continued Care and Services - Admitted Since 10/17/2023    Coordination has not been started for this encounter.          Demographic Summary    No documentation.                  Functional Status    No documentation.                  Psychosocial    No documentation.                  Abuse/Neglect    No documentation.                  Legal    No documentation.                  Substance Abuse    No documentation.                  Patient Forms    No documentation.                     Shanna Epstein RN

## 2023-10-19 NOTE — PROGRESS NOTES
[facility] INPATIENT PROGRESS NOTE         [address]    10/19/2023      PATIENT IDENTIFICATION:  Name: Joaquín Garcia Jr. ADMIT: 10/17/2023   : 1960  PCP: Epley, James, APRN    MRN: [id number] LOS: 1 days   AGE/SEX: 63 y.o. male  ROOM: [facility]                     LOS 1    Reason for visit: COPD/emphysema      SUBJECTIVE:      Slept well.  No new pulmonary complaint.    Objective   OBJECTIVE:    Vital Sign Min/Max for last 24 hours  Temp  Min: 97.7 °F (36.5 °C)  Max: 98 °F (36.7 °C)   BP  Min: 106/72  Max: 140/79   Pulse  Min: 56  Max: 79   Resp  Min: 16  Max: 18   SpO2  Min: 91 %  Max: 100 %   No data recorded   Weight  Min: 91 kg (200 lb 9.9 oz)  Max: 91 kg (200 lb 9.9 oz)        10/17/23  1858 10/17/23  2119 10/18/23  1356   Weight: 92.1 kg (203 lb) 91.5 kg (201 lb 11.2 oz) 91 kg (200 lb 9.9 oz)       Body mass index is 28.09 kg/m².                          Body mass index is 28.09 kg/m².    Intake/Output Summary (Last 24 hours) at 10/19/2023 0747  Last data filed at 10/19/2023 0000  Gross per 24 hour   Intake 480 ml   Output 600 ml   Net -120 ml         Exam:  GEN:  No distress, appears stated age  EYES:   PERRL, anicteric sclerae  ENT:    External ears/nose normal, OP clear  NECK:  No adenopathy, midline trachea  LUNGS: Normal chest on inspection, palpation and diminished on  auscultation  CV:  Normal S1S2, without murmur  ABD:  Nontender, nondistended, no hepatosplenomegaly, +BS  EXT:  No edema.  No cyanosis or clubbing.  No mottling and normal cap refill.    Assessment     Scheduled meds:  allopurinol, 300 mg, Oral, Daily  aspirin, 325 mg, Oral, Daily   Or  aspirin, 300 mg, Rectal, Daily  atorvastatin, 80 mg, Oral, Nightly  budesonide-formoterol, 2 puff, Inhalation, BID - RT  famotidine, 20 mg, Intravenous, Q12H  metoprolol succinate XL, 100 mg, Oral, Daily  tiotropium bromide monohydrate, 2 puff, Inhalation, Daily - RT      IV meds:                      heparin, 13.9  Units/kg/hr, Last Rate: Stopped (10/19/23 0723)      Data Review:  Results from last 7 days   Lab Units 10/19/23  0520 10/18/23  0534 10/17/23  1912   SODIUM mmol/L 139 138 133*   POTASSIUM mmol/L 4.8 3.9 4.1   CHLORIDE mmol/L 102 105 97*   CO2 mmol/L 28.0 24.4 26.0   BUN mg/dL 14 10 14   CREATININE mg/dL 0.96 0.75* 0.95   GLUCOSE mg/dL 88 90 101*   CALCIUM mg/dL 9.4 8.8 9.8         Estimated Creatinine Clearance: 88.3 mL/min (by C-G formula based on SCr of 0.96 mg/dL).  Results from last 7 days   Lab Units 10/19/23  0520 10/18/23  0534 10/17/23  1912   WBC 10*3/mm3 7.49 5.87 7.76   HEMOGLOBIN g/dL 16.1 16.1 17.5   PLATELETS 10*3/mm3 140 131* 137*     Results from last 7 days   Lab Units 10/18/23  1025 10/17/23  1912   INR  1.16* 1.11*     Results from last 7 days   Lab Units 10/18/23  0534 10/17/23  1912   ALT (SGPT) U/L 21 28   AST (SGOT) U/L 13 17                 Hemoglobin A1C   Date/Time Value Ref Range Status   10/18/2023 0534 5.70 (H) 4.80 - 5.60 % Final     Glucose   Date/Time Value Ref Range Status   10/18/2023 0602 90 70 - 130 mg/dL Final   10/18/2023 0033 97 70 - 130 mg/dL Final   10/17/2023 1907 102 70 - 130 mg/dL Final         XR Chest 1 View    Result Date: 10/17/2023  PORTABLE CHEST X-RAY  HISTORY: Acute stroke symptoms. Type I neurofibromatosis. CT angiogram chest earlier today demonstrated multiple intraluminal filling defects in the left atrium.  Portable chest x-ray is correlated with CT angiogram chest from earlier today and chest x-ray 10/31/2016.  FINDINGS: Sternal wires with cardiomegaly and extensive emphysematous change in the upper lung zones. There is some prominence of the interstitial markings commensurate with advanced emphysema. No focal infiltrate, effusion, or pneumothorax.      Impression: Cardiomegaly with marked emphysematous pulmonary parenchymal change. No acute abnormality evident on x-ray.  This report was finalized on 10/17/2023 8:08 PM by Dr. Juanito Perea M.D on  Workstation: [id number]      CT Chest Low Dose Cancer Screening WO    Result Date: 10/2/2023  CT CHEST LOW DOSE CANCER SCREENING WO-  Radiation dose reduction techniques were utilized, including automated exposure control and exposure modulation based on body size.  Low dose screening lung CT  Clinical: minimum of 30 pack year history of smoking  CTDI 2.4 mGy  DLP 84.5 mGycm  COMPARISON: 09/13/2022  FINDINGS: There is cardiac enlargement. There is a vague rounded 5 cm area of diminished attenuation seen centrally within a dilated left atrium. Prior median sternotomy with valve repair. Bilateral gynecomastia has developed. Diameter of the aorta is within normal limits. The esophagus is satisfactory in appearance.  6 mm stable pulmonary nodule along the periphery of the right middle lobe is again seen on image 116.  No new pulmonary nodule, acute airspace disease or pleural effusion has developed. 2.7 cm stable right adrenal nodule is again seen.   Conclusion: 1. Vague rounded 5 cm area of diminished attenuation within the left atrium. 2. Stable pulmonary nodule. 3. Gynecomastia.  Lung Rads category: 2S  Recommendations: CT angiogram chest  Findings of this report called to Dr. Tian the morning of 10/02/2023.     This report was finalized on 10/2/2023 10:01 AM by Dr. Sylvester Junior M.D.          LILIA reviewed                  Active Hospital Problems    Diagnosis  POA    **Diplopia [H53.2]  Yes    Dizziness [R42]  Yes    Acute CVA (cerebrovascular accident) [I63.9]  Yes    Left atrial mass [I51.89]  Yes    Gout [M10.9]  Yes    On anticoagulant therapy [Z79.01]  Not Applicable    Hypertension [I10]  Yes    Atrial fibrillation, chronic [I48.20]  Yes      Resolved Hospital Problems   No resolved problems to display.         ASSESSMENT:  COPD with severe emphysema  Left atrial filling defects with severely dilated left atrium  History of valve replacement 2016  Acute stroke with diplopia  BRANDIE: Noncompliant with  CPAP  Hypertension  Persistent smoker      PLAN:  PFTs this morning. Scheduled and as needed bronchodilators for severe COPD and emphysema.  Try to optimize lung function as best as we can.     Joseph Jackson MD  Pulmonary and Critical Care Medicine  [facility], [facility]  10/19/2023    07:47 EDT

## 2023-10-20 LAB
ANION GAP SERPL CALCULATED.3IONS-SCNC: 9 MMOL/L (ref 5–15)
APTT PPP: 63.9 SECONDS (ref 22.7–35.4)
APTT PPP: 69.2 SECONDS (ref 22.7–35.4)
B2 GLYCOPROT1 IGA SER-ACNC: <9 GPI IGA UNITS (ref 0–25)
B2 GLYCOPROT1 IGG SER-ACNC: <9 GPI IGG UNITS (ref 0–20)
B2 GLYCOPROT1 IGM SER-ACNC: <9 GPI IGM UNITS (ref 0–32)
BASOPHILS # BLD AUTO: 0.04 10*3/MM3 (ref 0–0.2)
BASOPHILS NFR BLD AUTO: 0.5 % (ref 0–1.5)
BUN SERPL-MCNC: 15 MG/DL (ref 8–23)
BUN/CREAT SERPL: 17.6 (ref 7–25)
CALCIUM SPEC-SCNC: 8.9 MG/DL (ref 8.6–10.5)
CHLORIDE SERPL-SCNC: 104 MMOL/L (ref 98–107)
CO2 SERPL-SCNC: 23 MMOL/L (ref 22–29)
CREAT SERPL-MCNC: 0.85 MG/DL (ref 0.76–1.27)
DEPRECATED RDW RBC AUTO: 49.1 FL (ref 37–54)
EGFRCR SERPLBLD CKD-EPI 2021: 96.4 ML/MIN/1.73
EOSINOPHIL # BLD AUTO: 0.13 10*3/MM3 (ref 0–0.4)
EOSINOPHIL NFR BLD AUTO: 1.6 % (ref 0.3–6.2)
ERYTHROCYTE [DISTWIDTH] IN BLOOD BY AUTOMATED COUNT: 13.5 % (ref 12.3–15.4)
GLUCOSE SERPL-MCNC: 92 MG/DL (ref 65–99)
HCT VFR BLD AUTO: 45 % (ref 37.5–51)
HGB BLD-MCNC: 15.7 G/DL (ref 13–17.7)
IMM GRANULOCYTES # BLD AUTO: 0.02 10*3/MM3 (ref 0–0.05)
IMM GRANULOCYTES NFR BLD AUTO: 0.2 % (ref 0–0.5)
INR PPP: 1.12 (ref 0.9–1.1)
LYMPHOCYTES # BLD AUTO: 1.17 10*3/MM3 (ref 0.7–3.1)
LYMPHOCYTES NFR BLD AUTO: 14.3 % (ref 19.6–45.3)
MCH RBC QN AUTO: 34.6 PG (ref 26.6–33)
MCHC RBC AUTO-ENTMCNC: 34.9 G/DL (ref 31.5–35.7)
MCV RBC AUTO: 99.1 FL (ref 79–97)
MONOCYTES # BLD AUTO: 0.69 10*3/MM3 (ref 0.1–0.9)
MONOCYTES NFR BLD AUTO: 8.4 % (ref 5–12)
NEUTROPHILS NFR BLD AUTO: 6.12 10*3/MM3 (ref 1.7–7)
NEUTROPHILS NFR BLD AUTO: 75 % (ref 42.7–76)
NRBC BLD AUTO-RTO: 0 /100 WBC (ref 0–0.2)
PLATELET # BLD AUTO: 137 10*3/MM3 (ref 140–450)
PMV BLD AUTO: 8.3 FL (ref 6–12)
POTASSIUM SERPL-SCNC: 3.9 MMOL/L (ref 3.5–5.2)
PROTHROMBIN TIME: 14.6 SECONDS (ref 11.7–14.2)
RBC # BLD AUTO: 4.54 10*6/MM3 (ref 4.14–5.8)
SODIUM SERPL-SCNC: 136 MMOL/L (ref 136–145)
WBC NRBC COR # BLD: 8.17 10*3/MM3 (ref 3.4–10.8)

## 2023-10-20 PROCEDURE — 94799 UNLISTED PULMONARY SVC/PX: CPT

## 2023-10-20 PROCEDURE — 85730 THROMBOPLASTIN TIME PARTIAL: CPT | Performed by: INTERNAL MEDICINE

## 2023-10-20 PROCEDURE — 25010000002 HEPARIN (PORCINE) PER 1000 UNITS: Performed by: NURSE PRACTITIONER

## 2023-10-20 PROCEDURE — 85025 COMPLETE CBC W/AUTO DIFF WBC: CPT | Performed by: INTERNAL MEDICINE

## 2023-10-20 PROCEDURE — 97535 SELF CARE MNGMENT TRAINING: CPT

## 2023-10-20 PROCEDURE — 85610 PROTHROMBIN TIME: CPT | Performed by: INTERNAL MEDICINE

## 2023-10-20 PROCEDURE — 99232 SBSQ HOSP IP/OBS MODERATE 35: CPT | Performed by: INTERNAL MEDICINE

## 2023-10-20 PROCEDURE — 94761 N-INVAS EAR/PLS OXIMETRY MLT: CPT

## 2023-10-20 PROCEDURE — 92523 SPEECH SOUND LANG COMPREHEN: CPT

## 2023-10-20 PROCEDURE — 80048 BASIC METABOLIC PNL TOTAL CA: CPT | Performed by: INTERNAL MEDICINE

## 2023-10-20 PROCEDURE — 85730 THROMBOPLASTIN TIME PARTIAL: CPT | Performed by: STUDENT IN AN ORGANIZED HEALTH CARE EDUCATION/TRAINING PROGRAM

## 2023-10-20 PROCEDURE — 97530 THERAPEUTIC ACTIVITIES: CPT

## 2023-10-20 PROCEDURE — 94664 DEMO&/EVAL PT USE INHALER: CPT

## 2023-10-20 RX ORDER — FAMOTIDINE 20 MG/1
20 TABLET, FILM COATED ORAL
Status: DISCONTINUED | OUTPATIENT
Start: 2023-10-20 | End: 2023-10-23 | Stop reason: HOSPADM

## 2023-10-20 RX ADMIN — ASPIRIN 325 MG: 325 TABLET ORAL at 08:15

## 2023-10-20 RX ADMIN — FAMOTIDINE 20 MG: 10 INJECTION INTRAVENOUS at 08:15

## 2023-10-20 RX ADMIN — BUDESONIDE AND FORMOTEROL FUMARATE DIHYDRATE 2 PUFF: 160; 4.5 AEROSOL RESPIRATORY (INHALATION) at 20:20

## 2023-10-20 RX ADMIN — METOPROLOL SUCCINATE 100 MG: 100 TABLET, EXTENDED RELEASE ORAL at 08:15

## 2023-10-20 RX ADMIN — FAMOTIDINE 20 MG: 20 TABLET, FILM COATED ORAL at 17:28

## 2023-10-20 RX ADMIN — BUDESONIDE AND FORMOTEROL FUMARATE DIHYDRATE 2 PUFF: 160; 4.5 AEROSOL RESPIRATORY (INHALATION) at 07:49

## 2023-10-20 RX ADMIN — ALLOPURINOL 300 MG: 300 TABLET ORAL at 08:15

## 2023-10-20 RX ADMIN — ATORVASTATIN CALCIUM 80 MG: 80 TABLET, FILM COATED ORAL at 19:51

## 2023-10-20 RX ADMIN — HEPARIN SODIUM 13.9 UNITS/KG/HR: 10000 INJECTION, SOLUTION INTRAVENOUS at 12:28

## 2023-10-20 RX ADMIN — WARFARIN 6 MG: 6 TABLET ORAL at 17:28

## 2023-10-20 RX ADMIN — TIOTROPIUM BROMIDE INHALATION SPRAY 2 PUFF: 3.12 SPRAY, METERED RESPIRATORY (INHALATION) at 07:48

## 2023-10-20 NOTE — THERAPY EVALUATION
Acute Care - Speech Language Pathology Initial Evaluation  [facility]     Patient Name: Joaquín Garcia Jr.  : 1960  MRN: [id number]  Today's Date: 10/20/2023               Admit Date: 10/17/2023     Visit Dx:    ICD-10-CM ICD-9-CM   1. Acute CVA (cerebrovascular accident)  I63.9 434.91   2. Dysconjugate gaze  H51.8 378.87   3. Chronic anticoagulation  Z79.01 V58.61     Patient Active Problem List   Diagnosis    Supraventricular tachycardia    NF (neurofibromatosis)    COPD (chronic obstructive pulmonary disease)    Acute hypoxemic respiratory failure    MR (mitral regurgitation)    COPD exacerbation    Acute respiratory failure with hypoxemia    Rectus sheath hematoma    Alcoholism    Atrial fibrillation, chronic    Toe pain, right    S/P MVR (mitral valve replacement)    Hypertension    S/P TVR (tricuspid valve repair)    Abnormal electrocardiogram    Benign essential hypertension    Anxiety    Prostate cancer screening    Colon cancer screening    Need for hepatitis C screening test    Knee strain, left, initial encounter    Acute midline low back pain with bilateral sciatica    Spinal stenosis of lumbar region with neurogenic claudication    On anticoagulant therapy    Tobacco abuse    Gout    Daytime somnolence    Left atrial mass    Acute CVA (cerebrovascular accident)    Diplopia    Dizziness     Past Medical History:   Diagnosis Date    Arthritis     Atrial fibrillation     COPD (chronic obstructive pulmonary disease)     PT STATES NO LONGER ON INHALERS BUT IS BREATHING WELL    Degenerative lumbar disc     Emphysema with chronic bronchitis     H/O tricuspid valve repair 2016    MEDTRONIC ANNULOPLASTY RING    History of heart valve replacement with porcine valve 2016    MITRAL VALVE, DR COBURN    Hypertension     Low back pain     RADIATES DOWN BLE, WORSE ON RT, SOME NUMBNESS/TINGLING    Neurofibromatosis     On anticoagulant therapy     Sleep apnea     DOES NOT USE CPAP    Spinal stenosis       Past Surgical History:   Procedure Laterality Date    CARDIAC CATHETERIZATION  10/20/2016    Procedure: Case Abort;  Surgeon: Zhen Ford MD;  Location[facility] CATH [facility]ASIVE LOC[facility];  Service:     CARDIAC CATHETERIZATION Left 10/21/2016    Procedure: Cardiac catheterization;  Surgeon: Zhen Ford MD;  Location:  [facility] CATH INVASIVE LOCATION;  Service:     HERNIA REPAIR      LUMBAR LAMINECTOMY DISCECTOMY DECOMPRESSION Bilateral 11/5/2020    Procedure: Lumbar 4 - Lumbar 5 Laminectomy;  Surgeon: Marcos Christine MD;  Location: [facility] MAIN OR;  Service: Orthopedic Spine;  Laterality: Bilateral;    MITRAL VALVE REPAIR/REPLACEMENT N/A 10/27/2016    Procedure: INTRAOPERATIVE LILIA, MIDLINE STERNOTOMY WITH MITRAL VALVE REPLACEMENT TRICUSPID VALVE REPAIR;  Surgeon: Robert George MD;  Location: [facility] MAIN OR;  Service:     TEETH EXTRACTION N/A 10/25/2016    Procedure: TEETH EXTRACTION FULL MOUTH;  Surgeon: Gio Ibarra DMD;  Location: [facility] OR;  Service:        SLP Recommendation and Plan  SLP Diagnosis: mild, cognitive-linguistic disorder (10/20/23 1045)           SLC Criteria for Skilled Therapy Interventions Met: yes (10/20/23 1045)  Anticipated Discharge Disposition (SLP): unknown (10/20/23 1045)        Therapy Frequency (SLP SLC): 3 days per week (10/20/23 1045)  Predicted Duration Therapy Intervention (Days): until discharge (10/20/23 1045)                             Outcome Evaluation: Cognitive-communication eval completed. Mild cognitive-communication difficulties. Suggest consider SLP services after d/c. SLP will follow while at this facility. (10/20/23 1201)      SLP EVALUATION (last 72 hours)       SLP SLC Evaluation       Row Name 10/20/23 1045       Comprehension Assessment/Intervention    Comprehension Assessment/Intervention Auditory Comprehension  -BB       Auditory Comprehension Assessment/Intervention    Auditory Comprehension (Communication) mild impairment  -BB     "Able to Identify Objects/Pictures (Communication) WNL  -BB    Answers Questions (Communication) complex;mild impairment  -BB    Able to Follow Commands (Communication) 2-step;WFL  -BB    Auditory Comprehension Communication, Comment Tasks completed from WAB-R bedside form. Additional informal observations: pt required 3 clarifications due to misunderstanding complex discourse.  -BB       Expression Assessment/Intervention    Expression Assessment/Intervention verbal expression  -BB       Verbal Expression Assessment/Intervention    Verbal Expression mild impairment  -BB    Conversational Discourse/Fluency mild impairment;other (see comments)  ?Episode of phonemic paraphasia during delayed recall task (pt stated, \"pie\" rather than \"tie\" during delayed recall. Pt initially able to immediately recall \"tie\" with initial prompt. Pt unable to select \"tie\" with multiple choice cue).  -BB    Verbal Expression, Comment Occasional hesitation and word-finding difficulties in conversation. No paraphasias or neoglogisms appreciated in conversation.  -BB       Motor Speech Assessment/Intervention    Conversational Speech (Communication) mild impairment  -BB    Speech intelligibility 90%;in quiet environment;in connected speech;with unfamiliar listener  -BB    Motor Speech, Comment 90-95% intelligibility in conversational speech. Articulation in connected speech: articulatory precision reduced with speech sound distortions.  -BB       Cursory Voice Assessment/Intervention    Quality and Resonance (Voice) WFL  -BB    Voice, Comment Vocal loudness appears good.  -BB       Cognitive Assessment Intervention- SLP    Cognition, Comment Several informal meaures completed. Functional math: 1/3 (3/3 with dynamic assessment). Immediate recall: 5/5. Delayed recall: 2/5 (4/5 with category cues). Story recall:  6/8. Attention: 1/3 (3/3 with dynamic assessment).  -BB       Standardized Tests    Formal Speech Language Tests WAB: Western Aphasia " Battery  -BB       Spontaneous Speech    Information Content --  Difficult to assess due to visual deficits.  -BB    Fluency 9  -BB       Comprehension    Yes/No Questions 9  -BB    Sequential Commands 10  -BB       Repetition    Repetition Total 10  -BB       Naming    Object 10  -BB       Cortical    WAB Comments Unable to formally score WAB-R Bedside form due to visual impairment. Results should be interpreted as informal measures. Sequential command following: 10/10. Repetition: 10/10. Yes/no responses: 9/10. Object naming: 10/10. Fluency: 9/10 (as judged during conversational speech)  -BB       SLP Evaluation Clinical Impressions    SLP Diagnosis mild;cognitive-linguistic disorder  -BB    Rehab Potential/Prognosis good  -BB    SLC Criteria for Skilled Therapy Interventions Met yes  -BB    Functional Impact functional impact in social situations;difficulty in expressing complex messages;difficulty completing home management task  -BB       Recommendations    Therapy Frequency (SLP SLC) 3 days per week  -BB    Predicted Duration Therapy Intervention (Days) until discharge  -BB    Anticipated Discharge Disposition (SLP) unknown  -BB       Communication Treatment Objective and Progress Goals (SLP)    SLC LTGs Patient will demonstrate functional cognitive-linguistic skills for return to discharge environment  -BB    SLC STGs Additional Goals (Group)  -BB    Auditory Comprehension Treatment Objectives Auditory Comprehension Treatment Objectives (Group)  -BB    Verbal Expression Treatment Objectives Verbal Expression Treatment Objectives (Group)  -BB       Patient will demonstrate functional cognitive-linguistic skills for return to discharge environment    [place] with minimal cues  -BB    Time frame by discharge  -BB    Progress/Outcomes new goal  -BB       Auditory Comprehension Treatment Objectives    Comprehend Questions Selection comprehend questions, SLP goal 1  -BB       Comprehend Questions Goal 1 (SLP)     Improve Ability to Comprehend Questions Goal 1 (SLP) questions about multi-paragraph length material;90%;independently (over 90% accuracy)  -BB    Time Frame (Comprehend Questions Goal 1, SLP) by discharge  -BB       Verbal Expression Treatment Objectives    Word Retrieval Skills Selection word retrieval, SLP goal 1  -BB       Word Retrieval Skills Goal 1 (SLP)    Improve Word Retrieval Skills By Goal 1 (SLP) completing a divergent task  -BB    Time Frame (Word Retrieval Goal 1, SLP) by discharge  -BB    Progress (Word Retrieval Skills Goal 1, SLP) 90%;independently (over 90% accuracy)  -BB    Progress/Outcomes (Word Retrieval Goal 1, SLP) new goal  -BB       SLC STGs    Additional Goal Selection additional goal, SLP goal 1  -BB       SLC STG Goal 1 (SLP)    Additional Goal 1, SLP Pt will demonstrate 90% accuracy with advanced functional math, attention, and/or delayed recall tasks.  -BB    Time Frame (Additional Goal 1, SLP) by discharge  -BB    Barriers (Additional Goal 1, SLP) Other (see comments)  visual impairment  -BB    Progress/Outcomes (Additional Goal 1, SLP) new goal  -BB              User Key  (r) = Recorded By, (t) = Taken By, (c) = Cosigned By      Initials Name Effective Dates    BB Gordon Grajeda, SLP 02/19/23 -                        EDUCATION  The patient has been educated in the following areas:     Cognitive Impairment Communication Impairment Home Exercise Program (HEP).           SLP GOALS       Row Name 10/20/23 1046             Patient will demonstrate functional cognitive-linguistic skills for return to discharge environment    [place] with minimal cues  -BB      Time frame by discharge  -BB      Progress/Outcomes new goal  -BB         Comprehend Questions Goal 1 (SLP)    Improve Ability to Comprehend Questions Goal 1 (SLP) questions about multi-paragraph length material;90%;independently (over 90% accuracy)  -BB      Time Frame (Comprehend Questions Goal 1, SLP) by discharge  -BB          Word Retrieval Skills Goal 1 (SLP)    Improve Word Retrieval Skills By Goal 1 (SLP) completing a divergent task  -BB      Time Frame (Word Retrieval Goal 1, SLP) by discharge  -BB      Progress (Word Retrieval Skills Goal 1, SLP) 90%;independently (over 90% accuracy)  -BB      Progress/Outcomes (Word Retrieval Goal 1, SLP) new goal  -BB         SLC STG Goal 1 (SLP)    Additional Goal 1, SLP Pt will demonstrate 90% accuracy with advanced functional math, attention, and/or delayed recall tasks.  -BB      Time Frame (Additional Goal 1, SLP) by discharge  -BB      Barriers (Additional Goal 1, SLP) Other (see comments)  visual impairment  -BB      Progress/Outcomes (Additional Goal 1, SLP) new goal  -BB                User Key  (r) = Recorded By, (t) = Taken By, (c) = Cosigned By      Initials Name Provider Type    Gordon Cast SLP Speech and Language Pathologist                            Time Calculation:      Time Calculation- SLP       Row Name 10/20/23 1203             Time Calculation- SLP    SLP Start Time 1045  -BB      SLP Stop Time 1205  -BB      SLP Time Calculation (min) 80 min  -BB         Untimed Charges    SLP Eval/Re-eval  ST Eval Speech and Production w/ Language - 00688  -BB      [id number] Eval Speech and Production w/ Language Minutes 80  -BB         Total Minutes    Untimed Charges Total Minutes 80  -BB       Total Minutes 80  -BB                User Key  (r) = Recorded By, (t) = Taken By, (c) = Cosigned By      Initials Name Provider Type    Gordon Cast SLP Speech and Language Pathologist                    Therapy Charges for Today       Code Description Service Date Service Provider Modifiers Qty    [id number] HC ST EVAL SPEECH AND PROD W LANG  5 10/20/2023 Gordon Grajeda SLP GN 1                       EMBER Voss  10/20/2023

## 2023-10-20 NOTE — PLAN OF CARE
Goal Outcome Evaluation:  Plan of Care Reviewed With: (P) patient, mother           Outcome Evaluation: (P) Pt continues to have double vision. Wearing eye patch upon PT arrival. Pt stopped multiple times while walking to take off his eye patch and describe what he saw. Pt had a few slight LOB with no assicated falls while ambulating. Bed mobility and STS did not require assistance. Pt ambulated 120' on this date with CGA and FWW. Pt required verbal cues to slow down when turning during ambulation. Pt appears motivated to work with PT and improve his functional mobility. PT will continue to follow and progress activity as pt tolerates.      Anticipated Discharge Disposition (PT): (P) home with home health, home with outpatient therapy services

## 2023-10-20 NOTE — PROGRESS NOTES
"REASON FOR FOLLOW-UP: atrial mass/thrombus/cva    INTERVAL HISTORY:  No further neurologic events.  Tolerating AC with heparin.    HISTORY OF PRESENT ILLNESS:   This is a 63-year-old man with atrial fibrillation hypertension hyperlipidemia, tissue mitral valve replacement 2016 neurofibromatosis on chronic anticoagulation with Eliquis 5 mg every 12 hours.  The patient had a low-dose CT of the chest lung cancer screening 9/29/2023 which showed stable pulmonary nodule 6 mm in the periphery of the right middle lobe and a vague rounded area of decreased attenuation in a severely dilated left atrium.  A referral was made to cardiology.  The patient presented to ER on 9/17/2023 with complaints of double vision.  MRI of the brain shows infarct in the medial aspect of the thalamus 8 x 4 mm and an acute infarct anterolateral to the sylvian aqueduct 7 x 4 mm.  He has been started on heparin drip.  CT angiogram of the head and neck showed no clinically significant stenosis.     The patient is a poor historian.  He states that he takes his Eliquis \"every morning\" but forgets his evening dose perhaps once or twice a week?  He denies prior history of thrombosis.      Past Medical History, Past Surgical History, Social History, Family History have been reviewed and are without significant changes except as mentioned.    Review of Systems   Constitutional:  Negative for activity change.   Gastrointestinal:  Negative for blood in stool.   Musculoskeletal:  Negative for arthralgias.   Psychiatric/Behavioral: Negative.          Medications:  The current medication list was reviewed in the EMR    ALLERGIES:  No Known Allergies    Objective      Vitals:    10/20/23 0824   BP:    Pulse: 72   Resp:    Temp:    SpO2:           Physical Exam    CONSTITUTIONAL: pleasant well-developed adult man  HEENT: no icterus, no thrush, moist membranes  LYMPH: no cervical or supraclavicular lad  RESP: cta bilat, no wheezing, no rales  GI: soft, " "non-tender, no splenomegaly, +bs  MUSC: no edema, normal gait  NEURO: alert and oriented x3, normal strength  PSYCH: normal mood poor historian  Skin: Neurofibromas  Unchanged 10/20/23  RECENT LABS:  Hematology Results from last 7 days   Lab Units 10/20/23  0533 10/19/23  0520 10/18/23  0534   WBC 10*3/mm3 8.17 7.49 5.87   HEMOGLOBIN g/dL 15.7 16.1 16.1   HEMATOCRIT % 45.0 46.2 46.5   PLATELETS 10*3/mm3 137* 140 131*     2  Lab Results   Component Value Date    GLUCOSE 92 10/20/2023    BUN 15 10/20/2023    CREATININE 0.85 10/20/2023    EGFRIFNONA 64 09/30/2021    EGFRIFAFRI 77 09/30/2021    BCR 17.6 10/20/2023    CO2 23.0 10/20/2023    CALCIUM 8.9 10/20/2023    PROTENTOTREF 7.4 09/26/2023    ALBUMIN 3.9 10/18/2023    LABIL2 1.6 09/26/2023    AST 13 10/18/2023    ALT 21 10/18/2023       No results found for: \"IRON\", \"TIBC\", \"FERRITIN\"    No results found for: \"[id number]\"    No results found for: \"FOLATE\"       Assessment & Plan   *Atrial fibrillation on anticoagulation prior to admission with Eliquis 5 mg every 12 hours-unclear if fully compliant/adherent  Negative B2GP-1 and cardiolipin Abs; LAC pending  *Left atrial mass versus thrombus (probable thrombus)  *Acute infarcts thalamus and adjacent to the sylvian aqueduct likely embolic, small vessel ischemic disease  *Mild thrombocytopenia likely consumptive-stable 137  *Advanced emphysematous changes  *Tobacco abuse     Hematology plan/recommendations:  Agree with anticoagulation plan as outlined by cardiology unfractionated heparin drip and bridge with goal of transition back to Coumadin with higher INR goal 2.5-3.5 (h/o adverse reaction to Lovenox per outpatient cardiology notes?)  Full dose aspirin not required from hematology perspective but would ask neurology aspirin dose when combined with anticoagulation given acute stroke  Will follow LAC peripherally  Pharmacy dosing coumadin    Nothing further to add from hematology standpoint; please call as " needed.                  10/20/2023      CC:

## 2023-10-20 NOTE — THERAPY TREATMENT NOTE
Patient Name: Joaquín Garcia Jr.  : 1960    MRN: [id number]                              Today's Date: 10/20/2023       Admit Date: 10/17/2023    Visit Dx:     ICD-10-CM ICD-9-CM   1. Acute CVA (cerebrovascular accident)  I63.9 434.91   2. Dysconjugate gaze  H51.8 378.87   3. Chronic anticoagulation  Z79.01 V58.61     Patient Active Problem List   Diagnosis    Supraventricular tachycardia    NF (neurofibromatosis)    COPD (chronic obstructive pulmonary disease)    Acute hypoxemic respiratory failure    MR (mitral regurgitation)    COPD exacerbation    Acute respiratory failure with hypoxemia    Rectus sheath hematoma    Alcoholism    Atrial fibrillation, chronic    Toe pain, right    S/P MVR (mitral valve replacement)    Hypertension    S/P TVR (tricuspid valve repair)    Abnormal electrocardiogram    Benign essential hypertension    Anxiety    Prostate cancer screening    Colon cancer screening    Need for hepatitis C screening test    Knee strain, left, initial encounter    Acute midline low back pain with bilateral sciatica    Spinal stenosis of lumbar region with neurogenic claudication    On anticoagulant therapy    Tobacco abuse    Gout    Daytime somnolence    Left atrial mass    Acute CVA (cerebrovascular accident)    Diplopia    Dizziness     Past Medical History:   Diagnosis Date    Arthritis     Atrial fibrillation     COPD (chronic obstructive pulmonary disease)     PT STATES NO LONGER ON INHALERS BUT IS BREATHING WELL    Degenerative lumbar disc     Emphysema with chronic bronchitis     H/O tricuspid valve repair 2016    MEDTRONIC ANNULOPLASTY RING    History of heart valve replacement with porcine valve 2016    MITRAL VALVE, DR COBURN    Hypertension     Low back pain     RADIATES DOWN BLE, WORSE ON RT, SOME NUMBNESS/TINGLING    Neurofibromatosis     On anticoagulant therapy     Sleep apnea     DOES NOT USE CPAP    Spinal stenosis      Past Surgical History:   Procedure Laterality Date     CARDIAC CATHETERIZATION  10/20/2016    Procedure: Case Abort;  Surgeon: Zhen Ford MD;  [facility];  Service:     CARDIAC CATHETERIZATION Left 10/21/2016    Procedure: Cardiac catheterization;  Surgeon: Zhen Ford MD;  [facility];  Service:     HERNIA REPAIR      LUMBAR LAMINECTOMY DISCECTOMY DECOMPRESSION Bilateral 11/5/2020    Procedure: Lumbar 4 - Lumbar 5 Laminectomy;  Surgeon: Marcos Christine MD;  Location: [facility] MAIN OR;  Service: Orthopedic Spine;  Laterality: Bilateral;    MITRAL VALVE REPAIR/REPLACEMENT N/A 10/27/2016    Procedure: INTRAOPERATIVE LILIA, MIDLINE STERNOTOMY WITH MITRAL VALVE REPLACEMENT TRICUSPID VALVE REPAIR;  Surgeon: Robert George MD;  Location: [facility] MAIN OR;  Service:     TEETH EXTRACTION N/A 10/25/2016    Procedure: TEETH EXTRACTION FULL MOUTH;  Surgeon: Gio Ibarra DMD;  Location: [facility] MAIN OR;  Service:       General Information       Row Name 10/20/23 1234          Physical Therapy Time and Intention    Document Type therapy note (daily note)  -CH (r) SK (t) CH (c)     Mode of Treatment individual therapy;physical therapy  -CH (r) SK (t) CH (c)       Row Name 10/20/23 1234          General Information    Patient Profile Reviewed yes  -CH (r) SK (t) CH (c)       Row Name 10/20/23 1234          Cognition    Orientation Status (Cognition) oriented x 4  -CH (r) SK (t) CH (c)       Row Name 10/20/23 1234          Safety Issues, Functional Mobility    Safety Issues Affecting Function (Mobility) impulsivity;safety precaution awareness;insight into deficits/self-awareness;awareness of need for assistance;problem-solving  -CH (r) SK (t) CH (c)     Impairments Affecting Function (Mobility) visual/perceptual;balance  -CH (r) SK (t) CH (c)     Comment, Safety Issues/Impairments (Mobility) non skid socks, gait belt  -CH (r) SK (t) CH (c)               User Key  (r) = Recorded By, (t) = Taken By, (c) =  Cosigned By      Initials Name Provider Type    TERRA Sol Wiggins, PT Physical Therapist    Mu Pickens, PT Student PT Student                   Mobility       Row Name 10/20/23 1235          Bed Mobility    Bed Mobility supine-sit  -CH (r) SK (t) CH (c)     Supine-Sit [place] (Bed Mobility) supervision;verbal cues;1 person assist  -CH (r) SK (t) CH (c)     Assistive Device (Bed Mobility) bed rails;head of bed elevated  -CH (r) SK (t) CH (c)       Row Name 10/20/23 1235          Sit-Stand Transfer    Sit-Stand [place] (Transfers) supervision  -CH (r) SK (t) CH (c)       Row Name 10/20/23 1235          Gait/Stairs (Locomotion)    [place] Level (Gait) contact guard;1 person assist;verbal cues  -CH (r) SK (t) CH (c)     Assistive Device (Gait) walker, front-wheeled  -CH (r) SK (t) CH (c)     Distance in Feet (Gait) 120  -CH (r) SK (t) CH (c)     Deviations/Abnormal Patterns (Gait) stride length decreased;gait speed decreased;caryl decreased  pt reports he has been walking slower d/t visual changes  -CH (r) SK (t) CH (c)     Comment, (Gait/Stairs) pt had slight sway and deviation from a straight path while ambulating, slight LOB with no associated fall, verbal cues required to take turns slowly  -CH (r) SK (t) CH (c)               User Key  (r) = Recorded By, (t) = Taken By, (c) = Cosigned By      Initials Name Provider Type    TERRA Sol Wiggins, PT Physical Therapist    Mu Pickens, PT Student PT Student                   Obj/Interventions       Row Name 10/20/23 1238          Balance    Balance Assessment standing static balance;standing dynamic balance  -CH (r) SK (t) CH (c)     Static Standing Balance standby assist  -CH (r) SK (t) CH (c)     Dynamic Standing Balance standby assist  -CH (r) SK (t) CH (c)     Position/Device Used, Standing Balance walker, front-wheeled  -CH (r) SK (t) CH (c)               User Key  (r) = Recorded By, (t) = Taken By, (c) = Cosigned By       Initials Name Provider Type     Sol Wiggins, PT Physical Therapist    Mu Pickens, PT Student PT Student                   Goals/Plan    No documentation.                  Clinical Impression       Row Name 10/20/23 1238          Pain    Pretreatment Pain Rating 0/10 - no pain  -CH (r) SK (t) CH (c)     Posttreatment Pain Rating 0/10 - no pain  -CH (r) SK (t) CH (c)       Row Name 10/20/23 1238          Plan of Care Review    Plan of Care Reviewed With patient;mother  -CH (r) SK (t) CH (c)     Outcome Evaluation Pt continues to have double vision. Wearing eye patch upon PT arrival. Pt stopped multiple times while walking to take off his eye patch and describe what he saw. Pt had a few slight LOB with no assicated falls while ambulating. Bed mobility and STS did not require assistance. Pt ambulated 120' on this date with CGA and FWW. Pt required verbal cues to slow down when turning during ambulation. Pt appears motivated to work with PT and improve his functional mobility. PT will continue to follow and progress activity as pt tolerates.  -CH (r) SK (t) CH (c)       Row Name 10/20/23 1238          Therapy Assessment/Plan (PT)    Therapy Frequency (PT) 6 times/wk  -CH (r) SK (t) CH (c)       Row Name 10/20/23 1238          Positioning and Restraints    Pre-Treatment Position in bed  -CH (r) SK (t) CH (c)     Post Treatment Position chair  -CH (r) SK (t) CH (c)     In Chair sitting;call light within reach;encouraged to call for assist;exit alarm on;with family/caregiver  -CH (r) SK (t) CH (c)               User Key  (r) = Recorded By, (t) = Taken By, (c) = Cosigned By      Initials Name Provider Type    Sol Mejia, PT Physical Therapist    Mu Pickens, PT Student PT Student                   Outcome Measures       Row Name 10/20/23 1244 10/20/23 0800       How much help from another person do you currently need...    Turning from your back to your side while in flat bed  without using bedrails? 4  -CH (r) SK (t) CH (c) 4  -LB    Moving from lying on back to sitting on the side of a flat bed without bedrails? 4  -CH (r) SK (t) CH (c) 4  -LB    Moving to and from a bed to a chair (including a wheelchair)? 3  -CH (r) SK (t) CH (c) 4  -LB    Standing up from a chair using your arms (e.g., wheelchair, bedside chair)? 4  -CH (r) SK (t) CH (c) 4  -LB    Climbing 3-5 steps with a railing? 2  -CH (r) SK (t) CH (c) 2  -LB    To walk in hospital room? 3  -CH (r) SK (t) CH (c) 3  -LB    AM-PAC 6 Clicks Score (PT) 20  -CH (r) SK (t) 21  -LB    Highest level of mobility 6 --> Walked 10 steps or more  -CH (r) SK (t) 6 --> Walked 10 steps or more  -LB      Row Name 10/20/23 1244          Functional Assessment    Outcome Measure Options AM-PAC 6 Clicks Basic Mobility (PT)  -CH (r) SK (t) CH (c)               User Key  (r) = Recorded By, (t) = Taken By, (c) = Cosigned By      Initials Name Provider Type    Sol Mejia, PT Physical Therapist    Dexter Zamudio, RN Registered Nurse    Mu Pickens, PT Student PT Student                                 Physical Therapy Education       Title: PT OT SLP Therapies (In Progress)       Topic: Physical Therapy (Done)       Point: Mobility training (Done)       Learning Progress Summary             Patient Acceptance, E,TB,D, VU by SK at 10/20/2023 1244    Acceptance, E,TB,D, VU,NR by SK at 10/18/2023 1516   Family Acceptance, E,TB,D, VU by SK at 10/20/2023 1244    Acceptance, E,TB,D, VU,NR by SK at 10/18/2023 1516                         Point: Home exercise program (Done)       Learning Progress Summary             Patient Acceptance, E,TB,D, VU by SK at 10/20/2023 1244    Acceptance, E,TB,D, VU,NR by SK at 10/18/2023 1516   Family Acceptance, E,TB,D, VU by SK at 10/20/2023 1244    Acceptance, E,TB,D, VU,NR by SK at 10/18/2023 1516                         Point: Body mechanics (Done)       Learning Progress Summary             Patient  Acceptance, E,TB,D, VU by SK at 10/20/2023 1244    Acceptance, E,TB,D, VU,NR by SK at 10/18/2023 1516   Family Acceptance, E,TB,D, VU by SK at 10/20/2023 1244    Acceptance, E,TB,D, VU,NR by SK at 10/18/2023 1516                         Point: Precautions (Done)       Learning Progress Summary             Patient Acceptance, E,TB,D, VU by SK at 10/20/2023 1244    Acceptance, E,TB,D, VU,NR by SK at 10/18/2023 1516   Family Acceptance, E,TB,D, VU by SK at 10/20/2023 1244    Acceptance, E,TB,D, VU,NR by SK at 10/18/2023 1516                                         User Key       Initials Effective Dates Name Provider Type Discipline    SK 10/10/23 -  Mu Huffman, PT Student PT Student PT                  PT Recommendation and Plan  Planned Therapy Interventions (PT): balance training, gait training, home exercise program, patient/family education, transfer training, strengthening, bed mobility training  Plan of Care Reviewed With: patient, mother  Outcome Evaluation: Pt continues to have double vision. Wearing eye patch upon PT arrival. Pt stopped multiple times while walking to take off his eye patch and describe what he saw. Pt had a few slight LOB with no assicated falls while ambulating. Bed mobility and STS did not require assistance. Pt ambulated 120' on this date with CGA and FWW. Pt required verbal cues to slow down when turning during ambulation. Pt appears motivated to work with PT and improve his functional mobility. PT will continue to follow and progress activity as pt tolerates.     Time Calculation:         PT Charges       Row Name 10/20/23 1246             Time Calculation    Start Time 1138  -CH (r) SK (t) CH (c)      Stop Time 1150  -CH (r) SK (t) CH (c)      Time Calculation (min) 12 min  -CH (r) SK (t)      PT Received On 10/20/23  -CH (r) SK (t) CH (c)      PT - Next Appointment 10/21/23  -CH (r) SK (t) CH (c)      PT Goal Re-Cert Due Date 10/23/23  -CH (r) SK (t) CH (c)         Time  Calculation- PT    Total Timed Code Minutes- PT 12 minute(s)  -CH (r) SK (t) CH (c)         Timed Charges    08195 - PT Therapeutic Activity Minutes 12  -CH (r) SK (t) CH (c)         Total Minutes    Timed Charges Total Minutes 12  -CH (r) SK (t)       Total Minutes 12  -CH (r) SK (t)                User Key  (r) = Recorded By, (t) = Taken By, (c) = Cosigned By      Initials Name Provider Type    Sol Mejia, PT Physical Therapist    Mu Pickens, PT Student PT Student                  Therapy Charges for Today       Code Description Service Date Service Provider Modifiers Qty    84627076677 HC PT THERAPEUTIC ACT EA 15 MIN 10/20/2023 Mu Huffman, PT Student GP 1            PT G-Codes  Outcome Measure Options: AM-PAC 6 Clicks Basic Mobility (PT)  AM-PAC 6 Clicks Score (PT): 20  AM-PAC 6 Clicks Score (OT): 18  Modified Gayle Scale: 4 - Moderately severe disability.  Unable to walk without assistance, and unable to attend to own bodily needs without assistance.  PT Discharge Summary  Anticipated Discharge Disposition (PT): home with home health, home with outpatient therapy services    Mu Huffman PT Student  10/20/2023

## 2023-10-20 NOTE — PLAN OF CARE
Goal Outcome Evaluation:              Outcome Evaluation: Cognitive-communication eval completed. Mild cognitive-communication difficulties. Suggest consider SLP services after d/c. SLP will follow while at this facility.

## 2023-10-20 NOTE — PLAN OF CARE
Goal Outcome Evaluation:  Plan of Care Reviewed With: patient, mother        Progress: improving  Outcome Evaluation: Patient tolerated treatment well,  continues to progress towards independence with self care. patient continues with double vision causing visual perceptual/depth perception deficits, requires cues with brushing teeth to get toothbrush positioned under sink water. Patient performed grooming/hygiene while standing unsupported at sink with CGA for safety. patient performed bed mobility and unsupported sitting at EOB with mod I. Patient will continue to benefit from skilled OT to address remaining functional deficits, and recommend HH at acute dc.      Anticipated Discharge Disposition (OT): home, home with assist, home with home health

## 2023-10-20 NOTE — THERAPY TREATMENT NOTE
Patient Name: Joaquín Garcia Jr.  : 1960    MRN: [id number]                              Today's Date: 10/20/2023       Admit Date: 10/17/2023    Visit Dx:     ICD-10-CM ICD-9-CM   1. Acute CVA (cerebrovascular accident)  I63.9 434.91   2. Dysconjugate gaze  H51.8 378.87   3. Chronic anticoagulation  Z79.01 V58.61     Patient Active Problem List   Diagnosis    Supraventricular tachycardia    NF (neurofibromatosis)    COPD (chronic obstructive pulmonary disease)    Acute hypoxemic respiratory failure    MR (mitral regurgitation)    COPD exacerbation    Acute respiratory failure with hypoxemia    Rectus sheath hematoma    Alcoholism    Atrial fibrillation, chronic    Toe pain, right    S/P MVR (mitral valve replacement)    Hypertension    S/P TVR (tricuspid valve repair)    Abnormal electrocardiogram    Benign essential hypertension    Anxiety    Prostate cancer screening    Colon cancer screening    Need for hepatitis C screening test    Knee strain, left, initial encounter    Acute midline low back pain with bilateral sciatica    Spinal stenosis of lumbar region with neurogenic claudication    On anticoagulant therapy    Tobacco abuse    Gout    Daytime somnolence    Left atrial mass    Acute CVA (cerebrovascular accident)    Diplopia    Dizziness     Past Medical History:   Diagnosis Date    Arthritis     Atrial fibrillation     COPD (chronic obstructive pulmonary disease)     PT STATES NO LONGER ON INHALERS BUT IS BREATHING WELL    Degenerative lumbar disc     Emphysema with chronic bronchitis     H/O tricuspid valve repair 2016    MEDTRONIC ANNULOPLASTY RING    History of heart valve replacement with porcine valve 2016    MITRAL VALVE, DR COBURN    Hypertension     Low back pain     RADIATES DOWN BLE, WORSE ON RT, SOME NUMBNESS/TINGLING    Neurofibromatosis     On anticoagulant therapy     Sleep apnea     DOES NOT USE CPAP    Spinal stenosis      Past Surgical History:   Procedure Laterality Date     CARDIAC CATHETERIZATION  10/20/2016    Procedure: Case Abort;  Surgeon: Zhen Ford MD;  [facility];  Service:     CARDIAC CATHETERIZATION Left 10/21/2016    Procedure: Cardiac catheterization;  Surgeon: Zhen Ford MD;  [facility];  Service:     HERNIA REPAIR      LUMBAR LAMINECTOMY DISCECTOMY DECOMPRESSION Bilateral 11/5/2020    Procedure: Lumbar 4 - Lumbar 5 Laminectomy;  Surgeon: Marcos Christine MD;  Location: [facility] MAIN OR;  Service: Orthopedic Spine;  Laterality: Bilateral;    MITRAL VALVE REPAIR/REPLACEMENT N/A 10/27/2016    Procedure: INTRAOPERATIVE LILIA, MIDLINE STERNOTOMY WITH MITRAL VALVE REPLACEMENT TRICUSPID VALVE REPAIR;  Surgeon: Robert George MD;  Location: [facility] MAIN OR;  Service:     TEETH EXTRACTION N/A 10/25/2016    Procedure: TEETH EXTRACTION FULL MOUTH;  Surgeon: Gio Ibarra DMD;  Location: [facility] MAIN OR;  Service:       General Information       Row Name 10/20/23 1559          OT Time and Intention    Document Type therapy note (daily note)  -     Mode of Treatment individual therapy;occupational therapy  -       Row Name 10/20/23 4088          General Information    Patient Profile Reviewed yes  -     Existing Precautions/Restrictions fall  -     Barriers to Rehab medically complex;previous functional deficit  -       Row Name 10/20/23 4377          Living Environment    People in Home parent(s)  -       Row Name 10/20/23 5659          Cognition    Orientation Status (Cognition) oriented x 4  -       Row Name 10/20/23 3283          Safety Issues, Functional Mobility    Impairments Affecting Function (Mobility) visual/perceptual;balance  -               User Key  (r) = Recorded By, (t) = Taken By, (c) = Cosigned By      Initials Name Provider Type     Shae Graff OT Occupational Therapist                     Mobility/ADL's       Row Name 10/20/23 7893          Bed Mobility    Bed  Mobility supine-sit  -     Supine-Sit Olive (Bed Mobility) supervision;verbal cues  -     Sit-Supine Olive (Bed Mobility) verbal cues;contact guard  -     Assistive Device (Bed Mobility) bed rails;head of bed elevated  -[facility] Name 10/20/23 1601          Transfers    Transfers sit-stand transfer;stand-sit transfer  -LH       Row Name 10/20/23 1601          Sit-Stand Transfer    Sit-Stand Olive (Transfers) supervision  -     Assistive Device (Sit-Stand Transfers) walker, front-wheeled  -[facility] Name 10/20/23 1601          Stand-Sit Transfer    Stand-Sit Olive (Transfers) contact guard  -     Assistive Device (Stand-Sit Transfers) walker, front-wheeled  -[facility] Name 10/20/23 1601          Functional Mobility    Patient was able to Ambulate yes  -LH       Row Name 10/20/23 1601          Activities of Daily Living    BADL Assessment/Intervention grooming;upper body dressing  -LH       Row Name 10/20/23 1601          Grooming Assessment/Training    Olive Level (Grooming) contact guard assist;hair care, combing/brushing;oral care regimen;wash face, hands  -     Position (Grooming) sink side;unsupported standing  -LH       Row Name 10/20/23 1601          Upper Body Dressing Assessment/Training    Olive Level (Upper Body Dressing) upper body dressing skills;doff;don;front opening garment;contact guard assist  -     Position (Upper Body Dressing) unsupported sitting  -               User Key  (r) = Recorded By, (t) = Taken By, (c) = Cosigned By      Initials Name Provider Type     Shae Graff OT Occupational Therapist                   Obj/Interventions       [facility] Name 10/20/23 1602          Vision Assessment/Intervention    Visual Impairment/Limitations blurry vision;diplopia  -LH       Row Name 10/20/23 1602          Range of Motion Comprehensive    General Range of Motion no range of motion deficits identified  -[facility] Name 10/20/23 1602           Strength Comprehensive (MMT)    General Manual Muscle Testing (MMT) Assessment no strength deficits identified  -       Row Name 10/20/23 1603          Balance    Balance Assessment sitting static balance;standing static balance;standing dynamic balance;sit to stand dynamic balance  -     Static Sitting Balance modified independence  -     Position, Sitting Balance unsupported  -     Sit to Stand Dynamic Balance standby assist  -     Static Standing Balance standby assist  -     Dynamic Standing Balance standby assist  -     Position/Device Used, Standing Balance walker, front-wheeled  -     Balance Interventions sitting;standing;sit to stand;occupation based/functional task;dynamic reaching  -               User Key  (r) = Recorded By, (t) = Taken By, (c) = Cosigned By      Initials Name Provider Type     Shae Graff OT Occupational Therapist                   Goals/Plan    No documentation.                  Clinical Impression       Row Name 10/20/23 1600          Pain Assessment    Pretreatment Pain Rating 0/10 - no pain  -     Posttreatment Pain Rating 0/10 - no pain  -[facility] Name 10/20/23 1609          Plan of Care Review    Plan of Care Reviewed With patient;mother  -     Progress improving  -     Outcome Evaluation Patient tolerated treatment well,  continues to progress towards independence with self care. patient continues with double vision causing visual perceptual/depth perception deficits, requires cues with brushing teeth to get toothbrush positioned under sink water. Patient performed grooming/hygiene while standing unsupported at sink with CGA for safety. patient performed bed mobility and unsupported sitting at EOB with mod I. Patient will continue to benefit from skilled OT to address remaining functional deficits, and recommend HH at acute [place].  -       Row Name 10/20/23 2549          Therapy Assessment/Plan (OT)    Rehab Potential (OT) good, to achieve  stated therapy goals  -     Criteria for Skilled Therapeutic Interventions Met (OT) skilled treatment is necessary  -     Therapy Frequency (OT) 3 times/wk  -       Row Name 10/20/23 1603          Therapy Plan Review/Discharge Plan (OT)    Anticipated Discharge Disposition (OT) home;home with assist;home with home health  -       Row Name 10/20/23 1603          Positioning and Restraints    Pre-Treatment Position in bed  -     Post Treatment Position bed  -     In Bed fowlers;call light within reach;encouraged to call for assist;exit alarm on;with family/caregiver  -               User Key  (r) = Recorded By, (t) = Taken By, (c) = Cosigned By      Initials Name Provider Type     Shae Graff, CARLOS Occupational Therapist                   Outcome Measures       Row Name 10/20/23 1605          How much help from another is currently needed...    Putting on and taking off regular lower body clothing? 3  -LH     Bathing (including washing, rinsing, and drying) 3  -LH     Toileting (which includes using toilet bed pan or urinal) 4  -LH     Putting on and taking off regular upper body clothing 4  -LH     Taking care of personal grooming (such as brushing teeth) 4  -LH     Eating meals 4  -LH     AM-PAC 6 Clicks Score (OT) 22  -       Row Name 10/20/23 1244 10/20/23 0800       How much help from another person do you currently need...    Turning from your back to your side while in flat bed without using bedrails? 4  -CH (r) SK (t) CH (c) 4  -LB    Moving from lying on back to sitting on the side of a flat bed without bedrails? 4  -CH (r) SK (t) CH (c) 4  -LB    Moving to and from a bed to a chair (including a wheelchair)? 3  -CH (r) SK (t) CH (c) 4  -LB    Standing up from a chair using your arms (e.g., wheelchair, bedside chair)? 4  -CH (r) SK (t) CH (c) 4  -LB    Climbing 3-5 steps with a railing? 2  -CH (r) SK (t) CH (c) 2  -LB    To walk in hospital room? 3  -CH (r) SK (t) CH (c) 3  -LB    AM-PAC 6  Clicks Score (PT) 20  -CH (r) SK (t) 21  -LB    Highest level of mobility 6 --> Walked 10 steps or more  -CH (r) SK (t) 6 --> Walked 10 steps or more  -LB      Row Name 10/20/23 1605          Modified Gayle Scale    Modified Amberg Scale 2 - Slight disability.  Unable to carry out all previous activities but able to look after own affairs without assistance.  -       Row Name 10/20/23 1605 10/20/23 1244       Functional Assessment    Outcome Measure Options AM-PAC 6 Clicks Daily Activity (OT);Modified Gayle  -LH AM-PAC 6 Clicks Basic Mobility (PT)  -CH (r) SK (t) CH (c)              User Key  (r) = Recorded By, (t) = Taken By, (c) = Cosigned By      Initials Name Provider Type    CH Sol Wiggins, PT Physical Therapist    LB Dexter Hawk, RN Registered Nurse    LH Shae Graff, OT Occupational Therapist    SK Mu Huffman, PT Student PT Student                    Occupational Therapy Education       Title: PT OT SLP Therapies (In Progress)       Topic: Occupational Therapy (Not Started)       Point: ADL training (Not Started)       Description:   Instruct learner(s) on proper safety adaptation and remediation techniques during self care or transfers.   Instruct in proper use of assistive devices.                  Learner Progress:  Not documented in this visit.              Point: Home exercise program (Not Started)       Description:   Instruct learner(s) on appropriate technique for monitoring, assisting and/or progressing therapeutic exercises/activities.                  Learner Progress:  Not documented in this visit.              Point: Precautions (Not Started)       Description:   Instruct learner(s) on prescribed precautions during self-care and functional transfers.                  Learner Progress:  Not documented in this visit.              Point: Body mechanics (Not Started)       Description:   Instruct learner(s) on proper positioning and spine alignment during self-care,  functional mobility activities and/or exercises.                  Learner Progress:  Not documented in this visit.                                  OT Recommendation and Plan  Therapy Frequency (OT): 3 times/wk  Plan of Care Review  Plan of Care Reviewed With: patient, mother  Progress: improving  Outcome Evaluation: Patient tolerated treatment well,  continues to progress towards independence with self care. patient continues with double vision causing visual perceptual/depth perception deficits, requires cues with brushing teeth to get toothbrush positioned under sink water. Patient performed grooming/hygiene while standing unsupported at sink with CGA for safety. patient performed bed mobility and unsupported sitting at EOB with mod I. Patient will continue to benefit from skilled OT to address remaining functional deficits, and recommend HH at acute [place].     Time Calculation:         Time Calculation- OT       Row Name 10/20/23 1606             Time Calculation- OT    OT Start Time 1038  -      OT Stop Time 1102  -      OT Time Calculation (min) 24 min  -      Total Timed Code Minutes- OT 24 minute(s)  -      OT Received On 10/20/23  -      OT - Next Appointment 10/23/23  -         Timed Charges    82984 - OT Self Care/Mgmt Minutes 24  -         Total Minutes    Timed Charges Total Minutes 24  -       Total Minutes 24  -                User Key  (r) = Recorded By, (t) = Taken By, (c) = Cosigned By      Initials Name Provider Type     Shae Graff OT Occupational Therapist                  Therapy Charges for Today       Code Description Service Date Service Provider Modifiers Qty    [id number]  OT SELF CARE/MGMT/TRAIN EA 15 MIN 10/20/2023 Shae Graff OT GO 2                 Shae Graff OT  10/20/2023

## 2023-10-20 NOTE — PROGRESS NOTES
[facility] INPATIENT PROGRESS NOTE         [address]    10/20/2023      PATIENT IDENTIFICATION:  Name: Joaquín Garcia Jr. ADMIT: 10/17/2023   : 1960  PCP: Epley, James, APRN    MRN: [id number] LOS: 2 days   AGE/SEX: 63 y.o. male  ROOM: [facility]                     LOS 2    Reason for visit: COPD/emphysema      SUBJECTIVE:      No new pulmonary complaint.    Objective   OBJECTIVE:    Vital Sign Min/Max for last 24 hours  Temp  Min: 97.5 °F (36.4 °C)  Max: 97.8 °F (36.6 °C)   BP  Min: 116/75  Max: 127/74   Pulse  Min: 62  Max: 79   Resp  Min: 16  Max: 20   SpO2  Min: 96 %  Max: 99 %   No data recorded   No data recorded        10/17/23  1858 10/17/23  2119 10/18/23  1356   Weight: 92.1 kg (203 lb) 91.5 kg (201 lb 11.2 oz) 91 kg (200 lb 9.9 oz)       Body mass index is 28.09 kg/m².                          Body mass index is 28.09 kg/m².    Intake/Output Summary (Last 24 hours) at 10/20/2023 0912  Last data filed at 10/20/2023 0710  Gross per 24 hour   Intake 120 ml   Output 1250 ml   Net -1130 ml         Exam:  GEN:  No distress, appears stated age  EYES:   PERRL, anicteric sclerae  ENT:    External ears/nose normal, OP clear  NECK:  No adenopathy, midline trachea  LUNGS: Normal chest on inspection, palpation and diminished on  auscultation  CV:  Normal S1S2, without murmur  ABD:  Nontender, nondistended, no hepatosplenomegaly, +BS  EXT:  No edema.  No cyanosis or clubbing.  No mottling and normal cap refill.    Assessment     Scheduled meds:  allopurinol, 300 mg, Oral, Daily  aspirin, 325 mg, Oral, Daily   Or  aspirin, 300 mg, Rectal, Daily  atorvastatin, 80 mg, Oral, Nightly  budesonide-formoterol, 2 puff, Inhalation, BID - RT  famotidine, 20 mg, Oral, BID AC  metoprolol succinate XL, 100 mg, Oral, Daily  tiotropium bromide monohydrate, 2 puff, Inhalation, Daily - RT  warfarin, 6 mg, Oral, Daily      IV meds:                      heparin, 13.9 Units/kg/hr, Last Rate: 13.9 Units/kg/hr  (10/19/23 1528)  Pharmacy to dose warfarin,       Data Review:  Results from last 7 days   Lab Units 10/20/23  0533 10/19/23  0520 10/18/23  0534 10/17/23  1912   SODIUM mmol/L 136 139 138 133*   POTASSIUM mmol/L 3.9 4.8 3.9 4.1   CHLORIDE mmol/L 104 102 105 97*   CO2 mmol/L 23.0 28.0 24.4 26.0   BUN mg/dL 15 14 10 14   CREATININE mg/dL 0.85 0.96 0.75* 0.95   GLUCOSE mg/dL 92 88 90 101*   CALCIUM mg/dL 8.9 9.4 8.8 9.8         Estimated Creatinine Clearance: 99.8 mL/min (by C-G formula based on SCr of 0.85 mg/dL).  Results from last 7 days   Lab Units 10/20/23  0533 10/19/23  0520 10/18/23  0534 10/17/23  1912   WBC 10*3/mm3 8.17 7.49 5.87 7.76   HEMOGLOBIN g/dL 15.7 16.1 16.1 17.5   PLATELETS 10*3/mm3 137* 140 131* 137*     Results from last 7 days   Lab Units 10/20/23  0533 10/19/23  1428 10/18/23  1025 10/17/23  1912   INR  1.12* 1.12* 1.16* 1.11*     Results from last 7 days   Lab Units 10/18/23  0534 10/17/23  1912   ALT (SGPT) U/L 21 28   AST (SGOT) U/L 13 17                 Hemoglobin A1C   Date/Time Value Ref Range Status   10/18/2023 0534 5.70 (H) 4.80 - 5.60 % Final     Glucose   Date/Time Value Ref Range Status   10/18/2023 0602 90 70 - 130 mg/dL Final   10/18/2023 0033 97 70 - 130 mg/dL Final   10/17/2023 1907 102 70 - 130 mg/dL Final         XR Chest 1 View    Result Date: 10/17/2023  PORTABLE CHEST X-RAY  HISTORY: Acute stroke symptoms. Type I neurofibromatosis. CT angiogram chest earlier today demonstrated multiple intraluminal filling defects in the left atrium.  Portable chest x-ray is correlated with CT angiogram chest from earlier today and chest x-ray 10/31/2016.  FINDINGS: Sternal wires with cardiomegaly and extensive emphysematous change in the upper lung zones. There is some prominence of the interstitial markings commensurate with advanced emphysema. No focal infiltrate, effusion, or pneumothorax.      Impression: Cardiomegaly with marked emphysematous pulmonary parenchymal change. No acute  abnormality evident on x-ray.  This report was finalized on 10/17/2023 8:08 PM by Dr. Juanito Perea M.D on Workstation: [id number]      CT Chest Low Dose Cancer Screening WO    Result Date: 10/2/2023  CT CHEST LOW DOSE CANCER SCREENING WO-  Radiation dose reduction techniques were utilized, including automated exposure control and exposure modulation based on body size.  Low dose screening lung CT  Clinical: minimum of 30 pack year history of smoking  CTDI 2.4 mGy  DLP 84.5 mGycm  COMPARISON: 09/13/2022  FINDINGS: There is cardiac enlargement. There is a vague rounded 5 cm area of diminished attenuation seen centrally within a dilated left atrium. Prior median sternotomy with valve repair. Bilateral gynecomastia has developed. Diameter of the aorta is within normal limits. The esophagus is satisfactory in appearance.  6 mm stable pulmonary nodule along the periphery of the right middle lobe is again seen on image 116.  No new pulmonary nodule, acute airspace disease or pleural effusion has developed. 2.7 cm stable right adrenal nodule is again seen.   Conclusion: 1. Vague rounded 5 cm area of diminished attenuation within the left atrium. 2. Stable pulmonary nodule. 3. Gynecomastia.  Lung Rads category: 2S  Recommendations: CT angiogram chest  Findings of this report called to Dr. Tian the morning of 10/02/2023.     This report was finalized on 10/2/2023 10:01 AM by Dr. Sylvester Junior M.D.          LILIA reviewed      Pulmonary function testing reviewed: Moderate obstruction with air trapping and moderate diffusion impairment.                    ASSESSMENT:  COPD with severe emphysema  Left atrial filling defects with severely dilated left atrium  History of valve replacement 2016  Acute stroke with diplopia  BRANDIE: Noncompliant with CPAP  Hypertension  Persistent smoker      PLAN:  Scheduled and as needed bronchodilators for COPD and emphysema.  Try to optimize lung function as best as we can.   Due to severity of  patient's emphysema is high risk for operative intervention.    Joseph Jackson MD  Pulmonary and Critical Care Medicine  [facility], [facility]  10/20/2023    09:12 EDT

## 2023-10-20 NOTE — PLAN OF CARE
Problem: Adult Inpatient Plan of Care  Goal: Plan of Care Review  Outcome: Ongoing, Progressing  Goal: Patient-Specific Goal (Individualized)  Outcome: Ongoing, Progressing  Goal: Absence of Hospital-Acquired Illness or Injury  Outcome: Ongoing, Progressing  Intervention: Identify and Manage Fall Risk  Recent Flowsheet Documentation  Taken 10/20/2023 1400 by Dexter Hawk RN  Safety Promotion/Fall Prevention:   safety round/check completed   room organization consistent  Taken 10/20/2023 1200 by Dexter Hawk RN  Safety Promotion/Fall Prevention:   safety round/check completed   room organization consistent  Taken 10/20/2023 1000 by Dexter Hawk RN  Safety Promotion/Fall Prevention:   safety round/check completed   room organization consistent  Taken 10/20/2023 0800 by Dexter Hawk RN  Safety Promotion/Fall Prevention:   safety round/check completed   room organization consistent  Intervention: Prevent Skin Injury  Recent Flowsheet Documentation  Taken 10/20/2023 1400 by Dexter Hawk RN  Body Position: position changed independently  Taken 10/20/2023 0800 by Dexter Hawk RN  Body Position: position changed independently  Skin Protection: adhesive use limited  Intervention: Prevent and Manage VTE (Venous Thromboembolism) Risk  Recent Flowsheet Documentation  Taken 10/20/2023 1400 by Dexter Hawk RN  Activity Management: activity encouraged  Taken 10/20/2023 0800 by Dexter Hawk RN  Activity Management: activity encouraged  Intervention: Prevent Infection  Recent Flowsheet Documentation  Taken 10/20/2023 1400 by Dexter Hawk RN  Infection Prevention:   single patient room provided   rest/sleep promoted  Taken 10/20/2023 1200 by Dexter Hawk RN  Infection Prevention:   single patient room provided   rest/sleep promoted  Taken 10/20/2023 1000 by Dexter Hawk RN  Infection Prevention:   rest/sleep promoted   single patient room provided  Taken 10/20/2023 0800 by Dexter Hawk  RN  Infection Prevention:   single patient room provided   rest/sleep promoted  Goal: Optimal Comfort and Wellbeing  Outcome: Ongoing, Progressing  Intervention: Provide Person-Centered Care  Recent Flowsheet Documentation  Taken 10/20/2023 1400 by Dexter Hawk RN  Trust Relationship/Rapport: care explained  Taken 10/20/2023 0800 by Dexter Hawk RN  Trust Relationship/Rapport: care explained  Goal: Readiness for Transition of Care  Outcome: Ongoing, Progressing     Problem: Hypertension Comorbidity  Goal: Blood Pressure in Desired Range  Outcome: Ongoing, Progressing  Intervention: Maintain Blood Pressure Management  Recent Flowsheet Documentation  Taken 10/20/2023 1400 by Dexter Hawk RN  Medication Review/Management: medications reviewed  Taken 10/20/2023 1200 by eDxter Hawk RN  Medication Review/Management: medications reviewed  Taken 10/20/2023 1000 by Dexter Hawk RN  Medication Review/Management: medications reviewed  Taken 10/20/2023 0800 by Dexter Hawk RN  Medication Review/Management: medications reviewed     Problem: Fall Injury Risk  Goal: Absence of Fall and Fall-Related Injury  Outcome: Ongoing, Progressing  Intervention: Identify and Manage Contributors  Recent Flowsheet Documentation  Taken 10/20/2023 1400 by Dexter Hawk RN  Medication Review/Management: medications reviewed  Taken 10/20/2023 1200 by Dexter Hawk RN  Medication Review/Management: medications reviewed  Taken 10/20/2023 1000 by Dexter Hawk RN  Medication Review/Management: medications reviewed  Taken 10/20/2023 0800 by Dexter Hawk RN  Medication Review/Management: medications reviewed  Intervention: Promote Injury-Free Environment  Recent Flowsheet Documentation  Taken 10/20/2023 1400 by Dexter Hawk RN  Safety Promotion/Fall Prevention:   safety round/check completed   room organization consistent  Taken 10/20/2023 1200 by Dexter Hawk RN  Safety Promotion/Fall Prevention:   safety round/check  completed   room organization consistent  Taken 10/20/2023 1000 by Dexter Hawk RN  Safety Promotion/Fall Prevention:   safety round/check completed   room organization consistent  Taken 10/20/2023 0800 by Dexter Hawk RN  Safety Promotion/Fall Prevention:   safety round/check completed   room organization consistent     Problem: Adjustment to Illness (Stroke, Ischemic/Transient Ischemic Attack)  Goal: Optimal Coping  Outcome: Ongoing, Progressing     Problem: Bowel Elimination Impaired (Stroke, Ischemic/Transient Ischemic Attack)  Goal: Effective Bowel Elimination  Outcome: Ongoing, Progressing     Problem: Cerebral Tissue Perfusion (Stroke, Ischemic/Transient Ischemic Attack)  Goal: Optimal Cerebral Tissue Perfusion  Outcome: Ongoing, Progressing     Problem: Cognitive Impairment (Stroke, Ischemic/Transient Ischemic Attack)  Goal: Optimal Cognitive Function  Outcome: Ongoing, Progressing     Problem: Communication Impairment (Stroke, Ischemic/Transient Ischemic Attack)  Goal: Improved Communication Skills  Outcome: Ongoing, Progressing     Problem: Functional Ability Impaired (Stroke, Ischemic/Transient Ischemic Attack)  Goal: Optimal Functional Ability  Outcome: Ongoing, Progressing  Intervention: Optimize Functional Ability  Recent Flowsheet Documentation  Taken 10/20/2023 1400 by Dexter Hawk RN  Activity Management: activity encouraged  Taken 10/20/2023 0800 by Dexter Hawk RN  Activity Management: activity encouraged     Problem: Respiratory Compromise (Stroke, Ischemic/Transient Ischemic Attack)  Goal: Effective Oxygenation and Ventilation  Outcome: Ongoing, Progressing  Intervention: Optimize Oxygenation and Ventilation  Recent Flowsheet Documentation  Taken 10/20/2023 1400 by Dexter Hawk RN  Head of Bed (HOB) Positioning: HOB at 20-30 degrees  Taken 10/20/2023 0800 by Dexter Hawk RN  Head of Bed (HOB) Positioning: HOB at 30 degrees     Problem: Sensorimotor Impairment (Stroke,  Ischemic/Transient Ischemic Attack)  Goal: Improved Sensorimotor Function  Outcome: Ongoing, Progressing  Intervention: Optimize Range of Motion, Motor Control and Function  Recent Flowsheet Documentation  Taken 10/20/2023 1400 by Dexter Hawk RN  Positioning/Transfer Devices:   pillows   in use  Taken 10/20/2023 0800 by Dexter Hawk RN  Positioning/Transfer Devices:   pillows   in use  Intervention: Optimize Sensory and Perceptual Ability  Recent Flowsheet Documentation  Taken 10/20/2023 0800 by Dexter Hawk RN  Pressure Reduction Techniques: frequent weight shift encouraged  Pressure Reduction Devices: positioning supports utilized     Problem: Swallowing Impairment (Stroke, Ischemic/Transient Ischemic Attack)  Goal: Optimal Eating and Swallowing without Aspiration  Outcome: Ongoing, Progressing     Problem: Urinary Elimination Impaired (Stroke, Ischemic/Transient Ischemic Attack)  Goal: Effective Urinary Elimination  Outcome: Ongoing, Progressing   Goal Outcome Evaluation:

## 2023-10-20 NOTE — PROGRESS NOTES
[facility] Progress Note       Encounter Date:10/20/23  Patient:Joaquín Garcia Jr.  :1960  MRN:[id number]      Chief Complaint: Follow-up left atrial thrombus      Subjective:        Patient doing okay no changes    Review of Systems:  Review of Systems   Constitutional: Negative for malaise/fatigue.   Eyes:  Positive for double vision.   Cardiovascular:  Negative for chest pain and palpitations.   Respiratory:  Negative for shortness of breath.        Medications:  Scheduled Meds:  allopurinol, 300 mg, Oral, Daily  aspirin, 325 mg, Oral, Daily   Or  aspirin, 300 mg, Rectal, Daily  atorvastatin, 80 mg, Oral, Nightly  budesonide-formoterol, 2 puff, Inhalation, BID - RT  famotidine, 20 mg, Intravenous, Q12H  metoprolol succinate XL, 100 mg, Oral, Daily  tiotropium bromide monohydrate, 2 puff, Inhalation, Daily - RT  warfarin, 6 mg, Oral, Daily    Continuous Infusions:  heparin, 13.9 Units/kg/hr, Last Rate: 13.9 Units/kg/hr (10/19/23 1528)  Pharmacy to dose warfarin,     PRN Meds:    acetaminophen **OR** acetaminophen    bisacodyl    heparin (porcine)    ipratropium-albuterol    labetalol    ondansetron    Pharmacy to dose warfarin    [COMPLETED] Insert Peripheral IV **AND** sodium chloride    sodium chloride         Objective:       Vitals:    10/19/23 1956 10/20/23 0006 10/20/23 0353 10/20/23 0800   BP: 126/72 123/70 127/74 116/75   BP Location: Left arm Right arm Right arm Right arm   Patient Position: Lying Lying Lying Sitting   Pulse: 68 76 62 79   Resp: 16 16 16 16   Temp: 97.7 °F (36.5 °C) 97.8 °F (36.6 °C) 97.6 °F (36.4 °C)    TempSrc: Oral Oral Oral    SpO2: 96% 96% 96% 99%   Weight:       Height:               Physical Exam:  Constitutional: Well appearing, well developed, no acute distress   HENT: Oropharynx clear and membrane moist  Eyes: Normal conjunctiva, no sclera icterus.  Neck: Supple, no carotid bruit bilaterally.  Cardiovascular: Irregularly irregular rate and  rhythm, Early peaking systolic murmur over the right upper sternal border, No bilateral lower extremity edema.  Pulmonary: Normal respiratory effort, distant lung sounds, no wheezing.  Skin: Warm, dry, no ecchymosis, no rash.  Psych: Appropriate mood and affect. Normal judgment and insight.           Lab Review:   Results from last 7 days   Lab Units 10/20/23  0533 10/19/23  0520 10/18/23  0534 10/17/23  1912   SODIUM mmol/L 136 139 138 133*   POTASSIUM mmol/L 3.9 4.8 3.9 4.1   CHLORIDE mmol/L 104 102 105 97*   CO2 mmol/L 23.0 28.0 24.4 26.0   BUN mg/dL 15 14 10 14   CREATININE mg/dL 0.85 0.96 0.75* 0.95   GLUCOSE mg/dL 92 88 90 101*   CALCIUM mg/dL 8.9 9.4 8.8 9.8   AST (SGOT) U/L  --   --  13 17   ALT (SGPT) U/L  --   --  21 28     Results from last 7 days   Lab Units 10/17/23  1912   HSTROP T ng/L 10     Results from last 7 days   Lab Units 10/20/23  0533 10/19/23  0520 10/18/23  0534 10/17/23  1912   WBC 10*3/mm3 8.17 7.49 5.87 7.76   HEMOGLOBIN g/dL 15.7 16.1 16.1 17.5   HEMATOCRIT % 45.0 46.2 46.5 49.4   PLATELETS 10*3/mm3 137* 140 131* 137*     Results from last 7 days   Lab Units 10/20/23  0533 10/19/23  2038 10/19/23  1428 10/19/23  0520 10/18/23  2045 10/18/23  1025 10/17/23  1912   INR  1.12*  --  1.12*  --   --  1.16* 1.11*   APTT seconds 63.9* 63.8* 39.6* 110.5* 42.2* 32.3 31.2         Results from last 7 days   Lab Units 10/18/23  0534   CHOLESTEROL mg/dL 173   TRIGLYCERIDES mg/dL 137   HDL CHOL mg/dL 47         Results from last 7 days   Lab Units 10/18/23  0534   TSH uIU/mL 0.818         Transesophageal echocardiogram 10/18/2023:  The left atrium is massively enlarged (giant left atrium). There is heavy swirling smoke within the left atrium the left atrial appendage.  There is a very large left atrial mass attached to the posterolateral aspect of the left atrium. 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 A  less probable differential would include a massive left atrial thrombus  Left ventricular systolic function is normal. Left ventricular ejection fraction appears to be 61 - 65%.  Left ventricular wall thickness is consistent with mild to moderate concentric hypertrophy.  The right ventricular cavity is moderate to severely dilated. Normal right ventricular systolic function noted.  There is a bioprosthetic mitral valve present. The mitral valve peak and mean gradients are within defined limits. The prosthetic mitral valve is normal.  There is an annuloplasty ring in the tricuspid valve position. There is trace tricuspid regurgitation through the annuloplasty ring.  There is no evidence of pericardial effusion    Echocardiogram 12/3/2020:  Calculated left ventricular EF = 55.1% Estimated left ventricular EF = 55% Estimated left ventricular EF was in agreement with the calculated left ventricular EF. Left ventricular systolic function is normal. Normal left ventricular cavity size noted. Left ventricular wall thickness is consistent with mild concentric hypertrophy. All left ventricular wall segments contract normally. Left ventricular diastolic function was indeterminate.  Left atrial volume is severely increased.The right atrial cavity is severely dilated.  There is mild thickening of the aortic valve.  There is a 33 mm, porcine, Medtronic bioprosthetic mitral valve present. The mitral valve peak and mean gradients are within defined limits. There is no significant prosthetic valve stenosis or regurgitation.  No significant tricuspid valve regurgitation or significant stenosis present. There is evidence of previous tricuspid valve repair.    Cardiac catheterization 10/21/2016:  Left main: Normal  LAD: 20% proximal LAD segment stenoses otherwise luminal irregularities throughout  Circumflex: Angiographically normal throughout  RCA: 20% mid segment stenoses supplying a PDA    Transesophageal echocardiogram  10/23/2016:  All left ventricular wall segments contract normally.  Left ventricular function is normal.  Left atrial cavity size is severely dilated.  Severe mitral valve regurgitation is present due to prolapse  Moderate tricuspid valve regurgitation is present.    Mitral valve replacement/tricuspid valve repair 10/27/2016 Dr. George:  Mitral valve replacement with a 33 mm Medtronic Mosaic ultra porcine valve with preservation of all subvalvar apparatus.    Tricuspid valve repair with a 32 mm Medtronic 3-D ring.        Assessment:          Diagnosis Plan   1. Acute CVA (cerebrovascular accident)        2. Dysconjugate gaze        3. Chronic anticoagulation               Plan:       Mr. Garcia is a 63 y.o. gentleman with past medical history notable for atrial fibrillation, nonrheumatic mitral valve regurgitation due to prolapse status post mitral valve replacement 2016, tricuspid valve regurgitation status post tricuspid valve repair, COPD with emphysema, and obstructive sleep apnea who presents to the emergency room with visual changes with diplopia on 10/17/2023.  His presentation seems consistent with his past noncompliance with Coumadin over the last 3 months or so.  He was placed on Eliquis and reports compliance with this may be missing only a few dosages however when I look at the nature of his clots and location this fits more with a pattern that you typically see with rheumatic heart disease which only responds to Coumadin.  He has a fair amount of smoke in his left atrium and his left atrium is extremely dilated.  I think that these clots likely formed over the last couple of months while off of anticoagulation and the recent addition of Eliquis likely did not provide any coverage.  My recommendation would be for him to go back on anticoagulation.  Given his mitral valve replacement and severely dilated left atrium I would treat him like a patient with valvular heart disease despite not meeting  strict criteria for this.  I would recommend an INR level of 2.5-3.5.  Aspirin would be fine but I do not think that he needs a 325 mg of aspirin but would also defer and appreciate hematology's input.  I would not recommend any percutaneous approach to aspirate these clots and likely resulted in significant embolization and further strokes surgery did evaluate the patient and he is not a good surgical candidate given his underlying lung disease.  Usually with treatment with appropriate anticoagulation this should be sufficient would recommend heparin bridge until therapeutic INR.  Again would appreciate hematology's input and recommendations as well if any further testing would be needed from there and prior to starting Coumadin will defer to them from a cardiac standpoint would be fine to start Coumadin at this point.  I did talk with him it sounds like he would like to try and continue to follow with his primary care physician for his INR because it is closer to home that being said I know there is some issues with compliance in the past I will reach out to his primary care physician to see if he is interested in this or perhaps he can have his labs drawn at his primary care physician's office and managed by our anticoagulation clinic.    Permanent atrial fibrillation:  By strict criteria not truly valvular in etiology however anatomically would appear to be behaving like a rheumatic valvular A-fib given the size of his atrium and the location of his multiple thrombi within the walls of his left atrium  I would recommend a goal INR of 2.5-3.5  Would advise against NOAC  Aspirin 81 mg would be sufficient from my perspective  Appreciate hematology's input  Would bridge with heparin until therapeutic INR    Nonrheumatic mitral valve regurgitation status post mitral valve replacement:  Normal valve function on current study  We will continue to follow  Etiology of valvular heart disease appears to be prolapse not  rheumatic heart disease but atrium is severely dilated would treat him as a patient who had rheumatic mitral stenosis             Gordon Walsh MD  [facility]  10/20/23  08:19 EDT

## 2023-10-20 NOTE — PROGRESS NOTES
Name: Joaquín Garcia Jr. ADMIT: 10/17/2023   : 1960  PCP: Epley, James, APRN    MRN: [id number] LOS: 2 days   AGE/SEX: 63 y.o. male  ROOM: [facility]     Subjective   Subjective   Patient seen and examined this morning. Hospital day 3.  Patient awake, alert, sitting up on the edge of the bed.  Family present.  He states he is doing okay today, continues to endorse some dizziness with changes in position and standing.     Objective   Objective   Vital Signs  Temp:  [97.6 °F (36.4 °C)-97.8 °F (36.6 °C)] 97.6 °F (36.4 °C)  Heart Rate:  [62-79] 72  Resp:  [16-20] 20  BP: (116-127)/(70-75) 116/75  SpO2:  [96 %-99 %] 99 %  on  Flow (L/min):  [2] 2;   Device (Oxygen Therapy): room air  Body mass index is 28.09 kg/m².  Const: Elderly, chronically ill-appearing, no acute distress  HEENT: Medial deviation of the left eye, no scleral icterus  CV: Regular rate, irregular rhythm, systolic murmur  RESP: FIO2 21, decreased breath sounds, normal effort  GI: soft, non-tender, non-distended  MSK: No tenderness, no edema  Skin: Warm, dry, scattered bruises  Neuro: Awake, alert, oriented x3, no tremor, no facial droop  Psych: Appropriate mood and affect.    Results Review     I reviewed the patient's new clinical results.  Results from last 7 days   Lab Units 10/20/23  0533 10/19/23  0520 10/18/23  0534 10/17/23  1912   WBC 10*3/mm3 8.17 7.49 5.87 7.76   HEMOGLOBIN g/dL 15.7 16.1 16.1 17.5   PLATELETS 10*3/mm3 137* 140 131* 137*     Results from last 7 days   Lab Units 10/20/23  0533 10/19/23  0520 10/18/23  0534 10/17/23  1912   SODIUM mmol/L 136 139 138 133*   POTASSIUM mmol/L 3.9 4.8 3.9 4.1   CHLORIDE mmol/L 104 102 105 97*   CO2 mmol/L 23.0 28.0 24.4 26.0   BUN mg/dL 15 14 10 14   CREATININE mg/dL 0.85 0.96 0.75* 0.95   GLUCOSE mg/dL 92 88 90 101*   EGFR mL/min/1.73 96.4 87.7 100.1 88.8     Results from last 7 days   Lab Units 10/18/23  0534 10/17/23  1912   ALBUMIN g/dL 3.9 4.6   BILIRUBIN mg/dL 0.6 0.5   ALK PHOS  U/L 76 88   AST (SGOT) U/L 13 17   ALT (SGPT) U/L 21 28     Results from last 7 days   Lab Units 10/20/23  0533 10/19/23  0520 10/18/23  0534 10/17/23  1912   CALCIUM mg/dL 8.9 9.4 8.8 9.8   ALBUMIN g/dL  --   --  3.9 4.6       Hemoglobin A1C   Date/Time Value Ref Range Status   10/18/2023 0534 5.70 (H) 4.80 - 5.60 % Final     Glucose   Date/Time Value Ref Range Status   10/18/2023 0602 90 70 - 130 mg/dL Final   10/18/2023 0033 97 70 - 130 mg/dL Final   10/17/2023 1907 102 70 - 130 mg/dL Final       No radiology results for the last day    I have personally reviewed all medications:  Scheduled Medications  allopurinol, 300 mg, Oral, Daily  aspirin, 325 mg, Oral, Daily   Or  aspirin, 300 mg, Rectal, Daily  atorvastatin, 80 mg, Oral, Nightly  budesonide-formoterol, 2 puff, Inhalation, BID - RT  famotidine, 20 mg, Oral, BID AC  metoprolol succinate XL, 100 mg, Oral, Daily  tiotropium bromide monohydrate, 2 puff, Inhalation, Daily - RT  warfarin, 6 mg, Oral, Daily    Infusions  heparin, 13.9 Units/kg/hr, Last Rate: 13.9 Units/kg/hr (10/20/23 1228)  Pharmacy to dose warfarin,     Diet  Diet: Regular/House Diet; Texture: Regular Texture (IDDSI 7); Fluid Consistency: Thin (IDDSI 0)    I have personally reviewed:  [x]  Laboratory   [x]  Microbiology   [x]  Radiology   [x]  EKG/Telemetry  [x]  Cardiology/Vascular   []  Pathology    []  Records       Assessment/Plan     Active Hospital Problems    Diagnosis  POA    **Diplopia [H53.2]  Yes    Dizziness [R42]  Yes    Acute CVA (cerebrovascular accident) [I63.9]  Yes    Left atrial mass [I51.89]  Yes    Gout [M10.9]  Yes    On anticoagulant therapy [Z79.01]  Not Applicable    Hypertension [I10]  Yes    Atrial fibrillation, chronic [I48.20]  Yes      Resolved Hospital Problems   No resolved problems to display.       63 y.o. male admitted with Diplopia.    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  This has  occurred while the patient is on Eliquis (he assures compliance) which indicate Eliquis failure.  A1c is 5.7.  LDL is 102.  TSH is normal.   - Currently on heparin/aspirin/high-dose Lipitor.  Cardiology consult is noted and appreciated.  Cardiology performed LILIA and the finding was consistent of a large left atrial thrombus, normal ejection fraction, left ventricular hypertrophy, dilated right ventricle, bioprosthetic mitral valve that appears to be normal, annuloplasty in the tricuspid valve with the position.  Patient is a poor candidate for surgery because of his severe COPD.  Patient is a poor candidate for catheter removal of the blood clot as it is significantly large.   - Cardiology and cardiothoracic surgery recommends conservative treatment with anticoagulation.  Plan for transition to warfarin with goal INR 2.5 go 3.5.   - Hematology/oncology consulted and following.   - Physical therapy/Occupational Therapy evaluated the patient and recommends a skilled facility.  - Continue current treatments as prescribed.  Continue to follow consultant plans and recommendations, greatly appreciate their help.    Atrial fibrillation  Bioprosthetic mitral valvular replacement, Tricuspid repair  Recent diagnosis of left atrial large thrombus  Hypertension  - LILIA result noted. Blood pressure appears stable and acceptable acutely at this time. No indications to warrant acute changes/intervention at this time.  - Vitals, telemetry reviewed, patient appears to be stable, rate controlled on current treatment regimen with Metoprolol.  - Currently on heparin/warfarin bridging, discussed with pharmacy, considered treatment failure on Eliquis, plan for long term transition back to warfarin with goal INR 2.5 to 3.5.  - Cardiology consulted and following. Will continue to follow their plans/recommendations. Greatly appreciate their help.  - Continue current treatment as prescribed, monitor on telemetry, daily INR.    Prediabetes  with hyperglycemia  - Glucose elevated on most recent labs. Patient without known history of T2DM.  Most recent hemoglobin A1c 5.70% (10/18/2023).  - Monitor for now, continue with POC glucose checks, correctional SSI if needed.    COPD  Obstructive sleep apnea  - Appears stable from this perspective at present.  No evidence to suggest acute exacerbation.  Continue current medications as prescribed and closely monitor.  - Supplemental oxygen as needed to maintain O2 sats 88 to 92%, pulse oximetry, telemetry monitoring.    Thrombocytopenia  - Platelets found to be low on most recent labs. No indications for urgent intervention at present. Will monitor for now.  - Order repeat CBC in AM for reassessment.    Peptic Ulcer Disease/GERD  - Continue Pepcid as prescribed.     Hyperlipidemia  - Continue Statin as prescribed.    History of neurofibromatosis      All workup, problems and plans new to me today. First day taking care of patient.    SCDs for DVT prophylaxis.  Full code.  Discussed with patient, family, nursing staff, CCP, and care team on multidisciplinary rounds.  Anticipate discharge  TBD  timing yet to be determined..      Karsten Watson DO  [facility]  10/20/23

## 2023-10-21 LAB
ANION GAP SERPL CALCULATED.3IONS-SCNC: 8.3 MMOL/L (ref 5–15)
APTT PPP: 42.3 SECONDS (ref 22.7–35.4)
APTT PPP: 90.6 SECONDS (ref 22.7–35.4)
APTT PPP: 91.3 SECONDS (ref 22.7–35.4)
APTT SCREEN TO CONFIRM RATIO: 1.04 RATIO (ref 0–1.34)
BASOPHILS # BLD AUTO: 0.08 10*3/MM3 (ref 0–0.2)
BASOPHILS NFR BLD AUTO: 1 % (ref 0–1.5)
BUN SERPL-MCNC: 11 MG/DL (ref 8–23)
BUN/CREAT SERPL: 13.3 (ref 7–25)
CALCIUM SPEC-SCNC: 8.8 MG/DL (ref 8.6–10.5)
CHLORIDE SERPL-SCNC: 106 MMOL/L (ref 98–107)
CO2 SERPL-SCNC: 23.7 MMOL/L (ref 22–29)
CONFIRM APTT/NORMAL: 38.7 SEC (ref 0–47.6)
CREAT SERPL-MCNC: 0.83 MG/DL (ref 0.76–1.27)
DEPRECATED RDW RBC AUTO: 47.9 FL (ref 37–54)
DRVVT SCREEN TO CONFIRM RATIO: 0.9 RATIO (ref 0.8–1.2)
EGFRCR SERPLBLD CKD-EPI 2021: 97.1 ML/MIN/1.73
EOSINOPHIL # BLD AUTO: 0.19 10*3/MM3 (ref 0–0.4)
EOSINOPHIL NFR BLD AUTO: 2.4 % (ref 0.3–6.2)
ERYTHROCYTE [DISTWIDTH] IN BLOOD BY AUTOMATED COUNT: 13.3 % (ref 12.3–15.4)
GLUCOSE SERPL-MCNC: 88 MG/DL (ref 65–99)
HCT VFR BLD AUTO: 43.9 % (ref 37.5–51)
HGB BLD-MCNC: 15.4 G/DL (ref 13–17.7)
IMM GRANULOCYTES # BLD AUTO: 0.02 10*3/MM3 (ref 0–0.05)
IMM GRANULOCYTES NFR BLD AUTO: 0.2 % (ref 0–0.5)
INR PPP: 1.68 (ref 0.9–1.1)
LA 2 SCREEN W REFLEX-IMP: ABNORMAL
LYMPHOCYTES # BLD AUTO: 1.28 10*3/MM3 (ref 0.7–3.1)
LYMPHOCYTES NFR BLD AUTO: 16 % (ref 19.6–45.3)
MCH RBC QN AUTO: 34.6 PG (ref 26.6–33)
MCHC RBC AUTO-ENTMCNC: 35.1 G/DL (ref 31.5–35.7)
MCV RBC AUTO: 98.7 FL (ref 79–97)
MIXING DRVVT: 43.1 SEC (ref 0–40.4)
MONOCYTES # BLD AUTO: 0.73 10*3/MM3 (ref 0.1–0.9)
MONOCYTES NFR BLD AUTO: 9.1 % (ref 5–12)
NEUTROPHILS NFR BLD AUTO: 5.71 10*3/MM3 (ref 1.7–7)
NEUTROPHILS NFR BLD AUTO: 71.3 % (ref 42.7–76)
NRBC BLD AUTO-RTO: 0 /100 WBC (ref 0–0.2)
PLATELET # BLD AUTO: 143 10*3/MM3 (ref 140–450)
PMV BLD AUTO: 8.2 FL (ref 6–12)
POTASSIUM SERPL-SCNC: 4 MMOL/L (ref 3.5–5.2)
PROTHROMBIN TIME: 20.1 SECONDS (ref 11.7–14.2)
RBC # BLD AUTO: 4.45 10*6/MM3 (ref 4.14–5.8)
SCREEN APTT: 43.5 SEC (ref 0–43.5)
SCREEN DRVVT: 51.1 SEC (ref 0–47)
SODIUM SERPL-SCNC: 138 MMOL/L (ref 136–145)
THROMBIN TIME: 19.9 SEC (ref 0–23)
WBC NRBC COR # BLD: 8.01 10*3/MM3 (ref 3.4–10.8)

## 2023-10-21 PROCEDURE — 25010000002 HEPARIN (PORCINE) PER 1000 UNITS: Performed by: NURSE PRACTITIONER

## 2023-10-21 PROCEDURE — 99232 SBSQ HOSP IP/OBS MODERATE 35: CPT | Performed by: INTERNAL MEDICINE

## 2023-10-21 PROCEDURE — 94664 DEMO&/EVAL PT USE INHALER: CPT

## 2023-10-21 PROCEDURE — 80048 BASIC METABOLIC PNL TOTAL CA: CPT | Performed by: INTERNAL MEDICINE

## 2023-10-21 PROCEDURE — 85025 COMPLETE CBC W/AUTO DIFF WBC: CPT | Performed by: INTERNAL MEDICINE

## 2023-10-21 PROCEDURE — 85610 PROTHROMBIN TIME: CPT | Performed by: INTERNAL MEDICINE

## 2023-10-21 PROCEDURE — 85730 THROMBOPLASTIN TIME PARTIAL: CPT | Performed by: INTERNAL MEDICINE

## 2023-10-21 PROCEDURE — 85730 THROMBOPLASTIN TIME PARTIAL: CPT | Performed by: STUDENT IN AN ORGANIZED HEALTH CARE EDUCATION/TRAINING PROGRAM

## 2023-10-21 PROCEDURE — 94761 N-INVAS EAR/PLS OXIMETRY MLT: CPT

## 2023-10-21 PROCEDURE — 94799 UNLISTED PULMONARY SVC/PX: CPT

## 2023-10-21 RX ADMIN — ALLOPURINOL 300 MG: 300 TABLET ORAL at 08:48

## 2023-10-21 RX ADMIN — FAMOTIDINE 20 MG: 20 TABLET, FILM COATED ORAL at 18:18

## 2023-10-21 RX ADMIN — ASPIRIN 325 MG: 325 TABLET ORAL at 08:48

## 2023-10-21 RX ADMIN — HEPARIN SODIUM 4000 UNITS: 5000 INJECTION, SOLUTION INTRAVENOUS; SUBCUTANEOUS at 14:43

## 2023-10-21 RX ADMIN — METOPROLOL SUCCINATE 100 MG: 100 TABLET, EXTENDED RELEASE ORAL at 08:48

## 2023-10-21 RX ADMIN — TIOTROPIUM BROMIDE INHALATION SPRAY 2 PUFF: 3.12 SPRAY, METERED RESPIRATORY (INHALATION) at 10:47

## 2023-10-21 RX ADMIN — FAMOTIDINE 20 MG: 20 TABLET, FILM COATED ORAL at 06:35

## 2023-10-21 RX ADMIN — ATORVASTATIN CALCIUM 80 MG: 80 TABLET, FILM COATED ORAL at 20:33

## 2023-10-21 RX ADMIN — HEPARIN SODIUM 11.98 UNITS/KG/HR: 10000 INJECTION, SOLUTION INTRAVENOUS at 13:25

## 2023-10-21 RX ADMIN — HEPARIN SODIUM 11.9 UNITS/KG/HR: 10000 INJECTION, SOLUTION INTRAVENOUS at 06:34

## 2023-10-21 RX ADMIN — BUDESONIDE AND FORMOTEROL FUMARATE DIHYDRATE 2 PUFF: 160; 4.5 AEROSOL RESPIRATORY (INHALATION) at 10:47

## 2023-10-21 RX ADMIN — BUDESONIDE AND FORMOTEROL FUMARATE DIHYDRATE 2 PUFF: 160; 4.5 AEROSOL RESPIRATORY (INHALATION) at 20:10

## 2023-10-21 RX ADMIN — WARFARIN 6 MG: 6 TABLET ORAL at 18:18

## 2023-10-21 NOTE — PLAN OF CARE
Goal Outcome Evaluation:  Plan of Care Reviewed With: patient           Outcome Evaluation: Heparin gtt adjusted per order, running at 13.9 units/kg/hr. Denies having any pain. No further complaints. Will continue to monitor.

## 2023-10-21 NOTE — PROGRESS NOTES
[facility] Progress Note       Encounter Date:10/21/23  Patient:Joaquín Garcia Jr.  :1960  MRN:[id number]      Chief Complaint: Follow-up left atrial thrombus      Subjective:        Patient doing okay no changes    Review of Systems:  Review of Systems   Constitutional: Negative for malaise/fatigue.   Eyes:  Positive for double vision.   Cardiovascular:  Negative for chest pain and palpitations.   Respiratory:  Negative for shortness of breath.        Medications:  Scheduled Meds:  allopurinol, 300 mg, Oral, Daily  aspirin, 325 mg, Oral, Daily   Or  aspirin, 300 mg, Rectal, Daily  atorvastatin, 80 mg, Oral, Nightly  budesonide-formoterol, 2 puff, Inhalation, BID - RT  famotidine, 20 mg, Oral, BID AC  metoprolol succinate XL, 100 mg, Oral, Daily  tiotropium bromide monohydrate, 2 puff, Inhalation, Daily - RT  warfarin, 6 mg, Oral, Daily    Continuous Infusions:  heparin, 13.9 Units/kg/hr, Last Rate: 11.9 Units/kg/hr (10/21/23 0634)  Pharmacy to dose warfarin,     PRN Meds:    acetaminophen **OR** acetaminophen    bisacodyl    heparin (porcine)    ipratropium-albuterol    labetalol    ondansetron    Pharmacy to dose warfarin    [COMPLETED] Insert Peripheral IV **AND** sodium chloride    sodium chloride         Objective:       Vitals:    10/21/23 0044 10/21/23 0214 10/21/23 0446 10/21/23 0808   BP: 107/68  115/58 115/80   BP Location: Right arm  Left arm Left arm   Patient Position: Lying  Lying Lying   Pulse: 64 63 66 85   Resp: 18  17 17   Temp: 97.5 °F (36.4 °C)  98.9 °F (37.2 °C) 97.5 °F (36.4 °C)   TempSrc: Oral  Oral Oral   SpO2: 96% 94% 96% 95%   Weight:       Height:               Physical Exam:  Constitutional: Well appearing, well developed, no acute distress   HENT: Oropharynx clear and membrane moist  Eyes: Normal conjunctiva, no sclera icterus.  Neck: Supple, no carotid bruit bilaterally.  Cardiovascular: Irregularly irregular rate and rhythm, Early peaking systolic  murmur over the right upper sternal border, No bilateral lower extremity edema.  Pulmonary: Normal respiratory effort, distant lung sounds, no wheezing.  Skin: Warm, dry, no ecchymosis, no rash.  Psych: Appropriate mood and affect. Normal judgment and insight.           Lab Review:   Results from last 7 days   Lab Units 10/21/23  0529 10/20/23  0533 10/19/23  0520 10/18/23  0534 10/17/23  1912   SODIUM mmol/L 138 136 139 138 133*   POTASSIUM mmol/L 4.0 3.9 4.8 3.9 4.1   CHLORIDE mmol/L 106 104 102 105 97*   CO2 mmol/L 23.7 23.0 28.0 24.4 26.0   BUN mg/dL 11 15 14 10 14   CREATININE mg/dL 0.83 0.85 0.96 0.75* 0.95   GLUCOSE mg/dL 88 92 88 90 101*   CALCIUM mg/dL 8.8 8.9 9.4 8.8 9.8   AST (SGOT) U/L  --   --   --  13 17   ALT (SGPT) U/L  --   --   --  21 28     Results from last 7 days   Lab Units 10/17/23  1912   HSTROP T ng/L 10     Results from last 7 days   Lab Units 10/21/23  0529 10/20/23  0533 10/19/23  0520 10/18/23  0534 10/17/23  1912   WBC 10*3/mm3 8.01 8.17 7.49 5.87 7.76   HEMOGLOBIN g/dL 15.4 15.7 16.1 16.1 17.5   HEMATOCRIT % 43.9 45.0 46.2 46.5 49.4   PLATELETS 10*3/mm3 143 137* 140 131* 137*     Results from last 7 days   Lab Units 10/21/23  0529 10/20/23  1209 10/20/23  0533 10/19/23  2038 10/19/23  1428 10/19/23  0520 10/18/23  2045 10/18/23  1025 10/17/23  1912   INR  1.68*  --  1.12*  --  1.12*  --   --  1.16* 1.11*   APTT seconds 90.6* 69.2* 63.9* 63.8* 39.6* 110.5* 42.2* 32.3 31.2         Results from last 7 days   Lab Units 10/18/23  0534   CHOLESTEROL mg/dL 173   TRIGLYCERIDES mg/dL 137   HDL CHOL mg/dL 47         Results from last 7 days   Lab Units 10/18/23  0534   TSH uIU/mL 0.818         Transesophageal echocardiogram 10/18/2023:  The left atrium is massively enlarged (giant left atrium). There is heavy swirling smoke within the left atrium the left atrial appendage.  There is a very large left atrial mass attached to the posterolateral aspect of the left atrium. The mass measures up to  4.97 cm x 4.05 cm, and is most consistent with an intracardiac tumor (most likely either a myxoma or lymphoma). A less probable differential would include a massive left atrial thrombus  Left ventricular systolic function is normal. Left ventricular ejection fraction appears to be 61 - 65%.  Left ventricular wall thickness is consistent with mild to moderate concentric hypertrophy.  The right ventricular cavity is moderate to severely dilated. Normal right ventricular systolic function noted.  There is a bioprosthetic mitral valve present. The mitral valve peak and mean gradients are within defined limits. The prosthetic mitral valve is normal.  There is an annuloplasty ring in the tricuspid valve position. There is trace tricuspid regurgitation through the annuloplasty ring.  There is no evidence of pericardial effusion    Echocardiogram 12/3/2020:  Calculated left ventricular EF = 55.1% Estimated left ventricular EF = 55% Estimated left ventricular EF was in agreement with the calculated left ventricular EF. Left ventricular systolic function is normal. Normal left ventricular cavity size noted. Left ventricular wall thickness is consistent with mild concentric hypertrophy. All left ventricular wall segments contract normally. Left ventricular diastolic function was indeterminate.  Left atrial volume is severely increased.The right atrial cavity is severely dilated.  There is mild thickening of the aortic valve.  There is a 33 mm, porcine, Medtronic bioprosthetic mitral valve present. The mitral valve peak and mean gradients are within defined limits. There is no significant prosthetic valve stenosis or regurgitation.  No significant tricuspid valve regurgitation or significant stenosis present. There is evidence of previous tricuspid valve repair.    Cardiac catheterization 10/21/2016:  Left main: Normal  LAD: 20% proximal LAD segment stenoses otherwise luminal irregularities throughout  Circumflex:  Angiographically normal throughout  RCA: 20% mid segment stenoses supplying a PDA    Transesophageal echocardiogram 10/23/2016:  All left ventricular wall segments contract normally.  Left ventricular function is normal.  Left atrial cavity size is severely dilated.  Severe mitral valve regurgitation is present due to prolapse  Moderate tricuspid valve regurgitation is present.    Mitral valve replacement/tricuspid valve repair 10/27/2016 Dr. George:  Mitral valve replacement with a 33 mm Medtronic Mosaic ultra porcine valve with preservation of all subvalvar apparatus.    Tricuspid valve repair with a 32 mm Medtronic 3-D ring.        Assessment:          Diagnosis Plan   1. Acute CVA (cerebrovascular accident)        2. Dysconjugate gaze        3. Chronic anticoagulation               Plan:       Mr. Garcia is a 63 y.o. gentleman with past medical history notable for atrial fibrillation, nonrheumatic mitral valve regurgitation due to prolapse status post mitral valve replacement 2016, tricuspid valve regurgitation status post tricuspid valve repair, COPD with emphysema, and obstructive sleep apnea who presents to the emergency room with visual changes with diplopia on 10/17/2023.  His presentation seems consistent with his past noncompliance with Coumadin over the last 3 months or so.  He was placed on Eliquis and reports compliance with this may be missing only a few dosages however when I look at the nature of his clots and location this fits more with a pattern that you typically see with rheumatic heart disease which only responds to Coumadin.  He has a fair amount of smoke in his left atrium and his left atrium is extremely dilated.  I think that these clots likely formed over the last couple of months while off of anticoagulation and the recent addition of Eliquis likely did not provide any coverage.  My recommendation would be for him to go back on anticoagulation.  Given his mitral valve replacement and  severely dilated left atrium I would treat him like a patient with valvular heart disease despite not meeting strict criteria for this.  I would recommend an INR level of 2.5-3.5.  Aspirin would be fine but I do not think that he needs a 325 mg of aspirin but would also defer and appreciate hematology's input.  I would not recommend any percutaneous approach to aspirate these clots and likely resulted in significant embolization and further strokes surgery did evaluate the patient and he is not a good surgical candidate given his underlying lung disease.  Usually with treatment with appropriate anticoagulation this should be sufficient would recommend heparin bridge until therapeutic INR.  Again would appreciate hematology's input and recommendations as well if any further testing would be needed from there and prior to starting Coumadin will defer to them from a cardiac standpoint would be fine to start Coumadin at this point.  I did talk with him it sounds like he would like to try and continue to follow with his primary care physician for his INR because it is closer to home that being said I know there is some issues with compliance in the past I will reach out to his primary care physician to see if he is interested in this or perhaps he can have his labs drawn at his primary care physician's office and managed by our anticoagulation clinic.    Permanent atrial fibrillation:  By strict criteria not truly valvular in etiology however anatomically would appear to be behaving like a rheumatic valvular A-fib given the size of his atrium and the location of his multiple thrombi within the walls of his left atrium  I would recommend a goal INR of 2.5-3.5  Would advise against NOAC  Aspirin 81 mg would be sufficient from my perspective  Appreciate hematology's input  Would bridge with heparin until therapeutic INR    Nonrheumatic mitral valve regurgitation status post mitral valve replacement:  Normal valve function  on current study  We will continue to follow  Etiology of valvular heart disease appears to be prolapse not rheumatic heart disease but atrium is severely dilated would treat him as a patient who had rheumatic mitral stenosis             Gordon Walsh MD  [facility]  10/21/23  10:04 EDT

## 2023-10-21 NOTE — PROGRESS NOTES
Name: Joaquín Garcia Jr. ADMIT: 10/17/2023   : 1960  PCP: Epley, James, APRN    MRN: [id number] LOS: 3 days   AGE/SEX: 63 y.o. male  ROOM: [facility]     Subjective   Subjective   Patient seen and examined this morning. Hospital day 4.  At time of my examination, patient is awake, alert, sitting up on the edge of the bed.  He states that he feels okay today, continues to have some intermittent dizziness and lightheadedness with changes in position, however, improved from prior.       Objective   Objective   Vital Signs  Temp:  [97.5 °F (36.4 °C)-98.9 °F (37.2 °C)] 97.5 °F (36.4 °C)  Heart Rate:  [59-85] 75  Resp:  [16-18] 16  BP: (107-125)/(58-80) 115/80  SpO2:  [91 %-96 %] 92 %  on   ;   Device (Oxygen Therapy): room air  Body mass index is 28.09 kg/m².  Const: Elderly, overweight, chronically ill-appearing, no acute distress  HEENT: Medial deviation of the left eye, no scleral icterus  CV: Regular rate, irregular rhythm, systolic murmur  RESP: FIO2 21, decreased breath sounds, normal effort, no wheezes  GI: soft, non-tender, non-distended  MSK: No tenderness, no edema  Skin: Warm, dry, scattered bruises  Neuro: Awake, alert, no tremor, no facial droop  Psych: Appropriate mood and affect.    Results Review     I reviewed the patient's new clinical results.  Results from last 7 days   Lab Units 10/21/23  0529 10/20/23  0533 10/19/23  0520 10/18/23  0534   WBC 10*3/mm3 8.01 8.17 7.49 5.87   HEMOGLOBIN g/dL 15.4 15.7 16.1 16.1   PLATELETS 10*3/mm3 143 137* 140 131*     Results from last 7 days   Lab Units 10/21/23  0529 10/20/23  0533 10/19/23  0520 10/18/23  0534   SODIUM mmol/L 138 136 139 138   POTASSIUM mmol/L 4.0 3.9 4.8 3.9   CHLORIDE mmol/L 106 104 102 105   CO2 mmol/L 23.7 23.0 28.0 24.4   BUN mg/dL 11 15 14 10   CREATININE mg/dL 0.83 0.85 0.96 0.75*   GLUCOSE mg/dL 88 92 88 90   EGFR mL/min/1.73 97.1 96.4 87.7 100.1     Results from last 7 days   Lab Units 10/18/23  0534 10/17/23  1912   ALBUMIN  "g/dL 3.9 4.6   BILIRUBIN mg/dL 0.6 0.5   ALK PHOS U/L 76 88   AST (SGOT) U/L 13 17   ALT (SGPT) U/L 21 28     Results from last 7 days   Lab Units 10/21/23  0529 10/20/23  0533 10/19/23  0520 10/18/23  0534 10/17/23  1912   CALCIUM mg/dL 8.8 8.9 9.4 8.8 9.8   ALBUMIN g/dL  --   --   --  3.9 4.6       No results found for: \"HGBA1C\", \"POCGLU\"      No radiology results for the last day    I have personally reviewed all medications:  Scheduled Medications  allopurinol, 300 mg, Oral, Daily  aspirin, 325 mg, Oral, Daily   Or  aspirin, 300 mg, Rectal, Daily  atorvastatin, 80 mg, Oral, Nightly  budesonide-formoterol, 2 puff, Inhalation, BID - RT  famotidine, 20 mg, Oral, BID AC  metoprolol succinate XL, 100 mg, Oral, Daily  tiotropium bromide monohydrate, 2 puff, Inhalation, Daily - RT  warfarin, 6 mg, Oral, Daily    Infusions  heparin, 13.9 Units/kg/hr, Last Rate: 11.9 Units/kg/hr (10/21/23 0634)  Pharmacy to dose warfarin,     Diet  Diet: Regular/House Diet; Texture: Regular Texture (IDDSI 7); Fluid Consistency: Thin (IDDSI 0)    I have personally reviewed:  [x]  Laboratory   [x]  Microbiology   [x]  Radiology   [x]  EKG/Telemetry  [x]  Cardiology/Vascular   []  Pathology    []  Records       Assessment/Plan     Active Hospital Problems    Diagnosis  POA    **Diplopia [H53.2]  Yes    Dizziness [R42]  Yes    Acute CVA (cerebrovascular accident) [I63.9]  Yes    Left atrial mass [I51.89]  Yes    Gout [M10.9]  Yes    On anticoagulant therapy [Z79.01]  Not Applicable    Hypertension [I10]  Yes    Atrial fibrillation, chronic [I48.20]  Yes      Resolved Hospital Problems   No resolved problems to display.       63 y.o. male admitted with Diplopia.    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  This has occurred while the patient is on Eliquis (he assures compliance) which indicate Eliquis failure.  A1c is 5.7.  LDL is 102.  TSH is normal.   - Currently on " heparin/aspirin/high-dose Lipitor.  Cardiology consult is noted and appreciated.  Cardiology performed LILIA and the finding was consistent of a large left atrial thrombus, normal ejection fraction, left ventricular hypertrophy, dilated right ventricle, bioprosthetic mitral valve that appears to be normal, annuloplasty in the tricuspid valve with the position.  Patient is a poor candidate for surgery because of his severe COPD.  Patient is a poor candidate for catheter removal of the blood clot as it is significantly large.   - Cardiology and cardiothoracic surgery recommends conservative treatment with anticoagulation.  Plan for transition to warfarin with goal INR 2.5 go 3.5.   - Hematology/oncology consulted and following.   - Physical therapy/Occupational Therapy evaluated the patient and recommends a skilled facility.  - Continue current treatments as prescribed.  Continue to follow consultant plans and recommendations, greatly appreciate their help.    Atrial fibrillation  Bioprosthetic mitral valvular replacement, Tricuspid repair  Recent diagnosis of left atrial large thrombus  Hypertension  - LILIA result noted. Blood pressure appears stable and acceptable acutely at this time. No indications to warrant acute changes/intervention at this time.  - Vitals, telemetry reviewed, patient appears to be stable, rate controlled on current treatment regimen with Metoprolol.  - Currently on heparin/warfarin bridging, discussed with pharmacy, considered treatment failure on Eliquis, plan for long term transition back to warfarin with goal INR 2.5 to 3.5.  - Cardiology consulted and following. Will continue to follow their plans/recommendations. Greatly appreciate their help.  - Continue current treatment as prescribed, monitor on telemetry, daily INR, requiring close monitoring.    Prediabetes with hyperglycemia  - Glucose elevated on most recent labs. Patient without known history of T2DM.  Most recent hemoglobin A1c  5.70% (10/18/2023).  - Monitor for now, continue with POC glucose checks, correctional SSI if needed.    COPD  Obstructive sleep apnea  - Appears stable from this perspective at present.  No evidence to suggest acute exacerbation.  Continue current medications as prescribed and closely monitor.  - Supplemental oxygen as needed to maintain O2 sats 88 to 92%, pulse oximetry, telemetry monitoring.    Peptic Ulcer Disease/GERD  - Continue Pepcid as prescribed.     Hyperlipidemia  - Continue Statin as prescribed.    History of neurofibromatosis    Thrombocytopenia, resolved  - Platelets found to be low on prior labs, improved on most recent testing. No indications for urgent intervention at present. Will monitor for now.  - Order repeat CBC in AM for reassessment.    SCDs for DVT prophylaxis.  Full code.  Discussed with patient and nursing staff.  Anticipate discharge to SNU facility timing yet to be determined..      Karsten Watson DO  [facility]  10/21/23

## 2023-10-21 NOTE — PLAN OF CARE
Goal Outcome Evaluation:  Plan of Care Reviewed With: patient      Patient alert and oriented, VSS, on room air. Patient voiding via urinal. Denies pain. NIH at a 4. Receiving heparin continuously. Rate change completed per orders. Call light within reach.

## 2023-10-21 NOTE — PROGRESS NOTES
[facility] Clinical Pharmacy Services: Warfarin Dosing/Monitoring Consult    Joaquín Garcia Jr. is a 63 y.o. male, estimated creatinine clearance is 99.8 mL/min (by C-G formula based on SCr of 0.85 mg/dL). weighing 91 kg (200 lb 9.9 oz).    Results from last 7 days   Lab Units 10/21/23  0529 10/20/23  1209 10/20/23  0533 10/19/23  2038 10/19/23  1428 10/19/23  0520 10/18/23  2045 10/18/23  1025 10/18/23  0534 10/17/23  1912   INR  1.68*  --  1.12*  --  1.12*  --   --  1.16*  --  1.11*   APTT seconds 90.6* 69.2* 63.9* 63.8* 39.6* 110.5*   < > 32.3  --  31.2   HEMOGLOBIN g/dL 15.4  --  15.7  --   --  16.1  --   --  16.1 17.5   HEMATOCRIT % 43.9  --  45.0  --   --  46.2  --   --  46.5 49.4   PLATELETS 10*3/mm3 143  --  137*  --   --  140  --   --  131* 137*    < > = values in this interval not displayed.     Prior to admission anticoagulation: apixaban, prior to that was on warfarin 5mg daily     Hospital Anticoagulation:  Consulting provider: Dr Bolden  Start date: 10/19  Indication: DVT/PE (active thrombosis)  Target INR: 2.5 - 3.5  Expected duration: indefinite   Bridge Therapy: Yes  with unfractionated heparin    Potential food or drug interactions: allopurinol    Education complete?/Date: No; plan for follow up TBD    Assessment/Plan:  Dose: continue with 6mg daily (higher than previous dose since INR goal higher)   Monitor for any signs or symptoms of bleeding  Follow up daily INRs and dose adjustments    Pharmacy will continue to follow until discharge or discontinuation of warfarin.     Daniela Torres, [facility]    Clinical Pharmacist

## 2023-10-22 LAB
ANION GAP SERPL CALCULATED.3IONS-SCNC: 13.7 MMOL/L (ref 5–15)
APTT PPP: 43.8 SECONDS (ref 22.7–35.4)
APTT PPP: 45.9 SECONDS (ref 22.7–35.4)
APTT PPP: 78.1 SECONDS (ref 22.7–35.4)
BASOPHILS # BLD AUTO: 0.06 10*3/MM3 (ref 0–0.2)
BASOPHILS NFR BLD AUTO: 0.7 % (ref 0–1.5)
BUN SERPL-MCNC: 13 MG/DL (ref 8–23)
BUN/CREAT SERPL: 15.7 (ref 7–25)
CALCIUM SPEC-SCNC: 8.9 MG/DL (ref 8.6–10.5)
CHLORIDE SERPL-SCNC: 102 MMOL/L (ref 98–107)
CO2 SERPL-SCNC: 20.3 MMOL/L (ref 22–29)
CREAT SERPL-MCNC: 0.83 MG/DL (ref 0.76–1.27)
DEPRECATED RDW RBC AUTO: 49.2 FL (ref 37–54)
EGFRCR SERPLBLD CKD-EPI 2021: 97.1 ML/MIN/1.73
EOSINOPHIL # BLD AUTO: 0.2 10*3/MM3 (ref 0–0.4)
EOSINOPHIL NFR BLD AUTO: 2.5 % (ref 0.3–6.2)
ERYTHROCYTE [DISTWIDTH] IN BLOOD BY AUTOMATED COUNT: 13.3 % (ref 12.3–15.4)
GLUCOSE SERPL-MCNC: 83 MG/DL (ref 65–99)
HCT VFR BLD AUTO: 47.2 % (ref 37.5–51)
HGB BLD-MCNC: 16.2 G/DL (ref 13–17.7)
IMM GRANULOCYTES # BLD AUTO: 0.02 10*3/MM3 (ref 0–0.05)
IMM GRANULOCYTES NFR BLD AUTO: 0.2 % (ref 0–0.5)
INR PPP: 2.81 (ref 0.9–1.1)
LYMPHOCYTES # BLD AUTO: 1.12 10*3/MM3 (ref 0.7–3.1)
LYMPHOCYTES NFR BLD AUTO: 13.8 % (ref 19.6–45.3)
MCH RBC QN AUTO: 34.2 PG (ref 26.6–33)
MCHC RBC AUTO-ENTMCNC: 34.3 G/DL (ref 31.5–35.7)
MCV RBC AUTO: 99.6 FL (ref 79–97)
MONOCYTES # BLD AUTO: 0.78 10*3/MM3 (ref 0.1–0.9)
MONOCYTES NFR BLD AUTO: 9.6 % (ref 5–12)
NEUTROPHILS NFR BLD AUTO: 5.91 10*3/MM3 (ref 1.7–7)
NEUTROPHILS NFR BLD AUTO: 73.2 % (ref 42.7–76)
NRBC BLD AUTO-RTO: 0 /100 WBC (ref 0–0.2)
PLATELET # BLD AUTO: 164 10*3/MM3 (ref 140–450)
PMV BLD AUTO: 8.4 FL (ref 6–12)
POTASSIUM SERPL-SCNC: 4 MMOL/L (ref 3.5–5.2)
PROTHROMBIN TIME: 30.2 SECONDS (ref 11.7–14.2)
RBC # BLD AUTO: 4.74 10*6/MM3 (ref 4.14–5.8)
SODIUM SERPL-SCNC: 136 MMOL/L (ref 136–145)
WBC NRBC COR # BLD: 8.09 10*3/MM3 (ref 3.4–10.8)

## 2023-10-22 PROCEDURE — 25010000002 HEPARIN (PORCINE) PER 1000 UNITS: Performed by: NURSE PRACTITIONER

## 2023-10-22 PROCEDURE — 85730 THROMBOPLASTIN TIME PARTIAL: CPT | Performed by: STUDENT IN AN ORGANIZED HEALTH CARE EDUCATION/TRAINING PROGRAM

## 2023-10-22 PROCEDURE — 94799 UNLISTED PULMONARY SVC/PX: CPT

## 2023-10-22 PROCEDURE — 80048 BASIC METABOLIC PNL TOTAL CA: CPT | Performed by: INTERNAL MEDICINE

## 2023-10-22 PROCEDURE — 85025 COMPLETE CBC W/AUTO DIFF WBC: CPT | Performed by: INTERNAL MEDICINE

## 2023-10-22 PROCEDURE — 97530 THERAPEUTIC ACTIVITIES: CPT

## 2023-10-22 PROCEDURE — 94761 N-INVAS EAR/PLS OXIMETRY MLT: CPT

## 2023-10-22 PROCEDURE — 99232 SBSQ HOSP IP/OBS MODERATE 35: CPT | Performed by: INTERNAL MEDICINE

## 2023-10-22 PROCEDURE — 85610 PROTHROMBIN TIME: CPT | Performed by: INTERNAL MEDICINE

## 2023-10-22 PROCEDURE — 85730 THROMBOPLASTIN TIME PARTIAL: CPT | Performed by: PSYCHIATRY & NEUROLOGY

## 2023-10-22 PROCEDURE — 85730 THROMBOPLASTIN TIME PARTIAL: CPT | Performed by: INTERNAL MEDICINE

## 2023-10-22 RX ORDER — WARFARIN SODIUM 3 MG/1
3 TABLET ORAL
Status: COMPLETED | OUTPATIENT
Start: 2023-10-22 | End: 2023-10-22

## 2023-10-22 RX ADMIN — ASPIRIN 325 MG: 325 TABLET ORAL at 09:42

## 2023-10-22 RX ADMIN — FAMOTIDINE 20 MG: 20 TABLET, FILM COATED ORAL at 17:08

## 2023-10-22 RX ADMIN — HEPARIN SODIUM 4000 UNITS: 5000 INJECTION, SOLUTION INTRAVENOUS; SUBCUTANEOUS at 13:13

## 2023-10-22 RX ADMIN — ATORVASTATIN CALCIUM 80 MG: 80 TABLET, FILM COATED ORAL at 20:04

## 2023-10-22 RX ADMIN — ALLOPURINOL 300 MG: 300 TABLET ORAL at 09:42

## 2023-10-22 RX ADMIN — BUDESONIDE AND FORMOTEROL FUMARATE DIHYDRATE 2 PUFF: 160; 4.5 AEROSOL RESPIRATORY (INHALATION) at 08:31

## 2023-10-22 RX ADMIN — BUDESONIDE AND FORMOTEROL FUMARATE DIHYDRATE 2 PUFF: 160; 4.5 AEROSOL RESPIRATORY (INHALATION) at 19:24

## 2023-10-22 RX ADMIN — TIOTROPIUM BROMIDE INHALATION SPRAY 2 PUFF: 3.12 SPRAY, METERED RESPIRATORY (INHALATION) at 08:32

## 2023-10-22 RX ADMIN — HEPARIN SODIUM 11.9 UNITS/KG/HR: 10000 INJECTION, SOLUTION INTRAVENOUS at 10:42

## 2023-10-22 RX ADMIN — METOPROLOL SUCCINATE 100 MG: 100 TABLET, EXTENDED RELEASE ORAL at 09:42

## 2023-10-22 RX ADMIN — FAMOTIDINE 20 MG: 20 TABLET, FILM COATED ORAL at 09:42

## 2023-10-22 RX ADMIN — WARFARIN 3 MG: 3 TABLET ORAL at 17:08

## 2023-10-22 NOTE — PLAN OF CARE
Goal Outcome Evaluation:    Problem: Cerebral Tissue Perfusion (Stroke, Ischemic/Transient Ischemic Attack)  Goal: Optimal Cerebral Tissue Perfusion  Outcome: Ongoing, Progressing  Intervention: Protect and Optimize Cerebral Perfusion  Recent Flowsheet Documentation  Taken 10/22/2023 1240 by Brina Ponce RN  Sensory Stimulation Regulation: music on  Taken 10/22/2023 0830 by Brina Ponce RN  Sensory Stimulation Regulation: television on   Plan of Care Reviewed With: patient alert, room air, Heparin gtt discontinued, no c/o on this shift

## 2023-10-22 NOTE — PROGRESS NOTES
[facility] Progress Note       Encounter Date:10/22/23  Patient:Joaquín Garcia Jr.  :1960  MRN:[id number]      Chief Complaint: Follow-up left atrial thrombus      Subjective:        Patient doing okay no changes    Review of Systems:  Review of Systems   Constitutional: Negative for malaise/fatigue.   Eyes:  Positive for double vision.   Cardiovascular:  Negative for chest pain and palpitations.   Respiratory:  Negative for shortness of breath.        Medications:  Scheduled Meds:  allopurinol, 300 mg, Oral, Daily  aspirin, 325 mg, Oral, Daily   Or  aspirin, 300 mg, Rectal, Daily  atorvastatin, 80 mg, Oral, Nightly  budesonide-formoterol, 2 puff, Inhalation, BID - RT  famotidine, 20 mg, Oral, BID AC  metoprolol succinate XL, 100 mg, Oral, Daily  tiotropium bromide monohydrate, 2 puff, Inhalation, Daily - RT  warfarin, 3 mg, Oral, Once    Continuous Infusions:  Pharmacy to dose warfarin,     PRN Meds:    acetaminophen **OR** acetaminophen    bisacodyl    ipratropium-albuterol    labetalol    ondansetron    Pharmacy to dose warfarin    [COMPLETED] Insert Peripheral IV **AND** sodium chloride    sodium chloride         Objective:       Vitals:    10/22/23 0459 10/22/23 0744 10/22/23 0832 10/22/23 0841   BP:  115/77     BP Location:  Left arm     Patient Position:  Sitting     Pulse: 65 82 78 79   Resp:  16 16 16   Temp:       TempSrc:       SpO2: 100% 96% 97% 97%   Weight:       Height:               Physical Exam:  Constitutional: Well appearing, well developed, no acute distress   HENT: Oropharynx clear and membrane moist  Eyes: Normal conjunctiva, no sclera icterus.  Neck: Supple, no carotid bruit bilaterally.  Cardiovascular: Irregularly irregular rate and rhythm, Early peaking systolic murmur over the right upper sternal border, No bilateral lower extremity edema.  Pulmonary: Normal respiratory effort, distant lung sounds, no wheezing.  Skin: Warm, dry, no ecchymosis, no  rash.  Psych: Appropriate mood and affect. Normal judgment and insight.           Lab Review:   Results from last 7 days   Lab Units 10/22/23  0545 10/21/23  0529 10/20/23  0533 10/19/23  0520 10/18/23  0534 10/17/23  1912   SODIUM mmol/L 136 138 136 139 138 133*   POTASSIUM mmol/L 4.0 4.0 3.9 4.8 3.9 4.1   CHLORIDE mmol/L 102 106 104 102 105 97*   CO2 mmol/L 20.3* 23.7 23.0 28.0 24.4 26.0   BUN mg/dL 13 11 15 14 10 14   CREATININE mg/dL 0.83 0.83 0.85 0.96 0.75* 0.95   GLUCOSE mg/dL 83 88 92 88 90 101*   CALCIUM mg/dL 8.9 8.8 8.9 9.4 8.8 9.8   AST (SGOT) U/L  --   --   --   --  13 17   ALT (SGPT) U/L  --   --   --   --  21 28     Results from last 7 days   Lab Units 10/17/23  1912   HSTROP T ng/L 10     Results from last 7 days   Lab Units 10/22/23  0546 10/21/23  0529 10/20/23  0533 10/19/23  0520 10/18/23  0534 10/17/23  1912   WBC 10*3/mm3 8.09 8.01 8.17 7.49 5.87 7.76   HEMOGLOBIN g/dL 16.2 15.4 15.7 16.1 16.1 17.5   HEMATOCRIT % 47.2 43.9 45.0 46.2 46.5 49.4   PLATELETS 10*3/mm3 164 143 137* 140 131* 137*     Results from last 7 days   Lab Units 10/22/23  1154 10/22/23  0545 10/21/23  2050 10/21/23  1355 10/21/23  0529 10/20/23  1209 10/20/23  0533 10/19/23  2038 10/19/23  1428 10/18/23  2045 10/18/23  1025 10/17/23  1912   INR   --  2.81*  --   --  1.68*  --  1.12*  --  1.12*  --  1.16* 1.11*   APTT seconds 45.9* 78.1* 91.3* 42.3* 90.6* 69.2* 63.9*   < > 39.6*   < > 32.3 31.2    < > = values in this interval not displayed.         Results from last 7 days   Lab Units 10/18/23  0534   CHOLESTEROL mg/dL 173   TRIGLYCERIDES mg/dL 137   HDL CHOL mg/dL 47         Results from last 7 days   Lab Units 10/18/23  0534   TSH uIU/mL 0.818         Transesophageal echocardiogram 10/18/2023:  The left atrium is massively enlarged (giant left atrium). There is heavy swirling smoke within the left atrium the left atrial appendage.  There is a very large left atrial mass attached to the posterolateral aspect of the left  atrium. 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 A less probable differential would include a massive left atrial thrombus  Left ventricular systolic function is normal. Left ventricular ejection fraction appears to be 61 - 65%.  Left ventricular wall thickness is consistent with mild to moderate concentric hypertrophy.  The right ventricular cavity is moderate to severely dilated. Normal right ventricular systolic function noted.  There is a bioprosthetic mitral valve present. The mitral valve peak and mean gradients are within defined limits. The prosthetic mitral valve is normal.  There is an annuloplasty ring in the tricuspid valve position. There is trace tricuspid regurgitation through the annuloplasty ring.  There is no evidence of pericardial effusion    Echocardiogram 12/3/2020:  Calculated left ventricular EF = 55.1% Estimated left ventricular EF = 55% Estimated left ventricular EF was in agreement with the calculated left ventricular EF. Left ventricular systolic function is normal. Normal left ventricular cavity size noted. Left ventricular wall thickness is consistent with mild concentric hypertrophy. All left ventricular wall segments contract normally. Left ventricular diastolic function was indeterminate.  Left atrial volume is severely increased.The right atrial cavity is severely dilated.  There is mild thickening of the aortic valve.  There is a 33 mm, porcine, Medtronic bioprosthetic mitral valve present. The mitral valve peak and mean gradients are within defined limits. There is no significant prosthetic valve stenosis or regurgitation.  No significant tricuspid valve regurgitation or significant stenosis present. There is evidence of previous tricuspid valve repair.    Cardiac catheterization 10/21/2016:  Left main: Normal  LAD: 20% proximal LAD segment stenoses otherwise luminal irregularities  throughout  Circumflex: Angiographically normal throughout  RCA: 20% mid segment stenoses supplying a PDA    Transesophageal echocardiogram 10/23/2016:  All left ventricular wall segments contract normally.  Left ventricular function is normal.  Left atrial cavity size is severely dilated.  Severe mitral valve regurgitation is present due to prolapse  Moderate tricuspid valve regurgitation is present.    Mitral valve replacement/tricuspid valve repair 10/27/2016 Dr. George:  Mitral valve replacement with a 33 mm Medtronic Mosaic ultra porcine valve with preservation of all subvalvar apparatus.    Tricuspid valve repair with a 32 mm Medtronic 3-D ring.        Assessment:          Diagnosis Plan   1. Acute CVA (cerebrovascular accident)        2. Dysconjugate gaze        3. Chronic anticoagulation               Plan:       Mr. Garcia is a 63 y.o. gentleman with past medical history notable for atrial fibrillation, nonrheumatic mitral valve regurgitation due to prolapse status post mitral valve replacement 2016, tricuspid valve regurgitation status post tricuspid valve repair, COPD with emphysema, and obstructive sleep apnea who presents to the emergency room with visual changes with diplopia on 10/17/2023.  His presentation seems consistent with his past noncompliance with Coumadin over the last 3 months or so.  He was placed on Eliquis and reports compliance with this may be missing only a few dosages however when I look at the nature of his clots and location this fits more with a pattern that you typically see with rheumatic heart disease which only responds to Coumadin.  He has a fair amount of smoke in his left atrium and his left atrium is extremely dilated.  I think that these clots likely formed over the last couple of months while off of anticoagulation and the recent addition of Eliquis likely did not provide any coverage.  My recommendation would be for him to go back on anticoagulation.  Given his  mitral valve replacement and severely dilated left atrium I would treat him like a patient with valvular heart disease despite not meeting strict criteria for this.  I would recommend an INR level of 2.5-3.5.  Aspirin would be fine but I do not think that he needs a 325 mg of aspirin but would also defer and appreciate hematology's input.  I would not recommend any percutaneous approach to aspirate these clots and likely resulted in significant embolization and further strokes surgery did evaluate the patient and he is not a good surgical candidate given his underlying lung disease.  Usually with treatment with appropriate anticoagulation this should be sufficient would recommend heparin bridge until therapeutic INR.  His INR today jumped up quite a bit up to 2.8.  Heparin has been stopped.  Like to see where his INR is tomorrow assuming that it is stable likely can discharge home tomorrow.    Permanent atrial fibrillation:  By strict criteria not truly valvular in etiology however anatomically would appear to be behaving like a rheumatic valvular A-fib given the size of his atrium and the location of his multiple thrombi within the walls of his left atrium  I would recommend a goal INR of 2.5-3.5  Would advise against NOAC  Aspirin 81 mg would be sufficient from my perspective  Appreciate hematology's input  Would bridge with heparin until therapeutic INR    Nonrheumatic mitral valve regurgitation status post mitral valve replacement:  Normal valve function on current study  We will continue to follow  Etiology of valvular heart disease appears to be prolapse not rheumatic heart disease but atrium is severely dilated would treat him as a patient who had rheumatic mitral stenosis             Gordon Waslh MD  [facility]  10/22/23  13:24 EDT

## 2023-10-22 NOTE — PROGRESS NOTES
[facility] Clinical Pharmacy Services: Warfarin Dosing/Monitoring Consult    Joaquín Garcia Jr. is a 63 y.o. male, estimated creatinine clearance is 102.2 mL/min (by C-G formula based on SCr of 0.83 mg/dL). weighing 91 kg (200 lb 9.9 oz).    Results from last 7 days   Lab Units 10/22/23  1154 10/22/23  0546 10/22/23  0545 10/21/23  2050 10/21/23  1355 10/21/23  0529 10/20/23  1209 10/20/23  0533 10/19/23  2038 10/19/23  1428 10/19/23  0520 10/18/23  2045 10/18/23  1025 10/18/23  0534   INR   --   --  2.81*  --   --  1.68*  --  1.12*  --  1.12*  --   --  1.16*  --    APTT seconds 45.9*  --  78.1* 91.3* 42.3* 90.6*   < > 63.9*   < > 39.6* 110.5*   < > 32.3  --    HEMOGLOBIN g/dL  --  16.2  --   --   --  15.4  --  15.7  --   --  16.1  --   --  16.1   HEMATOCRIT %  --  47.2  --   --   --  43.9  --  45.0  --   --  46.2  --   --  46.5   PLATELETS 10*3/mm3  --  164  --   --   --  143  --  137*  --   --  140  --   --  131*    < > = values in this interval not displayed.     Prior to admission anticoagulation: apixaban, prior to that was on warfarin 5mg daily     Hospital Anticoagulation:  Consulting provider: Dr Bolden  Start date: 10/19  Indication: DVT/PE (active thrombosis)  Target INR: 2.5 - 3.5  Expected duration: indefinite   Bridge Therapy: Yes  with unfractionated heparin    Potential food or drug interactions: allopurinol ( home  med)    Education complete?/Date: No; plan for follow up TBD    Assessment/Plan:  Dose: inr is within goal, but has increased very quickly with daily 6mg dose.  Will give one time dose of 3 mg for this evening.  (Regular diet) ate 75% 10/20  Monitor for any signs or symptoms of bleeding  Follow up daily INRs and dose adjustments    Pharmacy will continue to follow until discharge or discontinuation of warfarin.     Daniela Torres, [facility]    Clinical Pharmacist

## 2023-10-22 NOTE — THERAPY TREATMENT NOTE
Patient Name: Joaquín Garcia Jr.  : 1960    MRN: [id number]                              Today's Date: 10/22/2023       Admit Date: 10/17/2023    Visit Dx:     ICD-10-CM ICD-9-CM   1. Acute CVA (cerebrovascular accident)  I63.9 434.91   2. Dysconjugate gaze  H51.8 378.87   3. Chronic anticoagulation  Z79.01 V58.61     Patient Active Problem List   Diagnosis    Supraventricular tachycardia    NF (neurofibromatosis)    COPD (chronic obstructive pulmonary disease)    Acute hypoxemic respiratory failure    MR (mitral regurgitation)    COPD exacerbation    Acute respiratory failure with hypoxemia    Rectus sheath hematoma    Alcoholism    Atrial fibrillation, chronic    Toe pain, right    S/P MVR (mitral valve replacement)    Hypertension    S/P TVR (tricuspid valve repair)    Abnormal electrocardiogram    Benign essential hypertension    Anxiety    Prostate cancer screening    Colon cancer screening    Need for hepatitis C screening test    Knee strain, left, initial encounter    Acute midline low back pain with bilateral sciatica    Spinal stenosis of lumbar region with neurogenic claudication    On anticoagulant therapy    Tobacco abuse    Gout    Daytime somnolence    Left atrial mass    Acute CVA (cerebrovascular accident)    Diplopia    Dizziness     Past Medical History:   Diagnosis Date    Arthritis     Atrial fibrillation     COPD (chronic obstructive pulmonary disease)     PT STATES NO LONGER ON INHALERS BUT IS BREATHING WELL    Degenerative lumbar disc     Emphysema with chronic bronchitis     H/O tricuspid valve repair 2016    MEDTRONIC ANNULOPLASTY RING    History of heart valve replacement with porcine valve 2016    MITRAL VALVE, DR COBURN    Hypertension     Low back pain     RADIATES DOWN BLE, WORSE ON RT, SOME NUMBNESS/TINGLING    Neurofibromatosis     On anticoagulant therapy     Sleep apnea     DOES NOT USE CPAP    Spinal stenosis      Past Surgical History:   Procedure Laterality Date     CARDIAC CATHETERIZATION  10/20/2016    Procedure: Case Abort;  Surgeon: Zhen Ford MD;  Location: [facility] CATH INVASIVE LOC[facility];  Service:     CARDIAC CATHETERIZATION Left 10/21/2016    Procedure: Cardiac catheterization;  Surgeon: Zhen Ford MD;  Location[facility] CATH [facility]VASIVE LOC[facility];  Service:     HERNIA REPAIR      LUMBAR LAMINECTOMY DISCECTOMY DECOMPRESSION Bilateral 11/5/2020    Procedure: Lumbar 4 - Lumbar 5 Laminectomy;  Surgeon: Marcos Christine MD;  Location: [facility] MAIN OR;  Service: Orthopedic Spine;  Laterality: Bilateral;    MITRAL VALVE REPAIR/REPLACEMENT N/A 10/27/2016    Procedure: INTRAOPERATIVE LILIA, MIDLINE STERNOTOMY WITH MITRAL VALVE REPLACEMENT TRICUSPID VALVE REPAIR;  Surgeon: Robert George MD;  Location: [facility] MAIN OR;  Service:     TEETH EXTRACTION N/A 10/25/2016    Procedure: TEETH EXTRACTION FULL MOUTH;  Surgeon: Gio Ibarra DMD;  Location: [facility] OR;  Service:       General Information       Row Name 10/22/23 1521          Physical Therapy Time and Intention    Document Type therapy note (daily note)  -CS     Mode of Treatment individual therapy;physical therapy  -CS       Row Name 10/22/23 1521          General Information    Patient Profile Reviewed yes  -CS     Existing Precautions/Restrictions no known precautions/restrictions  -CS       Row Name 10/22/23 1521          Cognition    Orientation Status (Cognition) oriented x 4  -CS       Row Name 10/22/23 1521          Safety Issues, Functional Mobility    Impairments Affecting Function (Mobility) visual/perceptual;balance  -CS               User Key  (r) = Recorded By, (t) = Taken By, (c) = Cosigned By      Initials Name Provider Type    CS Sarahy Smith PT Physical Therapist                   Mobility       Row Name 10/22/23 1525          Bed Mobility    Bed Mobility supine-sit  -CS     Supine-Sit [place] (Bed Mobility) supervision  -CS     Assistive Device (Bed Mobility) head of bed  elevated  -CS     Comment, (Bed Mobility) UI at end of session  -CS       Row Name 10/22/23 1525          Sit-Stand Transfer    Sit-Stand [place] (Transfers) supervision  -CS     Assistive Device (Sit-Stand Transfers) walker, front-wheeled  -CS       Row Name 10/22/23 1525          Gait/Stairs (Locomotion)    [place] Level (Gait) standby assist  -CS     Assistive Device (Gait) walker, front-wheeled  -CS     Distance in Feet (Gait) 200'  -CS     Deviations/Abnormal Patterns (Gait) caryl decreased  -CS     Comment, (Gait/Stairs) slow pace but no LOB  -CS               User Key  (r) = Recorded By, (t) = Taken By, (c) = Cosigned By      Initials Name Provider Type    CS Sarahy Smith, PT Physical Therapist                   Obj/Interventions       Row Name 10/22/23 1526          Motor Skills    Therapeutic Exercise other (see comments)  discussed B LE HEP  -CS       Row Name 10/22/23 1526          Balance    Balance Assessment sitting static balance;sitting dynamic balance;standing static balance;standing dynamic balance  -CS     Static Sitting Balance modified independence  -CS     Dynamic Sitting Balance modified independence  -CS     Position, Sitting Balance unsupported;sitting edge of bed  -CS     Static Standing Balance supervision  -CS     Dynamic Standing Balance standby assist  -CS     Position/Device Used, Standing Balance supported;walker, front-wheeled  -CS               User Key  (r) = Recorded By, (t) = Taken By, (c) = Cosigned By      Initials Name Provider Type    CS Sarahy Smith, PT Physical Therapist                   Goals/Plan    No documentation.                  Clinical Impression       Row Name 10/22/23 1526          Pain    Pretreatment Pain Rating 0/10 - no pain  -CS     Posttreatment Pain Rating 0/10 - no pain  -CS       Row Name 10/22/23 1526          Plan of Care Review    Plan of Care Reviewed With patient  -CS     Progress improving  -CS     Outcome Evaluation Pt received  in bed upon arrival and agreeable to PT. Pt still wearing eye patch due to diplopia. Pt completed bed mobility and STS transfer with SPV. Pt ambulated 200' c RW requiring SBA. Pt demo's a slow pace but no LOB noted. Pt UIC at end of session and discussed B LE HEP. Pt currently getting up independently to the bathroom but request PT keep on his caseload for strength and endurance purposed. PT encouraged pt to ambulate longer distances with staff as tolerated.  -CS       Row Name 10/22/23 1526          Therapy Assessment/Plan (PT)    Criteria for Skilled Interventions Met (PT) yes;meets criteria  -CS     Therapy Frequency (PT) 2 times/wk  -CS       Row Name 10/22/23 1526          Positioning and Restraints    Pre-Treatment Position in bed  -CS     Post Treatment Position chair  -CS     In Chair sitting;call light within reach;encouraged to call for assist  -CS               User Key  (r) = Recorded By, (t) = Taken By, (c) = Cosigned By      Initials Name Provider Type    CS Sarahy Smith, PT Physical Therapist                   Outcome Measures       Row Name 10/22/23 1529          How much help from another person do you currently need...    Turning from your back to your side while in flat bed without using bedrails? 4  -CS     Moving from lying on back to sitting on the side of a flat bed without bedrails? 4  -CS     Moving to and from a bed to a chair (including a wheelchair)? 4  -CS     Standing up from a chair using your arms (e.g., wheelchair, bedside chair)? 4  -CS     Climbing 3-5 steps with a railing? 3  -CS     To walk in hospital room? 4  -CS     AM-PAC 6 Clicks Score (PT) 23  -CS     Highest level of mobility 7 --> Walked 25 feet or more  -CS       Row Name 10/22/23 1529          Functional Assessment    Outcome Measure Options AM-PAC 6 Clicks Basic Mobility (PT)  -CS               User Key  (r) = Recorded By, (t) = Taken By, (c) = Cosigned By      Initials Name Provider Type    Sarahy Cardona, PT  Physical Therapist                                 Physical Therapy Education       Title: PT OT SLP Therapies (In Progress)       Topic: Physical Therapy (Done)       Point: Mobility training (Done)       Learning Progress Summary             Patient Acceptance, E,TB, VU,DU by  at 10/22/2023 1529    Acceptance, E,TB,D, VU by SK at 10/20/2023 1244    Acceptance, E,TB,D, VU,NR by SK at 10/18/2023 1516   Family Acceptance, E,TB,D, VU by SK at 10/20/2023 1244    Acceptance, E,TB,D, VU,NR by SK at 10/18/2023 1516                         Point: Home exercise program (Done)       Learning Progress Summary             Patient Acceptance, E,TB, VU,DU by  at 10/22/2023 1529    Acceptance, E,TB,D, VU by SK at 10/20/2023 1244    Acceptance, E,TB,D, VU,NR by SK at 10/18/2023 1516   Family Acceptance, E,TB,D, VU by SK at 10/20/2023 1244    Acceptance, E,TB,D, VU,NR by SK at 10/18/2023 1516                         Point: Body mechanics (Done)       Learning Progress Summary             Patient Acceptance, E,TB, VU,DU by  at 10/22/2023 1529    Acceptance, E,TB,D, VU by SK at 10/20/2023 1244    Acceptance, E,TB,D, VU,NR by SK at 10/18/2023 1516   Family Acceptance, E,TB,D, VU by SK at 10/20/2023 1244    Acceptance, E,TB,D, VU,NR by SK at 10/18/2023 1516                         Point: Precautions (Done)       Learning Progress Summary             Patient Acceptance, E,TB, VU,DU by  at 10/22/2023 1529    Acceptance, E,TB,D, VU by SK at 10/20/2023 1244    Acceptance, E,TB,D, VU,NR by SK at 10/18/2023 1516   Family Acceptance, E,TB,D, VU by SK at 10/20/2023 1244    Acceptance, E,TB,D, VU,NR by SK at 10/18/2023 1516                                         User Key       Initials Effective Dates Name Provider Type Discipline     09/22/22 -  Smith, Sarahy, PT Physical Therapist PT    SK 10/10/23 -  Mu Huffman, PT Student PT Student PT                  PT Recommendation and Plan     Plan of Care Reviewed With:  patient  Progress: improving  Outcome Evaluation: Pt received in bed upon arrival and agreeable to PT. Pt still wearing eye patch due to diplopia. Pt completed bed mobility and STS transfer with SPV. Pt ambulated 200' c RW requiring SBA. Pt demo's a slow pace but no LOB noted. Pt UIC at end of session and discussed B LE HEP. Pt currently getting up independently to the bathroom but request PT keep on his caseload for strength and endurance purposed. PT encouraged pt to ambulate longer distances with staff as tolerated.     Time Calculation:         PT Charges       Row Name 10/22/23 1530             Time Calculation    Start Time 1343  -CS      Stop Time 1355  -CS      Time Calculation (min) 12 min  -CS      PT Received On 10/22/23  -CS      PT - Next Appointment 10/24/23  -CS         Time Calculation- PT    Total Timed Code Minutes- PT 10 minute(s)  -CS         Timed Charges    70869 - PT Therapeutic Activity Minutes 10  -CS         Total Minutes    Timed Charges Total Minutes 10  -CS       Total Minutes 10  -CS                User Key  (r) = Recorded By, (t) = Taken By, (c) = Cosigned By      Initials Name Provider Type    CS Sarahy Smith, PT Physical Therapist                  Therapy Charges for Today       Code Description Service Date Service Provider Modifiers Qty    08010028412  PT THERAPEUTIC ACT EA 15 MIN 10/22/2023 Sarahy Smith, PT GP 1            PT G-Codes  Outcome Measure Options: AM-PAC 6 Clicks Basic Mobility (PT)  AM-PAC 6 Clicks Score (PT): 23  AM-PAC 6 Clicks Score (OT): 22  Modified Gayle Scale: 2 - Slight disability.  Unable to carry out all previous activities but able to look after own affairs without assistance.  PT Discharge Summary  Anticipated Discharge Disposition (PT): home with home health, home with outpatient therapy services    Sarahy Smith PT  10/22/2023

## 2023-10-22 NOTE — PLAN OF CARE
Goal Outcome Evaluation:  Plan of Care Reviewed With: patient        Progress: improving  Outcome Evaluation: Pt received in bed upon arrival and agreeable to PT. Pt still wearing eye patch due to diplopia. Pt completed bed mobility and STS transfer with SPV. Pt ambulated 200' c RW requiring SBA. Pt demo's a slow pace but no LOB noted. Pt UIC at end of session and discussed B LE HEP. Pt currently getting up independently to the bathroom but request PT keep on his caseload for strength and endurance purposed. PT encouraged pt to ambulate longer distances with staff as tolerated.      Anticipated Discharge Disposition (PT): home with home health, home with outpatient therapy services

## 2023-10-22 NOTE — PROGRESS NOTES
Name: Joaquín Garcia Jr. ADMIT: 10/17/2023   : 1960  PCP: Epley, James, APRN    MRN: [id number] LOS: 4 days   AGE/SEX: 63 y.o. male  ROOM: [facility]     Subjective   Subjective   Patient seen and examined this morning. Hospital day 5.  Patient awake, alert, sitting up in chair next to bedside.  Continues to endorse intermittent dizziness and lightheadedness, also, endorsing visual impairment, however, relatively stable from prior.       Objective   Objective   Vital Signs  Temp:  [97.5 °F (36.4 °C)-98.1 °F (36.7 °C)] 98.1 °F (36.7 °C)  Heart Rate:  [61-82] 79  Resp:  [16] 16  BP: (101-115)/(56-77) 115/77  SpO2:  [92 %-100 %] 97 %  on   ;   Device (Oxygen Therapy): room air  Body mass index is 28.09 kg/m².  Const: Elderly, overweight, chronically ill-appearing, no acute distress  HEENT: Medial deviation of the left eye, no scleral icterus, EOMI, impaired visual acuity involving right and left gaze.  Upper/outer   CV: Regular rate, irregular rhythm, systolic murmur  RESP: FIO2 21, decreased breath sounds, normal effort, no wheezes  GI: soft, non-tender, non-distended  MSK: No tenderness, no edema  Skin: Warm, dry, scattered bruises  Neuro: Awake, alert, no tremor, no facial droop  Psych: Appropriate mood and affect.    Results Review     I reviewed the patient's new clinical results.  Results from last 7 days   Lab Units 10/22/23  0546 10/21/23  0529 10/20/23  0533 10/19/23  0520   WBC 10*3/mm3 8.09 8.01 8.17 7.49   HEMOGLOBIN g/dL 16.2 15.4 15.7 16.1   PLATELETS 10*3/mm3 164 143 137* 140     Results from last 7 days   Lab Units 10/22/23  0545 10/21/23  0529 10/20/23  0533 10/19/23  0520   SODIUM mmol/L 136 138 136 139   POTASSIUM mmol/L 4.0 4.0 3.9 4.8   CHLORIDE mmol/L 102 106 104 102   CO2 mmol/L 20.3* 23.7 23.0 28.0   BUN mg/dL 13 11 15 14   CREATININE mg/dL 0.83 0.83 0.85 0.96   GLUCOSE mg/dL 83 88 92 88   EGFR mL/min/1.73 97.1 97.1 96.4 87.7     Results from last 7 days   Lab Units 10/18/23  0559  "10/17/23  1912   ALBUMIN g/dL 3.9 4.6   BILIRUBIN mg/dL 0.6 0.5   ALK PHOS U/L 76 88   AST (SGOT) U/L 13 17   ALT (SGPT) U/L 21 28     Results from last 7 days   Lab Units 10/22/23  0545 10/21/23  0529 10/20/23  0533 10/19/23  0520 10/18/23  0534 10/17/23  1912   CALCIUM mg/dL 8.9 8.8 8.9 9.4 8.8 9.8   ALBUMIN g/dL  --   --   --   --  3.9 4.6       No results found for: \"HGBA1C\", \"POCGLU\"      No radiology results for the last day    I have personally reviewed all medications:  Scheduled Medications  allopurinol, 300 mg, Oral, Daily  aspirin, 325 mg, Oral, Daily   Or  aspirin, 300 mg, Rectal, Daily  atorvastatin, 80 mg, Oral, Nightly  budesonide-formoterol, 2 puff, Inhalation, BID - RT  famotidine, 20 mg, Oral, BID AC  metoprolol succinate XL, 100 mg, Oral, Daily  tiotropium bromide monohydrate, 2 puff, Inhalation, Daily - RT  warfarin, 6 mg, Oral, Daily    Infusions  heparin, 13.9 Units/kg/hr, Last Rate: 11.9 Units/kg/hr (10/22/23 1042)  Pharmacy to dose warfarin,     Diet  Diet: Regular/House Diet; Texture: Regular Texture (IDDSI 7); Fluid Consistency: Thin (IDDSI 0)    I have personally reviewed:  [x]  Laboratory   [x]  Microbiology   [x]  Radiology   [x]  EKG/Telemetry  [x]  Cardiology/Vascular   []  Pathology    []  Records       Assessment/Plan     Active Hospital Problems    Diagnosis  POA    **Diplopia [H53.2]  Yes    Dizziness [R42]  Yes    Acute CVA (cerebrovascular accident) [I63.9]  Yes    Left atrial mass [I51.89]  Yes    Gout [M10.9]  Yes    On anticoagulant therapy [Z79.01]  Not Applicable    Hypertension [I10]  Yes    Atrial fibrillation, chronic [I48.20]  Yes      Resolved Hospital Problems   No resolved problems to display.       63 y.o. male admitted with Diplopia.    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  This has occurred while the patient is on Eliquis (he assures compliance) which indicate Eliquis failure.  A1c is " 5.7.  LDL is 102.  TSH is normal.   - Cardiology performed LILIA and the finding was consistent of a large left atrial thrombus, normal ejection fraction, left ventricular hypertrophy, dilated right ventricle, bioprosthetic mitral valve that appears to be normal, annuloplasty in the tricuspid valve with the position.  Patient is a poor candidate for surgery because of his severe COPD.  Patient is a poor candidate for catheter removal of the blood clot as it is significantly large. Hematology consulted and followed, but have since signed off.  - He is currently on heparin/warfarin bridge, ASA, statin. Cardiology and cardiothoracic surgery recommends conservative treatment with anticoagulation.  Goal INR 2.5 to 3.5, most recent value 2.81.   - Physical therapy/Occupational Therapy evaluated the patient and recommends a skilled facility.  - Continue current treatments as prescribed.  Continue to follow consultant plans and recommendations, greatly appreciate their help.  - Daily INR. Discuss with pharmacy tomorrow (10/23) regarding ongoing anticoagulation.    Permanent atrial fibrillation  Nonrheumatic mitral valve regurgitation status post mitral valve replacement  Tricuspid repair  Recent diagnosis of left atrial large thrombus  Hypertension  - LILIA result noted. Blood pressure appears stable and acceptable acutely at this time. No indications to warrant acute changes/intervention at this time.  - Vitals, telemetry reviewed, patient appears to be stable, rate controlled on current treatment regimen with Metoprolol.  - Currently on heparin/warfarin bridging, discussed with pharmacy, considered treatment failure on Eliquis, plan for long term transition back to warfarin with goal INR 2.5 to 3.5.  - Cardiology consulted and following. Will continue to follow their plans/recommendations. Greatly appreciate their help.  - Continue current treatment as prescribed, monitor on telemetry, daily INR, requiring close  monitoring.    Prediabetes with hyperglycemia  - Glucose elevated on most recent labs. Patient without known history of T2DM.  Most recent hemoglobin A1c 5.70% (10/18/2023).  - Monitor for now, continue with POC glucose checks, correctional SSI if needed.    COPD  Obstructive sleep apnea  - Appears stable from this perspective at present.  No evidence to suggest acute exacerbation.  Continue current medications as prescribed and closely monitor.  - Supplemental oxygen as needed to maintain O2 sats 88 to 92%, pulse oximetry, telemetry monitoring.    Peptic Ulcer Disease/GERD  - Continue Pepcid as prescribed.     Hyperlipidemia  - Continue Statin as prescribed.    History of neurofibromatosis    Thrombocytopenia, resolved  - Platelets found to be low on prior labs, improved on most recent testing. No indications for urgent intervention at present. Will monitor for now.  - Order repeat CBC in AM for reassessment.    SCDs for DVT prophylaxis.  Full code.  Discussed with patient and nursing staff.  Anticipate discharge to SNU facility timing yet to be determined..      Karsten Watson DO  [facility]  10/22/23

## 2023-10-23 ENCOUNTER — READMISSION MANAGEMENT (OUTPATIENT)
Dept: CALL CENTER | Facility: HOSPITAL | Age: 63
End: 2023-10-23
Payer: MEDICARE

## 2023-10-23 VITALS
HEART RATE: 72 BPM | WEIGHT: 200.62 LBS | HEIGHT: 71 IN | OXYGEN SATURATION: 98 % | RESPIRATION RATE: 18 BRPM | BODY MASS INDEX: 28.09 KG/M2 | DIASTOLIC BLOOD PRESSURE: 74 MMHG | SYSTOLIC BLOOD PRESSURE: 109 MMHG | TEMPERATURE: 97.3 F

## 2023-10-23 PROBLEM — R73.03 PREDIABETES: Status: ACTIVE | Noted: 2023-10-23

## 2023-10-23 PROBLEM — Z79.01 CHRONIC ANTICOAGULATION: Status: ACTIVE | Noted: 2023-10-23

## 2023-10-23 LAB
ANION GAP SERPL CALCULATED.3IONS-SCNC: 10.9 MMOL/L (ref 5–15)
APTT PPP: 46.8 SECONDS (ref 22.7–35.4)
BASOPHILS # BLD AUTO: 0.06 10*3/MM3 (ref 0–0.2)
BASOPHILS NFR BLD AUTO: 0.8 % (ref 0–1.5)
BUN SERPL-MCNC: 14 MG/DL (ref 8–23)
BUN/CREAT SERPL: 16.7 (ref 7–25)
CALCIUM SPEC-SCNC: 8.9 MG/DL (ref 8.6–10.5)
CHLORIDE SERPL-SCNC: 104 MMOL/L (ref 98–107)
CO2 SERPL-SCNC: 21.1 MMOL/L (ref 22–29)
CREAT SERPL-MCNC: 0.84 MG/DL (ref 0.76–1.27)
DEPRECATED RDW RBC AUTO: 49.4 FL (ref 37–54)
EGFRCR SERPLBLD CKD-EPI 2021: 96.8 ML/MIN/1.73
EOSINOPHIL # BLD AUTO: 0.23 10*3/MM3 (ref 0–0.4)
EOSINOPHIL NFR BLD AUTO: 2.9 % (ref 0.3–6.2)
ERYTHROCYTE [DISTWIDTH] IN BLOOD BY AUTOMATED COUNT: 13.4 % (ref 12.3–15.4)
GLUCOSE SERPL-MCNC: 85 MG/DL (ref 65–99)
HCT VFR BLD AUTO: 46.6 % (ref 37.5–51)
HGB BLD-MCNC: 16.1 G/DL (ref 13–17.7)
IMM GRANULOCYTES # BLD AUTO: 0.03 10*3/MM3 (ref 0–0.05)
IMM GRANULOCYTES NFR BLD AUTO: 0.4 % (ref 0–0.5)
INR PPP: 2.74 (ref 0.9–1.1)
LYMPHOCYTES # BLD AUTO: 1.17 10*3/MM3 (ref 0.7–3.1)
LYMPHOCYTES NFR BLD AUTO: 14.8 % (ref 19.6–45.3)
MCH RBC QN AUTO: 34.1 PG (ref 26.6–33)
MCHC RBC AUTO-ENTMCNC: 34.5 G/DL (ref 31.5–35.7)
MCV RBC AUTO: 98.7 FL (ref 79–97)
MONOCYTES # BLD AUTO: 0.82 10*3/MM3 (ref 0.1–0.9)
MONOCYTES NFR BLD AUTO: 10.4 % (ref 5–12)
NEUTROPHILS NFR BLD AUTO: 5.58 10*3/MM3 (ref 1.7–7)
NEUTROPHILS NFR BLD AUTO: 70.7 % (ref 42.7–76)
NRBC BLD AUTO-RTO: 0 /100 WBC (ref 0–0.2)
PLATELET # BLD AUTO: 189 10*3/MM3 (ref 140–450)
PMV BLD AUTO: 8.3 FL (ref 6–12)
POTASSIUM SERPL-SCNC: 4 MMOL/L (ref 3.5–5.2)
PROTHROMBIN TIME: 29.6 SECONDS (ref 11.7–14.2)
RBC # BLD AUTO: 4.72 10*6/MM3 (ref 4.14–5.8)
SODIUM SERPL-SCNC: 136 MMOL/L (ref 136–145)
WBC NRBC COR # BLD: 7.89 10*3/MM3 (ref 3.4–10.8)

## 2023-10-23 PROCEDURE — 85730 THROMBOPLASTIN TIME PARTIAL: CPT | Performed by: INTERNAL MEDICINE

## 2023-10-23 PROCEDURE — 94799 UNLISTED PULMONARY SVC/PX: CPT

## 2023-10-23 PROCEDURE — 80048 BASIC METABOLIC PNL TOTAL CA: CPT | Performed by: INTERNAL MEDICINE

## 2023-10-23 PROCEDURE — 99232 SBSQ HOSP IP/OBS MODERATE 35: CPT | Performed by: NURSE PRACTITIONER

## 2023-10-23 PROCEDURE — 85025 COMPLETE CBC W/AUTO DIFF WBC: CPT | Performed by: STUDENT IN AN ORGANIZED HEALTH CARE EDUCATION/TRAINING PROGRAM

## 2023-10-23 PROCEDURE — 94664 DEMO&/EVAL PT USE INHALER: CPT

## 2023-10-23 PROCEDURE — 85610 PROTHROMBIN TIME: CPT | Performed by: INTERNAL MEDICINE

## 2023-10-23 PROCEDURE — 94761 N-INVAS EAR/PLS OXIMETRY MLT: CPT

## 2023-10-23 RX ORDER — BUDESONIDE AND FORMOTEROL FUMARATE DIHYDRATE 80; 4.5 UG/1; UG/1
2 AEROSOL RESPIRATORY (INHALATION) 2 TIMES DAILY
Qty: 10.2 G | Refills: 0 | Status: SHIPPED | OUTPATIENT
Start: 2023-10-23 | End: 2023-10-23

## 2023-10-23 RX ORDER — FAMOTIDINE 20 MG/1
20 TABLET, FILM COATED ORAL
Qty: 60 TABLET | Refills: 0 | Status: SHIPPED | OUTPATIENT
Start: 2023-10-23

## 2023-10-23 RX ORDER — WARFARIN SODIUM 5 MG/1
5 TABLET ORAL
Status: DISCONTINUED | OUTPATIENT
Start: 2023-10-23 | End: 2023-10-23 | Stop reason: HOSPADM

## 2023-10-23 RX ORDER — WARFARIN SODIUM 5 MG/1
5 TABLET ORAL NIGHTLY
Qty: 30 TABLET | Refills: 1 | Status: SHIPPED | OUTPATIENT
Start: 2023-10-23

## 2023-10-23 RX ORDER — ATORVASTATIN CALCIUM 80 MG/1
80 TABLET, FILM COATED ORAL NIGHTLY
Qty: 90 TABLET | Refills: 0 | Status: SHIPPED | OUTPATIENT
Start: 2023-10-23

## 2023-10-23 RX ORDER — FLUTICASONE FUROATE, UMECLIDINIUM BROMIDE AND VILANTEROL TRIFENATATE 100; 62.5; 25 UG/1; UG/1; UG/1
1 POWDER RESPIRATORY (INHALATION)
Qty: 60 EACH | Refills: 0 | Status: SHIPPED | OUTPATIENT
Start: 2023-10-23

## 2023-10-23 RX ADMIN — ALLOPURINOL 300 MG: 300 TABLET ORAL at 08:52

## 2023-10-23 RX ADMIN — METOPROLOL SUCCINATE 100 MG: 100 TABLET, EXTENDED RELEASE ORAL at 08:52

## 2023-10-23 RX ADMIN — BUDESONIDE AND FORMOTEROL FUMARATE DIHYDRATE 2 PUFF: 160; 4.5 AEROSOL RESPIRATORY (INHALATION) at 07:24

## 2023-10-23 RX ADMIN — TIOTROPIUM BROMIDE INHALATION SPRAY 2 PUFF: 3.12 SPRAY, METERED RESPIRATORY (INHALATION) at 07:24

## 2023-10-23 RX ADMIN — FAMOTIDINE 20 MG: 20 TABLET, FILM COATED ORAL at 08:52

## 2023-10-23 RX ADMIN — ASPIRIN 325 MG: 325 TABLET ORAL at 08:51

## 2023-10-23 NOTE — DISCHARGE PLACEMENT REQUEST
"Joaquín Biswas JrShantell (63 y.o. Male)       Date of Birth   1960    Social Security Number       Address   [address] [address]    Home Phone   [phone]    MRN   [id number]       Sikhism   Religious    Marital Status   Single                            Admission Date   10/17/23    Admission Type   Emergency    Admitting Provider   Chandan Mary MD    Attending Provider   Karsten Watson DO    Department, Room/Bed   [address], [address]/1       Discharge Date       Discharge Disposition       Discharge Destination                                 Attending Provider: Karsten Watson DO    Allergies: No Known Allergies    Isolation: None   Infection: None   Code Status: CPR    Ht: 180 cm (70.87\")   Wt: 91 kg (200 lb 9.9 oz)    Admission Cmt: None   Principal Problem: Diplopia [H53.2]                   Active Insurance as of 10/17/2023       Primary Coverage       Payor Plan Insurance Group Employer/Plan Group    [facility] MEDICARE REPLACEMENT [facility] MEDICARE REPLACEMENT [id number]       Payor Plan Address Payor Plan Phone Number Payor Plan Fax Number Effective Dates    [address] [address]   9/1/2023 - None Entered    [facility] [id number]         Subscriber Name Subscriber Birth Date Member ID       JOAQUÍN BISWAS JR. 1960 [id number]                     Emergency Contacts        (Rel.) Home Phone Work Phone Mobile Phone    Susy Biswas (Mother) [phone] -- [phone]              "

## 2023-10-23 NOTE — PROGRESS NOTES
Discharge Planning Assessment  [facility]     Patient Name: Joaquín Garcia Jr.  MRN: [id number]  Today's Date: 10/23/2023    Admit Date: 10/17/2023    Plan: Home   Discharge Needs Assessment    No documentation.                  Discharge Plan       Row Name 10/23/23 1831       Plan    Plan Home    Final Discharge Disposition Code 01 - home or self-care    Final Note Home                  Continued Care and Services - Discharged on 10/23/2023 Admission date: 10/17/2023 - Discharge disposition: Home-Health Care [facility]      Home Medical Care       Service Provider Request Status Selected Services Address Phone Fax Patient Preferred    [facility] [facility] Accepted N/A [address], [address], [facility] [address] [phone] --     [facility] Declined  Out of network N/A [address] [facility] [place] [address] [id number] [phone] --    [facility] Declined  Inadequate staffing N/A [address] [facility], [address], [facility] [id number] [phone] --                  Expected Discharge Date and Time       Expected Discharge Date Expected Discharge Time    Oct 23, 2023 11:38 AM           Demographic Summary    No documentation.                  Functional Status    No documentation.                  Psychosocial    No documentation.                  Abuse/Neglect    No documentation.                  Legal    No documentation.                  Substance Abuse    No documentation.                  Patient Forms    No documentation.                     Shanna Epstein, RN

## 2023-10-23 NOTE — PLAN OF CARE
Goal Outcome Evaluation:  Plan of Care Reviewed With: patient is alert and oriented X4, on room air, nil fresh complain from him        Progress: improving

## 2023-10-23 NOTE — OUTREACH NOTE
Prep Survey      Flowsheet Row Responses   [facility] patient discharged from? [place]   Is LACE score < 7 ? No   Eligibility [facility]   Date of Admission 10/17/23   Date of Discharge 10/23/23   Discharge Disposition [place] [facility]   Discharge diagnosis Diplopia   Does the patient have one of the following disease processes/diagnoses(primary or secondary)? Other   Does the patient have Home health ordered? Yes   What is the Home health agency?  [facility]   Is there a DME ordered? No   Prep survey completed? Yes            Stacy TENA - Registered Nurse

## 2023-10-23 NOTE — PROGRESS NOTES
[facility] Progress Note       Encounter Date:10/23/23  Patient:Joaquín Garcia Jr.  :1960  MRN:[id number]      Chief Complaint: f/u atrial fibrillation, left atrial thrombus      Subjective:      No acute complaints  Double vision has improved wearing patch    Medications:  Scheduled Meds:  allopurinol, 300 mg, Oral, Daily  aspirin, 325 mg, Oral, Daily   Or  aspirin, 300 mg, Rectal, Daily  atorvastatin, 80 mg, Oral, Nightly  budesonide-formoterol, 2 puff, Inhalation, BID - RT  famotidine, 20 mg, Oral, BID AC  metoprolol succinate XL, 100 mg, Oral, Daily  tiotropium bromide monohydrate, 2 puff, Inhalation, Daily - RT    Continuous Infusions:  Pharmacy to dose warfarin,     PRN Meds:    acetaminophen **OR** acetaminophen    bisacodyl    ipratropium-albuterol    labetalol    ondansetron    Pharmacy to dose warfarin    [COMPLETED] Insert Peripheral IV **AND** sodium chloride    sodium chloride         Objective:       Vitals:    10/22/23 2004 10/22/23 2221 10/22/23 2321 10/23/23 0724   BP:   137/82 114/85   BP Location:   Left arm    Patient Position:   Lying    Pulse: 67 65 78 80   Resp:   18 16   Temp:   97.5 °F (36.4 °C) 97.5 °F (36.4 °C)   TempSrc:   Oral    SpO2:  95% 95% 98%   Weight:       Height:               Physical Exam:  Constitutional: Alert and conversant  HENT: Oropharynx clear and membrane moist  Eyes: Normal conjunctiva, left eye patch  Neck: Supple, no carotid bruit bilaterally.  Cardiovascular: Irregularly irregular rate and rhythm, No Murmur, Trace bilateral lower extremity edema.  Pulmonary: Normal respiratory effort, normal lung sounds, no wheezing.  Abdominal: Soft, nontender, no hepatosplenomegaly, liver is non-pulsatile.  Neurological: Alert and orient x 3.   Skin: Warm, dry, no ecchymosis, no rash.  Psych: Appropriate mood and affect. Normal judgment and insight.           Lab Review:   Results from last 7 days   Lab Units 10/23/23  0533 10/22/23  1669  10/21/23  0529 10/20/23  0533 10/19/23  0520 10/18/23  0534 10/17/23  1912   SODIUM mmol/L 136 136 138 136 139 138 133*   POTASSIUM mmol/L 4.0 4.0 4.0 3.9 4.8 3.9 4.1   CHLORIDE mmol/L 104 102 106 104 102 105 97*   CO2 mmol/L 21.1* 20.3* 23.7 23.0 28.0 24.4 26.0   BUN mg/dL 14 13 11 15 14 10 14   CREATININE mg/dL 0.84 0.83 0.83 0.85 0.96 0.75* 0.95   GLUCOSE mg/dL 85 83 88 92 88 90 101*   CALCIUM mg/dL 8.9 8.9 8.8 8.9 9.4 8.8 9.8   AST (SGOT) U/L  --   --   --   --   --  13 17   ALT (SGPT) U/L  --   --   --   --   --  21 28     Results from last 7 days   Lab Units 10/17/23  1912   HSTROP T ng/L 10     Results from last 7 days   Lab Units 10/23/23  0533 10/22/23  0546 10/21/23  0529 10/20/23  0533 10/19/23  0520 10/18/23  0534 10/17/23  1912   WBC 10*3/mm3 7.89 8.09 8.01 8.17 7.49 5.87 7.76   HEMOGLOBIN g/dL 16.1 16.2 15.4 15.7 16.1 16.1 17.5   HEMATOCRIT % 46.6 47.2 43.9 45.0 46.2 46.5 49.4   PLATELETS 10*3/mm3 189 164 143 137* 140 131* 137*     Results from last 7 days   Lab Units 10/23/23  0533 10/22/23  2031 10/22/23  1154 10/22/23  0545 10/21/23  2050 10/21/23  1355 10/21/23  0529 10/20/23  1209 10/20/23  0533 10/19/23  2038 10/19/23  1428 10/18/23  2045 10/18/23  1025 10/17/23  1912   INR  2.74*  --   --  2.81*  --   --  1.68*  --  1.12*  --  1.12*  --  1.16* 1.11*   APTT seconds 46.8* 43.8* 45.9* 78.1* 91.3* 42.3* 90.6*   < > 63.9*   < > 39.6*   < > 32.3 31.2    < > = values in this interval not displayed.         Results from last 7 days   Lab Units 10/18/23  0534   CHOLESTEROL mg/dL 173   TRIGLYCERIDES mg/dL 137   HDL CHOL mg/dL 47         Results from last 7 days   Lab Units 10/18/23  0534   TSH uIU/mL 0.818            Assessment:          Diagnosis Plan   1. Acute CVA (cerebrovascular accident)        2. Dysconjugate gaze        3. Chronic anticoagulation               Plan:       Permanent atrial fibrillation - not truly valvular in etiology however anatomically behaving like a rheumatic valvular A-fib  given the size of his atrium and the location of his multiple thrombi.  Recommend anticoagulation with warfarin, INR goal 2.5-3.5.  INR remains therapeutic today at 2.7  Non-rheumatic mitral valve regurgitation s/p MVR - most recent echo with normal valvular function.  Given severely dilated left atrium would treat him as rheumatic heart disease and anticoagulate with warfarin  Acute CVA - in setting of LA thrombus due to atrial fibrillation  Tricuspid regurgitation - s/p repair  BRANDIE    Continue anticoagulation with warfarin -- INR goal 2.5-3.5  From cardiac perspective acceptable to drop to baby aspirin, however defer to hematology and Neurology recommendations  With stable INR patient acceptable for discharge from CV perspective. Will need 1-2 week follow-up               Kiki MORENO  [facility]  10/23/23  09:38 EDT

## 2023-10-23 NOTE — DISCHARGE SUMMARY
Patient Name: Joaquín Garcia Jr.  : 1960  MRN: [id number]    Date of Admission: 10/17/2023  Date of Discharge:  10/23/2023  Primary Care Physician: Epley, James, APRN      Chief Complaint:   Eye Problem      Discharge Diagnoses     Active Hospital Problems    Diagnosis  POA    **Diplopia [H53.2]  Yes    Chronic anticoagulation [Z79.01]  Not Applicable    Prediabetes [R73.03]  Yes    Dizziness [R42]  Yes    Acute CVA (cerebrovascular accident) [I63.9]  Yes    Left atrial mass [I51.89]  Yes    Gout [M10.9]  Yes    On anticoagulant therapy [Z79.01]  Not Applicable    Benign essential hypertension [I10]  Yes    Hypertension [I10]  Yes    Atrial fibrillation, chronic [I48.20]  Yes    S/P MVR (mitral valve replacement) [Z95.2]  Not Applicable    COPD (chronic obstructive pulmonary disease) [J44.9]  Yes      Resolved Hospital Problems   No resolved problems to display.        Hospital Course     Mr. Garcia is a 63 y.o. male who presented to [facility] initially complaining of double vision.  Please see the admitting history and physical for further details.  He was admitted to the hospital for further evaluation and treatment.     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 Diagnosed with acute midbrain and left thalamic stroke, felt to be likely cardio-embolic in nature, secondary to intracardiac left atrial thrombus.  This has occurred while the patient is on Eliquis (he assures compliance) which indicated Eliquis failure.  A1c 5.70%, , TSH normal. Cardiology performed LILIA and the finding was consistent of a large left atrial thrombus, normal ejection fraction, left ventricular hypertrophy, dilated right ventricle, bioprosthetic mitral valve that appears to be normal, annuloplasty in the tricuspid valve with the position.  Patient evaluated by Cardiology and Cardiothoracic surgery, felt to be poor candidate  for surgery because of his severe COPD.  Patient is a poor candidate for catheter removal of the blood clot as it is significantly large. Hematology consulted and followed as well during hospital stay. Patient was treated with heparin drip, ASA, statin, subsequently bridged to warfarin (goal INR 2.5 to 3.5). Evaluated by PT, plan determined to be home with home health after discharge. He was cleared for discharge by consultants with plans/follow up as follows: Discharge medication plan: Warfarin 5 mg daily, Atorvastatin 80 mg daily. Discussed with Ju Tolentino with Neurology on day of discharge, who spoke with attending Dr. Wadsworth, regarding need for aspirin at discharge. They stated that continuation of aspirin not indicated after discharge and to continue warfarin and statin as prescribed. Patient is to have follow up with Neurology in 3 months after discharge for reassessment.    Permanent atrial fibrillation  Nonrheumatic mitral valve regurgitation status post mitral valve replacement  Tricuspid repair s/p repair  Recent diagnosis of left atrial large thrombus  - Cardiology consulted and followed patient for the duration of his hospital stay. He underwent further testing, LILIA performed and results as discussed elsewhere. He was treated with Metoprolol for atrial fibrillation and remained stable on that treatment. He was on Eliquis prior to arrival, but found to have left atrial thrombus and CVA during hospital stay, so this was stopped and he was treated with Heparin drip. Case reviewed with pharmacy and consultants, including hematology, cardiology and neurology. Considered to have treatment failure on Eliquis, so plan was for long term anticoagulation with warfarin. He was on heparin/warfarin bridge, until INR reached therapeutic goal level of 2.5 to 3.5, at which time heparin drip was discontinued. Evaluated by Cardiology prior to discharge, recommended continuation of Metoprolol and warfarin (5 mg daily per  pharmacy) after discharge with plans for close outpatient follow up. Ambulatory referral to anticoagulation clinic placed by pharmacy at discharge to allow for INR monitoring to guide warfarin dosing. Follow up with Cardiology in 1-2 weeks after discharge for reassessment, discussed with Cardiology, they stated that clinic would call patient to schedule appointment. Follow up with anticoagulation clinic for INR monitoring (goal 2.5 to 3.5).    Hypertension  - Blood pressure appears stable and acceptable acutely at this time. Discussed with Cardiology, they recommend holding home Amlodipine and Ramipril at discharge and resume HCTZ as previously prescribed and they plan to reassess blood pressure in 1-2 weeks at follow up appointment. Recommend that patient check his blood pressure 2-3 times daily and record those values in log book and take with him to next PCP visit to allow for greater insight into treatment efficacy to guide further management decisions. Recommend heart healthy/low sodium diet. Recommend close follow up with PCP and cardiology within 1 week after discharge for reassessment of blood pressure and repeat CMP to assess renal function, to guide ongoing management decisions.    Prediabetes with hyperglycemia  - Glucose elevated on most recent labs. Patient without known history of T2DM. Most recent hemoglobin A1c 5.70% (10/18/2023). Recommend that patient check his blood glucose 2-3 times daily and record those values in log book and take with him to next PCP visit to allow for greater insight into treatment efficacy to guide further management decisions. Recommend consistent carbohydrate/diabetic diet. Recommend close follow up with PCP within 1 week after discharge for reassessment to guide ongoing management decisions.    COPD  Obstructive sleep apnea  - Appears stable from this perspective at present.  Evaluated by Pulmonology during inpatient stay. Started on treatment with Symbicort and Spiriva  and tolerated well. Plan to prescribe these medications at discharge. COPD discharge order set reviewed, appropriate orders placed. Ambulatory referral to Pulmonology placed at discharge.     Peptic Ulcer Disease/GERD  - Continue Pepcid as prescribed.      Hyperlipidemia  - Continue Statin as prescribed.     History of neurofibromatosis     Thrombocytopenia, resolved  - Platelets found to be low on prior labs, improved on most recent testing. Recommend repeat CBC with PCP within 1 week for reassessment.    Day of Discharge     Subjective:  Patient seen and examined this morning. Hospital day 6. Doing well at this time, states he is ready to go home. Plan is for discharge home with home health.    Physical Exam:  Temp:  [97.3 °F (36.3 °C)-98.4 °F (36.9 °C)] 97.3 °F (36.3 °C)  Heart Rate:  [65-80] 72  Resp:  [16-18] 18  BP: (109-137)/(74-85) 109/74  Body mass index is 28.09 kg/m².  Const: Elderly, overweight, chronically ill-appearing, no acute distress  HEENT: Medial deviation of the left eye, no scleral icterus, EOMI, impaired visual acuity, right eye covered with patch  CV: Regular rate, irregular rhythm, systolic murmur  RESP: FIO2 21, decreased breath sounds, normal effort, no wheezes  GI: soft, non-tender, non-distended  MSK: No tenderness, no edema  Skin: Warm, dry, scattered bruises  Neuro: Awake, alert, no tremor    Consultants     Consult Orders (all) (From admission, onward)       Start     Ordered    10/18/23 1358  Inpatient Pulmonology Consult  Once        Specialty:  Pulmonary Disease  Provider:  Conrado Balderas Jr., MD    10/18/23 1359    10/18/23 0952  Inpatient Cardiology Consult  Once        Specialty:  Cardiology  Provider:  Meryl Epps MD    10/18/23 0955    10/18/23 0952  Hematology & Oncology Inpatient Consult  Once        Specialty:  Hematology and Oncology  Provider:  Mono Up II, MD    10/18/23 0955    10/17/23 2015  Notify Stroke Coordinator  Once        Provider:  (Not yet  assigned)    10/17/23 2015    10/17/23 2015  Inpatient Rehab Admission Consult  Once        Provider:  (Not yet assigned)    10/17/23 2015    10/17/23 2015  Consult to Case Management   Once        Provider:  (Not yet assigned)    10/17/23 2015    10/17/23 2015  Consult to Diabetes Educator  Once,   Status:  Canceled        Provider:  (Not yet assigned)    10/17/23 2015    10/17/23 2015  Inpatient Neurology Consult Stroke  Once        Specialty:  Neurology  Provider:  Mono Jasso MD    10/17/23 2015    10/17/23 2000  [facility] (on-call MD unless specified) Details  Once        Specialty:  Hospitalist  Provider:  (Not yet assigned)    10/17/23 1959    10/17/23 1905  Inpatient Neurology Consult Stroke  Once        Specialty:  Neurology  Provider:  (Not yet assigned)    10/17/23 1904    10/17/23 1905  Inpatient Neurology Consult Stroke  Once        Specialty:  Neurology  Provider:  Mono Jasso MD    10/17/23 1904                  Procedures     * Surgery not found *    Imaging Results (All)       Procedure Component Value Units Date/Time    MRI Brain With & Without Contrast [[id number]] Collected: 10/18/23 0940     Updated: 10/18/23 1706    Narrative:      MRI EXAMINATION OF THE BRAIN WITH AND WITHOUT CONTRAST     HISTORY: Blurry vision.     COMPARISON: CT head 10/17/2023. No prior MRI of the brain is available  for comparison.     TECHNIQUE: A MRI examination of the brain was performed utilizing  sagittal T1, axial diffusion, T1, T2, T2 FLAIR, gradient echo T2 as well  as sagittal, axial and coronal T1 postcontrast weighted sequences.     FINDINGS:     The diffusion sequence demonstrates an area of increased signal  intensity involving the anterior and medial aspect of the thalamus on  the left measuring approximately 8 x 4 mm in size consistent with a  small acute infarct. There is signal loss on the ADC map at the same  location. There is also an area of increased signal  intensity  anterolateral to the sylvian aqueduct on the right measuring  approximately 7 x 4 mm in size which is decreased in signal intensity on  the ADC map consistent with a second smaller acute infarct.     There is expected flow void in the basilar artery and in the distal  aspect of the internal carotid arteries bilaterally on axial T2  sequence.     Innumerable nodules are appreciated involving the scalp consistent with  the patient's history of neurofibromatosis. Nearly all are subcentimeter  in size. The largest overlies the medial aspect of the left orbit  measuring approximately 2.7 x 1.6 x 2.3 cm in size. Mild small vessel  ischemic disease is noted. A small lacunar infarct involving the corona  radiata on the right anteriorly is appreciated. There is no evidence of  abnormal intra-axial enhancement.       Impression:      1.  There is an acute infarct involving the medial aspect of the  thalamus on the left anteriorly measuring 8 x 4 mm in size and an acute  infarct anterolateral to the sylvian aqueduct to the right measuring 7 x  4 mm in size.  2.  Interval nodules involving the scalp are noted as described above,  the largest of which overlies the anterior and medial aspect of the left  orbit measuring approximately 2.7 cm in size.  3.  Small vessel ischemic disease and lacunar infarct are noted. There  is no evidence of abnormal intra-axial enhancement.     This report was finalized on 10/18/2023 5:03 PM by Dr. David Koehler M.D on Workstation: [id number]       CT Angiogram Head w AI Analysis of LVO [[id number]] Collected: 10/17/23 2117     Updated: 10/17/23 2124    Narrative:      CT ANGIOGRAM HEAD AND NECK WITH PRE AND POSTCONTRAST HEAD CT AND CT  PERFUSION EXAM     HISTORY: Type I neurofibromatosis. Known intracardiac mass lesion on CT  angiogram from earlier today. Acute onset of stroke symptoms.     TECHNIQUE: Noncontrast head CT scan was performed, reviewed, and  discussed with Dr. Menjivar of  the stroke team at 7:26 p.m. Subsequently  CT angiogram head and neck with CT perfusion were performed using 145 mL  of Isovue-370 IV contrast. Numerous multiplanar and 3-dimensional  reformatted images were produced elsewhere. Postcontrast and perfusion  images were discussed with Dr. Jasso at 7:44 p.m.     AI analysis of LVO was utilized.     Radiation dose reduction techniques were utilized, including automated  exposure control and exposure modulation based on body size.     FINDINGS: Noncontrast head CT images show normal caliber ventricles with  normal brain parenchyma and extra-axial spaces. No abnormal intracranial  contrast enhancement on head CT.     Numerous enhancing nodules are observed throughout the scalp and upper  face compatible with the known history of neurofibromatosis.     CT perfusion images are negative.     CT angiogram images show widely patent aortic arch, brachiocephalic and  proximal subclavian arteries and common carotid arteries. Internal and  external carotid arteries are widely patent with minimal carotid bulb  atheromatous change and no significant stenosis. The vertebral arteries  are also widely patent throughout their cervical and intracranial  course. The basilar artery, anterior, posterior, and middle cerebral  arteries and branches appear normal. Small posterior fossa branches  appear normal. No aneurysm or vascular cutoff is identified. Major  intracranial venous sinuses enhance as expected.     Advanced degenerative disc change at C3-4 with endplate osteophyte  posteriorly on the right creating at least moderately severe spinal  canal stenosis. Profound emphysematous change in the visualized lung  apices. Postoperative change from sternotomy.       Impression:      No acute abnormality identified on CT angiogram head and  neck, CT perfusion, or head CT with and without contrast. There are  numerous soft tissue nodules throughout the head and neck compatible  with known  neurofibromatosis..     This report was finalized on 10/17/2023 9:20 PM by Dr. Juanito Perea M.D on Workstation: [id number]       CT Angiogram Neck [[id number]] Collected: 10/17/23 2117     Updated: 10/17/23 2124    Narrative:      CT ANGIOGRAM HEAD AND NECK WITH PRE AND POSTCONTRAST HEAD CT AND CT  PERFUSION EXAM     HISTORY: Type I neurofibromatosis. Known intracardiac mass lesion on CT  angiogram from earlier today. Acute onset of stroke symptoms.     TECHNIQUE: Noncontrast head CT scan was performed, reviewed, and  discussed with Dr. Menjivar of the stroke team at 7:26 p.m. Subsequently  CT angiogram head and neck with CT perfusion were performed using 145 mL  of Isovue-370 IV contrast. Numerous multiplanar and 3-dimensional  reformatted images were produced elsewhere. Postcontrast and perfusion  images were discussed with Dr. Jasso at 7:44 p.m.     AI analysis of LVO was utilized.     Radiation dose reduction techniques were utilized, including automated  exposure control and exposure modulation based on body size.     FINDINGS: Noncontrast head CT images show normal caliber ventricles with  normal brain parenchyma and extra-axial spaces. No abnormal intracranial  contrast enhancement on head CT.     Numerous enhancing nodules are observed throughout the scalp and upper  face compatible with the known history of neurofibromatosis.     CT perfusion images are negative.     CT angiogram images show widely patent aortic arch, brachiocephalic and  proximal subclavian arteries and common carotid arteries. Internal and  external carotid arteries are widely patent with minimal carotid bulb  atheromatous change and no significant stenosis. The vertebral arteries  are also widely patent throughout their cervical and intracranial  course. The basilar artery, anterior, posterior, and middle cerebral  arteries and branches appear normal. Small posterior fossa branches  appear normal. No aneurysm or vascular cutoff is  identified. Major  intracranial venous sinuses enhance as expected.     Advanced degenerative disc change at C3-4 with endplate osteophyte  posteriorly on the right creating at least moderately severe spinal  canal stenosis. Profound emphysematous change in the visualized lung  apices. Postoperative change from sternotomy.       Impression:      No acute abnormality identified on CT angiogram head and  neck, CT perfusion, or head CT with and without contrast. There are  numerous soft tissue nodules throughout the head and neck compatible  with known neurofibromatosis..     This report was finalized on 10/17/2023 9:20 PM by Dr. Juanito Perea M.D on Workstation: [id number]       CT CEREBRAL PERFUSION WITH & WITHOUT CONTRAST [[id number]] Collected: 10/17/23 2117     Updated: 10/17/23 2124    Narrative:      CT ANGIOGRAM HEAD AND NECK WITH PRE AND POSTCONTRAST HEAD CT AND CT  PERFUSION EXAM     HISTORY: Type I neurofibromatosis. Known intracardiac mass lesion on CT  angiogram from earlier today. Acute onset of stroke symptoms.     TECHNIQUE: Noncontrast head CT scan was performed, reviewed, and  discussed with Dr. Menjivar of the stroke team at 7:26 p.m. Subsequently  CT angiogram head and neck with CT perfusion were performed using 145 mL  of Isovue-370 IV contrast. Numerous multiplanar and 3-dimensional  reformatted images were produced elsewhere. Postcontrast and perfusion  images were discussed with Dr. Jasso at 7:44 p.m.     AI analysis of LVO was utilized.     Radiation dose reduction techniques were utilized, including automated  exposure control and exposure modulation based on body size.     FINDINGS: Noncontrast head CT images show normal caliber ventricles with  normal brain parenchyma and extra-axial spaces. No abnormal intracranial  contrast enhancement on head CT.     Numerous enhancing nodules are observed throughout the scalp and upper  face compatible with the known history of neurofibromatosis.      CT perfusion images are negative.     CT angiogram images show widely patent aortic arch, brachiocephalic and  proximal subclavian arteries and common carotid arteries. Internal and  external carotid arteries are widely patent with minimal carotid bulb  atheromatous change and no significant stenosis. The vertebral arteries  are also widely patent throughout their cervical and intracranial  course. The basilar artery, anterior, posterior, and middle cerebral  arteries and branches appear normal. Small posterior fossa branches  appear normal. No aneurysm or vascular cutoff is identified. Major  intracranial venous sinuses enhance as expected.     Advanced degenerative disc change at C3-4 with endplate osteophyte  posteriorly on the right creating at least moderately severe spinal  canal stenosis. Profound emphysematous change in the visualized lung  apices. Postoperative change from sternotomy.       Impression:      No acute abnormality identified on CT angiogram head and  neck, CT perfusion, or head CT with and without contrast. There are  numerous soft tissue nodules throughout the head and neck compatible  with known neurofibromatosis..     This report was finalized on 10/17/2023 9:20 PM by Dr. Juanito Perea M.D on Workstation: [id number]       XR Chest 1 View [[id number]] Collected: 10/17/23 2007     Updated: 10/17/23 2011    Narrative:      PORTABLE CHEST X-RAY     HISTORY: Acute stroke symptoms. Type I neurofibromatosis. CT angiogram  chest earlier today demonstrated multiple intraluminal filling defects  in the left atrium.     Portable chest x-ray is correlated with CT angiogram chest from earlier  today and chest x-ray 10/31/2016.      FINDINGS: Sternal wires with cardiomegaly and extensive emphysematous  change in the upper lung zones. There is some prominence of the  interstitial markings commensurate with advanced emphysema. No focal  infiltrate, effusion, or pneumothorax.       Impression:       Cardiomegaly with marked emphysematous pulmonary parenchymal  change. No acute abnormality evident on x-ray.     This report was finalized on 10/17/2023 8:08 PM by Dr. Juanito Perea M.D on Workstation: [id number]             Results for orders placed during the hospital encounter of 10/10/16    Bilateral Carotid Duplex    Interpretation Summary  · Proximal right internal carotid artery mild stenosis.  · Proximal left internal carotid artery mild stenosis.    Results for orders placed during the hospital encounter of 10/17/23    Adult Transesophageal Echo (LILIA) W/ Cont if Necessary Per Protocol    Interpretation Summary    The left atrium is massively enlarged (giant left atrium). There is heavy swirling smoke within the left atrium the left atrial appendage.    There is a very large left atrial mass attached to the posterolateral aspect of the left atrium. 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 A less probable differential would include a massive left atrial thrombus    Left ventricular systolic function is normal. Left ventricular ejection fraction appears to be 61 - 65%.    Left ventricular wall thickness is consistent with mild to moderate concentric hypertrophy.    The right ventricular cavity is moderate to severely dilated. Normal right ventricular systolic function noted.    There is a bioprosthetic mitral valve present. The mitral valve peak and mean gradients are within defined limits. The prosthetic mitral valve is normal.    There is an annuloplasty ring in the tricuspid valve position. There is trace tricuspid regurgitation through the annuloplasty ring.    There is no evidence of pericardial effusion    Pertinent Labs     Results from last 7 days   Lab Units 10/23/23  0533 10/22/23  0546 10/21/23  0529 10/20/23  0533   WBC 10*3/mm3 7.89 8.09 8.01 8.17   HEMOGLOBIN g/dL 16.1 16.2 15.4 15.7   PLATELETS  10*3/mm3 189 164 143 137*     Results from last 7 days   Lab Units 10/23/23  0533 10/22/23  0545 10/21/23  0529 10/20/23  0533   SODIUM mmol/L 136 136 138 136   POTASSIUM mmol/L 4.0 4.0 4.0 3.9   CHLORIDE mmol/L 104 102 106 104   CO2 mmol/L 21.1* 20.3* 23.7 23.0   BUN mg/dL 14 13 11 15   CREATININE mg/dL 0.84 0.83 0.83 0.85   GLUCOSE mg/dL 85 83 88 92   EGFR mL/min/1.73 96.8 97.1 97.1 96.4     Results from last 7 days   Lab Units 10/18/23  0534 10/17/23  1912   ALBUMIN g/dL 3.9 4.6   BILIRUBIN mg/dL 0.6 0.5   ALK PHOS U/L 76 88   AST (SGOT) U/L 13 17   ALT (SGPT) U/L 21 28     Results from last 7 days   Lab Units 10/23/23  0533 10/22/23  0545 10/21/23  0529 10/20/23  0533 10/19/23  0520 10/18/23  0534 10/17/23  1912   CALCIUM mg/dL 8.9 8.9 8.8 8.9   < > 8.8 9.8   ALBUMIN g/dL  --   --   --   --   --  3.9 4.6    < > = values in this interval not displayed.       Results from last 7 days   Lab Units 10/17/23  1912   HSTROP T ng/L 10       Results from last 7 days   Lab Units 10/18/23  0534   CHOLESTEROL mg/dL 173   TRIGLYCERIDES mg/dL 137   HDL CHOL mg/dL 47   LDL CHOL mg/dL 102*             Test Results Pending at Discharge       Discharge Details        Discharge Medications        New Medications        Instructions Start Date   budesonide-formoterol 80-4.5 MCG/ACT inhaler  Commonly known as: Symbicort   2 puffs, Inhalation, 2 Times Daily      famotidine 20 MG tablet  Commonly known as: PEPCID   20 mg, Oral, 2 Times Daily Before Meals      tiotropium bromide monohydrate 2.5 MCG/ACT aerosol solution inhaler  Commonly known as: SPIRIVA RESPIMAT   2 puffs, Inhalation, Daily      warfarin 5 MG tablet  Commonly known as: COUMADIN   5 mg, Oral, Nightly             Changes to Medications        Instructions Start Date   atorvastatin 80 MG tablet  Commonly known as: LIPITOR  What changed:   medication strength  how much to take  when to take this   80 mg, Oral, Nightly             Continue These Medications         Instructions Start Date   acetaminophen 325 MG tablet  Commonly known as: TYLENOL   650 mg, Oral, Every 6 Hours PRN      allopurinol 300 MG tablet  Commonly known as: ZYLOPRIM   300 mg, Oral, Daily, For gout prevention      fluticasone 50 MCG/ACT nasal spray  Commonly known as: FLONASE   2 sprays, Nasal, Daily      hydroCHLOROthiazide 12.5 MG tablet  Commonly known as: HYDRODIURIL   12.5 mg, Oral, Daily      metoprolol succinate  MG 24 hr tablet  Commonly known as: TOPROL-XL   100 mg, Oral, Daily             Stop These Medications      amLODIPine 10 MG tablet  Commonly known as: NORVASC     apixaban 5 MG tablet tablet  Commonly known as: ELIQUIS     ramipril 10 MG capsule  Commonly known as: [facility]              No Known Allergies    Discharge Disposition:  Home-Health Care [facility]      Discharge Diet:  Diet Order   Procedures    Diet: Regular/House Diet; Texture: Regular Texture (IDDSI 7); Fluid Consistency: Thin (IDDSI 0)       Discharge Activity:   Activity Instructions       Gradually Increase Activity Until at Pre-Hospitalization Level      Other Activity Instructions      Activity Instructions: Per home health physical therapy            CODE STATUS:    Code Status and Medical Interventions:   Ordered at: 10/17/23 2015     Code Status (Patient has no pulse and is not breathing):    CPR (Attempt to Resuscitate)     Medical Interventions (Patient has pulse or is breathing):    Full Support       Future Appointments   Date Time Provider Department Center   11/8/2023  9:30 AM Kiki Henriquez APRN K CD [facility]   12/1/2023  1:40 PM LAB CHAIR 1 [facility]   12/1/2023  2:00 PM Mono Up II, MD [facility]   9/26/2024 10:30 AM Gordon Walsh MD MGK CD L[facility]     Additional Instructions for the Follow-ups that You Need to Schedule       Ambulatory Referral to Anticoagulation Monitoring   As directed       Select To DEPT SPEC to filter TO DEPT       Send INR  "reminders to the \"clinical pool\" of the TO DEPT     (ie:  [facility] CLINIC  or [facility] CLINICAL POOL)     Ambulatory Referral to Home Health   As directed      Face to Face Visit Date: 10/23/2023   Follow-up provider for Plan of Care?: I treated the patient in an acute care facility and will not continue treatment after discharge.   Follow-up provider: EPLEY, JAMES [[id number]]   Reason/Clinical Findings: weakness   Describe mobility limitations that make leaving home difficult: unsteady while ambulating   Nursing/Therapeutic Services Requested: Physical Therapy Skilled Nursing   Skilled nursing orders: Other   PT orders: Strengthening Home safety assessment   Frequency: 1 Week 1        Discharge Follow-up with PCP   As directed       Currently Documented PCP:    Epley, James, APRN    PCP Phone Number:    [phone]     Follow Up Details: Within 1 week after discharge for reassessment, medication and symptom review, blood pressure and blood glucose check, CMP and CBC        Discharge Follow-up with Specialty: Cardiology, pulmonology, neurology, hematology   As directed      Specialty: Cardiology, pulmonology, neurology, hematology   Follow Up Details: Please follow-up with the specialties within 1 to 2 weeks after discharge or as scheduled.  Please call their office to ensure a follow-up appointment is made for you.               Follow-up Information       Gordon Walsh MD. Schedule an appointment as soon as possible for a visit in 1 week(s).    Specialty: Cardiology  Why: 1-2 week  follow-up with Kiki Werner NP  Contact information:  [address] [address]  [phone]               [facility] .    Specialty: Pharmacy  Contact information:  [address] [address]  [phone]             Epley, James, APRN .    Specialties: Nurse Practitioner, Family Medicine  Why: Within 1 week after discharge for " "reassessment, medication and symptom review, blood pressure and blood glucose check, CMP and CBC  Contact information:  [address]  [address]  [place] [address]  [phone]                             Additional Instructions for the Follow-ups that You Need to Schedule       Ambulatory Referral to Anticoagulation Monitoring   As directed       Select To DEPT SPEC to filter TO DEPT       Send INR reminders to the \"clinical pool\" of the TO DEPT     (ie:   [facility] CLINIC  or [facility] CLINICAL POOL)     Ambulatory Referral to Home Health   As directed      Face to Face Visit Date: 10/23/2023   Follow-up provider for Plan of Care?: I treated the patient in an acute care facility and will not continue treatment after discharge.   Follow-up provider: EPLEY, JAMES [[id number]]   Reason/Clinical Findings: weakness   Describe mobility limitations that make leaving home difficult: unsteady while ambulating   Nursing/Therapeutic Services Requested: Physical Therapy Skilled Nursing   Skilled nursing orders: Other   PT orders: Strengthening Home safety assessment   Frequency: 1 Week 1        Discharge Follow-up with PCP   As directed       Currently Documented PCP:    Epley, James, APRN    PCP Phone Number:    [phone]     Follow Up Details: Within 1 week after discharge for reassessment, medication and symptom review, blood pressure and blood glucose check, CMP and CBC        Discharge Follow-up with Specialty: Cardiology, pulmonology, neurology, hematology   As directed      Specialty: Cardiology, pulmonology, neurology, hematology   Follow Up Details: Please follow-up with the specialties within 1 to 2 weeks after discharge or as scheduled.  Please call their office to ensure a follow-up appointment is made for you.            Time Spent on Discharge:  Greater than 30 minutes      DO [facility]  10/23/23  15:40 EDT              "

## 2023-10-23 NOTE — PROGRESS NOTES
[facility] Clinical Pharmacy Services: Warfarin Dosing/Monitoring Consult    Joaquín Garcia Jr. is a 63 y.o. male, estimated creatinine clearance is 100.9 mL/min (by C-G formula based on SCr of 0.84 mg/dL). weighing 91 kg (200 lb 9.9 oz).    Results from last 7 days   Lab Units 10/23/23  0533 10/22/23  2031 10/22/23  1154 10/22/23  0546 10/22/23  0545 10/21/23  2050 10/21/23  1355 10/21/23  0529 10/20/23  1209 10/20/23  0533 10/19/23  2038 10/19/23  1428 10/19/23  0520   INR  2.74*  --   --   --  2.81*  --   --  1.68*  --  1.12*  --  1.12*  --    APTT seconds 46.8* 43.8* 45.9*  --  78.1* 91.3*   < > 90.6*   < > 63.9*   < > 39.6* 110.5*   HEMOGLOBIN g/dL 16.1  --   --  16.2  --   --   --  15.4  --  15.7  --   --  16.1   HEMATOCRIT % 46.6  --   --  47.2  --   --   --  43.9  --  45.0  --   --  46.2   PLATELETS 10*3/mm3 189  --   --  164  --   --   --  143  --  137*  --   --  140    < > = values in this interval not displayed.     Prior to admission anticoagulation: apixaban, prior to that was on warfarin 5mg daily     Hospital Anticoagulation:  Consulting provider: Dr. Bolden  Start date: 10/19  Indication: DVT/PE (active thrombosis); per cardiology: LA thrombus; permanent Afib; hx of MVR  Target INR: 2.5 - 3.5 per cardiology recs  Expected duration: indefinite   Bridge Therapy: Yes  with unfractionated heparin    Potential food or drug interactions: allopurinol ( home  med)    Education complete?/Date: Defer; pt has taken warfarin in the past (although most recently on apixaban)    Assessment/Plan:  INR therapeutic and stable.    Average dose since restarting warfarin is 5.6mg/day (39mg weekly average).  Will begin warfarin 5mg qday for now, providing 35mg weekly.  May require 7.5mg 1-2x weekly to maintain therapeutic INR goal of 2.5-3.5.  Appears that pt is nearing discharge - recommend first outpatient INR within 3 days of hospital discharge.  Monitor for any signs or symptoms of bleeding.  H/H  stable.  Follow up daily INRs and dose adjustments.    Pharmacy will continue to follow until discharge or discontinuation of warfarin.     Nataly Deluca, SydneyD, MPH, BCPS

## 2023-10-24 ENCOUNTER — TELEPHONE (OUTPATIENT)
Dept: NEUROLOGY | Facility: OTHER | Age: 63
End: 2023-10-24

## 2023-10-24 ENCOUNTER — TRANSITIONAL CARE MANAGEMENT TELEPHONE ENCOUNTER (OUTPATIENT)
Dept: CALL CENTER | Facility: HOSPITAL | Age: 63
End: 2023-10-24
Payer: MEDICARE

## 2023-10-24 ENCOUNTER — TELEPHONE (OUTPATIENT)
Dept: PHARMACY | Facility: HOSPITAL | Age: 63
End: 2023-10-24
Payer: MEDICARE

## 2023-10-24 NOTE — OUTREACH NOTE
Call Center TCM Note      Flowsheet Row Responses   [facility] [place] patient discharged from? [place]   Does the patient have one of the following disease processes/diagnoses(primary or secondary)? Other   TCM attempt successful? Yes   Call start time 1042   Call end time 1046   Discharge diagnosis Diplopia   Meds reviewed with patient/caregiver? Yes   Is the patient having any side effects they believe may be caused by any medication additions or changes? No   Does the patient have all medications ordered at discharge? Yes   Is the patient taking all medications as directed (includes completed medication regime)? Yes   Does the patient have an appointment with their PCP within 7-14 days of discharge? No appointments available   Nursing Interventions Routed TCM call to PCP office, PCP office requested to make appointment - message sent   What is the Home health agency?  [facility] health comments KAREN has not yet reached out to pt. He is awaiting their call.   Psychosocial issues? No   Did the patient receive a copy of their discharge instructions? Yes   Nursing interventions Reviewed instructions with patient   What is the patient's perception of their health status since discharge? Improving   Is the patient/caregiver able to teach back signs and symptoms related to disease process for when to call PCP? Yes   Is the patient/caregiver able to teach back signs and symptoms related to disease process for when to call 911? Yes   Is the patient/caregiver able to teach back the hierarchy of who to call/visit for symptoms/problems? PCP, Specialist, Home health nurse, Urgent Care, ED, 911 Yes   TCM call completed? Yes   Wrap up additional comments D/C DX: acute CVA w/diplopia - Pt states he is feeling pretty well today. New rx's Famotidine, Trelelgy Ellipta, Warfarin, and changes to Atorvastatin in place. Pt is waiting on call from KAREN. Pt very much wants TCM APPT but only  with his PCP APRN James Epley, who has no avialable times I can access to sched TCM APPT by 11/07/2023. Please call pt to schedule.   Call end time 9833            Daniela Mueller MA    10/24/2023, 10:49 EDT

## 2023-10-24 NOTE — TELEPHONE ENCOUNTER
Called patient regarding referral for anticoag management s/p discharge with atrial thrombus.  He confirmed warfarin 5 mg daily since discharge. Scheduled first visit at clinic on 10/26.

## 2023-10-25 ENCOUNTER — HOME HEALTH ADMISSION (OUTPATIENT)
Dept: HOME HEALTH SERVICES | Facility: HOME HEALTHCARE | Age: 63
End: 2023-10-25
Payer: MEDICARE

## 2023-10-25 DIAGNOSIS — Z91.199 NONCOMPLIANCE: ICD-10-CM

## 2023-10-25 DIAGNOSIS — F32.2 SEVERE MAJOR DEPRESSION: ICD-10-CM

## 2023-10-25 DIAGNOSIS — I63.9 ACUTE CVA (CEREBROVASCULAR ACCIDENT): Primary | ICD-10-CM

## 2023-10-25 DIAGNOSIS — Z78.9 DEFICIT IN ACTIVITIES OF DAILY LIVING (ADL): ICD-10-CM

## 2023-10-26 ENCOUNTER — TELEPHONE (OUTPATIENT)
Dept: FAMILY MEDICINE CLINIC | Facility: CLINIC | Age: 63
End: 2023-10-26

## 2023-10-26 ENCOUNTER — ANTICOAGULATION VISIT (OUTPATIENT)
Dept: PHARMACY | Facility: HOSPITAL | Age: 63
End: 2023-10-26
Payer: MEDICARE

## 2023-10-26 DIAGNOSIS — Z79.01 CHRONIC ANTICOAGULATION: Primary | ICD-10-CM

## 2023-10-26 DIAGNOSIS — I48.20 ATRIAL FIBRILLATION, CHRONIC: ICD-10-CM

## 2023-10-26 DIAGNOSIS — Z95.2 S/P MVR (MITRAL VALVE REPLACEMENT): ICD-10-CM

## 2023-10-26 DIAGNOSIS — Z79.01 ON ANTICOAGULANT THERAPY: ICD-10-CM

## 2023-10-26 DIAGNOSIS — I63.9 ACUTE CVA (CEREBROVASCULAR ACCIDENT): ICD-10-CM

## 2023-10-26 LAB
INR PPP: 5 (ref 0.91–1.09)
PROTHROMBIN TIME: 60 SECONDS (ref 10–13.8)

## 2023-10-26 PROCEDURE — G0463 HOSPITAL OUTPT CLINIC VISIT: HCPCS

## 2023-10-26 PROCEDURE — 85610 PROTHROMBIN TIME: CPT

## 2023-10-26 PROCEDURE — 36416 COLLJ CAPILLARY BLOOD SPEC: CPT

## 2023-10-26 NOTE — TELEPHONE ENCOUNTER
Caller: NICK    Relationship: [facility]    Best call back number: [phone]     What orders are you requesting (i.e. lab or imaging): VERBAL ORDERS NURSING ONE TIME A WEEK FOR FOUR WEEKS

## 2023-10-26 NOTE — PROGRESS NOTES
Anticoagulation Clinic Progress Note  Anticoagulation Summary  As of 10/26/2023      INR goal:  2.5-3.5   TTR:  --   INR used for dosin.0 (10/26/2023)   Warfarin maintenance plan:  2.5 mg every Fri; 5 mg all other days   Weekly warfarin total:  32.5 mg   Plan last modified:  Tasha Velasquez RPH (10/26/2023)   Next INR check:  10/30/2023   Target end date:      Indications    Chronic anticoagulation [Z79.01]  S/P MVR (mitral valve replacement) [Z95.2]  Atrial fibrillation  chronic [I48.20]  On anticoagulant therapy [Z79.01]  Acute CVA (cerebrovascular accident) [I63.9]                 Anticoagulation Episode Summary       INR check location:      Preferred lab:      Send INR reminders to:  [facility]    Comments:  Admitted with left atrial thrombus and CVA, previously on Eliquis and changed to warfarin while admitted. First visit 10/26 on warfarin 5 mg daily.          Anticoagulation Care Providers       Provider Role Specialty Phone number    Gordon Walsh MD Referring Cardiology [phone]            Clinic Interview:  Patient Findings     Positives:  Change in health, Change in medications, Change in   diet/appetite, Hospital admission    Negatives:  Signs/symptoms of thrombosis, Signs/symptoms of bleeding,   Laboratory test error suspected, Change in alcohol use, Change in   activity, Upcoming invasive procedure, Emergency department visit,   Upcoming dental procedure, Missed doses, Extra doses, Bruising, Other   complaints    Comments:  New patient to clinic. Patient reports he has been on warfarin   in the past, stopped in 2017.  Patient reports he was in the hospital from 10/17/23-10/23/23 due to a   stroke he had on 10/17/23.   Patient reports he was on Eliquis before going to the hospital but was   discharged on warfarin 5mg/day.   While in the hospital his diet was abnormal and patient reports losing ~8   pounds.       Clinical Outcomes     Negatives:  Major bleeding event,  Thromboembolic event,   Anticoagulation-related hospital admission, Anticoagulation-related ED   visit, Anticoagulation-related fatality    Comments:  New patient to clinic. Patient reports he has been on warfarin   in the past, stopped in 2017.  Patient reports he was in the hospital from 10/17/23-10/23/23 due to a   stroke he had on 10/17/23.   Patient reports he was on Eliquis before going to the hospital but was   discharged on warfarin 5mg/day.   While in the hospital his diet was abnormal and patient reports losing ~8   pounds.         Education:  Joaquín BILL Radha Eduardo is a new start in the [facility]ication Management Clinic. We discussed the followin) Warfarin's indication, mechanism, and dosing  2) Enforced the importance of taking warfarin as instructed and at the same time every day, preferably in the evening so that we can make dose adjustments more easily following subsequent clinic visits  3) What he should do about a missed dose; pts can take missed doses within about 12 hours of their usual scheduled dose, but he was instructed on the importance of not doubling up on doses unless told to do so by the [facility]ication Management Clinic  4) Explained possible side effects of warfarin therapy, including increased risk of bleeding, s/sx of bleeding and s/sx of any additional clots/PE/CVA.   5) Discussed monitoring of warfarin, the INR, goal INR range, and the frequency of monitoring  6) Reviewed drug/food/tobacco/EtOH interactions and provided written information covering these topics in more detail, explaining that green, leafy vegetables interact most heavily with warfarin  7) Instructed the pt not to take or discontinue any medications without informing his physician/pharmacist and reminded him to inform us of any dietary changes, as well  8) Explained that he would be coming into the clinic more frequently in these first few weeks of therapy as we try to adjust his dose and achieve a therapeutic INR x 2  consecutive readings. Once that is achieved, patient will follow up in clinic every 4 weeks, on average.    He stated no problems with transportation or scheduling clinic appts in this manner. he expressed understanding of the information provided and has no additional questions at this time.    Joaquín Garcia Jr. was presented with a copy of the Patients Rights and Responsibilities. he expressed verbal consent and agreement to receive care in the [facility] Management Clinic under the current collaborative care agreement with [facility].       INR History:      Latest Ref Rng & Units 10/18/2023    10:25 AM 10/19/2023     2:28 PM 10/20/2023     5:33 AM 10/21/2023     5:29 AM 10/22/2023     5:45 AM 10/23/2023     5:33 AM 10/26/2023    10:00 AM   Anticoagulation Monitoring   INR        5.0   INR Date        10/26/2023   INR Goal        2.5-3.5   Last Week Total        37.5 mg   Next Week Total        25 mg   Sun        5 mg   Mon        -   Tue        -   Wed        -   Thu        Hold (10/26)   Fri        2.5 mg   Sat        5 mg   Historical INR 0.90 - 1.10 1.16  1.12  1.12  1.68  2.81  2.74         Plan:  1. INR is Supratherapeutic today- see above in Anticoagulation Summary.   Will instruct Joaquín Garcia Jr. to Change their warfarin regimen- see above in Anticoagulation Summary.  Instructed patient to hold warfarin dose today. Then, decrease warfarin dose to 2.5mg tomorrow. Then, take 5mg of warfarin on Saturday & Sunday. Recheck INR in-clinic on Monday, 10/30/23.  2. Follow up in 4 days on Monday, 10/30/23.  3. Patient declines warfarin refills.  4. Verbal and written information provided. Patient expresses understanding and has no further questions at this time.    Kristina Landeros, Pharmacy Intern

## 2023-10-26 NOTE — PROGRESS NOTES
I have supervised and reviewed the notes, assessments, and/or procedures performed by our Pharmacy Intern. The documented assessment and plan were developed cooperatively, and the plan was implemented in my presence. I concur with the documentation of this patient encounter.    Tasha Velasquez [facility]

## 2023-10-27 ENCOUNTER — TELEPHONE (OUTPATIENT)
Dept: FAMILY MEDICINE CLINIC | Facility: CLINIC | Age: 63
End: 2023-10-27

## 2023-10-27 NOTE — TELEPHONE ENCOUNTER
GILDARDO HILL, PT WITH [employer], CALLED TO INFORM APRN JAMES EPLEY THAT SHE WAS UNABLE TO SCHEDULE HIS PT EVAL TODAY AND WILL HAVE TO PUSH IT OUT TO NEXT WEEK.    CALL BACK IF NEEDED:  [phone]

## 2023-10-30 ENCOUNTER — TELEPHONE (OUTPATIENT)
Dept: NEUROLOGY | Facility: CLINIC | Age: 63
End: 2023-10-30

## 2023-10-30 ENCOUNTER — ANTICOAGULATION VISIT (OUTPATIENT)
Dept: PHARMACY | Facility: HOSPITAL | Age: 63
End: 2023-10-30
Payer: MEDICARE

## 2023-10-30 DIAGNOSIS — I48.20 ATRIAL FIBRILLATION, CHRONIC: ICD-10-CM

## 2023-10-30 DIAGNOSIS — Z79.01 CHRONIC ANTICOAGULATION: Primary | ICD-10-CM

## 2023-10-30 DIAGNOSIS — I63.9 ACUTE CVA (CEREBROVASCULAR ACCIDENT): ICD-10-CM

## 2023-10-30 DIAGNOSIS — Z79.01 ON ANTICOAGULANT THERAPY: ICD-10-CM

## 2023-10-30 DIAGNOSIS — Z95.2 S/P MVR (MITRAL VALVE REPLACEMENT): ICD-10-CM

## 2023-10-30 LAB
INR PPP: 3.9 (ref 0.91–1.09)
PROTHROMBIN TIME: 46.3 SECONDS (ref 10–13.8)

## 2023-10-30 PROCEDURE — 85610 PROTHROMBIN TIME: CPT

## 2023-10-30 PROCEDURE — G0463 HOSPITAL OUTPT CLINIC VISIT: HCPCS

## 2023-10-30 PROCEDURE — 36416 COLLJ CAPILLARY BLOOD SPEC: CPT

## 2023-10-30 NOTE — PROGRESS NOTES
Anticoagulation Clinic Progress Note    Anticoagulation Summary  As of 10/30/2023      INR goal:  2.5-3.5   TTR:  --   INR used for dosing:  3.9 (10/30/2023)   Warfarin maintenance plan:  2.5 mg every Fri; 5 mg all other days   Weekly warfarin total:  32.5 mg   Plan last modified:  Tasha Velasquez RPH (10/26/2023)   Next INR check:  11/3/2023   Target end date:      Indications    Chronic anticoagulation [Z79.01]  S/P MVR (mitral valve replacement) [Z95.2]  Atrial fibrillation  chronic [I48.20]  On anticoagulant therapy [Z79.01]  Acute CVA (cerebrovascular accident) [I63.9]                 Anticoagulation Episode Summary       INR check location:      Preferred lab:      Send INR reminders to:   [facility] [place]    Comments:  Admitted with left atrial thrombus and CVA, previously on Eliquis and changed to warfarin while admitted. First visit 10/26 on warfarin 5 mg daily.          Anticoagulation Care Providers       Provider Role Specialty Phone number    Gordon Walsh MD Referring Cardiology [phone]            Clinic Interview:  Patient Findings     Negatives:  Signs/symptoms of thrombosis, Signs/symptoms of bleeding,   Laboratory test error suspected, Change in health, Change in alcohol use,   Change in activity, Upcoming invasive procedure, Emergency department   visit, Upcoming dental procedure, Missed doses, Extra doses, Change in   medications, Change in diet/appetite, Hospital admission, Bruising, Other   complaints      Clinical Outcomes     Negatives:  Major bleeding event, Thromboembolic event,   Anticoagulation-related hospital admission, Anticoagulation-related ED   visit, Anticoagulation-related fatality        INR History:      Latest Ref Rng & Units 10/19/2023     2:28 PM 10/20/2023     5:33 AM 10/21/2023     5:29 AM 10/22/2023     5:45 AM 10/23/2023     5:33 AM 10/26/2023    10:00 AM 10/30/2023     1:45 PM   Anticoagulation Monitoring   INR       5.0 3.9   INR Date        10/26/2023 10/30/2023   INR Goal       2.5-3.5 2.5-3.5   Trend        Same   Last Week Total       37.5 mg 27.5 mg   Next Week Total       25 mg 25 mg   Sun       5 mg -   Mon       - 2.5 mg (10/30)   Tue       - 2.5 mg (10/31)   Wed       - 2.5 mg (11/1)   Thu       Hold (10/26) 5 mg   Fri       2.5 mg -   Sat       5 mg -   Historical INR 0.90 - 1.10 1.12  1.12  1.68  2.81  2.74          Plan:  1. INR is Supratherapeutic today- see above in Anticoagulation Summary.  Will instruct Joaquín Garcia Jr. to Change their warfarin regimen- see above in Anticoagulation Summary.  Patient took 2.5 mg today.  Reduce to 2.5 mg tomorrow, then 2.5 mg Monday/Wednesday/Friday and 5 mg on all other days.  2. Follow up in 4 days  3. Patient declines warfarin refills.  4. Verbal and written information provided. Patient expresses understanding and has no further questions at this time.    Kendra Moncada, PharmD

## 2023-10-30 NOTE — TELEPHONE ENCOUNTER
Caller: YU      Relationship to patient: MOTHER     Best call back number: [phone]     New or established patient?   [x] New  [] Established     Date of discharge: 10-23-23     Facility discharged from: [facility]     Diagnosis/Symptoms: CVA     Length of stay (If applicable): 6 DAYS     Specialty Only: Did you see a Anabaptist health provider?    [x] Yes  [] No  If so, who? Guillermo Barrett MD     [facility] SAYS TO SEE PT IN 2 WEEKS.

## 2023-10-31 ENCOUNTER — TELEPHONE (OUTPATIENT)
Dept: FAMILY MEDICINE CLINIC | Facility: CLINIC | Age: 63
End: 2023-10-31

## 2023-10-31 NOTE — TELEPHONE ENCOUNTER
Absolutely what ever orders they need  However due to long history of noncompliance,  And to make sure he stays on top of this and not have another stroke I would like him to go to  The warfarin clinic for the clinic for his INR with cardiology I think he has been referred   They will need to monitor his INR and adjust accordingly    If this is not correct please let me know or if I need to do anything else  Etc. but hopefully he can get signed up on this now  Thanks

## 2023-11-01 NOTE — TELEPHONE ENCOUNTER
Called spoke to ibeth from [employer] with verbal order and explained to her about pt having history of noncompliance and Mono wanting pt to see a warfarin clinic with cardiology. She voiced understanding and will talk with pt.

## 2023-11-02 ENCOUNTER — READMISSION MANAGEMENT (OUTPATIENT)
Dept: CALL CENTER | Facility: HOSPITAL | Age: 63
End: 2023-11-02
Payer: MEDICARE

## 2023-11-02 ENCOUNTER — ANTICOAGULATION VISIT (OUTPATIENT)
Dept: PHARMACY | Facility: HOSPITAL | Age: 63
End: 2023-11-02
Payer: MEDICARE

## 2023-11-02 DIAGNOSIS — Z79.01 ON ANTICOAGULANT THERAPY: ICD-10-CM

## 2023-11-02 DIAGNOSIS — Z95.2 S/P MVR (MITRAL VALVE REPLACEMENT): ICD-10-CM

## 2023-11-02 DIAGNOSIS — I63.9 ACUTE CVA (CEREBROVASCULAR ACCIDENT): ICD-10-CM

## 2023-11-02 DIAGNOSIS — Z79.01 CHRONIC ANTICOAGULATION: Primary | ICD-10-CM

## 2023-11-02 DIAGNOSIS — I48.20 ATRIAL FIBRILLATION, CHRONIC: ICD-10-CM

## 2023-11-02 LAB — INR PPP: 7.7

## 2023-11-02 RX ORDER — WARFARIN SODIUM 1 MG/1
TABLET ORAL
Qty: 60 TABLET | Refills: 1 | Status: SHIPPED | OUTPATIENT
Start: 2023-11-02

## 2023-11-02 NOTE — OUTREACH NOTE
Medical Week 2 Survey      Flowsheet Row Responses   [facility] patient discharged from? [place]   Does the patient have one of the following disease processes/diagnoses(primary or secondary)? Other   Week 2 attempt successful? Yes   Call start time 1511   Discharge diagnosis Diplopia   Call end time 1517   Medication alerts for this patient Warfarin--next INR due on 11/3/23, reports it has been checked a few times post discharge   Meds reviewed with patient/caregiver? Yes   Is the patient having any side effects they believe may be caused by any medication additions or changes? No   Does the patient have all medications ordered at discharge? Yes   Prescription comments Pt reports he is no longer taking Trelegy (reviewed chart and unable to locate an order to indicate an order for discontinuation)   Is the patient taking all medications as directed (includes completed medication regime)? Yes   Comments regarding appointments Cardiology on 11/8/23   Does the patient have an appointment with their PCP within 7 days of discharge? Greater than 7 days   Nursing Interventions Verified appointment date/time/provider  [verified appt with PCP on 11/6/23]   Has the patient kept scheduled appointments due by today? Yes   Comments  nurse called Neuro regarding appt and expects a return call with scheduling on Monday or Tuesday   What is the Home health agency?  [facility]   Has home health visited the patient within 72 hours of discharge? Yes   Psychosocial issues? No   Did the patient receive a copy of their discharge instructions? Yes   Nursing interventions Reviewed instructions with patient   What is the patient's perception of their health status since discharge? Improving  [Reports he is improved at times but still having vision issues--encouraged to use cane to prevent falls as on warfarin.  Reviewed s/s cva with pt.   nurse is visiting in home at time of call.]   Is the  patient/caregiver able to teach back signs and symptoms related to disease process for when to call PCP? Yes   Is the patient/caregiver able to teach back signs and symptoms related to disease process for when to call 911? Yes   Week 2 Call Completed? Yes   Call end time 1517            KYLEE OHARA - Registered Nurse

## 2023-11-02 NOTE — PROGRESS NOTES
Anticoagulation Clinic Progress Note    Anticoagulation Summary  As of 2023      INR goal:  2.5-3.5   TTR:  --   INR used for dosin.70 (2023)   Warfarin maintenance plan:  2.5 mg every Fri; 5 mg all other days   Weekly warfarin total:  32.5 mg   Plan last modified:  Tasha Velasquez RPH (10/26/2023)   Next INR check:  2023   Target end date:      Indications    Chronic anticoagulation [Z79.01]  S/P MVR (mitral valve replacement) [Z95.2]  Atrial fibrillation  chronic [I48.20]  On anticoagulant therapy [Z79.01]  Acute CVA (cerebrovascular accident) [I63.9]                 Anticoagulation Episode Summary       INR check location:      Preferred lab:      Send INR reminders to:  [facility]    Comments:  Admitted with left atrial thrombus and CVA, previously on Eliquis and changed to warfarin while admitted. First visit 10/26 on warfarin 5 mg daily.          Anticoagulation Care Providers       Provider Role Specialty Phone number    Gordon Walsh MD Referring Cardiology [phone]            INR History:      10/21/2023     5:29 AM 10/22/2023     5:45 AM 10/23/2023     5:33 AM 10/26/2023    10:00 AM 10/30/2023     1:45 PM 2023    12:00 AM 2023     3:34 PM   Anticoagulation Monitoring   INR    5.0 3.9  7.70   INR Date    10/26/2023 10/30/2023  2023   INR Goal    2.5-3.5 2.5-3.5  2.5-3.5   Trend     Same  Same   Last Week Total    37.5 mg 27.5 mg  20 mg   Next Week Total    25 mg 25 mg  17.5 mg   Sun    5 mg -  2.5 mg ()   Mon    - 2.5 mg (10/30)  -   Tue    - 2.5 mg (10/31)  -   Wed    - 2.5 mg ()  -   Thu    Hold (10/26) 5 mg  Hold ()   Fri    2.5 mg -  Hold (11/3)   Sat    5 mg -  Hold ()   Historical INR 1.68  2.81  2.74    7.70            This result is from an external source.       Plan:  1. INR is Supratherapeutic today- see above in Anticoagulation Summary.   Provided instructions to Jossie with [facility] to Change their  warfarin regimen- see above in Anticoagulation Summary.  Hold warfarin today,tomorrow and Saturday. Take 2.5 mg on Sunday.   Sent prescription for 1 mg tablets to allow for smaller dosing adjustments.   2. Follow up in 4 days      Sydney GfofD

## 2023-11-03 LAB — CREAT BLDA-MCNC: 1 MG/DL (ref 0.6–1.3)

## 2023-11-06 ENCOUNTER — ANTICOAGULATION VISIT (OUTPATIENT)
Dept: PHARMACY | Facility: HOSPITAL | Age: 63
End: 2023-11-06
Payer: MEDICARE

## 2023-11-06 ENCOUNTER — OFFICE VISIT (OUTPATIENT)
Dept: FAMILY MEDICINE CLINIC | Facility: CLINIC | Age: 63
End: 2023-11-06
Payer: MEDICARE

## 2023-11-06 VITALS
OXYGEN SATURATION: 98 % | HEIGHT: 70 IN | RESPIRATION RATE: 16 BRPM | DIASTOLIC BLOOD PRESSURE: 78 MMHG | HEART RATE: 77 BPM | TEMPERATURE: 97.5 F | BODY MASS INDEX: 28.22 KG/M2 | SYSTOLIC BLOOD PRESSURE: 115 MMHG | WEIGHT: 197.1 LBS

## 2023-11-06 DIAGNOSIS — I63.9 ACUTE CVA (CEREBROVASCULAR ACCIDENT): ICD-10-CM

## 2023-11-06 DIAGNOSIS — Z95.2 S/P MVR (MITRAL VALVE REPLACEMENT): ICD-10-CM

## 2023-11-06 DIAGNOSIS — I63.9 ACUTE CVA (CEREBROVASCULAR ACCIDENT): Primary | ICD-10-CM

## 2023-11-06 DIAGNOSIS — Z79.01 CHRONIC ANTICOAGULATION: Primary | ICD-10-CM

## 2023-11-06 DIAGNOSIS — Z79.01 CHRONIC ANTICOAGULATION: ICD-10-CM

## 2023-11-06 DIAGNOSIS — R47.9 DIFFICULTY WITH SPEECH: ICD-10-CM

## 2023-11-06 DIAGNOSIS — Z79.01 ON ANTICOAGULANT THERAPY: ICD-10-CM

## 2023-11-06 DIAGNOSIS — I48.20 ATRIAL FIBRILLATION, CHRONIC: ICD-10-CM

## 2023-11-06 DIAGNOSIS — H53.2 DIPLOPIA: ICD-10-CM

## 2023-11-06 LAB — INR PPP: 1.8

## 2023-11-06 RX ORDER — FLUTICASONE FUROATE AND VILANTEROL 100; 25 UG/1; UG/1
1 POWDER RESPIRATORY (INHALATION) DAILY
COMMUNITY
Start: 2023-11-02

## 2023-11-06 NOTE — PATIENT INSTRUCTIONS
Discharge instructions,  Follow-up with your INR continue with her anticoagulation clinic with cardiology they are managing your INR  Avoid any warfarin holiday which will result likely in another stroke  Especially since you have a large mass still in your atrium and hopefully will resolve over several months  Follow-up with cardiology speech therapy  Ophthalmology neurology all your specialist  Falls precaution get up every hour continue to take part in life, bright lights positive music positive aromas studies show all of this is  Very effective improvement, the first 3 months is the building window of opportunity for your brain to restore itself up to 2 years but much is in the first few months    Strengthening exercises, just simply walking and standing as needed, should you have confusion lethargy weakness emergency room,    Read daily for at least  20 minutes 2 or 3 times a day    Crossword puzzles other fun games all important.

## 2023-11-06 NOTE — PROGRESS NOTES
"Chief Complaint  Follow-up (Follow up from stroke on October 17th)    Subjective        Joaquín Garcia . presents to [facility]  History of Present Illness  Pleasant patient here today recent acute CVA he been without his warfarin for several months I was unaware due to noncompliance and did not follow-up has a long history of this unfortunately, presently taking warfarin is a little high he is being held he has a follow-up INR today and cardiology tomorrow  He is being followed by the antibiotic coagulation clinic with cardiology  Home health, helping physical therapy, he is basically homebound cannot drive his mother is in his 80s and poor care of herself and can only occasionally take him out  He is unable to work at this time has diplopia and vision abnormalities, he supposed to wear a patch every 2 hours and change during the day he is doing this inconsistently but he is up out of bed for the most part, taking his medication no chest pain shortness of breath,  Assistance with his mother at home,  He is unable to drive unable to walk independently without a walker due to some weakness and gait instability after his stroke he is have no swallowing difficulties but does have mild cognitive difficulties, as well as speech impediment, difficulty reading some mild cognitive difficulty and increased vision abnormality, unable to function presently with his vision  Other than getting dressed etc. but unable to take care of himself at this time.      Objective   Vital Signs:  /78 (BP Location: Left arm, Patient Position: Sitting, Cuff Size: Adult)   Pulse 77   Temp 97.5 °F (36.4 °C) (Temporal)   Resp 16   Ht 177.8 cm (70\")   Wt 89.4 kg (197 lb 1.6 oz)   SpO2 98%   BMI 28.28 kg/m²   Estimated body mass index is 28.28 kg/m² as calculated from the following:    Height as of this encounter: 177.8 cm (70\").    Weight as of this encounter: 89.4 kg (197 lb 1.6 " oz).          Physical Exam  Vitals reviewed.   Constitutional:       General: He is not in acute distress.     Appearance: Normal appearance. He is well-developed. He is not ill-appearing, toxic-appearing or diaphoretic.      Comments: Pleasant appears well   HENT:      Head: Normocephalic.      Nose: Nose normal.   Eyes:      General: No scleral icterus.     Conjunctiva/sclera: Conjunctivae normal.      Pupils: Pupils are equal, round, and reactive to light.   Neck:      Thyroid: No thyromegaly.      Vascular: No JVD.   Cardiovascular:      Rate and Rhythm: Normal rate. Rhythm irregular.      Heart sounds: Normal heart sounds. No murmur heard.     No friction rub. No gallop.      Comments: With murmur  Pulmonary:      Effort: Pulmonary effort is normal. No respiratory distress.      Breath sounds: Normal breath sounds. No stridor. No wheezing or rales.   Abdominal:      General: Bowel sounds are normal. There is no distension.      Palpations: Abdomen is soft.      Tenderness: There is no abdominal tenderness.      Comments: No hepatosplenomegaly, no ascites,   Musculoskeletal:         General: No tenderness.      Cervical back: Neck supple.      Comments: No easily differential assessment between strength right and left or lower extremity requires walker for gait   Lymphadenopathy:      Cervical: No cervical adenopathy.   Skin:     General: Skin is warm and dry.      Findings: No erythema or rash.   Neurological:      Mental Status: He is alert and oriented to person, place, and time.      Deep Tendon Reflexes: Reflexes are normal and symmetric.      Comments: Some difficulty following instructions, unable to follow my finger in all gazes  Substantial vision abnormalities  PERRLA otherwise speech, not quite clear,  No obvious facial weakness otherwise tongue midline no facial droop obvious   Psychiatric:         Behavior: Behavior normal.         Thought Content: Thought content normal.         Judgment: Judgment  normal.        Result Review :                Assessment and Plan   Diagnoses and all orders for this visit:    1. Acute CVA (cerebrovascular accident) (Primary)  -     Ambulatory Referral to Neurology  -     Ambulatory Referral to Ophthalmology  -     Ambulatory Referral to Home Health    2. Chronic anticoagulation    3. Diplopia  -     Ambulatory Referral to Neurology  -     Ambulatory Referral to Ophthalmology  -     Ambulatory Referral to Home Health    4. Difficulty with speech  -     Ambulatory Referral to Home Health           I spent 20  minutes caring for Joaquín on this date of service. This time includes time spent by me in the following activities:preparing for the visit, reviewing tests, obtaining and/or reviewing a separately obtained history, performing a medically appropriate examination and/or evaluation , counseling and educating the patient/family/caregiver, ordering medications, tests, or procedures, referring and communicating with other health care professionals , documenting information in the medical record, and care coordination  Follow Up   Return in about 1 month (around 12/6/2023).  Patient was given instructions and counseling regarding his condition or for health maintenance advice. Please see specific information pulled into the AVS if appropriate.     Patient Instructions   Discharge instructions,  Follow-up with your INR continue with her anticoagulation clinic with cardiology they are managing your INR  Avoid any warfarin holiday which will result likely in another stroke  Especially since you have a large mass still in your atrium and hopefully will resolve over several months  Follow-up with cardiology speech therapy  Ophthalmology neurology all your specialist  Falls precaution get up every hour continue to take part in life, bright lights positive music positive aromas studies show all of this is  Very effective improvement, the first 3 months is the building window of  opportunity for your brain to restore itself up to 2 years but much is in the first few months    Strengthening exercises, just simply walking and standing as needed, should you have confusion lethargy weakness emergency room,    Read daily for at least  20 minutes 2 or 3 times a day    Crossword puzzles other fun games all important.

## 2023-11-06 NOTE — PROGRESS NOTES
Anticoagulation Clinic Progress Note    Anticoagulation Summary  As of 2023      INR goal:  2.5-3.5   TTR:  16.9% (4 d)   INR used for dosin.80 (2023)   Warfarin maintenance plan:  2.5 mg every day   Weekly warfarin total:  17.5 mg   Plan last modified:  Tasha Velasquez RPH (2023)   Next INR check:  2023   Target end date:      Indications    Chronic anticoagulation [Z79.01]  S/P MVR (mitral valve replacement) [Z95.2]  Atrial fibrillation  chronic [I48.20]  On anticoagulant therapy [Z79.01]  Acute CVA (cerebrovascular accident) [I63.9]                 Anticoagulation Episode Summary       INR check location:      Preferred lab:      Send INR reminders to:  [facility]    Comments:  Admitted with left atrial thrombus and CVA, previously on Eliquis and changed to warfarin while admitted. First visit 10/26 on warfarin 5 mg daily.          Anticoagulation Care Providers       Provider Role Specialty Phone number    Gordon Walsh MD Referring Cardiology [phone]            INR History:      10/23/2023     5:33 AM 10/26/2023    10:00 AM 10/30/2023     1:45 PM 2023    12:00 AM 2023     3:34 PM 2023    12:00 AM 2023     3:36 PM   Anticoagulation Monitoring   INR  5.0 3.9  7.70  1.80   INR Date  10/26/2023 10/30/2023  2023  2023   INR Goal  2.5-3.5 2.5-3.5  2.5-3.5  2.5-3.5   Trend   Same  Same  Down   Last Week Total  37.5 mg 27.5 mg  20 mg  10 mg   Next Week Total  25 mg 25 mg  17.5 mg  17.5 mg   Sun  5 mg -  2.5 mg ()  -   Mon  - 2.5 mg (10/30)  -  2.5 mg   Tue  - 2.5 mg (10/31)  -  2.5 mg   Wed  - 2.5 mg ()  -  2.5 mg   Thu  Hold (10/26) 5 mg  Hold ()  -   Fri  2.5 mg -  Hold (11/3)  -   Sat  5 mg -  Hold ()  -   Historical INR 2.74    7.70      1.80        Visit Report      Report Report       This result is from an external source.       Plan:  1. INR is Subtherapeutic today- see above in Anticoagulation Summary.    Provided instructions to Jossie with [facility] to Change their warfarin regimen- see above in Anticoagulation Summary.  Trial on 2.5 mg daily, rck on Thursday to determine if we need to do a couple days of 1 mg. Prior to the INR of 7.8 he had received 20 mg but INR was already elevated. Planned total until Thursday is only 10 mg of warfarin.   2. Follow up in 3 days      Tasha Velasquez RPH

## 2023-11-08 ENCOUNTER — OFFICE VISIT (OUTPATIENT)
Dept: CARDIOLOGY | Facility: CLINIC | Age: 63
End: 2023-11-08
Payer: MEDICARE

## 2023-11-08 VITALS
HEIGHT: 71 IN | OXYGEN SATURATION: 99 % | HEART RATE: 75 BPM | DIASTOLIC BLOOD PRESSURE: 80 MMHG | SYSTOLIC BLOOD PRESSURE: 110 MMHG | WEIGHT: 194.8 LBS | BODY MASS INDEX: 27.27 KG/M2

## 2023-11-08 DIAGNOSIS — I51.89 LEFT ATRIAL MASS: ICD-10-CM

## 2023-11-08 DIAGNOSIS — Z79.01 CHRONIC ANTICOAGULATION: ICD-10-CM

## 2023-11-08 DIAGNOSIS — I48.20 ATRIAL FIBRILLATION, CHRONIC: Primary | ICD-10-CM

## 2023-11-08 DIAGNOSIS — Z98.890 S/P TVR (TRICUSPID VALVE REPAIR): ICD-10-CM

## 2023-11-08 DIAGNOSIS — Z95.2 S/P MVR (MITRAL VALVE REPLACEMENT): ICD-10-CM

## 2023-11-08 DIAGNOSIS — I10 HYPERTENSION, UNSPECIFIED TYPE: ICD-10-CM

## 2023-11-08 PROCEDURE — 3079F DIAST BP 80-89 MM HG: CPT | Performed by: NURSE PRACTITIONER

## 2023-11-08 PROCEDURE — 93000 ELECTROCARDIOGRAM COMPLETE: CPT | Performed by: NURSE PRACTITIONER

## 2023-11-08 PROCEDURE — 1160F RVW MEDS BY RX/DR IN RCRD: CPT | Performed by: NURSE PRACTITIONER

## 2023-11-08 PROCEDURE — 3074F SYST BP LT 130 MM HG: CPT | Performed by: NURSE PRACTITIONER

## 2023-11-08 PROCEDURE — 1159F MED LIST DOCD IN RCRD: CPT | Performed by: NURSE PRACTITIONER

## 2023-11-08 PROCEDURE — 99214 OFFICE O/P EST MOD 30 MIN: CPT | Performed by: NURSE PRACTITIONER

## 2023-11-08 NOTE — PROGRESS NOTES
[facility] Follow Up Office Note     Encounter Date:23  Patient:Joaquín Garcia Jr.  :1960  MRN:[id number]      Chief Complaint: No chief complaint on file.        History of Presenting Illness:        Joaquín Garcia Jr. is a 63 y.o. male who is here for follow-up.  He is a patient of Dr Walsh and was previously followed by Dr Remy.    Patient has past medical history that is significant for persistent atrial fibrillation, hypertension, hyperlipidemia, tobacco abuse and mitral regurgitation.  He is status post mitral valve replacement with bioprosthetic valve in 2016 as well as a TV repair at this time.  At the time of valve surgery had a significant reaction to Lovenox.    In mid October patient was admitted to [facility] for vision changes and diplopia.  MRI brain showed acute CVA in medial aspect of thalamus on the left side as well as an acute CVA anterior lateral to the sylvian aqueduct.  Patient has been off of Coumadin for several months and was switched to apixaban.  A LILIA done during this hospitalization showed a very large mass in the left atrium that was felt most likely a very large thrombus.  He was deemed high risk for surgery given significant lung disease and was managed with heparin drip initially.  He was then transition to warfarin with INR goal of 2.5-3.5.  He was ultimately discharged on warfarin on 10/23/2023 which is being managed by our anticoagulation clinic.    On review of the patient's labs since discharge unfortunately his INR has been labile.  About a week ago it was 7.7, then most recently on  it was down to 1.8.    Today patient is accompanied by his mother.  He states his vision continues to be his largest concern.  It is definitely improved since initial hospitalization, but limits his activity.  He is able to complete activities of daily living for the most part.  He is not driving.  He is trying to arrange follow-up with  neurology but has not heard from anyone.  He is not having chest pain, dyspnea on exertion, lower extremity edema, PND orthopnea.  He denies bleeding concerns.    Review of Systems:  Review of Systems   Eyes:  Positive for blurred vision and double vision.   Cardiovascular:  Negative for chest pain, dyspnea on exertion, leg swelling, orthopnea and palpitations.   Respiratory:  Negative for shortness of breath.        Current Outpatient Medications on File Prior to Visit   Medication Sig Dispense Refill    acetaminophen (TYLENOL) 325 MG tablet Take 2 tablets by mouth Every 6 (Six) Hours As Needed for mild pain (1-3).  0    allopurinol (ZYLOPRIM) 300 MG tablet Take 1 tablet by mouth Daily. For gout prevention (Patient taking differently: Take 1 tablet by mouth Daily.) 30 tablet 11    atorvastatin (LIPITOR) 80 MG tablet Take 1 tablet by mouth Every Night. 90 tablet 0    famotidine (PEPCID) 20 MG tablet Take 1 tablet by mouth 2 (Two) Times a Day Before Meals. 60 tablet 0    fluticasone (FLONASE) 50 MCG/ACT nasal spray 2 sprays into the nostril(s) as directed by provider Daily. 15.8 mL 11    Fluticasone Furoate-Vilanterol 100-25 MCG/ACT aerosol powder  1 puff Daily.      Fluticasone-Umeclidin-Vilant (Trelegy Ellipta) 100-62.5-25 MCG/ACT inhaler Inhale 1 puff Daily. 60 each 0    hydroCHLOROthiazide (HYDRODIURIL) 12.5 MG tablet Take 1 tablet by mouth Daily. 30 tablet 11    metoprolol succinate XL (TOPROL-XL) 100 MG 24 hr tablet Take 1 tablet by mouth Daily. 30 tablet 11    warfarin (Coumadin) 1 MG tablet Take one tablet by mouth daily as directed 60 tablet 1    warfarin (COUMADIN) 5 MG tablet Take 1 tablet by mouth Every Night. Indications: Atrial Fibrillation, DVT/PE (active thrombosis), Other - full anticoagulation, goal INR 2.5 to 3.5 30 tablet 1     No current facility-administered medications on file prior to visit.       No Known Allergies    Past Medical History:   Diagnosis Date    Arthritis     Atrial fibrillation      COPD (chronic obstructive pulmonary disease)     PT STATES NO LONGER ON INHALERS BUT IS BREATHING WELL    Degenerative lumbar disc     Emphysema with chronic bronchitis     H/O tricuspid valve repair     MEDTRONIC ANNULOPLASTY RING    History of heart valve replacement with porcine valve 2016    MITRAL VALVE, DR GEORGE    Hypertension     Low back pain     RADIATES DOWN BLE, WORSE ON RT, SOME NUMBNESS/TINGLING    Neurofibromatosis     On anticoagulant therapy     Sleep apnea     DOES NOT USE CPAP    Spinal stenosis     Stroke        Past Surgical History:   Procedure Laterality Date    CARDIAC CATHETERIZATION  10/20/2016    Procedure: Case Abort;  Surgeon: Zhen Ford MD;  Location[facility] C[facility]AS[facility];  Service:     CARDIAC CATHETERIZATION Left 10/21/2016    Procedure: Cardiac catheterization;  Surgeon: Zhen Ford MD;  Location[facility] C[facility]AS[facility] LOC[facility];  Service:     HERNIA REPAIR      LUMBAR LAMINECTOMY DISCECTOMY DECOMPRESSION Bilateral 2020    Procedure: Lumbar 4 - Lumbar 5 Laminectomy;  Surgeon: Marcos Christine MD;  Location: [facility] MAIN OR;  Service: Orthopedic Spine;  Laterality: Bilateral;    MITRAL VALVE REPAIR/REPLACEMENT N/A 10/27/2016    Procedure: INTRAOPERATIVE LILIA, MIDLINE STERNOTOMY WITH MITRAL VALVE REPLACEMENT TRICUSPID VALVE REPAIR;  Surgeon: Robert George MD;  Location: [facility] OR;  Service:     TEETH EXTRACTION N/A 10/25/2016    Procedure: TEETH EXTRACTION FULL MOUTH;  Surgeon: Gio Ibarra DMD;  Location: [facility] MAIN OR;  Service:        Social History     Socioeconomic History    Marital status: Single   Tobacco Use    Smoking status: Former     Packs/day: 0.50     Years: 35.00     Additional pack years: 0.00     Total pack years: 17.50     Types: Cigarettes     Quit date: 10/17/2023     Years since quittin.0    Smokeless tobacco: Never    Tobacco comments:     PT STATES DOWN TO 1/2 TO 1/3 PPD FROM 1PPD   Vaping Use    Vaping  "Use: Never used   Substance and Sexual Activity    Alcohol use: Yes     Alcohol/week: 2.0 standard drinks of alcohol     Types: 2 Shots of liquor per week     Comment: Caffeine - Soft Drinks daily    Drug use: No    Sexual activity: Defer       Family History   Problem Relation Age of Onset    Heart disease Father     Hypertension Father     Malig Hyperthermia Neg Hx        The following portions of the patient's history were reviewed and updated as appropriate: allergies, current medications, past family history, past medical history, past social history, past surgical history and problem list.       Objective:       Vitals:    11/08/23 0919   BP: 110/80   Pulse: 75   SpO2: 99%   Weight: 88.4 kg (194 lb 12.8 oz)   Height: 180.3 cm (71\")         Physical Exam:  Constitutional: Well appearing, well developed, no acute distress   HENT: Oropharynx clear and membrane moist  Eyes: Normal conjunctiva, no sclera icterus  Neck: Supple, no carotid bruit bilaterally  Cardiovascular: Irregularly irregular rate and rhythm, No Murmur, No bilateral lower extremity edema  Pulmonary: Normal respiratory effort, normal lung sounds, no wheezing  Neurological: Alert and orient x 3  Skin: Warm, dry, no ecchymosis, no rash  Psych: Appropriate mood and affect. Normal judgment and insight         Lab Results   Component Value Date     10/23/2023     10/22/2023    K 4.0 10/23/2023    K 4.0 10/22/2023     10/23/2023     10/22/2023    CO2 21.1 (L) 10/23/2023    CO2 20.3 (L) 10/22/2023    BUN 14 10/23/2023    BUN 13 10/22/2023    CREATININE 0.84 10/23/2023    CREATININE 0.83 10/22/2023    EGFRIFNONA 64 09/30/2021    EGFRIFNONA 77 10/28/2020    EGFRIFAFRI 77 09/30/2021    EGFRIFAFRI 109 06/30/2020    GLUCOSE 85 10/23/2023    GLUCOSE 83 10/22/2023    CALCIUM 8.9 10/23/2023    CALCIUM 8.9 10/22/2023    PROTENTOTREF 7.4 09/26/2023    PROTENTOTREF 7.9 08/09/2022    ALBUMIN 3.9 10/18/2023    ALBUMIN 4.6 10/17/2023    BILITOT " 0.6 10/18/2023    BILITOT 0.5 10/17/2023    AST 13 10/18/2023    AST 17 10/17/2023    ALT 21 10/18/2023    ALT 28 10/17/2023     Lab Results   Component Value Date    WBC 7.89 10/23/2023    WBC 8.09 10/22/2023    HGB 16.1 10/23/2023    HGB 16.2 10/22/2023    HCT 46.6 10/23/2023    HCT 47.2 10/22/2023    MCV 98.7 (H) 10/23/2023    MCV 99.6 (H) 10/22/2023     10/23/2023     10/22/2023     Lab Results   Component Value Date    CHOL 173 10/18/2023    TRIG 137 10/18/2023    TRIG 88 09/26/2023    HDL 47 10/18/2023    HDL 51 09/26/2023     (H) 10/18/2023     (H) 09/26/2023     Lab Results   Component Value Date    PROBNP 1,447.0 (H) 10/16/2016    PROBNP 3,429.0 (H) 10/11/2016     Lab Results   Component Value Date    TROPONINT 10 10/17/2023     Lab Results   Component Value Date    TSH 0.818 10/18/2023    TSH 1.200 09/26/2023           ECG 12 Lead    Date/Time: 11/8/2023 9:26 AM  Performed by: Kiki Henriquez APRN    Authorized by: Kiki Henriquez APRN  Comparison: compared with previous ECG from 10/17/2023  Similar to previous ECG  Rhythm: atrial fibrillation  Rate: normal  BPM: 69  Conduction: conduction normal  ST Segments: ST segments normal        Transesophageal echocardiogram 10/18/2023:  The left atrium is massively enlarged (giant left atrium). There is heavy swirling smoke within the left atrium the left atrial appendage.  There is a very large left atrial mass attached to the posterolateral aspect of the left atrium. 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 A less probable differential would include a massive left atrial thrombus  Left ventricular systolic function is normal. Left ventricular ejection fraction appears to be 61 - 65%.  Left ventricular wall thickness is consistent with mild to moderate concentric hypertrophy.  The right ventricular cavity is moderate to severely  dilated. Normal right ventricular systolic function noted.  There is a bioprosthetic mitral valve present. The mitral valve peak and mean gradients are within defined limits. The prosthetic mitral valve is normal.  There is an annuloplasty ring in the tricuspid valve position. There is trace tricuspid regurgitation through the annuloplasty ring.  There is no evidence of pericardial effusion     Echocardiogram 12/3/2020:  Calculated left ventricular EF = 55.1% Estimated left ventricular EF = 55% Estimated left ventricular EF was in agreement with the calculated left ventricular EF. Left ventricular systolic function is normal. Normal left ventricular cavity size noted. Left ventricular wall thickness is consistent with mild concentric hypertrophy. All left ventricular wall segments contract normally. Left ventricular diastolic function was indeterminate.  Left atrial volume is severely increased.The right atrial cavity is severely dilated.  There is mild thickening of the aortic valve.  There is a 33 mm, porcine, Medtronic bioprosthetic mitral valve present. The mitral valve peak and mean gradients are within defined limits. There is no significant prosthetic valve stenosis or regurgitation.  No significant tricuspid valve regurgitation or significant stenosis present. There is evidence of previous tricuspid valve repair.     Cardiac catheterization 10/21/2016:  Left main: Normal  LAD: 20% proximal LAD segment stenoses otherwise luminal irregularities throughout  Circumflex: Angiographically normal throughout  RCA: 20% mid segment stenoses supplying a PDA     Transesophageal echocardiogram 10/23/2016 images reviewed by myself:  All left ventricular wall segments contract normally.  Left ventricular function is normal.  Left atrial cavity size is severely dilated.  Severe mitral valve regurgitation is present due to prolapse  Moderate tricuspid valve regurgitation is present.     Mitral valve replacement/tricuspid  valve repair 10/27/2016 Dr. George:  Mitral valve replacement with a 33 mm Medtronic Mosaic ultra porcine valve with preservation of all subvalvar apparatus.    Tricuspid valve repair with a 32 mm Medtronic 3-D ring.      Assessment:          Diagnosis Plan   1. Atrial fibrillation, chronic  ECG 12 Lead      2. S/P MVR (mitral valve replacement)        3. S/P TVR (tricuspid valve repair)        4. Hypertension, unspecified type        5. Left atrial mass        6. Chronic anticoagulation               Plan:       Permanent atrial fibrillation -by strict criteria that he is not truly valvular atrial fibrillation in etiology, however given the size of his atrium and LA thrombus he is behaving this way.  We have recommended warfarin with goal INR 2.5-3.5.  He is rate controlled on beta-blocker  Left atrial thrombus -likely in setting of holding warfarin as transition to DOAC.  Additionally, feel behaving like rheumatic valvular atrial fibrillation even though he it is not truly valvular atrial fibrillation.  He is on warfarin with INR goal of 2.5-3.5.  This is being managed by our anticoagulation clinic.  Given stroke may need to consider bridging for procedures however note he does have noted reaction to Lovenox at time of surgery  Essential hypertension -controlled on present regimen  CVA -in setting of subtherapeutic INR/ transition to DOAC. Still with vision field deficits. Will follow with Neurology  Nonrheumatic valve disease: status post tissue mitral valve replacement and tricuspid valve repair - 2016 by Dr. George  Ongoing tobacco abuse -encourage cessation    Patient is seen today for follow-up.  I think he is stable from a cardiac perspective.  I discussed no elective procedures at least for the next 6 months.  Anticoagulation managed by anticoagulation clinic.  In the future we will have to address if he needs bridging.  Follow-up with Dr. Walsh in 1 month or earlier with problems    Orders Placed This  Encounter   Procedures    ECG 12 Lead     This order was created via procedure documentation     Order Specific Question:   Release to patient     Answer:   Routine Release [[id number]]            TIFFANIE Jackson  [facility]  11/08/23  09:45 EST

## 2023-11-09 LAB — INR PPP: 2.5

## 2023-11-10 ENCOUNTER — ANTICOAGULATION VISIT (OUTPATIENT)
Dept: PHARMACY | Facility: HOSPITAL | Age: 63
End: 2023-11-10
Payer: MEDICARE

## 2023-11-10 DIAGNOSIS — Z79.01 CHRONIC ANTICOAGULATION: Primary | ICD-10-CM

## 2023-11-10 DIAGNOSIS — Z95.2 S/P MVR (MITRAL VALVE REPLACEMENT): ICD-10-CM

## 2023-11-10 DIAGNOSIS — I48.20 ATRIAL FIBRILLATION, CHRONIC: ICD-10-CM

## 2023-11-10 DIAGNOSIS — Z79.01 ON ANTICOAGULANT THERAPY: ICD-10-CM

## 2023-11-10 DIAGNOSIS — I63.9 ACUTE CVA (CEREBROVASCULAR ACCIDENT): ICD-10-CM

## 2023-11-10 NOTE — PROGRESS NOTES
Anticoagulation Clinic Progress Note    Anticoagulation Summary  As of 11/10/2023      INR goal:  2.5-3.5   TTR:  9.7% (1 wk)   INR used for dosin.50 (2023)   Warfarin maintenance plan:  1 mg every Mon, Fri; 2.5 mg all other days   Weekly warfarin total:  14.5 mg   Plan last modified:  Tasha Velasquez RPH (11/10/2023)   Next INR check:     Target end date:      Indications    Chronic anticoagulation [Z79.01]  S/P MVR (mitral valve replacement) [Z95.2]  Atrial fibrillation  chronic [I48.20]  On anticoagulant therapy [Z79.01]  Acute CVA (cerebrovascular accident) [I63.9]                 Anticoagulation Episode Summary       INR check location:      Preferred lab:      Send INR reminders to:  [facility]    Comments:  Admitted with left atrial thrombus and CVA, previously on Eliquis and changed to warfarin while admitted. First visit 10/26 on warfarin 5 mg daily.          Anticoagulation Care Providers       Provider Role Specialty Phone number    Gordon Walsh MD Referring Cardiology [phone]            INR History:      10/30/2023     1:45 PM 2023    12:00 AM 2023     3:34 PM 2023    12:00 AM 2023     3:36 PM 2023    12:00 AM 11/10/2023     3:08 PM   Anticoagulation Monitoring   INR 3.9  7.70  1.80  2.50   INR Date 10/30/2023  2023  2023  2023   INR Goal 2.5-3.5  2.5-3.5  2.5-3.5  2.5-3.5   Trend Same  Same  Down  Down   Last Week Total 27.5 mg  20 mg  10 mg  12.5 mg   Next Week Total 25 mg  17.5 mg  17.5 mg  14.5 mg   Sun -  2.5 mg ()  -  2.5 mg   Mon 2.5 mg (10/30)  -  2.5 mg  1 mg   Tue 2.5 mg (10/31)  -  2.5 mg  2.5 mg   Wed 2.5 mg ()  -  2.5 mg  2.5 mg   Thu 5 mg  Hold ()  -  2.5 mg   Fri -  Hold (11/3)  -  1 mg   Sat -  Hold ()  -  2.5 mg   Historical INR  7.70      1.80      2.50        Visit Report    Report Report         This result is from an external source.       Plan:  1. INR is Therapeutic today- see above in  Anticoagulation Summary.   Provided instructions to Jossie with [facility] to Change their warfarin regimen- see above in Anticoagulation Summary.  received 12.5 mg over the past week. Trial on 1 mg on mon and fri and 2.5 mg AOD (14,5 mg versus 12.5 mg), rck 1 week (Jossie with [facility] [phone])   2. Follow up in 1 week      Tasha Velasquez RPH

## 2023-11-13 ENCOUNTER — TELEPHONE (OUTPATIENT)
Dept: FAMILY MEDICINE CLINIC | Facility: CLINIC | Age: 63
End: 2023-11-13

## 2023-11-13 NOTE — TELEPHONE ENCOUNTER
Caller: KAREN/GILDARDO HILL    Relationship: Other    Best call back number: [phone]     What orders are you requesting (i.e. lab or imaging): URINALYSIS    In what timeframe would the patient need to come in: HOME HEALTH NURSE WOULD COLLECT        Additional notes:   PATIENT'S URINE IS DARK,FREQUENT URINATION BUT SMALL AMOUNTS AND HE GENERALLY DOES NOT FEEL WELL. COULD AN ORDER BE PUT IN? PLEASE CALL GILDARDO HILL AT [facility] WITH THE ORDER.

## 2023-11-14 NOTE — TELEPHONE ENCOUNTER
Spoke with Scott Adams in detail. Verbal orders given per provider instructions. Voiced understanding.

## 2023-11-16 ENCOUNTER — ANTICOAGULATION VISIT (OUTPATIENT)
Dept: PHARMACY | Facility: HOSPITAL | Age: 63
End: 2023-11-16
Payer: MEDICARE

## 2023-11-16 DIAGNOSIS — Z79.01 ON ANTICOAGULANT THERAPY: ICD-10-CM

## 2023-11-16 DIAGNOSIS — I63.9 ACUTE CVA (CEREBROVASCULAR ACCIDENT): ICD-10-CM

## 2023-11-16 DIAGNOSIS — Z95.2 S/P MVR (MITRAL VALVE REPLACEMENT): ICD-10-CM

## 2023-11-16 DIAGNOSIS — Z79.01 CHRONIC ANTICOAGULATION: Primary | ICD-10-CM

## 2023-11-16 DIAGNOSIS — I48.20 ATRIAL FIBRILLATION, CHRONIC: ICD-10-CM

## 2023-11-16 LAB — INR PPP: 2.3

## 2023-11-16 NOTE — PROGRESS NOTES
Anticoagulation Clinic Progress Note    Anticoagulation Summary  As of 2023      INR goal:  2.5-3.5   TTR:  4.8% (2 wk)   INR used for dosin.30 (2023)   Warfarin maintenance plan:  2.5 mg every day   Weekly warfarin total:  17.5 mg   Plan last modified:  Kendra Moncada, PharmD (2023)   Next INR check:  2023   Target end date:      Indications    Chronic anticoagulation [Z79.01]  S/P MVR (mitral valve replacement) [Z95.2]  Atrial fibrillation  chronic [I48.20]  On anticoagulant therapy [Z79.01]  Acute CVA (cerebrovascular accident) [I63.9]                 Anticoagulation Episode Summary       INR check location:      Preferred lab:      Send INR reminders to:  [facility]    Comments:  Admitted with left atrial thrombus and CVA, previously on Eliquis and changed to warfarin while admitted. First visit 10/26 on warfarin 5 mg daily.          Anticoagulation Care Providers       Provider Role Specialty Phone number    Gordon Walsh MD Referring Cardiology [phone]            INR History:      2023     3:34 PM 2023    12:00 AM 2023     3:36 PM 2023    12:00 AM 11/10/2023     3:08 PM 2023    12:00 AM 2023     3:32 PM   Anticoagulation Monitoring   INR 7.70  1.80  2.50  2.30   INR Date 2023   INR Goal 2.5-3.5  2.5-3.5  2.5-3.5  2.5-3.5   Trend Same  Down  Down  Up   Last Week Total 20 mg  10 mg  12.5 mg  17.5 mg   Next Week Total 17.5 mg  17.5 mg  14.5 mg  18.5 mg   Sun 2.5 mg ()  -  2.5 mg  2.5 mg   Mon -  2.5 mg  1 mg  2.5 mg   Tue -  2.5 mg  2.5 mg  2.5 mg   Wed -  2.5 mg  2.5 mg  -   Thu Hold ()  -  -  3.5 mg ()   Fri Hold (11/3)  -  1 mg  2.5 mg ()   Sat Hold ()  -  2.5 mg  2.5 mg   Historical INR  1.80      2.50      2.30        Visit Report  Report Report           This result is from an external source.       Plan:  1. INR is Subtherapeutic today- see above in  Anticoagulation Summary.   Provided instructions to Jossie  with [facility] to Change their warfarin regimen- see above in Anticoagulation Summary.  Since patient is currently taking 2.5 mg daily; recommend 3.5 mg today then resume 2.5 mg daily.   Faxing lab orders to [facility] for INR checks after [facility] discharge.  2. Follow up in 1 week      Kendra Moncada, SydneyD

## 2023-11-20 ENCOUNTER — TELEPHONE (OUTPATIENT)
Dept: FAMILY MEDICINE CLINIC | Facility: CLINIC | Age: 63
End: 2023-11-20

## 2023-11-20 NOTE — TELEPHONE ENCOUNTER
Caller: KAREN    Relationship to patient: [facility]    Best call back number: [phone]    Patient is needing: THE PATIENT IS BEING DISCHARGED FROM HOME HEALTH PHYSICAL THERAPY TODAY AND NURSING ON WEDNESDAY. PHYSICAL THERAPY IS TRYING TO GET THE PATIENT IN WITH A [facility] NEURO OUT-PATIENT THERAPIST. THE PATIENT WILL BE ABLE TO HAVE SPEECH SERVICES AT THIS LOCATION. CARETENDERS WILL CONTACT THE OFFICE TO SEND OVER AN ORDER/REFERRAL ONCE THEY RECEIVE A CALL BACK FROM [facility].

## 2023-11-21 NOTE — TELEPHONE ENCOUNTER
Caller: KAREN    Relationship to patient: [facility]    Best call back number: [phone]    Patient is needing: KAREN CALLED BACK WITH A FAX NUMBER FOR [facility] NEURO OUT PATIENT.     FAX: [phone]

## 2023-11-21 NOTE — TELEPHONE ENCOUNTER
I placed a referral on the sixth of neurology for acute CVA  Is this what you are referring to please assist thank you and make sure it went through inpatient is in progress of scheduling appointment thank you

## 2023-11-22 ENCOUNTER — ANTICOAGULATION VISIT (OUTPATIENT)
Dept: PHARMACY | Facility: HOSPITAL | Age: 63
End: 2023-11-22
Payer: MEDICARE

## 2023-11-22 ENCOUNTER — TELEPHONE (OUTPATIENT)
Dept: FAMILY MEDICINE CLINIC | Facility: CLINIC | Age: 63
End: 2023-11-22

## 2023-11-22 DIAGNOSIS — I63.9 ACUTE CVA (CEREBROVASCULAR ACCIDENT): ICD-10-CM

## 2023-11-22 DIAGNOSIS — I48.20 ATRIAL FIBRILLATION, CHRONIC: ICD-10-CM

## 2023-11-22 DIAGNOSIS — Z79.01 CHRONIC ANTICOAGULATION: Primary | ICD-10-CM

## 2023-11-22 DIAGNOSIS — Z79.01 ON ANTICOAGULANT THERAPY: ICD-10-CM

## 2023-11-22 DIAGNOSIS — Z95.2 S/P MVR (MITRAL VALVE REPLACEMENT): ICD-10-CM

## 2023-11-22 LAB — INR PPP: 1.4

## 2023-11-22 NOTE — PROGRESS NOTES
Anticoagulation Clinic Progress Note    Anticoagulation Summary  As of 2023      INR goal:  2.5-3.5   TTR:  3.4% (2.9 wk)   INR used for dosin.40 (2023)   Warfarin maintenance plan:  2.5 mg every day   Weekly warfarin total:  17.5 mg   Plan last modified:  Kendra Moncada, PharmD (2023)   Next INR check:  2023   Target end date:      Indications    Chronic anticoagulation [Z79.01]  S/P MVR (mitral valve replacement) [Z95.2]  Atrial fibrillation  chronic [I48.20]  On anticoagulant therapy [Z79.01]  Acute CVA (cerebrovascular accident) [I63.9]                 Anticoagulation Episode Summary       INR check location:      Preferred lab:      Send INR reminders to:   [facility]    Comments:  Admitted with left atrial thrombus and CVA, previously on Eliquis and changed to warfarin while admitted. First visit 10/26 on warfarin 5 mg daily.          Anticoagulation Care Providers       Provider Role Specialty Phone number    Gordon Walsh MD Referring Cardiology [phone]            Clinic Interview:  Patient Findings     Negatives:  Signs/symptoms of thrombosis, Signs/symptoms of bleeding,   Laboratory test error suspected, Change in health, Change in alcohol use,   Change in activity, Upcoming invasive procedure, Emergency department   visit, Upcoming dental procedure, Missed doses, Extra doses, Change in   medications, Change in diet/appetite, Hospital admission, Bruising, Other   complaints    Comments:  no missed doses, denies any other changes      Clinical Outcomes     Negatives:  Major bleeding event, Thromboembolic event,   Anticoagulation-related hospital admission, Anticoagulation-related ED   visit, Anticoagulation-related fatality    Comments:  no missed doses, denies any other changes        INR History:      2023     3:36 PM 2023    12:00 AM 11/10/2023     3:08 PM 2023    12:00 AM 2023     3:32 PM 2023    12:00 AM 2023      2:17 PM   Anticoagulation Monitoring   INR 1.80  2.50  2.30  1.40   INR Date 11/6/2023 11/9/2023 11/16/2023 11/22/2023   INR Goal 2.5-3.5  2.5-3.5  2.5-3.5  2.5-3.5   Trend Down  Down  Up  Same   Last Week Total 10 mg  12.5 mg  17.5 mg  18.5 mg   Next Week Total 17.5 mg  14.5 mg  18.5 mg  20 mg   Sun -  2.5 mg  2.5 mg  2.5 mg   Mon 2.5 mg  1 mg  2.5 mg  -   Tue 2.5 mg  2.5 mg  2.5 mg  -   Wed 2.5 mg  2.5 mg  -  5 mg (11/22)   Thu -  -  3.5 mg (11/16)  2.5 mg   Fri -  1 mg  2.5 mg (11/17)  2.5 mg   Sat -  2.5 mg  2.5 mg  2.5 mg   Historical INR  2.50      2.30      1.40        Visit Report Report             This result is from an external source.       Plan:  1. INR is Subtherapeutic today- see above in Anticoagulation Summary.   Will instruct Joaquín Garcia Jr. to Increase their warfarin regimen- see above in Anticoagulation Summary.  boost to 5 mg today then resume[facility]  2. Follow up in 5 days   3. They have been instructed to call if any changes in medications, doses, concerns, etc. Patient expresses understanding and has no further questions at this time.    Tasha Velasquez [facility]

## 2023-11-27 LAB — INR PPP: 1.6

## 2023-11-28 ENCOUNTER — ANTICOAGULATION VISIT (OUTPATIENT)
Dept: PHARMACY | Facility: HOSPITAL | Age: 63
End: 2023-11-28
Payer: MEDICARE

## 2023-11-28 DIAGNOSIS — I63.9 ACUTE CVA (CEREBROVASCULAR ACCIDENT): ICD-10-CM

## 2023-11-28 DIAGNOSIS — Z79.01 ON ANTICOAGULANT THERAPY: ICD-10-CM

## 2023-11-28 DIAGNOSIS — I48.20 ATRIAL FIBRILLATION, CHRONIC: ICD-10-CM

## 2023-11-28 DIAGNOSIS — Z79.01 CHRONIC ANTICOAGULATION: Primary | ICD-10-CM

## 2023-11-28 DIAGNOSIS — Z95.2 S/P MVR (MITRAL VALVE REPLACEMENT): ICD-10-CM

## 2023-11-28 NOTE — PROGRESS NOTES
Anticoagulation Clinic Progress Note    Anticoagulation Summary  As of 2023      INR goal:  2.5-3.5   TTR:  2.7% (3.6 wk)   INR used for dosin.60 (2023)   Warfarin maintenance plan:  5 mg every Tue, Thu; 2.5 mg all other days   Weekly warfarin total:  22.5 mg   Plan last modified:  Kendra Moncada, PharmD (2023)   Next INR check:  2023   Target end date:      Indications    Chronic anticoagulation [Z79.01]  S/P MVR (mitral valve replacement) [Z95.2]  Atrial fibrillation  chronic [I48.20]  On anticoagulant therapy [Z79.01]  Acute CVA (cerebrovascular accident) [I63.9]                 Anticoagulation Episode Summary       INR check location:      Preferred lab:      Send INR reminders to:   [facility]    Comments:  Admitted with left atrial thrombus and CVA, previously on Eliquis and changed to warfarin while admitted. First visit 10/26 on warfarin 5 mg daily.          Anticoagulation Care Providers       Provider Role Specialty Phone number    Gordon Walsh MD Referring Cardiology [phone]            Clinic Interview:  Patient Findings     Negatives:  Signs/symptoms of thrombosis, Signs/symptoms of bleeding,   Laboratory test error suspected, Change in health, Change in alcohol use,   Change in activity, Upcoming invasive procedure, Emergency department   visit, Upcoming dental procedure, Missed doses, Extra doses, Change in   medications, Change in diet/appetite, Hospital admission, Bruising, Other   complaints      Clinical Outcomes     Negatives:  Major bleeding event, Thromboembolic event,   Anticoagulation-related hospital admission, Anticoagulation-related ED   visit, Anticoagulation-related fatality        INR History:      11/10/2023     3:08 PM 2023    12:00 AM 2023     3:32 PM 2023    12:00 AM 2023     2:17 PM 2023    12:00 AM 2023     2:36 PM   Anticoagulation Monitoring   INR 2.50  2.30  1.40  1.60   INR Date  11/9/2023 11/16/2023 11/22/2023 11/27/2023   INR Goal 2.5-3.5  2.5-3.5  2.5-3.5  2.5-3.5   Trend Down  Up  Same  Up   Last Week Total 12.5 mg  17.5 mg  18.5 mg  20 mg   Next Week Total 14.5 mg  18.5 mg  20 mg  22.5 mg   Sun 2.5 mg  2.5 mg  2.5 mg  2.5 mg   Mon 1 mg  2.5 mg  -  -   Tue 2.5 mg  2.5 mg  -  5 mg (11/28)   Wed 2.5 mg  -  5 mg (11/22)  2.5 mg   Thu -  3.5 mg (11/16)  2.5 mg  5 mg (11/30)   Fri 1 mg  2.5 mg (11/17)  2.5 mg  2.5 mg   Sat 2.5 mg  2.5 mg  2.5 mg  2.5 mg   Historical INR  2.30      1.40      1.60            This result is from an external source.       Plan:  1. INR is Subtherapeutic today- see above in Anticoagulation Summary.   Will instruct Joaquín Garcia Jr. to Change their warfarin regimen- see above in Anticoagulation Summary.  Increase to 5 mg on Tuesday and Thursday and 2.5 mg on all other days.   2. Follow up in 1 weeks  3. They have been instructed to call if any changes in medications, doses, concerns, etc. Patient expresses understanding and has no further questions at this time.    Kendra Moncada, PharmD

## 2023-11-29 ENCOUNTER — TELEPHONE (OUTPATIENT)
Dept: ONCOLOGY | Facility: CLINIC | Age: 63
End: 2023-11-29

## 2023-11-29 NOTE — TELEPHONE ENCOUNTER
Caller: Joaquín Garcia Jr.    Relationship to patient: Self    Best call back number: [phone]    Chief complaint: CANCEL    Type of visit: NEW PT    Requested date: WILL NOT BE RESCHEDULING AT THIS TIME    If rescheduling, when is the original appointment: 12-1

## 2023-12-05 ENCOUNTER — ANTICOAGULATION VISIT (OUTPATIENT)
Dept: PHARMACY | Facility: HOSPITAL | Age: 63
End: 2023-12-05
Payer: MEDICARE

## 2023-12-05 DIAGNOSIS — I63.9 ACUTE CVA (CEREBROVASCULAR ACCIDENT): ICD-10-CM

## 2023-12-05 DIAGNOSIS — I48.20 ATRIAL FIBRILLATION, CHRONIC: ICD-10-CM

## 2023-12-05 DIAGNOSIS — Z79.01 CHRONIC ANTICOAGULATION: Primary | ICD-10-CM

## 2023-12-05 DIAGNOSIS — Z79.01 ON ANTICOAGULANT THERAPY: ICD-10-CM

## 2023-12-05 DIAGNOSIS — Z95.2 S/P MVR (MITRAL VALVE REPLACEMENT): ICD-10-CM

## 2023-12-05 LAB
INR PPP: 3.2 (ref 0.9–1.2)
PROTHROMBIN TIME: 31.9 SEC (ref 9.1–12)

## 2023-12-05 NOTE — PROGRESS NOTES
Anticoagulation Clinic Progress Note    Anticoagulation Summary  As of 12/5/2023      INR goal:  2.5-3.5   TTR:  12.2% (1.1 mo)   INR used for dosing:  3.2 (12/4/2023)   Warfarin maintenance plan:  5 mg every Tue, Thu; 2.5 mg all other days   Weekly warfarin total:  22.5 mg   No change documented:  Kendra Moncada PharmD   Plan last modified:  Kendra Moncada PharmD (11/28/2023)   Next INR check:  12/13/2023   Target end date:      Indications    Chronic anticoagulation [Z79.01]  S/P MVR (mitral valve replacement) [Z95.2]  Atrial fibrillation  chronic [I48.20]  On anticoagulant therapy [Z79.01]  Acute CVA (cerebrovascular accident) [I63.9]                 Anticoagulation Episode Summary       INR check location:      Preferred lab:      Send INR reminders to:  [facility]    Comments:  Admitted with left atrial thrombus and CVA, previously on Eliquis and changed to warfarin while admitted. First visit 10/26 on warfarin 5 mg daily.          Anticoagulation Care Providers       Provider Role Specialty Phone number    Gordon Walsh MD Referring Cardiology [phone]            Clinic Interview:  Patient Findings     Negatives:  Signs/symptoms of thrombosis, Signs/symptoms of bleeding,   Laboratory test error suspected, Change in health, Change in alcohol use,   Change in activity, Upcoming invasive procedure, Emergency department   visit, Upcoming dental procedure, Missed doses, Extra doses, Change in   medications, Change in diet/appetite, Hospital admission, Bruising, Other   complaints      Clinical Outcomes     Negatives:  Major bleeding event, Thromboembolic event,   Anticoagulation-related hospital admission, Anticoagulation-related ED   visit, Anticoagulation-related fatality        INR History:      11/16/2023    12:00 AM 11/16/2023     3:32 PM 11/22/2023    12:00 AM 11/22/2023     2:17 PM 11/27/2023    12:00 AM 11/28/2023     2:36 PM 12/5/2023     8:36 AM   Anticoagulation Monitoring    INR  2.30  1.40  1.60 3.2   INR Date  11/16/2023 11/22/2023 11/27/2023 12/4/2023   INR Goal  2.5-3.5  2.5-3.5  2.5-3.5 2.5-3.5   Trend  Up  Same  Up Same   Last Week Total  17.5 mg  18.5 mg  20 mg 22.5 mg   Next Week Total  18.5 mg  20 mg  22.5 mg 22.5 mg   Sun  2.5 mg  2.5 mg  2.5 mg 2.5 mg   Mon  2.5 mg  -  - 2.5 mg   Tue  2.5 mg  -  5 mg (11/28) 5 mg   Wed  -  5 mg (11/22)  2.5 mg 2.5 mg   Thu  3.5 mg (11/16)  2.5 mg  5 mg (11/30) 5 mg   Fri  2.5 mg (11/17)  2.5 mg  2.5 mg 2.5 mg   Sat  2.5 mg  2.5 mg  2.5 mg 2.5 mg   Historical INR 2.30      1.40      1.60             This result is from an external source.       Plan:  1. INR is Therapeutic today- see above in Anticoagulation Summary.   Will instruct Joaquín Garcia Jr. to Continue their warfarin regimen- see above in Anticoagulation Summary.  2. Follow up in 1 weeks  3. They have been instructed to call if any changes in medications, doses, concerns, etc. Patient expresses understanding and has no further questions at this time.    Kendra Moncada, PharmD

## 2023-12-13 ENCOUNTER — OFFICE VISIT (OUTPATIENT)
Age: 63
End: 2023-12-13
Payer: MEDICARE

## 2023-12-13 ENCOUNTER — LAB (OUTPATIENT)
Dept: LAB | Facility: HOSPITAL | Age: 63
End: 2023-12-13
Payer: MEDICARE

## 2023-12-13 ENCOUNTER — ANTICOAGULATION VISIT (OUTPATIENT)
Dept: PHARMACY | Facility: HOSPITAL | Age: 63
End: 2023-12-13
Payer: MEDICARE

## 2023-12-13 VITALS
DIASTOLIC BLOOD PRESSURE: 72 MMHG | HEIGHT: 71 IN | SYSTOLIC BLOOD PRESSURE: 120 MMHG | HEART RATE: 70 BPM | BODY MASS INDEX: 27.8 KG/M2 | WEIGHT: 198.6 LBS

## 2023-12-13 DIAGNOSIS — Z79.01 ON ANTICOAGULANT THERAPY: ICD-10-CM

## 2023-12-13 DIAGNOSIS — I48.20 ATRIAL FIBRILLATION, CHRONIC: ICD-10-CM

## 2023-12-13 DIAGNOSIS — I10 HYPERTENSION, UNSPECIFIED TYPE: ICD-10-CM

## 2023-12-13 DIAGNOSIS — Z95.2 S/P MVR (MITRAL VALVE REPLACEMENT): ICD-10-CM

## 2023-12-13 DIAGNOSIS — Z79.01 CHRONIC ANTICOAGULATION: ICD-10-CM

## 2023-12-13 DIAGNOSIS — E78.2 MIXED HYPERLIPIDEMIA: ICD-10-CM

## 2023-12-13 DIAGNOSIS — Z98.890 S/P TVR (TRICUSPID VALVE REPAIR): ICD-10-CM

## 2023-12-13 DIAGNOSIS — I48.20 ATRIAL FIBRILLATION, CHRONIC: Primary | ICD-10-CM

## 2023-12-13 DIAGNOSIS — I63.9 ACUTE CVA (CEREBROVASCULAR ACCIDENT): ICD-10-CM

## 2023-12-13 DIAGNOSIS — Z79.01 CHRONIC ANTICOAGULATION: Primary | ICD-10-CM

## 2023-12-13 LAB
INR PPP: 1.95 (ref 0.9–1.1)
PROTHROMBIN TIME: 22.6 SECONDS (ref 11.7–14.2)

## 2023-12-13 PROCEDURE — 85610 PROTHROMBIN TIME: CPT

## 2023-12-13 PROCEDURE — 36415 COLL VENOUS BLD VENIPUNCTURE: CPT

## 2023-12-13 RX ORDER — WARFARIN SODIUM 5 MG/1
TABLET ORAL
Qty: 65 TABLET | Refills: 1 | Status: SHIPPED | OUTPATIENT
Start: 2023-12-13

## 2023-12-13 NOTE — PROGRESS NOTES
[facility] Follow Up Office Note     Encounter Date:23  Patient:Joaquín Garcia Jr.  :1960  MRN:[id number]      Chief Complaint:   Chief Complaint   Patient presents with    1 month hospital f/u       History of Presenting Illness:      Mr. Garcia is a 63 y.o. gentleman with past medical history notable for atrial fibrillation, nonrheumatic mitral valve regurgitation due to prolapse status post mitral valve replacement , tricuspid valve regurgitation status post tricuspid valve repair, COPD with emphysema, and obstructive sleep apnea who presents to the office for scheduled follow-up.  I last saw him in the hospital in October when he presented with acute stroke in the setting of medical noncompliance but also after transitioning from Coumadin over to Eliquis.  Transesophageal echocardiogram demonstrated large left atrial thrombus is actually an interesting location and his presentation was more concerning for likely valvular atrial fibrillation and clot formation.  We decided to switch him from Eliquis over to Coumadin which he had been on for a number of years and had no issues with.    In general he is doing okay still having a rough time recovering from his stroke    Review of Systems:  Review of Systems   Constitutional: Positive for malaise/fatigue.   HENT: Negative.     Eyes:  Positive for double vision and visual disturbance.   Cardiovascular: Negative.    Respiratory: Negative.     Endocrine: Negative.    Hematologic/Lymphatic: Negative.    Skin: Negative.    Musculoskeletal: Negative.    Gastrointestinal: Negative.    Genitourinary: Negative.    Neurological:  Positive for dizziness.   Psychiatric/Behavioral: Negative.     Allergic/Immunologic: Negative.        Current Outpatient Medications on File Prior to Visit   Medication Sig Dispense Refill    acetaminophen (TYLENOL) 325 MG tablet Take 2 tablets by mouth Every 6 (Six) Hours As Needed for mild pain (1-3).  0     allopurinol (ZYLOPRIM) 300 MG tablet Take 1 tablet by mouth Daily. For gout prevention (Patient taking differently: Take 1 tablet by mouth Daily.) 30 tablet 11    atorvastatin (LIPITOR) 80 MG tablet Take 1 tablet by mouth Every Night. 90 tablet 0    famotidine (PEPCID) 20 MG tablet Take 1 tablet by mouth 2 (Two) Times a Day Before Meals. 60 tablet 0    fluticasone (FLONASE) 50 MCG/ACT nasal spray 2 sprays into the nostril(s) as directed by provider Daily. 15.8 mL 11    Fluticasone-Umeclidin-Vilant (Trelegy Ellipta) 100-62.5-25 MCG/ACT inhaler Inhale 1 puff Daily. 60 each 0    hydroCHLOROthiazide (HYDRODIURIL) 12.5 MG tablet Take 1 tablet by mouth Daily. 30 tablet 11    metoprolol succinate XL (TOPROL-XL) 100 MG 24 hr tablet Take 1 tablet by mouth Daily. 30 tablet 11    warfarin (Coumadin) 1 MG tablet Take one tablet by mouth daily as directed 60 tablet 1    warfarin (COUMADIN) 5 MG tablet Take 1 tablet by mouth Every Night. Indications: Atrial Fibrillation, DVT/PE (active thrombosis), Other - full anticoagulation, goal INR 2.5 to 3.5 30 tablet 1    [DISCONTINUED] Fluticasone Furoate-Vilanterol 100-25 MCG/ACT aerosol powder  1 puff Daily.       No current facility-administered medications on file prior to visit.       No Known Allergies    Past Medical History:   Diagnosis Date    Arthritis     Atrial fibrillation     COPD (chronic obstructive pulmonary disease)     PT STATES NO LONGER ON INHALERS BUT IS BREATHING WELL    Degenerative lumbar disc     Emphysema with chronic bronchitis     H/O tricuspid valve repair 2016    MEDTRONIC ANNULOPLASTY RING    History of heart valve replacement with porcine valve 2016    MITRAL VALVE, DR COBURN    Hypertension     Low back pain     RADIATES DOWN BLE, WORSE ON RT, SOME NUMBNESS/TINGLING    Neurofibromatosis     On anticoagulant therapy     Sleep apnea     DOES NOT USE CPAP    Spinal stenosis     Stroke        Past Surgical History:   Procedure Laterality Date    CARDIAC  CATHETERIZATION  10/20/2016    Procedure: Case Abort;  Surgeon: Zhen Ford MD;  Location: [facility] CATH INVASIVE LOCATION;  Service:     CARDIAC CATHETERIZATION Left 10/21/2016    Procedure: Cardiac catheterization;  Surgeon: Zhen Ford MD;  Location: [facility] CATH INVASIVE LOCATION;  Service:     HERNIA REPAIR      LUMBAR LAMINECTOMY DISCECTOMY DECOMPRESSION Bilateral 2020    Procedure: Lumbar 4 - Lumbar 5 Laminectomy;  Surgeon: Marcos Christine MD;  Location: [facility] OR;  Service: Orthopedic Spine;  Laterality: Bilateral;    MITRAL VALVE REPAIR/REPLACEMENT N/A 10/27/2016    Procedure: INTRAOPERATIVE LILIA, MIDLINE STERNOTOMY WITH MITRAL VALVE REPLACEMENT TRICUSPID VALVE REPAIR;  Surgeon: Robert George MD;  Location: [facility] OR;  Service:     TEETH EXTRACTION N/A 10/25/2016    Procedure: TEETH EXTRACTION FULL MOUTH;  Surgeon: Gio Ibarra DMD;  Location: [facility];  Service:        Social History     Socioeconomic History    Marital status: Single   Tobacco Use    Smoking status: Former     Packs/day: 0.50     Years: 35.00     Additional pack years: 0.00     Total pack years: 17.50     Types: Cigarettes     Quit date: 10/17/2023     Years since quittin.1    Smokeless tobacco: Never    Tobacco comments:     PT STATES DOWN TO 1/2 TO 1/3 PPD FROM 1PPD   Vaping Use    Vaping Use: Never used   Substance and Sexual Activity    Alcohol use: Yes     Alcohol/week: 2.0 standard drinks of alcohol     Types: 2 Shots of liquor per week     Comment: Caffeine - Soft Drinks daily    Drug use: No    Sexual activity: Defer       Family History   Problem Relation Age of Onset    Heart disease Father     Hypertension Father     Malig Hyperthermia Neg Hx        The following portions of the patient's history were reviewed and updated as appropriate: allergies, current medications, past family history, past medical history, past social history, past surgical history and problem list.      "  Objective:       Vitals:    12/13/23 0936   BP: 120/72   BP Location: Left arm   Patient Position: Sitting   Pulse: 70   Weight: 90.1 kg (198 lb 9.6 oz)   Height: 180.3 cm (71\")         Physical Exam:  Constitutional: Well appearing, well developed, no acute distress   HENT: Oropharynx clear and membrane moist  Eyes: Normal conjunctiva, no sclera icterus.  Neck: Supple, no carotid bruit bilaterally.  Cardiovascular: Irregularly irregular rate and rhythm, No Murmur, No bilateral lower extremity edema.  Pulmonary: Normal respiratory effort, normal lung sounds, no wheezing.  Neurological: Alert and orient x 3.   Skin: Warm, dry, no ecchymosis, no rash.  Psych: Appropriate mood and affect. Normal judgment and insight.         Lab Results   Component Value Date     10/23/2023     10/22/2023    K 4.0 10/23/2023    K 4.0 10/22/2023     10/23/2023     10/22/2023    CO2 21.1 (L) 10/23/2023    CO2 20.3 (L) 10/22/2023    BUN 14 10/23/2023    BUN 13 10/22/2023    CREATININE 0.84 10/23/2023    CREATININE 0.83 10/22/2023    EGFRIFNONA 64 09/30/2021    EGFRIFNONA 77 10/28/2020    EGFRIFAFRI 77 09/30/2021    EGFRIFAFRI 109 06/30/2020    GLUCOSE 85 10/23/2023    GLUCOSE 83 10/22/2023    CALCIUM 8.9 10/23/2023    CALCIUM 8.9 10/22/2023    PROTENTOTREF 7.4 09/26/2023    PROTENTOTREF 7.9 08/09/2022    ALBUMIN 3.9 10/18/2023    ALBUMIN 4.6 10/17/2023    BILITOT 0.6 10/18/2023    BILITOT 0.5 10/17/2023    AST 13 10/18/2023    AST 17 10/17/2023    ALT 21 10/18/2023    ALT 28 10/17/2023     Lab Results   Component Value Date    WBC 7.89 10/23/2023    WBC 8.09 10/22/2023    HGB 16.1 10/23/2023    HGB 16.2 10/22/2023    HCT 46.6 10/23/2023    HCT 47.2 10/22/2023    MCV 98.7 (H) 10/23/2023    MCV 99.6 (H) 10/22/2023     10/23/2023     10/22/2023     Lab Results   Component Value Date    CHOL 173 10/18/2023    TRIG 137 10/18/2023    TRIG 88 09/26/2023    HDL 47 10/18/2023    HDL 51 09/26/2023     " (H) 10/18/2023     (H) 09/26/2023     Lab Results   Component Value Date    PROBNP 1,447.0 (H) 10/16/2016    PROBNP 3,429.0 (H) 10/11/2016     Lab Results   Component Value Date    TROPONINT 10 10/17/2023     Lab Results   Component Value Date    TSH 0.818 10/18/2023    TSH 1.200 09/26/2023           ECG 12 Lead    Date/Time: 12/13/2023 10:30 AM  Performed by: Gordon Walsh MD    Authorized by: Gordon Walsh MD  Comparison: compared with previous ECG from 11/8/2023  Similar to previous ECG  Rhythm: atrial fibrillation         Transesophageal echocardiogram 10/18/2023:  The left atrium is massively enlarged (giant left atrium). There is heavy swirling smoke within the left atrium the left atrial appendage.  There is a very large left atrial mass attached to the posterolateral aspect of the left atrium. 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 A less probable differential would include a massive left atrial thrombus  Left ventricular systolic function is normal. Left ventricular ejection fraction appears to be 61 - 65%.  Left ventricular wall thickness is consistent with mild to moderate concentric hypertrophy.  The right ventricular cavity is moderate to severely dilated. Normal right ventricular systolic function noted.  There is a bioprosthetic mitral valve present. The mitral valve peak and mean gradients are within defined limits. The prosthetic mitral valve is normal.  There is an annuloplasty ring in the tricuspid valve position. There is trace tricuspid regurgitation through the annuloplasty ring.  There is no evidence of pericardial effusion     Echocardiogram 12/3/2020:  Calculated left ventricular EF = 55.1% Estimated left ventricular EF = 55% Estimated left ventricular EF was in agreement with the calculated left ventricular EF. Left ventricular systolic function is normal. Normal left  ventricular cavity size noted. Left ventricular wall thickness is consistent with mild concentric hypertrophy. All left ventricular wall segments contract normally. Left ventricular diastolic function was indeterminate.  Left atrial volume is severely increased.The right atrial cavity is severely dilated.  There is mild thickening of the aortic valve.  There is a 33 mm, porcine, Medtronic bioprosthetic mitral valve present. The mitral valve peak and mean gradients are within defined limits. There is no significant prosthetic valve stenosis or regurgitation.  No significant tricuspid valve regurgitation or significant stenosis present. There is evidence of previous tricuspid valve repair.     Cardiac catheterization 10/21/2016:  Left main: Normal  LAD: 20% proximal LAD segment stenoses otherwise luminal irregularities throughout  Circumflex: Angiographically normal throughout  RCA: 20% mid segment stenoses supplying a PDA     Transesophageal echocardiogram 10/23/2016:  All left ventricular wall segments contract normally.  Left ventricular function is normal.  Left atrial cavity size is severely dilated.  Severe mitral valve regurgitation is present due to prolapse  Moderate tricuspid valve regurgitation is present.     Mitral valve replacement/tricuspid valve repair 10/27/2016 Dr. George:  Mitral valve replacement with a 33 mm Medtronic Mosaic ultra porcine valve with preservation of all subvalvar apparatus.    Tricuspid valve repair with a 32 mm Medtronic 3-D ring.           Assessment:          Diagnosis Plan   1. Atrial fibrillation, chronic  ECG 12 Lead      2. S/P MVR (mitral valve replacement)        3. S/P TVR (tricuspid valve repair)        4. Hypertension, unspecified type        5. Mixed hyperlipidemia               Plan:       Mr. Garcia is a 63 y.o. gentleman with past medical history notable for atrial fibrillation, nonrheumatic mitral valve regurgitation due to prolapse status post mitral valve  replacement 2016, tricuspid valve regurgitation status post tricuspid valve repair, COPD with emphysema, and obstructive sleep apnea who presents to the office for scheduled follow-up.  Overall from a cardiac standpoint patient doing well still having a lot of troubles with recovering from his stroke has follow-up with them next month.  From my standpoint no changes are needed at this time we will see back in 3 months    Permanent atrial fibrillation:  By strict criteria not truly valvular in etiology however anatomically would appear to be behaving like a rheumatic valvular A-fib given the size of his atrium and the location of his multiple thrombi within the walls of his left atrium  I would recommend a goal INR of 2.5-3.5  Would advise against NOAC  Would not recommend discontinued anticoagulation for the time being would recommend bridging procedures if needed over the next year     Nonrheumatic mitral valve regurgitation status post mitral valve replacement:  Normal valve function on echocardiogram 10/2023  We will continue to follow  Etiology of valvular heart disease appears to be prolapse not rheumatic heart disease but atrium is severely dilated would treat him as a patient who had rheumatic mitral stenosis and would treat his A-fib as valvular     Essential hypertension:  Blood pressure well-controlled no changes needed at this time    Mixed hyperlipidemia:  Continue statin      Follow up:  3 Months      Gordon Walsh MD  [facility]  12/13/23  10:31 EST

## 2023-12-13 NOTE — PROGRESS NOTES
Anticoagulation Clinic Progress Note    Anticoagulation Summary  As of 2023      INR goal:  2.5-3.5   TTR:  21.6% (1.4 mo)   INR used for dosin.95 (2023)   Warfarin maintenance plan:  5 mg every Tue, Thu; 2.5 mg all other days   Weekly warfarin total:  22.5 mg   Plan last modified:  Kendra Moncada, PharmD (2023)   Next INR check:  2023   Target end date:      Indications    Chronic anticoagulation [Z79.01]  S/P MVR (mitral valve replacement) [Z95.2]  Atrial fibrillation  chronic [I48.20]  On anticoagulant therapy [Z79.01]  Acute CVA (cerebrovascular accident) [I63.9]                 Anticoagulation Episode Summary       INR check location:      Preferred lab:      Send INR reminders to:   [facility]    Comments:  Admitted with left atrial thrombus and CVA, previously on Eliquis and changed to warfarin while admitted.          Anticoagulation Care Providers       Provider Role Specialty Phone number    Gordon Walsh MD Referring Cardiology [phone]            Clinic Interview:  Patient Findings     Negatives:  Signs/symptoms of thrombosis, Signs/symptoms of bleeding,   Laboratory test error suspected, Change in health, Change in alcohol use,   Change in activity, Upcoming invasive procedure, Emergency department   visit, Upcoming dental procedure, Missed doses, Extra doses, Change in   medications, Change in diet/appetite, Hospital admission, Bruising, Other   complaints      Clinical Outcomes     Negatives:  Major bleeding event, Thromboembolic event,   Anticoagulation-related hospital admission, Anticoagulation-related ED   visit, Anticoagulation-related fatality        INR History:      Latest Ref Rng & Units 2023    12:00 AM 2023     2:17 PM 2023    12:00 AM 2023     2:36 PM 2023     8:36 AM 2023    10:59 AM 2023     2:23 PM   Anticoagulation Monitoring   INR   1.40  1.60 3.2  1.95   INR Date   2023  12/4/2023 12/13/2023   INR Goal   2.5-3.5  2.5-3.5 2.5-3.5  2.5-3.5   Trend   Same  Up Same  Same   Last Week Total   18.5 mg  20 mg 22.5 mg  22.5 mg   Next Week Total   20 mg  22.5 mg 22.5 mg  25 mg   Sun   2.5 mg  2.5 mg 2.5 mg  2.5 mg   Mon   -  - 2.5 mg  -   Tue   -  5 mg (11/28) 5 mg  -   Wed   5 mg (11/22)  2.5 mg 2.5 mg  5 mg (12/13)   Thu   2.5 mg  5 mg (11/30) 5 mg  5 mg   Fri   2.5 mg  2.5 mg 2.5 mg  2.5 mg   Sat   2.5 mg  2.5 mg 2.5 mg  2.5 mg   Historical INR 0.90 - 1.10 1.40      1.60       1.95     Visit Report       Report Report       This result is from an external source.       Plan:  1. INR is Subtherapeutic today- see above in Anticoagulation Summary.   Will instruct Joaquín Garcia Jr. to Increase their warfarin regimen (5 mg today, then increase to 5 mg Tues/Thurs/Sat, 2.5 mg all other days) - see above in Anticoagulation Summary.  2. Follow up in 5 days at [facility] (he prefers Mondays opposed to other days of the week).   3. They have been instructed to call if any changes in medications, doses, concerns, etc. Patient expresses understanding and has no further questions at this time.    Jonn Acosta, PharmD

## 2023-12-14 ENCOUNTER — OFFICE VISIT (OUTPATIENT)
Dept: FAMILY MEDICINE CLINIC | Facility: CLINIC | Age: 63
End: 2023-12-14
Payer: MEDICARE

## 2023-12-14 VITALS
DIASTOLIC BLOOD PRESSURE: 75 MMHG | BODY MASS INDEX: 27.92 KG/M2 | RESPIRATION RATE: 16 BRPM | HEART RATE: 84 BPM | OXYGEN SATURATION: 100 % | TEMPERATURE: 97.3 F | HEIGHT: 70 IN | WEIGHT: 195 LBS | SYSTOLIC BLOOD PRESSURE: 123 MMHG

## 2023-12-14 DIAGNOSIS — H53.2 DIPLOPIA: ICD-10-CM

## 2023-12-14 DIAGNOSIS — H54.3 IMPAIRED VISION IN BOTH EYES: Primary | ICD-10-CM

## 2023-12-14 DIAGNOSIS — R26.89 IMPAIRED GAIT AND MOBILITY: ICD-10-CM

## 2023-12-14 DIAGNOSIS — F32.1 MAJOR DEPRESSIVE DISORDER, SINGLE EPISODE, MODERATE: ICD-10-CM

## 2023-12-14 DIAGNOSIS — F81.0 READING IMPAIRMENT: ICD-10-CM

## 2023-12-14 DIAGNOSIS — I63.9 ACUTE CVA (CEREBROVASCULAR ACCIDENT): ICD-10-CM

## 2023-12-14 RX ORDER — BUPROPION HYDROCHLORIDE 150 MG/1
150 TABLET, EXTENDED RELEASE ORAL DAILY
Qty: 30 TABLET | Refills: 1 | Status: SHIPPED | OUTPATIENT
Start: 2023-12-14

## 2023-12-14 NOTE — PROGRESS NOTES
"Chief Complaint  Follow-up (Follow up visit. Pt has lots of questions )    Subjective        Joaquín Garcia Jr. presents to [facility] PRIMARY [facility]  History of Present Illness  Pleasant patient here today follow-up acute CVA, with diplopia vision abnormalities,  After having, stroke, presently he has recovered as far as mobility the strength however his vision significantly impairs his mobility and he has difficulty getting around functioning with ADLs, unable to do simple things such as go the grocery, unable to do laundry etc. cook or clean  Unable to read effectively, he is using an eye patch has been referred to ophthalmology but has not been not sure if he had speech therapy but he did have some therapy at home, but still has communication deficits with reading, and mental ability defecated severe due to severe vision abnormalities due to stroke, with diplopia and other abnormalities.  He is followed for disability on able to work at this point I agree he is totally disabled at this time likely permanent    Quit drinking, down to 1 cigarette a day or so      Objective   Vital Signs:  /75   Pulse 84   Temp 97.3 °F (36.3 °C) (Infrared)   Resp 16   Ht 177.8 cm (70\")   Wt 88.5 kg (195 lb)   SpO2 100%   BMI 27.98 kg/m²   Estimated body mass index is 27.98 kg/m² as calculated from the following:    Height as of this encounter: 177.8 cm (70\").    Weight as of this encounter: 88.5 kg (195 lb).          Physical Exam  Constitutional:       General: He is not in acute distress.     Appearance: Normal appearance. He is not ill-appearing, toxic-appearing or diaphoretic.   Eyes:      Conjunctiva/sclera: Conjunctivae normal.      Comments: EOM weakness apparent, improved, PERRLA,  Speech clear  No obvious facial weakness,   Cardiovascular:      Rate and Rhythm: Normal rate. Rhythm irregular.   Musculoskeletal:      Comments: Able to get up and stand without focal weakness, mobility " impaired with vision abnormality severe   Skin:     General: Skin is warm and dry.   Neurological:      General: No focal deficit present.      Mental Status: He is alert.      Cranial Nerves: No cranial nerve deficit.      Motor: No weakness.      Gait: Gait normal.      Comments: EOM    Psychiatric:         Mood and Affect: Mood normal.         Thought Content: Thought content normal.         Judgment: Judgment normal.        Result Review :                Assessment and Plan   Diagnoses and all orders for this visit:    1. Impaired vision in both eyes (Primary)    2. Diplopia  -     Ambulatory Referral to Speech Therapy  -     Ambulatory Referral to Physical Therapy    3. Reading impairment  -     Ambulatory Referral to Speech Therapy    4. Acute CVA (cerebrovascular accident)  -     Ambulatory Referral to Speech Therapy  -     Ambulatory Referral to Physical Therapy    5. Impaired gait and mobility  -     Ambulatory Referral to Physical Therapy    6. Major depressive disorder, single episode, moderate    Other orders  -     buPROPion SR (Wellbutrin SR) 150 MG 12 hr tablet; Take 1 tablet by mouth Daily. Improved mood and to stop smoking  Dispense: 30 tablet; Refill: 1             Follow Up   No follow-ups on file.  Patient was given instructions and counseling regarding his condition or for health maintenance advice. Please see specific information pulled into the AVS if appropriate.     Falls precaution,  Bupropion risk-benefit he has no agitation or increased irritability or insomnia may help with smoking  Will start 150 daily discontinue agitation irritability worsening symptoms or insomnia  Follow-up 3 weeks      Patient Instructions   Stay on top of your INR this is mandatory for healthy living to avoid another stroke,  Continue the great changes you are doing, I suspect your vision will continue to improve  Speech therapy to counseling  you with reading skills and to properly assess you,  Read is much  as you can speak out loud while reading frequently, all this is helpful do everything he can do by yourself falls precaution everything you can do the more you can do the better to retrain your brain,  Physical therapy for mobility and for guidance to help you maneuver with vision impairment, refer to ophthalmology they should reach out to you as well if you not heard anything within 1 week call me regarding ophthalmology    Continue steady issue go, recheck in 3 weeks, after He, sooner for problems, fluids fluids fluids falls precaution falls precaution.

## 2023-12-14 NOTE — PATIENT INSTRUCTIONS
Stay on top of your INR this is mandatory for healthy living to avoid another stroke,  Continue the great changes you are doing, I suspect your vision will continue to improve  Speech therapy to counseling  you with reading skills and to properly assess you,  Read is much as you can speak out loud while reading frequently, all this is helpful do everything he can do by yourself falls precaution everything you can do the more you can do the better to retrain your brain,  Physical therapy for mobility and for guidance to help you maneuver with vision impairment, refer to ophthalmology they should reach out to you as well if you not heard anything within 1 week call me regarding ophthalmology    Continue steady issue go, recheck in 3 weeks, after [place], sooner for problems, fluids fluids fluids falls precaution falls precaution.

## 2023-12-19 ENCOUNTER — PATIENT ROUNDING (BHMG ONLY) (OUTPATIENT)
Dept: CARDIOLOGY | Facility: CLINIC | Age: 63
End: 2023-12-19
Payer: MEDICARE

## 2023-12-28 ENCOUNTER — ANTICOAGULATION VISIT (OUTPATIENT)
Dept: PHARMACY | Facility: HOSPITAL | Age: 63
End: 2023-12-28
Payer: MEDICARE

## 2023-12-28 DIAGNOSIS — Z79.01 CHRONIC ANTICOAGULATION: Primary | ICD-10-CM

## 2023-12-28 DIAGNOSIS — Z79.01 ON ANTICOAGULANT THERAPY: ICD-10-CM

## 2023-12-28 DIAGNOSIS — I48.20 ATRIAL FIBRILLATION, CHRONIC: ICD-10-CM

## 2023-12-28 DIAGNOSIS — I63.9 ACUTE CVA (CEREBROVASCULAR ACCIDENT): ICD-10-CM

## 2023-12-28 DIAGNOSIS — Z95.2 S/P MVR (MITRAL VALVE REPLACEMENT): ICD-10-CM

## 2023-12-28 LAB
INR PPP: 1.8 (ref 0.9–1.2)
PROTHROMBIN TIME: 18.2 SEC (ref 9.1–12)

## 2023-12-28 NOTE — PROGRESS NOTES
Anticoagulation Clinic Progress Note    Anticoagulation Summary  As of 2023      INR goal:  2.5-3.5   TTR:  16.2% (1.8 mo)   INR used for dosin.8 (2023)   Warfarin maintenance plan:  2.5 mg every Mon, Fri; 5 mg all other days   Weekly warfarin total:  30 mg   Plan last modified:  Jonn Acosta, PharmD (2023)   Next INR check:  1/3/2024   Target end date:      Indications    Chronic anticoagulation [Z79.01]  S/P MVR (mitral valve replacement) [Z95.2]  Atrial fibrillation  chronic [I48.20]  On anticoagulant therapy [Z79.01]  Acute CVA (cerebrovascular accident) [I63.9]                 Anticoagulation Episode Summary       INR check location:      Preferred lab:      Send INR reminders to:   [facility] [place]    Comments:  Admitted with left atrial thrombus and CVA, previously on Eliquis and changed to warfarin while admitted.          Anticoagulation Care Providers       Provider Role Specialty Phone number    Gordon Walsh MD Referring Cardiology [phone]            Clinic Interview:  Patient Findings     Negatives:  Signs/symptoms of thrombosis, Signs/symptoms of bleeding,   Laboratory test error suspected, Change in health, Change in alcohol use,   Change in activity, Upcoming invasive procedure, Emergency department   visit, Upcoming dental procedure, Missed doses, Extra doses, Change in   medications, Change in diet/appetite, Hospital admission, Bruising, Other   complaints      Clinical Outcomes     Negatives:  Major bleeding event, Thromboembolic event,   Anticoagulation-related hospital admission, Anticoagulation-related ED   visit, Anticoagulation-related fatality        INR History:      Latest Ref Rng & Units 2023     2:17 PM 2023    12:00 AM 2023     2:36 PM 2023     8:36 AM 2023    10:59 AM 2023     2:23 PM 2023     8:47 AM   Anticoagulation Monitoring   INR  1.40  1.60 3.2  1.95 1.8   INR Date  2023  12/4/2023 12/13/2023 12/27/2023   INR Goal  2.5-3.5  2.5-3.5 2.5-3.5  2.5-3.5 2.5-3.5   Trend  Same  Up Same  Up Up   Last Week Total  18.5 mg  20 mg 22.5 mg  22.5 mg 25 mg   Next Week Total  20 mg  22.5 mg 22.5 mg  27.5 mg 32.5 mg   Sun  2.5 mg  2.5 mg 2.5 mg  2.5 mg 5 mg   Mon  -  - 2.5 mg  - 2.5 mg   Tue  -  5 mg (11/28) 5 mg  - 5 mg   Wed  5 mg (11/22)  2.5 mg 2.5 mg  5 mg (12/13) -   Thu  2.5 mg  5 mg (11/30) 5 mg  5 mg 7.5 mg (12/28)   Fri  2.5 mg  2.5 mg 2.5 mg  2.5 mg 2.5 mg   Sat  2.5 mg  2.5 mg 2.5 mg  5 mg 5 mg   Historical INR 0.90 - 1.10  1.60       1.95      Visit Report      Report Report        This result is from an external source.       Plan:  1. INR was Subtherapeutic yesterday- see above in Anticoagulation Summary.   Will instruct Joaquín Garcia Jr. to Increase their warfarin regimen (7.5 mg today, then increase to 2.5 mg MF, 5 mg all other days) - see above in Anticoagulation Summary.  2. Follow up in 6 days in clinic to coordinate with other appointments on campus the same day.   3. They have been instructed to call if any changes in medications, doses, concerns, etc. Patient expresses understanding and has no further questions at this time.    Jonn Acosta, PharmD

## 2024-01-01 ENCOUNTER — APPOINTMENT (OUTPATIENT)
Dept: SPEECH THERAPY | Facility: HOSPITAL | Age: 64
End: 2024-01-01
Payer: MEDICARE

## 2024-01-01 ENCOUNTER — APPOINTMENT (OUTPATIENT)
Dept: PHYSICAL THERAPY | Facility: HOSPITAL | Age: 64
End: 2024-01-01
Payer: MEDICARE

## 2024-01-03 ENCOUNTER — HOSPITAL ENCOUNTER (OUTPATIENT)
Dept: PHYSICAL THERAPY | Facility: HOSPITAL | Age: 64
Setting detail: THERAPIES SERIES
Discharge: HOME | End: 2024-01-03
Payer: MEDICARE

## 2024-01-03 ENCOUNTER — ANTICOAGULATION VISIT (OUTPATIENT)
Dept: PHARMACY | Facility: HOSPITAL | Age: 64
End: 2024-01-03
Payer: MEDICARE

## 2024-01-03 ENCOUNTER — HOSPITAL ENCOUNTER (OUTPATIENT)
Dept: SPEECH THERAPY | Facility: HOSPITAL | Age: 64
Setting detail: THERAPIES SERIES
Discharge: HOME | End: 2024-01-03
Payer: MEDICARE

## 2024-01-03 DIAGNOSIS — Z79.01 CHRONIC ANTICOAGULATION: Primary | ICD-10-CM

## 2024-01-03 DIAGNOSIS — Z95.2 S/P MVR (MITRAL VALVE REPLACEMENT): ICD-10-CM

## 2024-01-03 DIAGNOSIS — R26.89 IMPAIRED GAIT AND MOBILITY: ICD-10-CM

## 2024-01-03 DIAGNOSIS — I63.9 ACUTE CVA (CEREBROVASCULAR ACCIDENT): Primary | ICD-10-CM

## 2024-01-03 DIAGNOSIS — Z79.01 ON ANTICOAGULANT THERAPY: ICD-10-CM

## 2024-01-03 DIAGNOSIS — I63.9 ACUTE CVA (CEREBROVASCULAR ACCIDENT): ICD-10-CM

## 2024-01-03 DIAGNOSIS — F81.0 READING IMPAIRMENT: ICD-10-CM

## 2024-01-03 DIAGNOSIS — H53.2 DIPLOPIA: Primary | ICD-10-CM

## 2024-01-03 DIAGNOSIS — R26.89 IMPAIRMENT OF BALANCE: ICD-10-CM

## 2024-01-03 DIAGNOSIS — I48.20 ATRIAL FIBRILLATION, CHRONIC: ICD-10-CM

## 2024-01-03 LAB
INR PPP: 4.2 (ref 0.91–1.09)
PROTHROMBIN TIME: 49.9 SECONDS (ref 10–13.8)

## 2024-01-03 PROCEDURE — 85610 PROTHROMBIN TIME: CPT

## 2024-01-03 PROCEDURE — 36416 COLLJ CAPILLARY BLOOD SPEC: CPT

## 2024-01-03 PROCEDURE — G0463 HOSPITAL OUTPT CLINIC VISIT: HCPCS

## 2024-01-03 PROCEDURE — 97162 PT EVAL MOD COMPLEX 30 MIN: CPT

## 2024-01-03 PROCEDURE — 96105 ASSESSMENT OF APHASIA: CPT

## 2024-01-03 NOTE — THERAPY EVALUATION
Acute Care - Speech Language Pathology Initial Evaluation  [facility]     Patient Name: Joaquín Garcia Jr.  : 1960  MRN: [id number]  Today's Date: 1/3/2024               Admit Date: 1/3/2024     Visit Dx:    ICD-10-CM ICD-9-CM   1. Diplopia  H53.2 368.2   2. Reading impairment  F81.0 315.00   3. Acute CVA (cerebrovascular accident)  I63.9 434.91     Patient Active Problem List   Diagnosis    Supraventricular tachycardia    NF (neurofibromatosis)    COPD (chronic obstructive pulmonary disease)    Acute hypoxemic respiratory failure    MR (mitral regurgitation)    COPD exacerbation    Acute respiratory failure with hypoxemia    Rectus sheath hematoma    Alcoholism    Atrial fibrillation, chronic    Toe pain, right    S/P MVR (mitral valve replacement)    Hypertension    S/P TVR (tricuspid valve repair)    Abnormal electrocardiogram    Benign essential hypertension    Anxiety    Prostate cancer screening    Colon cancer screening    Need for hepatitis C screening test    Knee strain, left, initial encounter    Acute midline low back pain with bilateral sciatica    Spinal stenosis of lumbar region with neurogenic claudication    On anticoagulant therapy    Tobacco abuse    Gout    Daytime somnolence    Left atrial mass    Acute CVA (cerebrovascular accident)    Diplopia    Dizziness    Chronic anticoagulation    Prediabetes    Major depressive disorder, single episode, moderate    Impaired gait and mobility    Reading impairment    Impaired vision in both eyes     Past Medical History:   Diagnosis Date    Arthritis     Atrial fibrillation     COPD (chronic obstructive pulmonary disease)     PT STATES NO LONGER ON INHALERS BUT IS BREATHING WELL    Degenerative lumbar disc     Emphysema with chronic bronchitis     H/O tricuspid valve repair 2016    MEDTRONIC ANNULOPLASTY RING    History of heart valve replacement with porcine valve 2016    MITRAL VALVE, DR COBURN    Hypertension     Low back pain      RADIATES DOWN BLE, WORSE ON RT, SOME NUMBNESS/TINGLING    Neurofibromatosis     On anticoagulant therapy     Sleep apnea     DOES NOT USE CPAP    Spinal stenosis     Stroke      Past Surgical History:   Procedure Laterality Date    CARDIAC CATHETERIZATION  10/20/2016    Procedure: Case Abort;  Surgeon: Zhen Ford MD;  Location: [facility] CATH INVASIVE LOC[facility];  Service:     CARDIAC CATHETERIZATION Left 10/21/2016    Procedure: Cardiac catheterization;  Surgeon: Zhen Ford MD;  Location: [facility] CATH INVASIVE LOCATION;  Service:     HERNIA REPAIR      LUMBAR LAMINECTOMY DISCECTOMY DECOMPRESSION Bilateral 11/5/2020    Procedure: Lumbar 4 - Lumbar 5 Laminectomy;  Surgeon: Marcos Christine MD;  Location: [facility] MAIN OR;  Service: Orthopedic Spine;  Laterality: Bilateral;    MITRAL VALVE REPAIR/REPLACEMENT N/A 10/27/2016    Procedure: INTRAOPERATIVE LILIA, MIDLINE STERNOTOMY WITH MITRAL VALVE REPLACEMENT TRICUSPID VALVE REPAIR;  Surgeon: Robert George MD;  Location: [facility] OR;  Service:     TEETH EXTRACTION N/A 10/25/2016    Procedure: TEETH EXTRACTION FULL MOUTH;  Surgeon: Gio Ibarra DMD;  Location: [facility] OR;  Service:        SLP Recommendation and Plan  SLP Diagnosis: mild, cognitive-linguistic disorder (01/03/24 1300)  SLP Diagnosis Comments: Possible mild cognitive communication difficulties with word finding and attention. Difficulties assessing cognitive-linguistic abilities due to visual impairment. Suggest diagnostic treatment and trial of speech therapy to target cognitive-linguistic abilities. (01/03/24 1300)        SLC Criteria for Skilled Therapy Interventions Met: yes (01/03/24 1300)           Therapy Frequency (SLP SLC): 1 day per week (01/03/24 1300)  Predicted Duration Therapy Intervention (Days): 4 weeks (01/03/24 1300)                                    SLP EVALUATION (last 72 hours)       SLP SLC Evaluation       Row Name 01/03/24 1300                    "Communication Assessment/Intervention    Document Type evaluation  -BB        Subjective Information no complaints  -BB        Patient Observations alert;cooperative;agree to therapy  -BB        Patient Effort good  -BB        Symptoms Noted During/After Treatment none  -BB           General Information    Patient Profile Reviewed yes  -BB        Pertinent History Of Current Problem 62 yo who arrives for outpatient speech language pathology evaluation. The patient reports difficulties finding words and putting sentences together since his stroke in October 17, 2023. Trouble reading seems to be mostly due to double vision. The words seem to be jumbled together (\"double and run together\") with glasses and without. He endorses difficulties with focus and problem solving. He denies difficulties with memory.  -BB        Precautions/Limitations, Vision other (see comments)  visual impairment reported  -BB        Precautions/Limitations, Hearing WFL;for purposes of eval  -BB        Prior Level of Function-Communication WFL  -BB        Plans/Goals Discussed with patient;agreed upon  -BB        Barriers to Rehab visual deficit  -BB        Standardized Assessment Used Western Aphasia Battery  -BB           Pain    Additional Documentation Pain Scale: FACES Pre/Post-Treatment (Group)  -BB           Pain Scale: FACES Pre/Post-Treatment    Pain: FACES Scale, Pretreatment 0-->no hurt  -BB        Posttreatment Pain Rating 0-->no hurt  -BB           Cognitive Assessment Intervention- SLP    Cognition, Comment Informal measures: Advanced sustained attention task (words in reverse): 0% acc wo cues and 100% acc w mod-min cues. Sustained attention with simple calculations (100 by 7s): 100% acc. Basic money calculations: 100% acc.  -BB           Standardized Tests    Formal Speech Language Tests WAB: Western Aphasia Battery  -BB           Spontaneous Speech    Information Content 10  -BB        Fluency 9  -BB        Spontaneous Speech " Total 19  -BB           Comprehension    Yes/No Questions 60  -BB        Auditory Word Recongition 60  -BB        Sequential Commands 80  -BB        Comprehension Total 200  -BB           Repetition    Repetition Total 100  -BB           Naming    Object 60  -BB        Word Fluency 13  -BB        Sentence Completion 10  -BB        Responsive Speech 10  -BB        Naming Total 93  -BB           Aphasia    Aphasia Quotient 96.6  -BB        Aphasia Severity Very Mild (>90)  -BB        Type of Aphasia Anomic  -BB           Reading and Writing    Reading --  pt unable  -BB           SLP Evaluation Clinical Impressions    SLP Diagnosis mild;cognitive-linguistic disorder  -BB        SLP Diagnosis Comments Possible mild cognitive communication difficulties with word finding and attention. Difficulties assessing cognitive-linguistic abilities due to visual impairment. Suggest diagnostic treatment and trial of speech therapy to target cognitive-linguistic abilities.  -BB        Rehab Potential/Prognosis good  -BB        SLC Criteria for Skilled Therapy Interventions Met yes  -BB        Functional Impact difficulty in expressing complex messages;restrictions in personal and social life  -BB           Recommendations    Therapy Frequency (SLP SLC) 1 day per week  -BB        Predicted Duration Therapy Intervention (Days) 4 weeks  -BB                  User Key  (r) = Recorded By, (t) = Taken By, (c) = Cosigned By      Initials Name Effective Dates    Gordon Cast, EMBER 02/19/23 -                        EDUCATION  The patient has been educated in the following areas:     Cognitive Impairment Communication Impairment Home Exercise Program (HEP).                      Time Calculation:      Time Calculation- SLP       Row Name 01/03/24 1505             Time Calculation- SLP    SLP Start Time 1345  -BB      SLP Stop Time 1445  -BB      SLP Time Calculation (min) 60 min  -BB         Timed Charges    07440-[facility] of Aphasia 60   -BB         Total Minutes    Timed Charges Total Minutes 60  -BB       Total Minutes 60  -BB                User Key  (r) = Recorded By, (t) = Taken By, (c) = Cosigned By      Initials Name Provider Type    Gordon Cast SLP Speech and Language Pathologist                    Therapy Charges for Today       Code Description Service Date Service Provider Modifiers Qty    [id number]  ST ASSESSMENT OF APHASIA PER HOUR 1/3/2024 Gordon Grajeda SLP GN 1                       EMBER Voss  1/3/2024

## 2024-01-03 NOTE — THERAPY EVALUATION
.Outpatient Physical Therapy Neuro Initial Evaluation  [facility]     Patient Name: Joaquín Garcia Jr.  : 1960  MRN: [id number]  Today's Date: 1/3/2024      Visit Date: 2024    Patient Active Problem List   Diagnosis    Supraventricular tachycardia    NF (neurofibromatosis)    COPD (chronic obstructive pulmonary disease)    Acute hypoxemic respiratory failure    MR (mitral regurgitation)    COPD exacerbation    Acute respiratory failure with hypoxemia    Rectus sheath hematoma    Alcoholism    Atrial fibrillation, chronic    Toe pain, right    S/P MVR (mitral valve replacement)    Hypertension    S/P TVR (tricuspid valve repair)    Abnormal electrocardiogram    Benign essential hypertension    Anxiety    Prostate cancer screening    Colon cancer screening    Need for hepatitis C screening test    Knee strain, left, initial encounter    Acute midline low back pain with bilateral sciatica    Spinal stenosis of lumbar region with neurogenic claudication    On anticoagulant therapy    Tobacco abuse    Gout    Daytime somnolence    Left atrial mass    Acute CVA (cerebrovascular accident)    Diplopia    Dizziness    Chronic anticoagulation    Prediabetes    Major depressive disorder, single episode, moderate    Impaired gait and mobility    Reading impairment    Impaired vision in both eyes        Past Medical History:   Diagnosis Date    Arthritis     Atrial fibrillation     COPD (chronic obstructive pulmonary disease)     PT STATES NO LONGER ON INHALERS BUT IS BREATHING WELL    Degenerative lumbar disc     Emphysema with chronic bronchitis     H/O tricuspid valve repair 2016    MEDTRONIC ANNULOPLASTY RING    History of heart valve replacement with porcine valve 2016    MITRAL VALVE, DR COBURN    Hypertension     Low back pain     RADIATES DOWN BLE, WORSE ON RT, SOME NUMBNESS/TINGLING    Neurofibromatosis     On anticoagulant therapy     Sleep apnea     DOES NOT USE CPAP    Spinal stenosis      Stroke         Past Surgical History:   Procedure Laterality Date    CARDIAC CATHETERIZATION  10/20/2016    Procedure: Case Abort;  Surgeon: Zhen Ford MD;  Location: [facility] CATH INVASIVE LOCATION;  Service:     CARDIAC CATHETERIZATION Left 10/21/2016    Procedure: Cardiac catheterization;  Surgeon: Zhen Ford MD;  [facility];  Service:     HERNIA REPAIR      LUMBAR LAMINECTOMY DISCECTOMY DECOMPRESSION Bilateral 11/5/2020    Procedure: Lumbar 4 - Lumbar 5 Laminectomy;  Surgeon: Marcos Christine MD;  Location: [facility] MAIN OR;  Service: Orthopedic Spine;  Laterality: Bilateral;    MITRAL VALVE REPAIR/REPLACEMENT N/A 10/27/2016    Procedure: INTRAOPERATIVE LILIA, MIDLINE STERNOTOMY WITH MITRAL VALVE REPLACEMENT TRICUSPID VALVE REPAIR;  Surgeon: Robert George MD;  Location: [facility] MAIN OR;  Service:     TEETH EXTRACTION N/A 10/25/2016    Procedure: TEETH EXTRACTION FULL MOUTH;  Surgeon: Gio Ibarra DMD;  Location: [facility] OR;  Service:          Visit Dx:     ICD-10-CM ICD-9-CM   1. Acute CVA (cerebrovascular accident)  I63.9 434.91   2. Impaired gait and mobility  R26.89 781.2   3. Impairment of balance  R26.89 781.2        Patient History       Row Name 01/03/24 1330             History    Chief Complaint Balance Problems  -LB      Date Current Problem(s) Began 10/17/23  -LB      Brief Description of Current Complaint Trimester stent is a 63-year-old male who sustained a stroke last fall.  Patient completed home health therapy but feels that his balance continues to be a problem and was referred to outpatient physical therapy and speech therapy  -LB      Patient/Caregiver Goals Improve mobility  Improved balance  -LB      Current Tobacco Use May smoke 1 cigarette a day.  Patient reports he has not had a drink since his stroke in October  -LB         Fall Risk Assessment    Any falls in the past year: Yes  -LB      Number of falls reported in the last 12  "months 3  -LB      Factors that contributed to the fall: Lost balance;Tripped  She reports it may be due to drinking: Mild pulled him causing a fall  -LB         Services    Do you plan to receive Home Health services in the near future No  -LB         Daily Activities    Pt Participated in POC and Goals Yes  -LB         Safety    Are you being hurt, hit, or frightened by anyone at home or in your life? No  -LB      Are you being neglected by a caregiver No  -LB                User Key  (r) = Recorded By, (t) = Taken By, (c) = Cosigned By      Initials Name Provider Type    LB Shaniqua Davis, PT Physical Therapist                         PT Neuro       Row Name 01/03/24 4437             Subjective    Subjective Comments \"I feel dizzy if I move too fast and feel off balance.  I also have double vision which affects things\"  -LB         Precautions and Contraindications    Precautions/Limitations fall precautions  -LB      Precautions Double vision  -LB         Subjective Pain    Able to rate subjective pain? yes  -LB      Pre-Treatment Pain Level 0  -LB      Post-Treatment Pain Level 0  -LB         Home Living    Current Living Arrangements home  -LB      Home Accessibility stairs to enter home;stairs within home  -LB      Number of Stairs, Main Entrance two  -LB      Stair Railings, Main Entrance railings safe and in good condition  -LB      Stairs, Within Home, Primary Patient lives in the basement, has handrail  -LB         Vision-Basic Assessment    Current Vision Wears glasses all the time  -LB      Patient Visual Report Diplopia  Patient reports double vision since stroke in October  -LB         Cognition    Overall Cognitive Status WFL  -LB      Arousal/Alertness Appropriate responses to stimuli  -LB      Memory Appears intact  -LB      Orientation Level Oriented X4  -LB      Safety Judgment Good awareness of safety precautions  -LB      Deficits Fully aware of deficits  -LB         Sensation    Sensation WNL? " WFL  -LB         Proprioception    Proprioception Grossly intact  -LB         Posture/Observations    Posture/Observations Comments Patient walked up to the unit carrying single tip cane.  Mom present in waiting room  -LB         Gross Motor Coordination Training    Coordination heel to shin  Slow in both lower extremities  -LB         General ROM    GENERAL ROM COMMENTS No range of motion deficits in bilateral lower extremities  -LB         MMT (Manual Muscle Testing)    Rt Lower Ext Rt Hip WFL;Rt Knee WFL;Rt Ankle WFL  -LB      Lt Lower Ext Lt Hip WFL;Lt Knee WFL;Lt Ankle WFL  -LB         Transfers    Sit-Stand [place] (Transfers) independent  -LB      Stand-Sit [place] (Transfers) independent  -LB      Transfer, Impairments impaired balance  -LB      Comment, (Transfers) Patient attempted to stand without using upper extremities for outcome measures, patient with significant posterior loss of balance requiring assistance to maintain.  No issues when using upper extremities  -LB         Gait/Stairs (Locomotion)    [place] Level (Gait) modified independence;supervision  -LB      Assistive Device (Gait) cane, straight  No cane  -LB      Distance in Feet (Gait) 150; 100  -LB      Pattern (Gait) step-through  -LB      Deviations/Abnormal Patterns (Gait) base of support, wide  -LB      Bilateral Gait Deviations decreased arm swing  -LB      Gait Assessment/Intervention Patient displayed mild balance impairments with any type of head turns while conversing or performing outcome measures  -LB      [place] Level (Stairs) stand by assist  -LB      Handrail Location (Stairs) both sides  -LB      Number of Steps (Stairs) 4  -LB      Ascending Technique (Stairs) step-over-step  -LB      Descending Technique (Stairs) step-to-step  -LB      Stairs, Impairments impaired vision;impaired balance  -LB      Comment, (Gait/Stairs) When descending patient with decreased forward weight shift and clearance of feet   -LB         Balance Skills Training    Sitting-Level of Assistance Independent  -LB      Standing-Level of Assistance Independent  -LB      Gait Balance-Level of Assistance Distant supervision;Independent  -LB      Balance Comments See FGA, Montoya  -LB                User Key  (r) = Recorded By, (t) = Taken By, (c) = Cosigned By      Initials Name Provider Type    LB Shaniqua Davis, PT Physical Therapist                            Therapy Education  Education Details: Effect of diplopia on balance  Given: Fall prevention and home safety  Program: New  How Provided: Verbal  Provided to: Patient  Level of Understanding: Verbalized, Teach back education performed     PT OP Goals       Row Name 01/03/24 1300          PT Short Term Goals    STG Date to Achieve 01/31/24  -LB     STG 1 Patient to be independent with HEP for balance  -LB     STG 2 Patient to come to stand from various chairs and heights independently  -LB     STG 3 Patient to display initial standing balance independently without feet adjustment  -LB     STG 4 Patient to negotiate 8 steps with 1 handrail and step over fashion ascending and descending with modified independent  -LB     STG 5 6 patient with referral to a vision center for double vision  -LB        Long Term Goals    LTG Date to Achieve 02/28/24  -LB     LTG 1 Patient to be independent with progressive HEP for balance and dynamic activities  -LB     LTG 2 Patient to improve FGA scores by 5 to indicate improved dynamic balance  -LB     LTG 3 Patient to improve Montoya by 5 points to indicate improved standing balance and less risk for falls  -LB     LTG 4 Patient to report no falls in the last 2 months  -LB     LTG 5 Patient to maintain semitandem position for 30 seconds with supervision  -LB     LTG 6 Patient to maintain SLS for 10 seconds on each lower extremity  -LB        Time Calculation    PT Goal Re-Cert Due Date 01/31/24  -LB               User Key  (r) = Recorded By, (t) = Taken By, (c) =  Cosigned By      Initials Name Provider Type    Shaniqua Rouse, PT Physical Therapist                     PT Assessment/Plan       Row Name 01/03/24 9943          PT Assessment    Functional Limitations Impaired gait;Limitations in community activities;Limitation in home management;Other (comment)  Balance impairment  -LB     Impairments Balance;Coordination;Locomotion;Gait  -LB     Assessment Comments Mr. Garcia is a pleasant 63-year-old male who sustained a stroke in October 2023.  Patient patient reports ongoing diplopia and balance issues since his stroke.  Patient did complete home health therapy in November following his hospitalization.  The patient has good strength in bilateral lower extremities, some coordination issues but sensation/proprioception is intact.  The patient has medical history of COPD, A-fib, hypertension, spinal stenosis, MVR, tobacco and alcohol abuse.  The patient reports that he has stopped drinking and occasionally will smoke 1 cigarette.  The patient does have balance impairment as indicated both on FGA and Montoya indicating both balance issues with ambulation and standing balance.  Patient did have an evolving presentation as he had a significant loss of balance with coming to stand and difficulty maintaining balance with pertubation.  The patient is motivated to improve balance, hoping to feel more secure with his activities of daily living.  Discussed the effects of double vision on his balance.  Patient in agreement with this therapist contacting his PCP regarding a possible referral to a vision center.  Correction of his double vision could certainly improve his balance as the patient reports most issues occur with a change of position.  -LB     Please refer to paper survey for additional self-reported information Yes  -LB     Rehab Potential Good  -LB     Patient/caregiver participated in establishment of treatment plan and goals Yes  -LB     Patient would benefit from skilled  "therapy intervention Yes  -LB        PT Plan    PT Frequency 1x/week  -LB     Predicted Duration of Therapy Intervention (PT) 8 visits; 8-10 weeks  -LB     Planned CPT's? PT EVAL MOD COMPLELITY: 59075;PT THER PROC EA 15 MIN: 04234;PT THER ACT EA 15 MIN: 69145;PT NEUROMUSC RE-EDUCATION EA 15 MIN: 51680;PT SELF CARE/HOME MGMT/TRAIN EA 15: 60148  -LB     Physical Therapy Interventions (Optional Details) balance training;gait training;gross motor skills;home exercise program;neuromuscular re-education;patient/family education;stair training;transfer training  -LB               User Key  (r) = Recorded By, (t) = Taken By, (c) = Cosigned By      Initials Name Provider Type    Shaniqua Rouse PT Physical Therapist                        OP Exercises       Row Name 01/03/24 1330             Subjective    Subjective Comments \"I feel dizzy if I move too fast and feel off balance.  I also have double vision which affects things\"  -LB         Subjective Pain    Able to rate subjective pain? yes  -LB      Pre-Treatment Pain Level 0  -LB      Post-Treatment Pain Level 0  -LB                User Key  (r) = Recorded By, (t) = Taken By, (c) = Cosigned By      Initials Name Provider Type    Shaniqua Rouse, PT Physical Therapist                                Outcome Measure Options: FGA (Functional Gait Assessment), Montoya Balance  Montoya Balance Scale  Sitting to Standing: needs minimal aid to stand or stabilize  Standing Unsupported: able to stand safely for 2 minutes  Sitting with Back Unsupported but Feet Supported on Floor or on Stool: able to sit safely and securely for 2 minutes  Standing to Sitting: sits safely with minimal use of hands  Transfers: able to transfer safely with minor use of hands  Standing Unsupported with Eyes Closed: able to stand 10 seconds with supervision  Standing Unsupported with Feet Together: able to place feet together independently and stand 1 minute safely  Reaching Forward with Outstretched Arm " While Standing: can reach forward 12 cm (5 inches)   Object From the Floor From a Standing Position: able to  object safely and easily  Turning to Look Behind Over Left and Right Shoulders While Standing: looks behind from both sides and weight shifts well  Turn 360 Degrees: able to turn 360 degrees safely one side only 4 seconds or less  Place Alternate Foot on Step or Stool While Standing Unsupported: able to stand independently and complete 8 steps in > 20 seconds  Standing Unsupported with One Foot in Front: able to take small step independently and hold 30 seconds  Standing on One Leg: tries to lift leg unable to hold 3 seconds but remains standing independently  Montoya Total Score: 44  Functional Gait Assessment (FGA)  Gait Level Surface: Normal  Change in Gait Speed: Normal  Gait with Horizontal Head Turns: Mild Impairment  Gait with Vertical Head Turns: Mild Impairment  Gait and Pivot Turn: Normal  Step Over Obstacle: Mild Impairment  Gait with Narrow Base of Support: Severe Impairment  Gait with Eyes Closed: Normal  Ambulating Backwards: Mild Impairment  Steps: Mild Impairment  FGA Total Score: 22    Time Calculation:   Start Time: 1258  Stop Time: 1342  Time Calculation (min): 44 min  Untimed Charges  PT Eval/Re-eval Minutes: 40  Total Minutes  Untimed Charges Total Minutes: 40   Total Minutes: 40   Therapy Charges for Today       Code Description Service Date Service Provider Modifiers Qty    81734851916 HC PT EVAL MOD COMPLEXITY 4 1/3/2024 Shaniqua Davis, PT GP 1            PT G-Codes  Outcome Measure Options: FGA (Functional Gait Assessment), Montoya Balance  Montoya Total Score: 44  FGA Total Score: 22         Shaniqua Davis PT  1/3/2024

## 2024-01-03 NOTE — PROGRESS NOTES
Anticoagulation Clinic Progress Note    Anticoagulation Summary  As of 1/3/2024      INR goal:  2.5-3.5   TTR:  19.1% (2.1 mo)   INR used for dosin.2 (1/3/2024)   Warfarin maintenance plan:  2.5 mg every Mon, Fri; 5 mg all other days   Weekly warfarin total:  30 mg   Plan last modified:  Jonn Acosta, PharmD (2023)   Next INR check:  1/10/2024   Target end date:      Indications    Chronic anticoagulation [Z79.01]  S/P MVR (mitral valve replacement) [Z95.2]  Atrial fibrillation  chronic [I48.20]  On anticoagulant therapy [Z79.01]  Acute CVA (cerebrovascular accident) [I63.9]                 Anticoagulation Episode Summary       INR check location:      Preferred lab:      Send INR reminders to:   [facility]    Comments:  Admitted with left atrial thrombus and CVA, previously on Eliquis and changed to warfarin while admitted.          Anticoagulation Care Providers       Provider Role Specialty Phone number    Gordon Walsh MD Referring Cardiology [phone]            Clinic Interview:  Patient Findings     Positives:  Extra doses    Negatives:  Signs/symptoms of thrombosis, Signs/symptoms of bleeding,   Laboratory test error suspected, Change in health, Change in alcohol use,   Change in activity, Upcoming invasive procedure, Emergency department   visit, Upcoming dental procedure, Missed doses, Change in medications,   Change in diet/appetite, Hospital admission, Bruising, Other complaints      Clinical Outcomes     Negatives:  Major bleeding event, Thromboembolic event,   Anticoagulation-related hospital admission, Anticoagulation-related ED   visit, Anticoagulation-related fatality        INR History:      Latest Ref Rng & Units 2023    12:00 AM 2023     2:36 PM 2023     8:36 AM 2023    10:59 AM 2023     2:23 PM 2023     8:47 AM 1/3/2024     3:30 PM   Anticoagulation Monitoring   INR   1.60 3.2  1.95 1.8 4.2   INR Date   2023   12/13/2023 12/27/2023 1/3/2024   INR Goal   2.5-3.5 2.5-3.5  2.5-3.5 2.5-3.5 2.5-3.5   Trend   Up Same  Up Up Same   Last Week Total   20 mg 22.5 mg  22.5 mg 25 mg 35 mg   Next Week Total   22.5 mg 22.5 mg  27.5 mg 32.5 mg 27.5 mg   Sun   2.5 mg 2.5 mg  2.5 mg 5 mg 5 mg   Mon   - 2.5 mg  - 2.5 mg 2.5 mg   Tue   5 mg (11/28) 5 mg  - 5 mg 5 mg   Wed   2.5 mg 2.5 mg  5 mg (12/13) - 2.5 mg (1/3)   Thu   5 mg (11/30) 5 mg  5 mg 7.5 mg (12/28) 5 mg   Fri   2.5 mg 2.5 mg  2.5 mg 2.5 mg 2.5 mg   Sat   2.5 mg 2.5 mg  5 mg 5 mg 5 mg   Historical INR 0.90 - 1.10 1.60       1.95       Visit Report     Report Report         This result is from an external source.       Plan:  1. INR is Supratherapeutic today- see above in Anticoagulation Summary.  Will instruct Joaquín Garcia Jr. to Change their warfarin regimen- see above in Anticoagulation Summary.  Misunderstood directions and took 7.5 mg on thurs  and then 5 mg daily, partial to 2.5 mg today then trial on 2.5 mg MF, 5 AOD, rck 1 week   2. Follow up in 1 week  3. Patient declines warfarin refills.  4. Verbal and written information provided. Patient expresses understanding and has no further questions at this time.    Tasha Velasquez [facility]

## 2024-01-08 DIAGNOSIS — H54.7 VISION IMPAIRMENT: ICD-10-CM

## 2024-01-08 DIAGNOSIS — I63.9 CEREBROVASCULAR ACCIDENT (CVA), UNSPECIFIED MECHANISM: ICD-10-CM

## 2024-01-08 DIAGNOSIS — H53.2 DIPLOPIA: Primary | ICD-10-CM

## 2024-01-09 ENCOUNTER — DOCUMENTATION (OUTPATIENT)
Dept: PHYSICAL THERAPY | Facility: HOSPITAL | Age: 64
End: 2024-01-09
Payer: COMMERCIAL

## 2024-01-09 NOTE — THERAPY DISCHARGE NOTE
Outpatient Physical Therapy Discharge Summary         Patient Name: Joaquín Garcia Jr.  : 1960  MRN: [id number]    Today's Date: 2024    Visit Dx:  No diagnosis found.     PT OP Goals       Row Name 24 1600          PT Short Term Goals    STG 1 Patient to be independent with HEP for balance  -LB     STG 1 Progress Not Met  -LB     STG 2 Patient to come to stand from various chairs and heights independently  -LB     STG 2 Progress Not Met  -LB     STG 3 Patient to display initial standing balance independently without feet adjustment  -LB     STG 3 Progress Not Met  -LB     STG 4 Patient to negotiate 8 steps with 1 handrail and step over fashion ascending and descending with modified independent  -LB     STG 4 Progress Not Met  -LB     STG 5 6 patient with referral to a vision center for double vision  -LB     STG 5 Progress Not Met  -LB        Long Term Goals    LTG 1 Patient to be independent with progressive HEP for balance and dynamic activities  -LB     LTG 1 Progress Not Met  -LB     LTG 2 Patient to improve FGA scores by 5 to indicate improved dynamic balance  -LB     LTG 2 Progress Not Met  -LB     LTG 3 Patient to improve Montoya by 5 points to indicate improved standing balance and less risk for falls  -LB     LTG 3 Progress Not Met  -LB     LTG 4 Patient to report no falls in the last 2 months  -LB     LTG 4 Progress Not Met  -LB     LTG 5 Patient to maintain semitandem position for 30 seconds with supervision  -LB     LTG 5 Progress Not Met  -LB     LTG 6 Patient to maintain SLS for 10 seconds on each lower extremity  -LB     LTG 6 Progress Not Met  -LB               User Key  (r) = Recorded By, (t) = Taken By, (c) = Cosigned By      Initials Name Provider Type    Shaniqua Rouse, PT Physical Therapist                    OP PT Discharge Summary  Date of Discharge: 24  Reason for Discharge: Change in medical status  Outcomes Achieved: Refer to plan of care for updates on goals  achieved  Discharge Instructions/Additional Comments: Family called and reported pt is inpatient at [facility] after a fall at home      Time Calculation:                    Shaniqua Davis, PT  1/9/2024

## 2024-01-17 ENCOUNTER — APPOINTMENT (OUTPATIENT)
Dept: SPEECH THERAPY | Facility: HOSPITAL | Age: 64
End: 2024-01-17
Payer: MEDICARE

## 2024-01-17 ENCOUNTER — APPOINTMENT (OUTPATIENT)
Dept: PHYSICAL THERAPY | Facility: HOSPITAL | Age: 64
End: 2024-01-17
Payer: MEDICARE

## 2024-01-22 ENCOUNTER — APPOINTMENT (OUTPATIENT)
Dept: SPEECH THERAPY | Facility: HOSPITAL | Age: 64
End: 2024-01-22
Payer: MEDICARE

## 2024-01-22 ENCOUNTER — APPOINTMENT (OUTPATIENT)
Dept: PHYSICAL THERAPY | Facility: HOSPITAL | Age: 64
End: 2024-01-22
Payer: MEDICARE

## 2024-01-24 ENCOUNTER — APPOINTMENT (OUTPATIENT)
Dept: SPEECH THERAPY | Facility: HOSPITAL | Age: 64
End: 2024-01-24
Payer: MEDICARE

## 2024-01-24 ENCOUNTER — APPOINTMENT (OUTPATIENT)
Dept: PHYSICAL THERAPY | Facility: HOSPITAL | Age: 64
End: 2024-01-24
Payer: MEDICARE

## 2024-01-29 ENCOUNTER — APPOINTMENT (OUTPATIENT)
Dept: SPEECH THERAPY | Facility: HOSPITAL | Age: 64
End: 2024-01-29
Payer: MEDICARE

## 2024-01-29 ENCOUNTER — APPOINTMENT (OUTPATIENT)
Dept: PHYSICAL THERAPY | Facility: HOSPITAL | Age: 64
End: 2024-01-29
Payer: MEDICARE

## 2024-01-31 ENCOUNTER — APPOINTMENT (OUTPATIENT)
Dept: PHYSICAL THERAPY | Facility: HOSPITAL | Age: 64
End: 2024-01-31
Payer: MEDICARE

## 2024-01-31 ENCOUNTER — APPOINTMENT (OUTPATIENT)
Dept: SPEECH THERAPY | Facility: HOSPITAL | Age: 64
End: 2024-01-31
Payer: MEDICARE

## (undated) DEVICE — DRSNG GZ PETROLTM XEROFORM CURAD 1X8IN STRL

## (undated) DEVICE — SPONGE,LAP,12"X12",XR,ST,5/PK,40PK/CS: Brand: MEDLINE

## (undated) DEVICE — PK NEURO SPINE 40

## (undated) DEVICE — MEDI-VAC YANKAUER SUCTION HANDLE W/BULBOUS TIP: Brand: CARDINAL HEALTH

## (undated) DEVICE — SMOKE EVACUATION TUBING WITH 7/8 IN TO 1/4 IN REDUCER: Brand: BUFFALO FILTER

## (undated) DEVICE — INTENDED FOR TISSUE SEPARATION, AND OTHER PROCEDURES THAT REQUIRE A SHARP SURGICAL BLADE TO PUNCTURE OR CUT.: Brand: BARD-PARKER ® STAINLESS STEEL BLADES

## (undated) DEVICE — 6.0MM PRECISION ROUND

## (undated) DEVICE — CONN TBG Y 5 IN 1 LF STRL

## (undated) DEVICE — ANTIBACTERIAL UNDYED BRAIDED (POLYGLACTIN 910), SYNTHETIC ABSORBABLE SUTURE: Brand: COATED VICRYL

## (undated) DEVICE — SUT VIC 1 OS8 27IN J699H

## (undated) DEVICE — APPL CHLORAPREP HI/LITE 26ML ORNG

## (undated) DEVICE — GOWN,ECLIPSE,FABRIC-REINFORCED,X-LARGE: Brand: MEDLINE

## (undated) DEVICE — SYR LUERLOK 20CC BX/50

## (undated) DEVICE — PENCL E/S ULTRAVAC TELESCP NOSE HOLSTR 10FT

## (undated) DEVICE — GLV SURG BIOGEL LTX PF 7

## (undated) DEVICE — SYR LL 3CC

## (undated) DEVICE — TRAP FLD MINIVAC MEGADYNE 100ML

## (undated) DEVICE — GLV SURG BIOGEL LTX PF 7 1/2

## (undated) DEVICE — DISPOSABLE BIPOLAR CABLE 12FT. (3.6M): Brand: KIRWAN

## (undated) DEVICE — SUT MNCRYL PLS ANTIB UD 4/0 PS2 18IN

## (undated) DEVICE — OPTIFOAM GENTLE SA, POSTOP, 4X8: Brand: MEDLINE

## (undated) DEVICE — GLV SURG PREMIERPRO ORTHO LTX PF SZ8 BRN

## (undated) DEVICE — NEEDLE, QUINCKE, 18GX3.5": Brand: MEDLINE